# Patient Record
Sex: FEMALE | Race: WHITE | NOT HISPANIC OR LATINO | Employment: OTHER | URBAN - METROPOLITAN AREA
[De-identification: names, ages, dates, MRNs, and addresses within clinical notes are randomized per-mention and may not be internally consistent; named-entity substitution may affect disease eponyms.]

---

## 2017-01-04 ENCOUNTER — OFFICE VISIT (OUTPATIENT)
Dept: NUTRITION | Facility: HOSPITAL | Age: 42
End: 2017-01-04
Payer: MEDICARE

## 2017-01-04 DIAGNOSIS — E78.1 PURE HYPERGLYCERIDEMIA: ICD-10-CM

## 2017-01-04 DIAGNOSIS — E66.9 OBESITY, UNSPECIFIED: ICD-10-CM

## 2017-01-04 DIAGNOSIS — E11.9 DIABETES MELLITUS WITHOUT COMPLICATION (HCC): ICD-10-CM

## 2017-01-04 DIAGNOSIS — E78.00 PURE HYPERCHOLESTEROLEMIA: ICD-10-CM

## 2017-01-04 PROCEDURE — 97803 MED NUTRITION INDIV SUBSEQ: CPT

## 2017-01-17 ENCOUNTER — GENERIC CONVERSION - ENCOUNTER (OUTPATIENT)
Dept: OTHER | Facility: OTHER | Age: 42
End: 2017-01-17

## 2017-02-21 ENCOUNTER — ALLSCRIPTS OFFICE VISIT (OUTPATIENT)
Dept: OTHER | Facility: OTHER | Age: 42
End: 2017-02-21

## 2017-03-01 ENCOUNTER — OFFICE VISIT (OUTPATIENT)
Dept: NUTRITION | Facility: HOSPITAL | Age: 42
End: 2017-03-01
Payer: MEDICARE

## 2017-03-01 DIAGNOSIS — E78.00 PURE HYPERCHOLESTEROLEMIA: ICD-10-CM

## 2017-03-01 DIAGNOSIS — E78.1 PURE HYPERGLYCERIDEMIA: ICD-10-CM

## 2017-03-01 DIAGNOSIS — E66.9 OBESITY, UNSPECIFIED: ICD-10-CM

## 2017-03-01 DIAGNOSIS — E11.9 DIABETES MELLITUS WITHOUT COMPLICATION (HCC): ICD-10-CM

## 2017-03-01 PROCEDURE — 97803 MED NUTRITION INDIV SUBSEQ: CPT

## 2017-03-23 ENCOUNTER — TRANSCRIBE ORDERS (OUTPATIENT)
Dept: ADMINISTRATIVE | Facility: HOSPITAL | Age: 42
End: 2017-03-23

## 2017-03-23 ENCOUNTER — APPOINTMENT (OUTPATIENT)
Dept: LAB | Facility: HOSPITAL | Age: 42
End: 2017-03-23
Payer: MEDICARE

## 2017-03-23 DIAGNOSIS — G47.9 REPETITIVE INTRUSIONS OF SLEEP: Primary | ICD-10-CM

## 2017-03-23 DIAGNOSIS — G47.9 REPETITIVE INTRUSIONS OF SLEEP: ICD-10-CM

## 2017-03-23 LAB — VALPROATE SERPL-MCNC: 35 UG/ML (ref 50–100)

## 2017-03-23 PROCEDURE — 36415 COLL VENOUS BLD VENIPUNCTURE: CPT

## 2017-03-23 PROCEDURE — 80164 ASSAY DIPROPYLACETIC ACD TOT: CPT

## 2017-05-03 ENCOUNTER — GENERIC CONVERSION - ENCOUNTER (OUTPATIENT)
Dept: OTHER | Facility: OTHER | Age: 42
End: 2017-05-03

## 2017-05-17 ENCOUNTER — ALLSCRIPTS OFFICE VISIT (OUTPATIENT)
Dept: OTHER | Facility: OTHER | Age: 42
End: 2017-05-17

## 2017-05-24 ENCOUNTER — APPOINTMENT (EMERGENCY)
Dept: RADIOLOGY | Facility: HOSPITAL | Age: 42
End: 2017-05-24
Payer: MEDICARE

## 2017-05-24 ENCOUNTER — HOSPITAL ENCOUNTER (EMERGENCY)
Facility: HOSPITAL | Age: 42
Discharge: HOME | End: 2017-05-24
Attending: EMERGENCY MEDICINE | Admitting: EMERGENCY MEDICINE
Payer: MEDICARE

## 2017-05-24 VITALS
OXYGEN SATURATION: 92 % | HEART RATE: 64 BPM | DIASTOLIC BLOOD PRESSURE: 52 MMHG | WEIGHT: 188 LBS | RESPIRATION RATE: 18 BRPM | SYSTOLIC BLOOD PRESSURE: 91 MMHG | TEMPERATURE: 98.9 F | BODY MASS INDEX: 40.68 KG/M2

## 2017-05-24 DIAGNOSIS — M25.562 BILATERAL KNEE PAIN: Primary | ICD-10-CM

## 2017-05-24 DIAGNOSIS — M25.561 BILATERAL KNEE PAIN: Primary | ICD-10-CM

## 2017-05-24 PROCEDURE — 73562 X-RAY EXAM OF KNEE 3: CPT

## 2017-05-24 PROCEDURE — 99283 EMERGENCY DEPT VISIT LOW MDM: CPT

## 2017-05-24 RX ORDER — DIVALPROEX SODIUM 500 MG/1
1000 TABLET, DELAYED RELEASE ORAL
COMMUNITY
End: 2019-10-10

## 2017-05-24 RX ORDER — ARIPIPRAZOLE 10 MG/1
10 TABLET ORAL DAILY
COMMUNITY
End: 2018-12-03 | Stop reason: SDUPTHER

## 2017-05-24 RX ORDER — NAPROXEN 500 MG/1
500 TABLET ORAL 2 TIMES DAILY WITH MEALS
Qty: 14 TABLET | Refills: 0 | Status: SHIPPED | OUTPATIENT
Start: 2017-05-24 | End: 2017-05-31

## 2017-05-24 RX ORDER — DIVALPROEX SODIUM 250 MG/1
250 TABLET, DELAYED RELEASE ORAL
COMMUNITY
End: 2018-12-03 | Stop reason: SDUPTHER

## 2017-06-21 ENCOUNTER — APPOINTMENT (OUTPATIENT)
Dept: LAB | Facility: HOSPITAL | Age: 42
End: 2017-06-21
Payer: MEDICARE

## 2017-06-21 ENCOUNTER — TRANSCRIBE ORDERS (OUTPATIENT)
Dept: ADMINISTRATIVE | Facility: HOSPITAL | Age: 42
End: 2017-06-21

## 2017-06-21 DIAGNOSIS — G47.9 REPETITIVE INTRUSIONS OF SLEEP: ICD-10-CM

## 2017-06-21 DIAGNOSIS — F20.9 SUBCHRONIC SCHIZOPHRENIA (HCC): ICD-10-CM

## 2017-06-21 DIAGNOSIS — F70 MILD INTELLECTUAL DISABILITIES: ICD-10-CM

## 2017-06-21 DIAGNOSIS — F70 MILD INTELLECTUAL DISABILITIES: Primary | ICD-10-CM

## 2017-06-21 LAB — VALPROATE SERPL-MCNC: 46 UG/ML (ref 50–100)

## 2017-06-21 PROCEDURE — 80164 ASSAY DIPROPYLACETIC ACD TOT: CPT

## 2017-06-21 PROCEDURE — 36415 COLL VENOUS BLD VENIPUNCTURE: CPT

## 2017-07-12 ENCOUNTER — TRANSCRIBE ORDERS (OUTPATIENT)
Dept: ADMINISTRATIVE | Facility: HOSPITAL | Age: 42
End: 2017-07-12

## 2017-07-12 ENCOUNTER — APPOINTMENT (OUTPATIENT)
Dept: NUTRITION | Facility: HOSPITAL | Age: 42
End: 2017-07-12
Payer: MEDICARE

## 2017-07-12 ENCOUNTER — APPOINTMENT (OUTPATIENT)
Dept: LAB | Facility: HOSPITAL | Age: 42
End: 2017-07-12
Payer: MEDICARE

## 2017-07-12 DIAGNOSIS — E03.9 UNSPECIFIED HYPOTHYROIDISM: ICD-10-CM

## 2017-07-12 DIAGNOSIS — E78.1 PURE HYPERGLYCERIDEMIA: ICD-10-CM

## 2017-07-12 DIAGNOSIS — E78.00 PURE HYPERCHOLESTEROLEMIA: ICD-10-CM

## 2017-07-12 DIAGNOSIS — E11.9 DIABETES MELLITUS WITHOUT COMPLICATION (HCC): ICD-10-CM

## 2017-07-12 DIAGNOSIS — E87.6 HYPOPOTASSEMIA: ICD-10-CM

## 2017-07-12 DIAGNOSIS — E55.9 UNSPECIFIED VITAMIN D DEFICIENCY: ICD-10-CM

## 2017-07-12 DIAGNOSIS — E66.9 OBESITY, UNSPECIFIED: ICD-10-CM

## 2017-07-12 DIAGNOSIS — E11.9 DIABETES MELLITUS WITHOUT COMPLICATION (HCC): Primary | ICD-10-CM

## 2017-07-12 LAB
25(OH)D3 SERPL-MCNC: 98.2 NG/ML (ref 30–100)
ALBUMIN SERPL BCP-MCNC: 2.7 G/DL (ref 3.5–5)
ALP SERPL-CCNC: 70 U/L (ref 46–116)
ALT SERPL W P-5'-P-CCNC: 22 U/L (ref 12–78)
ANION GAP SERPL CALCULATED.3IONS-SCNC: 5 MMOL/L (ref 4–13)
AST SERPL W P-5'-P-CCNC: 19 U/L (ref 5–45)
BILIRUB SERPL-MCNC: 0.3 MG/DL (ref 0.2–1)
BUN SERPL-MCNC: 15 MG/DL (ref 5–25)
CALCIUM SERPL-MCNC: 8.1 MG/DL (ref 8.3–10.1)
CHLORIDE SERPL-SCNC: 103 MMOL/L (ref 100–108)
CHOLEST SERPL-MCNC: 116 MG/DL (ref 50–200)
CO2 SERPL-SCNC: 30 MMOL/L (ref 21–32)
CREAT SERPL-MCNC: 1.01 MG/DL (ref 0.6–1.3)
CREAT UR-MCNC: 59.3 MG/DL
EST. AVERAGE GLUCOSE BLD GHB EST-MCNC: 97 MG/DL
GFR SERPL CREATININE-BSD FRML MDRD: >60 ML/MIN/1.73SQ M
GLUCOSE P FAST SERPL-MCNC: 93 MG/DL (ref 65–99)
HBA1C MFR BLD: 5 % (ref 4.2–6.3)
HDLC SERPL-MCNC: 37 MG/DL (ref 40–60)
LDLC SERPL CALC-MCNC: 58 MG/DL (ref 0–100)
MICROALBUMIN UR-MCNC: <5 MG/L (ref 0–20)
MICROALBUMIN/CREAT 24H UR: <8 MG/G CREATININE (ref 0–30)
PHOSPHATE SERPL-MCNC: 4.1 MG/DL (ref 2.7–4.5)
POTASSIUM SERPL-SCNC: 3.7 MMOL/L (ref 3.5–5.3)
PROT SERPL-MCNC: 5.9 G/DL (ref 6.4–8.2)
SODIUM SERPL-SCNC: 138 MMOL/L (ref 136–145)
TRIGL SERPL-MCNC: 106 MG/DL
TSH SERPL DL<=0.05 MIU/L-ACNC: 5.25 UIU/ML (ref 0.36–3.74)

## 2017-07-12 PROCEDURE — 80061 LIPID PANEL: CPT

## 2017-07-12 PROCEDURE — 82306 VITAMIN D 25 HYDROXY: CPT

## 2017-07-12 PROCEDURE — 82570 ASSAY OF URINE CREATININE: CPT | Performed by: INTERNAL MEDICINE

## 2017-07-12 PROCEDURE — 83036 HEMOGLOBIN GLYCOSYLATED A1C: CPT

## 2017-07-12 PROCEDURE — 84100 ASSAY OF PHOSPHORUS: CPT

## 2017-07-12 PROCEDURE — 36415 COLL VENOUS BLD VENIPUNCTURE: CPT

## 2017-07-12 PROCEDURE — 84443 ASSAY THYROID STIM HORMONE: CPT

## 2017-07-12 PROCEDURE — 97803 MED NUTRITION INDIV SUBSEQ: CPT

## 2017-07-12 PROCEDURE — 82043 UR ALBUMIN QUANTITATIVE: CPT | Performed by: INTERNAL MEDICINE

## 2017-07-12 PROCEDURE — 80053 COMPREHEN METABOLIC PANEL: CPT

## 2017-08-30 ENCOUNTER — APPOINTMENT (OUTPATIENT)
Dept: LAB | Facility: HOSPITAL | Age: 42
End: 2017-08-30
Payer: MEDICARE

## 2017-08-30 ENCOUNTER — TRANSCRIBE ORDERS (OUTPATIENT)
Dept: ADMINISTRATIVE | Facility: HOSPITAL | Age: 42
End: 2017-08-30

## 2017-08-30 DIAGNOSIS — E03.9 UNSPECIFIED HYPOTHYROIDISM: Primary | ICD-10-CM

## 2017-08-30 DIAGNOSIS — E03.9 UNSPECIFIED HYPOTHYROIDISM: ICD-10-CM

## 2017-08-30 LAB
ESTRADIOL SERPL-MCNC: 47 PG/ML
FSH SERPL-ACNC: 64.8 MIU/ML
LH SERPL-ACNC: 42.2 MIU/ML
PROLACTIN SERPL-MCNC: 13.1 NG/ML
TSH SERPL DL<=0.05 MIU/L-ACNC: 1.29 UIU/ML (ref 0.36–3.74)

## 2017-08-30 PROCEDURE — 84443 ASSAY THYROID STIM HORMONE: CPT

## 2017-08-30 PROCEDURE — 83002 ASSAY OF GONADOTROPIN (LH): CPT

## 2017-08-30 PROCEDURE — 83001 ASSAY OF GONADOTROPIN (FSH): CPT

## 2017-08-30 PROCEDURE — 82670 ASSAY OF TOTAL ESTRADIOL: CPT

## 2017-08-30 PROCEDURE — 36415 COLL VENOUS BLD VENIPUNCTURE: CPT

## 2017-08-30 PROCEDURE — 84146 ASSAY OF PROLACTIN: CPT

## 2017-10-11 ENCOUNTER — APPOINTMENT (OUTPATIENT)
Dept: NUTRITION | Facility: HOSPITAL | Age: 42
End: 2017-10-11
Payer: MEDICARE

## 2017-10-11 DIAGNOSIS — E11.9 TYPE 2 DIABETES MELLITUS WITHOUT COMPLICATION, WITHOUT LONG-TERM CURRENT USE OF INSULIN (HCC): ICD-10-CM

## 2017-10-11 DIAGNOSIS — I10 ESSENTIAL HYPERTENSION, MALIGNANT: ICD-10-CM

## 2017-10-11 DIAGNOSIS — E78.00 PURE HYPERCHOLESTEROLEMIA: ICD-10-CM

## 2017-10-11 DIAGNOSIS — E66.01 MORBID OBESITY (HCC): ICD-10-CM

## 2017-10-11 PROCEDURE — 97803 MED NUTRITION INDIV SUBSEQ: CPT

## 2017-10-25 ENCOUNTER — TRANSCRIBE ORDERS (OUTPATIENT)
Dept: ADMINISTRATIVE | Facility: HOSPITAL | Age: 42
End: 2017-10-25

## 2017-10-25 ENCOUNTER — APPOINTMENT (OUTPATIENT)
Dept: LAB | Facility: HOSPITAL | Age: 42
End: 2017-10-25
Payer: MEDICARE

## 2017-10-25 DIAGNOSIS — E78.00 PURE HYPERCHOLESTEROLEMIA: Primary | ICD-10-CM

## 2017-10-25 DIAGNOSIS — I10 ESSENTIAL HYPERTENSION, MALIGNANT: ICD-10-CM

## 2017-10-25 DIAGNOSIS — E78.00 PURE HYPERCHOLESTEROLEMIA: ICD-10-CM

## 2017-10-25 LAB
ALBUMIN SERPL BCP-MCNC: 2.8 G/DL (ref 3.5–5)
ALP SERPL-CCNC: 89 U/L (ref 46–116)
ALT SERPL W P-5'-P-CCNC: 36 U/L (ref 12–78)
ANION GAP SERPL CALCULATED.3IONS-SCNC: 8 MMOL/L (ref 4–13)
AST SERPL W P-5'-P-CCNC: 34 U/L (ref 5–45)
BILIRUB DIRECT SERPL-MCNC: 0.1 MG/DL (ref 0–0.2)
BILIRUB SERPL-MCNC: 0.4 MG/DL (ref 0.2–1)
BUN SERPL-MCNC: 9 MG/DL (ref 5–25)
CALCIUM SERPL-MCNC: 8.9 MG/DL (ref 8.3–10.1)
CHLORIDE SERPL-SCNC: 105 MMOL/L (ref 100–108)
CHOLEST SERPL-MCNC: 113 MG/DL (ref 50–200)
CK SERPL-CCNC: 75 U/L (ref 26–192)
CO2 SERPL-SCNC: 30 MMOL/L (ref 21–32)
CREAT SERPL-MCNC: 1 MG/DL (ref 0.6–1.3)
GFR SERPL CREATININE-BSD FRML MDRD: 70 ML/MIN/1.73SQ M
GLUCOSE P FAST SERPL-MCNC: 95 MG/DL (ref 65–99)
HDLC SERPL-MCNC: 36 MG/DL (ref 40–60)
LDLC SERPL CALC-MCNC: 53 MG/DL (ref 0–100)
POTASSIUM SERPL-SCNC: 4.5 MMOL/L (ref 3.5–5.3)
PROT SERPL-MCNC: 6.7 G/DL (ref 6.4–8.2)
SODIUM SERPL-SCNC: 143 MMOL/L (ref 136–145)
TRIGL SERPL-MCNC: 119 MG/DL

## 2017-10-25 PROCEDURE — 82550 ASSAY OF CK (CPK): CPT

## 2017-10-25 PROCEDURE — 80048 BASIC METABOLIC PNL TOTAL CA: CPT

## 2017-10-25 PROCEDURE — 80076 HEPATIC FUNCTION PANEL: CPT

## 2017-10-25 PROCEDURE — 36415 COLL VENOUS BLD VENIPUNCTURE: CPT

## 2017-10-25 PROCEDURE — 80061 LIPID PANEL: CPT

## 2017-11-15 ENCOUNTER — GENERIC CONVERSION - ENCOUNTER (OUTPATIENT)
Dept: OTHER | Facility: OTHER | Age: 42
End: 2017-11-15
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
## 2017-11-29 ENCOUNTER — HOSPITAL ENCOUNTER (EMERGENCY)
Facility: HOSPITAL | Age: 42
Discharge: HOME | End: 2017-11-29
Admitting: EMERGENCY MEDICINE
Payer: MEDICARE

## 2017-11-29 ENCOUNTER — APPOINTMENT (EMERGENCY)
Dept: RADIOLOGY | Facility: HOSPITAL | Age: 42
End: 2017-11-29
Payer: MEDICARE

## 2017-11-29 VITALS
HEART RATE: 69 BPM | TEMPERATURE: 97.7 F | OXYGEN SATURATION: 94 % | DIASTOLIC BLOOD PRESSURE: 66 MMHG | RESPIRATION RATE: 18 BRPM | SYSTOLIC BLOOD PRESSURE: 117 MMHG

## 2017-11-29 DIAGNOSIS — S09.90XA: ICD-10-CM

## 2017-11-29 DIAGNOSIS — M54.2 NECK PAIN: ICD-10-CM
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
## 2017-11-29 PROCEDURE — 72125 CT NECK SPINE W/O DYE: CPT

## 2017-11-29 PROCEDURE — 70450 CT HEAD/BRAIN W/O DYE: CPT

## 2017-11-29 PROCEDURE — 99284 EMERGENCY DEPT VISIT MOD MDM: CPT

## 2017-11-29 RX ORDER — NAPROXEN 500 MG/1
500 TABLET ORAL ONCE
Status: DISCONTINUED | OUTPATIENT
Start: 2017-11-29 | End: 2017-11-29

## 2017-11-29 RX ORDER — TRAMADOL HYDROCHLORIDE 50 MG/1
50 TABLET ORAL ONCE
Status: COMPLETED | OUTPATIENT
Start: 2017-11-29 | End: 2017-11-29

## 2017-11-29 RX ORDER — NAPROXEN 500 MG/1
500 TABLET ORAL 2 TIMES DAILY WITH MEALS
Qty: 14 TABLET | Refills: 0 | Status: SHIPPED | OUTPATIENT
Start: 2017-11-29 | End: 2018-12-13 | Stop reason: HOSPADM

## 2017-11-29 RX ADMIN — TRAMADOL HYDROCHLORIDE 50 MG: 50 TABLET, FILM COATED ORAL at 14:35
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
## 2017-11-29 NOTE — ED NOTES
Discharge instructions gone over extensively with caregiver/ director of group home    Patient discharged home       Antony Campbell [facility]  11/29/17 7133

## 2017-11-29 NOTE — ED NOTES
Patient coughed while taking medication  Per father patient usually takes her medication in applesauce  I was not told that prior to administering medication         Loli Colon RN  11/29/17 1285
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
## 2018-01-10 ENCOUNTER — APPOINTMENT (OUTPATIENT)
Dept: NUTRITION | Facility: HOSPITAL | Age: 43
End: 2018-01-10
Payer: MEDICARE

## 2018-01-10 DIAGNOSIS — E78.00 PURE HYPERCHOLESTEROLEMIA: ICD-10-CM

## 2018-01-10 DIAGNOSIS — I10 ESSENTIAL HYPERTENSION, MALIGNANT: ICD-10-CM

## 2018-01-10 DIAGNOSIS — E66.01 MORBID OBESITY (HCC): ICD-10-CM

## 2018-01-10 DIAGNOSIS — E11.9 DIABETES MELLITUS WITHOUT COMPLICATION (HCC): ICD-10-CM

## 2018-01-10 PROCEDURE — 97803 MED NUTRITION INDIV SUBSEQ: CPT

## 2018-01-12 VITALS
RESPIRATION RATE: 16 BRPM | HEART RATE: 63 BPM | DIASTOLIC BLOOD PRESSURE: 70 MMHG | HEIGHT: 56 IN | OXYGEN SATURATION: 94 % | WEIGHT: 187.31 LBS | TEMPERATURE: 96.5 F | SYSTOLIC BLOOD PRESSURE: 96 MMHG | BODY MASS INDEX: 42.13 KG/M2

## 2018-01-12 NOTE — RESULT NOTES
Message   Called and left a voicemail that lab result are ready  Pt can call us back or can have endocrinologist f/u on results  Left a voicemail  Verified Results  () T4 and TSH [id number] 09:11AM Maryla Gottron     Test Name Result Flag Reference   TSH 2 610 uIU/mL  0 450-4 500   Thyroxine (T4) 6 7 ug/dL  4 5-12 0     () FSH and LH [id number] 09:11AM Maryla Gottron     Test Name Result Flag Reference   LH 38 2 mIU/mL     Adult Female: Follicular phase      2 4 -  12 6                                       Ovulation phase      14 0 -  95 6                                       Luteal phase          1 0 -  11 4                                       Postmenopausal        7 7 -  58 5   FSH 43 1 mIU/mL     Adult Female:                                        Follicular phase      3 5 -  12 5                                       Ovulation phase       4 7 -  21 5                                       Luteal phase          1 7 -   7 7                                       Postmenopausal       25 8 - 134 8

## 2018-01-12 NOTE — MISCELLANEOUS
Message  Message Free Text Note Form: Robles Blair is having nasal congestion and cough  She went to ER yesterday at [address] and was treated for wheezing and cough  She will follow up with Dr Vinicio Kumar next week  If she has any fever or difficulty in breathing  I am ordering Zpack for URI; she is rescheduled for January 21, 2016 at 10:30 am       Plan    1   Azithromycin 250 MG Oral Tablet; TAKE 2 TABLETS ON DAY 1 THEN TAKE 1   TABLET A DAY FOR 4 DAYS    Signatures   Electronically signed by : BISI Mariano; Jan 13 2016  9:33AM EST                       (Author)    Electronically signed by : BISI Mariano; Jan 13 2016  9:38AM EST                       (Author)

## 2018-01-13 VITALS
WEIGHT: 186 LBS | TEMPERATURE: 98.5 F | DIASTOLIC BLOOD PRESSURE: 70 MMHG | HEIGHT: 56 IN | BODY MASS INDEX: 41.84 KG/M2 | SYSTOLIC BLOOD PRESSURE: 100 MMHG | RESPIRATION RATE: 18 BRPM | OXYGEN SATURATION: 94 % | HEART RATE: 68 BPM

## 2018-01-15 NOTE — MISCELLANEOUS
-o whom it may concern,     Tao Jeong is a patient currently under the care of Dr Gerard Wyatt  In response to your question regarding a Prevnar 13 injection for Chela; As per  Dr Gerard Wyatt this injection is not indicated  until Tao Jeong is 73years of age  Should you have any questions or concerns, please feel free to contact our office at [phone]      Thank you,    Dylan Santana LPN  Practice Administrator      Electronically signed by:Haider Beckett OM  Aug  3 2016  2:33PM EST Author

## 2018-01-22 VITALS
SYSTOLIC BLOOD PRESSURE: 122 MMHG | HEIGHT: 55 IN | RESPIRATION RATE: 12 BRPM | HEART RATE: 65 BPM | WEIGHT: 180 LBS | DIASTOLIC BLOOD PRESSURE: 84 MMHG | OXYGEN SATURATION: 99 % | TEMPERATURE: 98 F | BODY MASS INDEX: 41.66 KG/M2

## 2018-02-05 ENCOUNTER — OFFICE VISIT (OUTPATIENT)
Dept: PULMONOLOGY | Facility: MEDICAL CENTER | Age: 43
End: 2018-02-05
Payer: MEDICARE

## 2018-02-05 VITALS
HEIGHT: 57 IN | DIASTOLIC BLOOD PRESSURE: 62 MMHG | BODY MASS INDEX: 38.83 KG/M2 | WEIGHT: 180 LBS | HEART RATE: 64 BPM | SYSTOLIC BLOOD PRESSURE: 112 MMHG | OXYGEN SATURATION: 95 % | RESPIRATION RATE: 18 BRPM | TEMPERATURE: 98.4 F

## 2018-02-05 DIAGNOSIS — G47.33 OBSTRUCTIVE SLEEP APNEA: ICD-10-CM

## 2018-02-05 DIAGNOSIS — J45.20 MILD INTERMITTENT ASTHMA WITHOUT COMPLICATION: Primary | ICD-10-CM

## 2018-02-05 PROCEDURE — 99214 OFFICE O/P EST MOD 30 MIN: CPT | Performed by: NURSE PRACTITIONER

## 2018-02-05 RX ORDER — ALBUTEROL SULFATE 2.5 MG/3ML
SOLUTION RESPIRATORY (INHALATION)
COMMUNITY
Start: 2016-01-21 | End: 2018-12-03 | Stop reason: ALTCHOICE

## 2018-02-05 NOTE — ASSESSMENT & PLAN NOTE
Sol Strauss is here for follow-up  She has history of obstructive sleep apnea and is using CPAP with 8 cm of water pressure with 2 L of oxygen she is now using a small ResMed med air fit for her mask with new head here  She did have nocturnal pulse oximetry on room air done December 9, 2017  Oxygen below 88% was 0 minutes this was ordered by Dr Harris     Plan includes discontinuation of supplemental oxygen  She was using 2 L of oxygen with her CPAP 8 cm    I did review with her caretaker results of her nocturnal pulse oximetry and I will discontinue her oxygen order

## 2018-02-05 NOTE — PROGRESS NOTES
Assessment/Plan:     Problem List Items Addressed This Visit        Respiratory    Obstructive sleep apnea     Nelly Broderick is here for follow-up  She has history of obstructive sleep apnea and is using CPAP with 8 cm of water pressure with 2 L of oxygen she is now using a small ResMed med air fit for her mask with new head here  She did have nocturnal pulse oximetry on room air done December 9, 2017  Oxygen below 88% was 0 minutes this was ordered by Dr Harris     Plan includes discontinuation of supplemental oxygen  She was using 2 L of oxygen with her CPAP 8 cm  I did review with her caretaker results of her nocturnal pulse oximetry and I will discontinue her oxygen order         Mild intermittent asthma without complication - Primary     Nelly Broderick does have mild intermittent asthma without complication  She is currently using nebulized treatment with albuterol 2 5 mg daily and has no wheezing now  She is not needing any maintenance inhalation  But she is using rescue albuterol as needed         Relevant Medications    albuterol (2 5 mg/3 mL) 0 083 % nebulizer solution            No Follow-up on file  All questions are answered to the patient's satisfaction and understanding  She verbalizes understanding  She is encouraged to call with any further questions or concerns  Portions of the record may have been created with voice recognition software  Occasional wrong word or "sound a like" substitutions may have occurred due to the inherent limitations of voice recognition software  Read the chart carefully and recognize, using context, where substitutions have occurred  ______________________________________________________________________    Chief Complaint:   Chief Complaint   Patient presents with    Follow-up     Sleep        Patient ID: Nelly Broderick is a 40 y o  y o  female has a past medical history of Asthma; Diabetes mellitus ([address] ); Disease of thyroid gland; Down syndrome;  Heart murmur; Hyperlipidemia; Long Q-T syndrome; Psychiatric disorder; and Sleep apnea  2/5/2018  HPI  Review of Systems   Constitutional: Negative  HENT: Negative  Eyes: Negative  Respiratory: Positive for cough  Cardiovascular: Negative  Gastrointestinal: Negative  Endocrine: Negative  Genitourinary: Negative  Musculoskeletal: Negative  Allergic/Immunologic: Negative  Neurological: Negative  Hematological: Negative  Psychiatric/Behavioral: Negative  Smoking history: She reports that she has never smoked  She has never used smokeless tobacco     The following portions of the patient's history were reviewed and updated as appropriate: problem list     Immunization History   Administered Date(s) Administered    Hep B, adult 11/06/2003, 12/04/2003, 05/06/2004    Td (adult), adsorbed 04/24/2003, 07/31/2013, 05/03/2014    Tuberculin Skin Test-PPD Intradermal 07/31/2015     Current Outpatient Prescriptions   Medication Sig Dispense Refill    albuterol (2 5 mg/3 mL) 0 083 % nebulizer solution Inhale      ARIPiprazole (ABILIFY) 10 mg tablet Take 10 mg by mouth daily      atorvastatin (LIPITOR) 10 mg tablet Take 10 mg by mouth daily   BD ULTRA-FINE LANCETS lancets TEST TWICE DAILY      divalproex sodium (DEPAKOTE) 500 mg EC tablet Take 500 mg by mouth every 12 (twelve) hours        fluticasone (FLONASE) 50 mcg/act nasal spray Fluticasone Propionate 50 MCG/ACT Nasal Suspension USE 1 SPRAY IN EACH NOSTRIL TWICE DAILY  Quantity: 1;  Refills: 5    Amna Cobble D O ; [id number] GM Bottle      glucose blood test strip TEST 2 TO 3 TIMES A DAY      levothyroxine (SYNTHROID) 50 mcg tablet Take 50 mcg by mouth daily  Synthroid 50 MCG Oral Tablet TAKE 1 TABLET DAILY  Refills: 0  Active       OXYGEN-HELIUM IN Inhale   VITAMIN D, ERGOCALCIFEROL, PO Take 1 25 mg by mouth once a week  Administer on sunday      ciclopirox (LOPROX) 0 77 % cream Apply topically 2 (two) times a day  Apply locally to groin, belly and underbreasts      divalproex sodium (DEPAKOTE) 250 mg EC tablet Take 250 mg by mouth daily at bedtime      Multiple Vitamins-Minerals (CENTRUM SILVER ULTRA WOMENS PO) Centrum Silver Ultra Womens Oral Tablet  Refills: 0  Active      naproxen (EC NAPROSYN) 500 MG EC tablet Take 1 tablet by mouth 2 (two) times a day with meals for 7 days 14 tablet 0    naproxen (NAPROSYN) 500 mg tablet Take 1 tablet by mouth 2 (two) times a day with meals for 7 days 14 tablet 0    sitaGLIPtin-metFORMIN (JANUMET)  MG per tablet Take 2 tablets by mouth daily      ziprasidone (GEODON) 60 mg capsule 1 tablet 2 (two) times a day  No current facility-administered medications for this visit  Allergies: Avandia [rosiglitazone]    Objective:  Vitals:    02/05/18 1451   BP: 112/62   BP Location: Left arm   Patient Position: Sitting   Cuff Size: Adult   Pulse: 64   Resp: 18   Temp: 98 4 °F (36 9 °C)   TempSrc: Oral   SpO2: 95%   Weight: 81 6 kg (180 lb)   Height: 4' 8 5" (1 435 m)   Oxygen Therapy  SpO2: 95 %    Wt Readings from Last 3 Encounters:   02/05/18 81 6 kg (180 lb)   11/15/17 81 6 kg (180 lb)   05/24/17 85 3 kg (188 lb)     Body mass index is 39 64 kg/m²  Physical Exam   Constitutional: She is oriented to person, place, and time  She appears well-developed and well-nourished  Down's syndrome   HENT:   Head: Normocephalic and atraumatic  Mallampati IV   Eyes: Conjunctivae and EOM are normal  Pupils are equal, round, and reactive to light  Neck: Normal range of motion  Neck supple  Cardiovascular: Normal rate and regular rhythm  Pulmonary/Chest: Effort normal and breath sounds normal    Abdominal: Soft  Bowel sounds are normal    Musculoskeletal: Normal range of motion  Neurological: She is alert and oriented to person, place, and time  Skin: Skin is warm and dry  Psychiatric: She has a normal mood and affect   Her behavior is normal        Lab Review: Appointment on 10/25/2017   Component Date Value    Total Bilirubin 10/25/2017 0 40     Bilirubin, Direct 10/25/2017 0 10     Alkaline Phosphatase 10/25/2017 89     AST 10/25/2017 34     ALT 10/25/2017 36     Total Protein 10/25/2017 6 7     Albumin 10/25/2017 2 8*    Cholesterol 10/25/2017 113     Triglycerides 10/25/2017 119     HDL, Direct 10/25/2017 36*    LDL Calculated 10/25/2017 53     Total CK 10/25/2017 75     Sodium 10/25/2017 143     Potassium 10/25/2017 4 5     Chloride 10/25/2017 105     CO2 10/25/2017 30     Anion Gap 10/25/2017 8     BUN 10/25/2017 9     Creatinine 10/25/2017 1 00     Glucose, Fasting 10/25/2017 95     Calcium 10/25/2017 8 9     eGFR 10/25/2017 70    Appointment on 08/30/2017   Component Date Value    FSH 08/30/2017 64 8     Estradiol 08/30/2017 47 0     LH 08/30/2017 42 2     TSH 3RD GENERATON 08/30/2017 1 288     Prolactin 08/30/2017 13 1        Diagnostics:  I have personally reviewed pertinent reports  Office Spirometry Results:     No results found

## 2018-02-05 NOTE — ASSESSMENT & PLAN NOTE
Cullen Cheng does have mild intermittent asthma without complication  She is currently using nebulized treatment with albuterol 2 5 mg daily and has no wheezing now  She is not needing any maintenance inhalation    But she is using rescue albuterol as needed

## 2018-02-05 NOTE — PATIENT INSTRUCTIONS
Sleep Apnea   AMBULATORY CARE:   Sleep apnea  is also called obstructive sleep apnea  It is a condition that causes you to stop breathing for 10 seconds or more while you are sleeping  During normal sleep, your throat is kept open by muscles that let air pass through easily  Sleep apnea causes the muscles and tissues around your throat to relax and block air from passing through  Sleep apnea may happen many times while you are asleep  Common symptoms include the following:   · No signs of breathing for 10 seconds or more while you sleep    · Snoring loudly, snorting, gasping or choking while you sleep, and waking up suddenly because of these    · A hard time thinking, remembering things, or focusing on your tasks the following day    · Headache or nausea    · Bedwetting or waking up often during the night to urinate    · Feeling sleepy, slow, and tired during the day  Seek care immediately if:   · You have chest pain or trouble breathing  Contact your healthcare provider if:   · You feel tired or depressed  · You have trouble staying awake during the day  · You have trouble thinking clearly  · You have questions or concerns about your condition or care  Treatment for sleep apnea  includes using a continuous positive airway pressure (CPAP) machine to keep your airway open during sleep  A mask is placed over your nose and mouth, or just your nose  The mask is hooked to the CPAP machine that blows a gentle stream of air into the mask when you breathe  This helps keep your airway open so you can breathe more regularly  Extra oxygen may be given to you through the machine  You may be given a mouth device  It looks like a mouth guard or dental retainer and stops your tongue and mouth tissues from blocking your throat while you sleep  Surgery may be needed to remove extra tissues that block your mouth, throat, or nose  Manage sleep apnea:   · Do not smoke    Nicotine and other chemicals in cigarettes and cigars can cause lung damage  Ask your healthcare provider for information if you currently smoke and need help to quit  E-cigarettes or smokeless tobacco still contain nicotine  Talk to your healthcare provider before you use these products  · Do not drink alcohol or take sedative medicine before you go to sleep  Alcohol and sedatives can relax the muscles and tissues around your throat  This can block the airflow to your lungs  · Maintain a healthy weight  Excess tissue around your throat may restrict your breathing  Ask your healthcare provider for information if you need to lose weight  · Sleep on your side or use pillows designed to prevent sleep apnea  This prevents your tongue or other tissues from blocking your throat  You can also raise the head of your bed  Follow up with your healthcare provider as directed:  Write down your questions so you remember to ask them during your visits  © 2017 [employer] Information is for End User's use only and may not be sold, redistributed or otherwise used for commercial purposes  All illustrations and images included in CareNotes® are the copyrighted property of A D [employer] M [employer]  or [employer]  The above information is an  only  It is not intended as medical advice for individual conditions or treatments  Talk to your doctor, nurse or pharmacist before following any medical regimen to see if it is safe and effective for you

## 2018-02-05 NOTE — LETTER
February 5, 2018     [id number]    Patient: Rose Petersen   YOB: 1975   Date of Visit: 2/5/2018       Dear Dr Priscilla Gonzalez Recipients: Thank you for referring Buena Leventhal to me for evaluation  Below are my notes for this consultation  If you have questions, please do not hesitate to call me  I look forward to following your patient along with you           Sincerely,        SESAR Lugo        CC: No Recipients

## 2018-02-05 NOTE — LETTER
February 5, 2018     Marcelino Womack  [address] [facility]    Patient: Sukumar Petersen   YOB: 1975   Date of Visit: 2/5/2018       Dear Dr Bateman Argue: Thank you for referring Franchesca Jessie to me for evaluation  Below are my notes for this consultation  If you have questions, please do not hesitate to call me  I look forward to following your patient along with you  David Salter has stable mild intermittent asthma  She is using nebulized albuterol as needed  She is not needing maintenance inhaler  She is compliant in the use of CPAP 8cm H2O pressure  She no longer needs supplemental oxygen with CPAP  Nocturnal pulse oximetry was done on room air CPAP  Oxygen below  88% was zero minutes and lowest oxygen was 92%  Sincerely,        SESAR Villarreal        CC: No Recipients  Tor Villarreal  2/5/2018  3:30 PM  Cosign Needed  Assessment/Plan:     Problem List Items Addressed This Visit        Respiratory    Obstructive sleep apnea     David Salter is here for follow-up  She has history of obstructive sleep apnea and is using CPAP with 8 cm of water pressure with 2 L of oxygen she is now using a small ResMed med air fit for her mask with new head here  She did have nocturnal pulse oximetry on room air done December 9, 2017  Oxygen below 88% was 0 minutes this was ordered by Dr Harris     Plan includes discontinuation of supplemental oxygen  She was using 2 L of oxygen with her CPAP 8 cm  I did review with her caretaker results of her nocturnal pulse oximetry and I will discontinue her oxygen order         Mild intermittent asthma without complication - Primary     David Salter does have mild intermittent asthma without complication  She is currently using nebulized treatment with albuterol 2 5 mg daily and has no wheezing now  She is not needing any maintenance inhalation    But she is using rescue albuterol as needed         Relevant Medications    albuterol (2 5 mg/3 mL) 0 083 % nebulizer solution            No Follow-up on file  All questions are answered to the patient's satisfaction and understanding  She verbalizes understanding  She is encouraged to call with any further questions or concerns  Portions of the record may have been created with voice recognition software  Occasional wrong word or "sound a like" substitutions may have occurred due to the inherent limitations of voice recognition software  Read the chart carefully and recognize, using context, where substitutions have occurred  ______________________________________________________________________    Chief Complaint:   Chief Complaint   Patient presents with    Follow-up     Sleep        Patient ID: Gigi Ojeda is a 40 y o  y o  female has a past medical history of Asthma; Diabetes mellitus ([address] ); Disease of thyroid gland; Down syndrome; Heart murmur; Hyperlipidemia; Long Q-T syndrome; Psychiatric disorder; and Sleep apnea  2/5/2018  HPI  Review of Systems   Constitutional: Negative  HENT: Negative  Eyes: Negative  Respiratory: Positive for cough  Cardiovascular: Negative  Gastrointestinal: Negative  Endocrine: Negative  Genitourinary: Negative  Musculoskeletal: Negative  Allergic/Immunologic: Negative  Neurological: Negative  Hematological: Negative  Psychiatric/Behavioral: Negative  Smoking history: She reports that she has never smoked   She has never used smokeless tobacco     The following portions of the patient's history were reviewed and updated as appropriate: problem list     Immunization History   Administered Date(s) Administered    Hep B, adult 11/06/2003, 12/04/2003, 05/06/2004    Td (adult), adsorbed 04/24/2003, 07/31/2013, 05/03/2014    Tuberculin Skin Test-PPD Intradermal 07/31/2015     Current Outpatient Prescriptions   Medication Sig Dispense Refill    albuterol (2 5 mg/3 mL) 0 083 % nebulizer solution Inhale      ARIPiprazole (ABILIFY) 10 mg tablet Take 10 mg by mouth daily      atorvastatin (LIPITOR) 10 mg tablet Take 10 mg by mouth daily   BD ULTRA-FINE LANCETS lancets TEST TWICE DAILY      divalproex sodium (DEPAKOTE) 500 mg EC tablet Take 500 mg by mouth every 12 (twelve) hours        fluticasone (FLONASE) 50 mcg/act nasal spray Fluticasone Propionate 50 MCG/ACT Nasal Suspension USE 1 SPRAY IN EACH NOSTRIL TWICE DAILY  Quantity: 1;  Refills: 5    Carlotta Niece D O ; [id number] GM Bottle      glucose blood test strip TEST 2 TO 3 TIMES A DAY      levothyroxine (SYNTHROID) 50 mcg tablet Take 50 mcg by mouth daily  Synthroid 50 MCG Oral Tablet TAKE 1 TABLET DAILY  Refills: 0  Active       OXYGEN-HELIUM IN Inhale   VITAMIN D, ERGOCALCIFEROL, PO Take 1 25 mg by mouth once a week  Administer on sunday      ciclopirox (LOPROX) 0 77 % cream Apply topically 2 (two) times a day  Apply locally to groin, belly and underbreasts      divalproex sodium (DEPAKOTE) 250 mg EC tablet Take 250 mg by mouth daily at bedtime      Multiple Vitamins-Minerals (CENTRUM SILVER ULTRA WOMENS PO) Centrum Silver Ultra Womens Oral Tablet  Refills: 0  Active      naproxen (EC NAPROSYN) 500 MG EC tablet Take 1 tablet by mouth 2 (two) times a day with meals for 7 days 14 tablet 0    naproxen (NAPROSYN) 500 mg tablet Take 1 tablet by mouth 2 (two) times a day with meals for 7 days 14 tablet 0    sitaGLIPtin-metFORMIN (JANUMET)  MG per tablet Take 2 tablets by mouth daily      ziprasidone (GEODON) 60 mg capsule 1 tablet 2 (two) times a day  No current facility-administered medications for this visit        Allergies: Avandia [rosiglitazone]    Objective:  Vitals:    02/05/18 1451   BP: 112/62   BP Location: Left arm   Patient Position: Sitting   Cuff Size: Adult   Pulse: 64   Resp: 18   Temp: 98 4 °F (36 9 °C)   TempSrc: Oral   SpO2: 95%   Weight: 81 6 kg (180 lb)   Height: 4' 8 5" (1 435 m)   Oxygen Therapy  SpO2: 95 %     Wt Readings from Last 3 Encounters:   02/05/18 81 6 kg (180 lb)   11/15/17 81 6 kg (180 lb)   05/24/17 85 3 kg (188 lb)     Body mass index is 39 64 kg/m²  Physical Exam   Constitutional: She is oriented to person, place, and time  She appears well-developed and well-nourished  Down's syndrome   HENT:   Head: Normocephalic and atraumatic  Mallampati IV   Eyes: Conjunctivae and EOM are normal  Pupils are equal, round, and reactive to light  Neck: Normal range of motion  Neck supple  Cardiovascular: Normal rate and regular rhythm  Pulmonary/Chest: Effort normal and breath sounds normal    Abdominal: Soft  Bowel sounds are normal    Musculoskeletal: Normal range of motion  Neurological: She is alert and oriented to person, place, and time  Skin: Skin is warm and dry  Psychiatric: She has a normal mood and affect  Her behavior is normal        Lab Review:   Appointment on 10/25/2017   Component Date Value    Total Bilirubin 10/25/2017 0 40     Bilirubin, Direct 10/25/2017 0 10     Alkaline Phosphatase 10/25/2017 89     AST 10/25/2017 34     ALT 10/25/2017 36     Total Protein 10/25/2017 6 7     Albumin 10/25/2017 2 8*    Cholesterol 10/25/2017 113     Triglycerides 10/25/2017 119     HDL, Direct 10/25/2017 36*    LDL Calculated 10/25/2017 53     Total CK 10/25/2017 75     Sodium 10/25/2017 143     Potassium 10/25/2017 4 5     Chloride 10/25/2017 105     CO2 10/25/2017 30     Anion Gap 10/25/2017 8     BUN 10/25/2017 9     Creatinine 10/25/2017 1 00     Glucose, Fasting 10/25/2017 95     Calcium 10/25/2017 8 9     eGFR 10/25/2017 70    Appointment on 08/30/2017   Component Date Value    FSH 08/30/2017 64 8     Estradiol 08/30/2017 47 0     LH 08/30/2017 42 2     TSH 3RD GENERATON 08/30/2017 1 288     Prolactin 08/30/2017 13 1        Diagnostics:  I have personally reviewed pertinent reports  Office Spirometry Results:     No results found

## 2018-04-11 ENCOUNTER — CLINICAL SUPPORT (OUTPATIENT)
Dept: NUTRITION | Facility: HOSPITAL | Age: 43
End: 2018-04-11
Payer: MEDICARE

## 2018-04-11 VITALS — BODY MASS INDEX: 39.6 KG/M2 | WEIGHT: 179.8 LBS

## 2018-04-11 DIAGNOSIS — E11.9 DIABETES MELLITUS, STABLE (HCC): ICD-10-CM

## 2018-04-11 PROCEDURE — 97803 MED NUTRITION INDIV SUBSEQ: CPT

## 2018-04-11 NOTE — PROGRESS NOTES
Follow-Up Nutrition Assessment Form    Patient Name: Kadeem Petersen    YOB: 1975    Sex: Female      Follow Up Date: 4/11/2018  Start Time: 1235 Stop Time: 1305 Total Minutes: 28     Data:  Present at session: Patient, mother and caregiver   Parent Concerns: Weight gain   Medical Dx/Reason for Referral: E11 9   Past Medical History:   Diagnosis Date    Asthma     Diabetes mellitus ([address] )     Disease of thyroid gland     Down syndrome     Heart murmur     Hyperlipidemia     Long Q-T syndrome     Psychiatric disorder     schizo    Sleep apnea        Current Outpatient Prescriptions   Medication Sig Dispense Refill    albuterol (2 5 mg/3 mL) 0 083 % nebulizer solution Inhale      ARIPiprazole (ABILIFY) 10 mg tablet Take 10 mg by mouth daily      atorvastatin (LIPITOR) 10 mg tablet Take 10 mg by mouth daily   BD ULTRA-FINE LANCETS lancets TEST TWICE DAILY      ciclopirox (LOPROX) 0 77 % cream Apply topically 2 (two) times a day  Apply locally to groin, belly and underbreasts      divalproex sodium (DEPAKOTE) 250 mg EC tablet Take 250 mg by mouth daily at bedtime      divalproex sodium (DEPAKOTE) 500 mg EC tablet Take 500 mg by mouth every 12 (twelve) hours        fluticasone (FLONASE) 50 mcg/act nasal spray Fluticasone Propionate 50 MCG/ACT Nasal Suspension USE 1 SPRAY IN EACH NOSTRIL TWICE DAILY  Quantity: 1;  Refills: 5    Anjana Marine D O ; [id number] GM Bottle      glucose blood test strip TEST 2 TO 3 TIMES A DAY      levothyroxine (SYNTHROID) 50 mcg tablet Take 50 mcg by mouth daily  Synthroid 50 MCG Oral Tablet TAKE 1 TABLET DAILY    Refills: 0  Active       Multiple Vitamins-Minerals (CENTRUM SILVER ULTRA WOMENS PO) Centrum Silver Ultra Womens Oral Tablet  Refills: 0  Active      naproxen (EC NAPROSYN) 500 MG EC tablet Take 1 tablet by mouth 2 (two) times a day with meals for 7 days 14 tablet 0    naproxen (NAPROSYN) 500 mg tablet Take 1 tablet by mouth 2 (two) times a day with meals for 7 days 14 tablet 0    OXYGEN-HELIUM IN Inhale   sitaGLIPtin-metFORMIN (JANUMET)  MG per tablet Take 2 tablets by mouth daily      VITAMIN D, ERGOCALCIFEROL, PO Take 1 25 mg by mouth once a week  Administer on sunday      ziprasidone (GEODON) 60 mg capsule 1 tablet 2 (two) times a day  No current facility-administered medications for this visit  Additional Meds/Supplements: None   Barriers to Learning: Pt has Down's Syndrome   Labs:    Height: Ht Readings from Last 3 Encounters:   02/05/18 4' 8 5" (1 435 m)   11/15/17 4' 7" (1 397 m)   05/17/17 4' 8" (1 422 m)      Weight: Wt Readings from Last 3 Encounters:   04/11/18 81 6 kg (179 lb 12 8 oz)   02/05/18 81 6 kg (180 lb)   11/15/17 81 6 kg (180 lb)     Body mass index is 39 6 kg/m²  Wt  Change Since Last Visit: [x]Yes     []No  Amount: 1 8 lb gain      Energy Needs:    Pain Screen: Are you having pain now? Goals Achieved: abiding by 1200 kcal diet, 8 pm snack and BS control off medication     New Goals:   1  Continue 1200 ADA diet daily   2  Take patient on walks, Jada or [employer] exercise programs weekly   3  Offer water or sugar free beverage or fresh cut-up vegetables if pt complains of hunger and is adamant about eating something       Initial PES:    Obesity related to h/o lack of physical activity as evidenced by elevated BMI   New PES:  None      New Problem List:  1  Weight gain   2  Lack of physical activity during winter   3         Assessment:       Medical Nutrition Therapy Intervention:  [x]Individualized Meal Plan []Understanding Lab Values   []Basic Pathophysiology of Disease []Food/Medication Interactions   [x]Food Diary [x]Exercise   []Lifestyle/Behavior Modification Techniques []Medication, Mechanism of Action   []Label Reading []Self Blood Glucose Monitoring   [x]Weight/BMI Goals []Other -    Other Notes: Pt's mother states that Xavi Bajwa has stopped being as active over the winter but she has started going to some [employer] exercise programs including Jada and yoga  They also intend to start taking her on walks again now that the weather is nicer  Steffanie Sanders has been able to stick to her 1200 ADA diet with the help of her caregivers at the group home  Pt has been pushing food away and saving food when she goes out for meals for a later time  She has been eating more vegetables and really enjoys salads  Pt has been maintaining BS below 100 without medications per BS log that group home is keeping         Comprehension: []Excellent  []Very Good  [x]Good  []Fair   []Poor    Receptivity: []Excellent  []Very Good  [x]Good  []Fair   []Poor    Expected Compliance: []Excellent  []Very Good  [x]Good  []Fair   []Poor      Labs:  CMP  Lab Results   Component Value Date     10/25/2017    K 4 5 10/25/2017     10/25/2017    CO2 30 10/25/2017    ANIONGAP 8 10/25/2017    BUN 9 10/25/2017    CREATININE 1 00 10/25/2017    GLUCOSE 115 10/10/2016    GLUF 95 10/25/2017    CALCIUM 8 9 10/25/2017    AST 34 10/25/2017    ALT 36 10/25/2017    ALKPHOS 89 10/25/2017    PROT 6 7 10/25/2017    BILITOT 0 40 10/25/2017    EGFR 70 10/25/2017       BMP  Lab Results   Component Value Date    GLUCOSE 115 10/10/2016    CALCIUM 8 9 10/25/2017     10/25/2017    K 4 5 10/25/2017    CO2 30 10/25/2017     10/25/2017    BUN 9 10/25/2017    CREATININE 1 00 10/25/2017       Lipids  Lab Results   Component Value Date    CHOL 113 10/25/2017    CHOL 116 07/12/2017    CHOL 124 02/08/2016     Lab Results   Component Value Date    HDL 36 (L) 10/25/2017    HDL 37 (L) 07/12/2017    HDL 42 02/08/2016     Lab Results   Component Value Date    LDLCALC 53 10/25/2017    LDLCALC 58 07/12/2017    LDLCALC 71 02/08/2016     Lab Results   Component Value Date    TRIG 119 10/25/2017    TRIG 106 07/12/2017    TRIG 54 02/08/2016     No components found for: CHOLHDL    Hemoglobin A1C  Lab Results   Component Value Date    HGBA1C 5 0 07/12/2017 Fasting Glucose  Lab Results   Component Value Date    GLUF 95 10/25/2017       Insulin     Thyroid  No results found for: TSH, F9FQAPT, C7GGWNW, THYROIDAB    Hepatic Function Panel  Lab Results   Component Value Date    ALT 36 10/25/2017    AST 34 10/25/2017    ALKPHOS 89 10/25/2017    BILITOT 0 40 10/25/2017       Celiac Disease Antibody Panel  No results found for: ENDOMYSIAL IGA, GLIADIN IGA, GLIADIN IGG, IGA, TISSUE TRANSGLUT AB, TTG IGA   Iron  No results found for: IRON, TIBC, FERRITIN    Vitamins  No results found for: VITAMIN B2   No results found for: NICOTINAMIDE, NICOTINIC ACID   No results found for: VITAMINB6  No results found for: [id number]  No results found for: VITB5  No results found for: Q4FLPXOP  No results found for: THYROGLB  No results found for: VITAMIN K   No results found for: 25-HYDROXY VIT D   No results found for: VITAMINE     No Follow-up on file      Kim Johnson, MS, RDN, [address] [id number]

## 2018-07-11 ENCOUNTER — CLINICAL SUPPORT (OUTPATIENT)
Dept: NUTRITION | Facility: HOSPITAL | Age: 43
End: 2018-07-11
Payer: MEDICARE

## 2018-07-11 ENCOUNTER — APPOINTMENT (OUTPATIENT)
Dept: LAB | Facility: HOSPITAL | Age: 43
End: 2018-07-11
Payer: MEDICARE

## 2018-07-11 ENCOUNTER — TRANSCRIBE ORDERS (OUTPATIENT)
Dept: ADMINISTRATIVE | Facility: HOSPITAL | Age: 43
End: 2018-07-11

## 2018-07-11 VITALS — WEIGHT: 176.6 LBS | BODY MASS INDEX: 38.9 KG/M2

## 2018-07-11 DIAGNOSIS — E03.9 MYXEDEMA HEART DISEASE: ICD-10-CM

## 2018-07-11 DIAGNOSIS — E11.9 DIABETES MELLITUS WITHOUT COMPLICATION (HCC): ICD-10-CM

## 2018-07-11 DIAGNOSIS — I51.9 MYXEDEMA HEART DISEASE: ICD-10-CM

## 2018-07-11 DIAGNOSIS — E11.9 SEVERE DIABETES MELLITUS (HCC): ICD-10-CM

## 2018-07-11 DIAGNOSIS — E11.9 SEVERE DIABETES MELLITUS (HCC): Primary | ICD-10-CM

## 2018-07-11 LAB
ALBUMIN SERPL BCP-MCNC: 3 G/DL (ref 3.5–5)
ALP SERPL-CCNC: 91 U/L (ref 46–116)
ALT SERPL W P-5'-P-CCNC: 32 U/L (ref 12–78)
ANION GAP SERPL CALCULATED.3IONS-SCNC: 4 MMOL/L (ref 4–13)
AST SERPL W P-5'-P-CCNC: 26 U/L (ref 5–45)
BILIRUB SERPL-MCNC: 0.4 MG/DL (ref 0.2–1)
BUN SERPL-MCNC: 12 MG/DL (ref 5–25)
CALCIUM SERPL-MCNC: 8.6 MG/DL (ref 8.3–10.1)
CHLORIDE SERPL-SCNC: 105 MMOL/L (ref 100–108)
CO2 SERPL-SCNC: 32 MMOL/L (ref 21–32)
CREAT SERPL-MCNC: 0.98 MG/DL (ref 0.6–1.3)
CREAT UR-MCNC: 16.1 MG/DL
EST. AVERAGE GLUCOSE BLD GHB EST-MCNC: 108 MG/DL
GFR SERPL CREATININE-BSD FRML MDRD: 72 ML/MIN/1.73SQ M
GLUCOSE SERPL-MCNC: 91 MG/DL (ref 65–140)
HBA1C MFR BLD: 5.4 % (ref 4.2–6.3)
MICROALBUMIN UR-MCNC: 6.4 MG/L (ref 0–20)
MICROALBUMIN/CREAT 24H UR: 40 MG/G CREATININE (ref 0–30)
POTASSIUM SERPL-SCNC: 4.3 MMOL/L (ref 3.5–5.3)
PROT SERPL-MCNC: 6.8 G/DL (ref 6.4–8.2)
SODIUM SERPL-SCNC: 141 MMOL/L (ref 136–145)
TSH SERPL DL<=0.05 MIU/L-ACNC: 1.89 UIU/ML (ref 0.36–3.74)

## 2018-07-11 PROCEDURE — 83036 HEMOGLOBIN GLYCOSYLATED A1C: CPT | Performed by: INTERNAL MEDICINE

## 2018-07-11 PROCEDURE — 36415 COLL VENOUS BLD VENIPUNCTURE: CPT | Performed by: INTERNAL MEDICINE

## 2018-07-11 PROCEDURE — 82570 ASSAY OF URINE CREATININE: CPT | Performed by: INTERNAL MEDICINE

## 2018-07-11 PROCEDURE — 84443 ASSAY THYROID STIM HORMONE: CPT

## 2018-07-11 PROCEDURE — 97803 MED NUTRITION INDIV SUBSEQ: CPT | Performed by: DIETITIAN, REGISTERED

## 2018-07-11 PROCEDURE — 82043 UR ALBUMIN QUANTITATIVE: CPT | Performed by: INTERNAL MEDICINE

## 2018-07-11 PROCEDURE — 80053 COMPREHEN METABOLIC PANEL: CPT | Performed by: INTERNAL MEDICINE

## 2018-07-11 NOTE — PROGRESS NOTES
Follow-Up Nutrition Assessment Form    Patient Name: China Petersen    YOB: 1975    Sex: Female      Follow Up Date: 7/11/2018  Start Time: 2:30 Stop Time: 3:30 Total Minutes: 30     Data:  Present at session: self and mother   Parent Concerns: Low BS   Medical Dx/Reason for Referral:  E11 9   Past Medical History:   Diagnosis Date    Asthma     Diabetes mellitus ([address] )     Disease of thyroid gland     Down syndrome     Heart murmur     Hyperlipidemia     Long Q-T syndrome     Psychiatric disorder     schizo    Sleep apnea        Current Outpatient Prescriptions   Medication Sig Dispense Refill    albuterol (2 5 mg/3 mL) 0 083 % nebulizer solution Inhale      ARIPiprazole (ABILIFY) 10 mg tablet Take 10 mg by mouth daily      atorvastatin (LIPITOR) 10 mg tablet Take 10 mg by mouth daily   BD ULTRA-FINE LANCETS lancets TEST TWICE DAILY      ciclopirox (LOPROX) 0 77 % cream Apply topically 2 (two) times a day  Apply locally to groin, belly and underbreasts      divalproex sodium (DEPAKOTE) 250 mg EC tablet Take 250 mg by mouth daily at bedtime      divalproex sodium (DEPAKOTE) 500 mg EC tablet Take 500 mg by mouth every 12 (twelve) hours        fluticasone (FLONASE) 50 mcg/act nasal spray Fluticasone Propionate 50 MCG/ACT Nasal Suspension USE 1 SPRAY IN EACH NOSTRIL TWICE DAILY  Quantity: 1;  Refills: 5    Earnest Shank D O ; [id number] GM Bottle      glucose blood test strip TEST 2 TO 3 TIMES A DAY      levothyroxine (SYNTHROID) 50 mcg tablet Take 50 mcg by mouth daily  Synthroid 50 MCG Oral Tablet TAKE 1 TABLET DAILY  Refills: 0  Active       Multiple Vitamins-Minerals (CENTRUM SILVER ULTRA WOMENS PO) Centrum Silver Ultra Womens Oral Tablet  Refills: 0  Active      naproxen (EC NAPROSYN) 500 MG EC tablet Take 1 tablet by mouth 2 (two) times a day with meals for 7 days 14 tablet 0    OXYGEN-HELIUM IN Inhale        sitaGLIPtin-metFORMIN (JANUMET)  MG per tablet Take 2 tablets by mouth daily      VITAMIN D, ERGOCALCIFEROL, PO Take 1 25 mg by mouth once a week  Administer on sunday      ziprasidone (GEODON) 60 mg capsule 1 tablet 2 (two) times a day  No current facility-administered medications for this visit  Additional Meds/Supplements:    Barriers to Learning: Other: Down's syndrome   Labs:    Height: Ht Readings from Last 3 Encounters:   02/05/18 4' 8 5" (1 435 m)   11/15/17 4' 7" (1 397 m)   05/17/17 4' 8" (1 422 m)      Weight: Wt Readings from Last 3 Encounters:   07/11/18 80 1 kg (176 lb 9 6 oz)   04/11/18 81 6 kg (179 lb 12 8 oz)   02/05/18 81 6 kg (180 lb)     Body mass index is 38 9 kg/m²  Wt  Change Since Last Visit: [x]Yes     []No  Amount:  - 3 3 lbs      Energy Needs: No calculation needed   Pain Screen: Are you having pain now? No      Goals Achieved: Pt has had a weight loss and BS are under control without medication  New Goals:   1    2    3        Initial PES:       New PES: No Change      New Problem List:  1  Pt has been having some low BS with agitation, headache and fuzziness   2    3        Assessment:   Overall pt is doing well     Medical Nutrition Therapy Intervention:  []Individualized Meal Plan []Understanding Lab Values   []Basic Pathophysiology of Disease []Food/Medication Interactions   []Food Diary []Exercise   []Lifestyle/Behavior Modification Techniques []Medication, Mechanism of Action   []Label Reading []Self Blood Glucose Monitoring   []Weight/BMI Goals []Other -    Other Notes: Mother is concerned that pt has been having some low BS, below 80 with agitation, headache and fuzziness  Mother than gives her a small snack which seems to help  Mother is concerned that diet is too restricted at the group home  She is also concerned about Chela choking when provided with salads without a lot of dressing as she doesn't chew her food well and can choke    Mother has come up with adding a little oil and vinegar to increase moisture on salad which discussed would not be a problem  Also suggested a snack when BS were a little low of 15 grams total carbohydrate with some protein such as cheese or peanut butter or low fat yogurt  Micah Valladares is scheduled for blood tests today including an A1c         Comprehension: []Excellent  [x]Very Good  []Good  []Fair   []Poor    Receptivity: []Excellent  [x]Very Good  []Good  []Fair   []Poor    Expected Compliance: []Excellent  [x]Very Good  []Good  []Fair   []Poor      Labs:  CMP  Lab Results   Component Value Date     10/25/2017    K 4 5 10/25/2017     10/25/2017    CO2 30 10/25/2017    ANIONGAP 8 10/25/2017    BUN 9 10/25/2017    CREATININE 1 00 10/25/2017    GLUCOSE 115 10/10/2016    GLUF 95 10/25/2017    CALCIUM 8 9 10/25/2017    AST 34 10/25/2017    ALT 36 10/25/2017    ALKPHOS 89 10/25/2017    PROT 6 7 10/25/2017    BILITOT 0 40 10/25/2017    EGFR 70 10/25/2017       BMP  Lab Results   Component Value Date    GLUCOSE 115 10/10/2016    CALCIUM 8 9 10/25/2017     10/25/2017    K 4 5 10/25/2017    CO2 30 10/25/2017     10/25/2017    BUN 9 10/25/2017    CREATININE 1 00 10/25/2017       Lipids  Lab Results   Component Value Date    CHOL 113 10/25/2017    CHOL 116 07/12/2017    CHOL 124 02/08/2016     Lab Results   Component Value Date    HDL 36 (L) 10/25/2017    HDL 37 (L) 07/12/2017    HDL 42 02/08/2016     Lab Results   Component Value Date    LDLCALC 53 10/25/2017    LDLCALC 58 07/12/2017    LDLCALC 71 02/08/2016     Lab Results   Component Value Date    TRIG 119 10/25/2017    TRIG 106 07/12/2017    TRIG 54 02/08/2016     No components found for: CHOLHDL    Hemoglobin A1C  Lab Results   Component Value Date    HGBA1C 5 0 07/12/2017       Fasting Glucose  Lab Results   Component Value Date    GLUF 95 10/25/2017       Insulin     Thyroid  No results found for: TSH, [id number], H8VBRPE, THYROIDAB    Hepatic Function Panel  Lab Results   Component Value Date    ALT 36 10/25/2017 AST 34 10/25/2017    ALKPHOS 89 10/25/2017    BILITOT 0 40 10/25/2017       Celiac Disease Antibody Panel  No results found for: ENDOMYSIAL IGA, GLIADIN IGA, GLIADIN IGG, IGA, TISSUE TRANSGLUT AB, TTG IGA   Iron  No results found for: IRON, TIBC, FERRITIN    Vitamins  No results found for: VITAMIN B2   No results found for: NICOTINAMIDE, NICOTINIC ACID   No results found for: VITAMINB6  No results found for: [id number]  No results found for: VITB5  No results found for: D4RVLXPR  No results found for: THYROGLB  No results found for: VITAMIN K   No results found for: 25-HYDROXY VIT D   No results found for: VITAMINE     No Follow-up on file      Luh Blunt, MS, RDN, [address] [phone]

## 2018-08-01 RX ORDER — BENZALKONIUM CHLORIDE 170 MG/89ML
GEL TOPICAL
COMMUNITY
End: 2019-10-10

## 2018-08-01 RX ORDER — DIVALPROEX SODIUM 500 MG/1
1000 TABLET, EXTENDED RELEASE ORAL
COMMUNITY
Start: 2018-07-10 | End: 2018-12-03 | Stop reason: SDUPTHER

## 2018-08-01 RX ORDER — LEVOCETIRIZINE DIHYDROCHLORIDE 5 MG/1
TABLET, FILM COATED ORAL
Status: ON HOLD | COMMUNITY
Start: 2018-03-22 | End: 2018-12-13

## 2018-08-01 RX ORDER — ARIPIPRAZOLE 10 MG/1
10 TABLET ORAL
COMMUNITY
Start: 2018-07-10 | End: 2019-10-10

## 2018-08-06 ENCOUNTER — OFFICE VISIT (OUTPATIENT)
Dept: PULMONOLOGY | Facility: MEDICAL CENTER | Age: 43
End: 2018-08-06
Payer: MEDICARE

## 2018-08-06 VITALS
HEART RATE: 62 BPM | WEIGHT: 175 LBS | BODY MASS INDEX: 37.76 KG/M2 | TEMPERATURE: 98.1 F | HEIGHT: 57 IN | DIASTOLIC BLOOD PRESSURE: 60 MMHG | OXYGEN SATURATION: 98 % | RESPIRATION RATE: 16 BRPM | SYSTOLIC BLOOD PRESSURE: 110 MMHG

## 2018-08-06 DIAGNOSIS — J45.20 MILD INTERMITTENT ASTHMA WITHOUT COMPLICATION: ICD-10-CM

## 2018-08-06 DIAGNOSIS — R06.02 SHORTNESS OF BREATH: Primary | ICD-10-CM

## 2018-08-06 DIAGNOSIS — G47.33 OBSTRUCTIVE SLEEP APNEA: ICD-10-CM

## 2018-08-06 DIAGNOSIS — J30.89 ALLERGIC RHINITIS DUE TO OTHER ALLERGIC TRIGGER, UNSPECIFIED SEASONALITY: ICD-10-CM

## 2018-08-06 PROCEDURE — 99214 OFFICE O/P EST MOD 30 MIN: CPT | Performed by: NURSE PRACTITIONER

## 2018-08-06 PROCEDURE — 94010 BREATHING CAPACITY TEST: CPT | Performed by: NURSE PRACTITIONER

## 2018-08-06 RX ORDER — FLUTICASONE PROPIONATE 50 MCG
1 SPRAY, SUSPENSION (ML) NASAL 2 TIMES DAILY
Qty: 16 G | Refills: 5 | Status: SHIPPED | OUTPATIENT
Start: 2018-08-06 | End: 2019-10-10

## 2018-08-06 NOTE — ASSESSMENT & PLAN NOTE
Greer Adams has mild intermittent asthma without complication  She has not been on any maintenance inhaler in some time  She does have nebulizer with albuterol that she can use as needed  Currently she is stable  PFTs were done today in the office show moderate restrictive defect  Forced vital capacity was 1 79 L or 62% of predicted FEV1 1 75 or 74% and obstruction ratio was 98% flow volume loop shows moderate restrictive impairment  She is stable at this time

## 2018-08-06 NOTE — PATIENT INSTRUCTIONS
Ryan Young has mild asthma  This is stable  She can use her rescue inhaler as needed  She does have a  Nebulizer with albuterol as needed  She has allergic rhinitis and this is stable  Continue fluticasone nasal spray 1 spray in each nostril twice daily  It is important that you use at nighttime before uses CPAP

## 2018-08-06 NOTE — PROGRESS NOTES
Assessment/Plan:     Problem List Items Addressed This Visit        Respiratory    Obstructive sleep apnea     Chela has moderate obstructive sleep apnea  He is using an benefitting from her machine  According to my most recent records she is using CPAP of 8 cm without supplemental oxygen nocturnal pulse oximetry was done in November of 2017 on room air CPAP and oxygen below 90% was 0  She is using an benefitting she was initially diagnosed in 2012  Mild intermittent asthma without complication     Chela has mild intermittent asthma without complication  She has not been on any maintenance inhaler in some time  She does have nebulizer with albuterol that she can use as needed  Currently she is stable  PFTs were done today in the office show moderate restrictive defect  Forced vital capacity was 1 79 L or 62% of predicted FEV1 1 75 or 74% and obstruction ratio was 98% flow volume loop shows moderate restrictive impairment  She is stable at this time  Other Visit Diagnoses     Shortness of breath    -  Primary    Relevant Orders    POCT spirometry (Completed)    Allergic rhinitis due to other allergic trigger, unspecified seasonality        Relevant Medications    fluticasone (FLONASE) 50 mcg/act nasal spray            Return in about 6 months (around 2/6/2019)  All questions are answered to the patient's satisfaction and understanding  She verbalizes understanding  She is encouraged to call with any further questions or concerns  Portions of the record may have been created with voice recognition software  Occasional wrong word or "sound a like" substitutions may have occurred due to the inherent limitations of voice recognition software  Read the chart carefully and recognize, using context, where substitutions have occurred      Electronically Signed by SESAR Moran    ______________________________________________________________________    Chief Complaint:   Chief Complaint   Patient presents with    Follow-up       Patient ID: Lidia Del Toro is a 40 y o  y o  female has a past medical history of Asthma; Diabetes mellitus ([address] ); Disease of thyroid gland; Down syndrome; Heart murmur; Hyperlipidemia; Long Q-T syndrome; Psychiatric disorder; and Sleep apnea  8/6/2018  Lidia Del Toro is a 27-year-old female with mild intermittent asthma without complication as well as obstructive sleep apnea  She is not needing any  any maintenance inhalation  But does have access to nebulizer with albuterol as needed  She also has history of obstructive sleep apnea and is using CPAP of 8 cm of water pressure  She had a nocturnal pulse oximetry done December of 2017  Oxalate is De Estrada below 88% was 0 minutes and this was ordered by Dr Jaleel Echavarria  According to my last notes in February 2018 she is no longer using supplemental oxygen with her CPAP  She is here today with her mother and is here for follow-up  He also has Flonase nasal spray that she uses 1 spray in each nostril both a m  and 8:00 p m  this is for allergic rhinitis  Cough   This is a chronic problem  The current episode started more than 1 year ago  The problem has been unchanged  The problem occurs every few hours  The cough is non-productive  Associated symptoms include postnasal drip  She has tried a beta-agonist inhaler for the symptoms  The treatment provided mild relief  Her past medical history is significant for asthma  Review of Systems   Constitutional: Negative  HENT: Positive for postnasal drip  Eyes: Negative  Respiratory: Positive for cough  Cardiovascular: Negative  Gastrointestinal: Negative  Endocrine: Negative  Genitourinary: Negative  Musculoskeletal: Negative  Skin: Negative  Allergic/Immunologic: Negative  Neurological: Negative  Hematological: Negative  Psychiatric/Behavioral: Negative  Smoking history: She reports that she has never smoked   She has never used smokeless tobacco     The following portions of the patient's history were reviewed and updated as appropriate: allergies, current medications, past family history, past medical history, past social history, past surgical history and problem list     Immunization History   Administered Date(s) Administered    Hep B, adult 11/06/2003, 12/04/2003, 05/06/2004    Td (adult), adsorbed 04/24/2003, 07/31/2013, 05/03/2014    Tuberculin Skin Test-PPD Intradermal 07/31/2015     Current Outpatient Prescriptions   Medication Sig Dispense Refill    albuterol (2 5 mg/3 mL) 0 083 % nebulizer solution Inhale      atorvastatin (LIPITOR) 10 mg tablet Take 10 mg by mouth daily   BD ULTRA-FINE LANCETS lancets TEST TWICE DAILY      ciclopirox (LOPROX) 0 77 % cream Apply topically 2 (two) times a day  Apply locally to groin, belly and underbreasts      divalproex sodium (DEPAKOTE) 500 mg EC tablet Take 500 mg by mouth every 12 (twelve) hours        fluticasone (FLONASE) 50 mcg/act nasal spray 1 spray into each nostril 2 (two) times a day for 30 days Fluticasone Propionate 50 MCG/ACT Nasal Suspension USE 1 SPRAY IN EACH NOSTRIL TWICE DAILY  Quantity: 1;  Refills: 5    Chelsea Harris Hamper D O ; [id number] GM Bottle  Use that a m  and 8:00 p m  16 g 5    glucose blood test strip TEST 2 TO 3 TIMES A DAY      levocetirizine (XYZAL) 5 MG tablet TAKE 1 TABLET BY MOUTH ONCE DAILY AT 8PM      levothyroxine (SYNTHROID) 50 mcg tablet Take 50 mcg by mouth daily  Synthroid 50 MCG Oral Tablet TAKE 1 TABLET DAILY  Refills: 0  Active       Multiple Vitamins-Minerals (CENTRUM SILVER ULTRA WOMENS PO) Centrum Silver Ultra Womens Oral Tablet  Refills: 0  Active      OXYGEN-HELIUM IN Inhale   VITAMIN D, ERGOCALCIFEROL, PO Take 1 25 mg by mouth once a week   Administer on sunday      Wound Dressings (REVITADERM WOUND CARE) GEL Inhale      ARIPiprazole (ABILIFY) 10 mg tablet Take 10 mg by mouth daily      ARIPiprazole (ABILIFY) 10 mg tablet Take 10 mg by mouth      divalproex sodium (DEPAKOTE ER) 500 mg 24 hr tablet Take 1,000 mg by mouth      divalproex sodium (DEPAKOTE) 250 mg EC tablet Take 250 mg by mouth daily at bedtime      naproxen (EC NAPROSYN) 500 MG EC tablet Take 1 tablet by mouth 2 (two) times a day with meals for 7 days 14 tablet 0    sitaGLIPtin-metFORMIN (JANUMET)  MG per tablet Take 2 tablets by mouth daily      ziprasidone (GEODON) 60 mg capsule 1 tablet 2 (two) times a day  No current facility-administered medications for this visit  Allergies: Avandia [rosiglitazone]    Objective:  Vitals:    08/06/18 1423   BP: 110/60   BP Location: Left arm   Patient Position: Sitting   Cuff Size: Standard   Pulse: 62   Resp: 16   Temp: 98 1 °F (36 7 °C)   TempSrc: Tympanic   SpO2: 98%   Weight: 79 4 kg (175 lb)   Height: 4' 9" (1 448 m)   Oxygen Therapy  SpO2: 98 %    Wt Readings from Last 3 Encounters:   08/06/18 79 4 kg (175 lb)   07/11/18 80 1 kg (176 lb 9 6 oz)   04/11/18 81 6 kg (179 lb 12 8 oz)     Body mass index is 37 87 kg/m²  Physical Exam   Constitutional: She is oriented to person, place, and time  She appears well-developed and well-nourished  HENT:   Head: Normocephalic  Eyes: Pupils are equal, round, and reactive to light  Neck: Normal range of motion  Cardiovascular: Normal rate and regular rhythm  Pulmonary/Chest: Effort normal and breath sounds normal    Abdominal: Soft  Musculoskeletal: Normal range of motion  Neurological: She is alert and oriented to person, place, and time  Skin: Skin is warm and dry  Psychiatric: She has a normal mood and affect   Her behavior is normal  Thought content normal        Lab Review:   Appointment on 07/11/2018   Component Date Value    TSH 3RD GENERATON 07/11/2018 1 890    Transcribe Orders on 07/11/2018   Component Date Value    Hemoglobin A1C 07/11/2018 5 4     EAG 07/11/2018 108     Sodium 07/11/2018 141     Potassium 07/11/2018 4 3  Chloride 07/11/2018 105     CO2 07/11/2018 32     Anion Gap 07/11/2018 4     BUN 07/11/2018 12     Creatinine 07/11/2018 0 98     Glucose 07/11/2018 91     Calcium 07/11/2018 8 6     AST 07/11/2018 26     ALT 07/11/2018 32     Alkaline Phosphatase 07/11/2018 91     Total Protein 07/11/2018 6 8     Albumin 07/11/2018 3 0*    Total Bilirubin 07/11/2018 0 40     eGFR 07/11/2018 72     Creatinine, Ur 07/11/2018 16 1     Microalbum  ,U,Random 07/11/2018 6 4     Microalb Creat Ratio 07/11/2018 40*       Diagnostics:  I have personally reviewed pertinent reports  Office Spirometry Results:  FEV1: 1 75 liters (74%)  FVC: 1 79 liters (62%)  FEV1/FVC: 97 8 %  ESS:    No results found

## 2018-08-06 NOTE — ASSESSMENT & PLAN NOTE
Errol Gann has moderate obstructive sleep apnea  He is using an benefitting from her machine  According to my most recent records she is using CPAP of 8 cm without supplemental oxygen nocturnal pulse oximetry was done in November of 2017 on room air CPAP and oxygen below 90% was 0  She is using an benefitting she was initially diagnosed in 2012

## 2018-08-14 ENCOUNTER — TELEPHONE (OUTPATIENT)
Dept: PULMONOLOGY | Facility: MEDICAL CENTER | Age: 43
End: 2018-08-14

## 2018-08-14 NOTE — TELEPHONE ENCOUNTER
Andrea Ortega from [facility] would like to speak to you  [phone]  They do not have it on file that [facility] uses a  Rescue inhaler and would like to speak to you in regards

## 2018-09-20 DIAGNOSIS — G47.33 OSA (OBSTRUCTIVE SLEEP APNEA): Primary | ICD-10-CM

## 2018-09-20 DIAGNOSIS — G47.33 OSA (OBSTRUCTIVE SLEEP APNEA): ICD-10-CM

## 2018-09-20 DIAGNOSIS — J40 BRONCHITIS: Primary | ICD-10-CM

## 2018-09-20 RX ORDER — ALBUTEROL SULFATE 2.5 MG/3ML
2.5 SOLUTION RESPIRATORY (INHALATION) 2 TIMES DAILY
Qty: 2160 ML | Refills: 0 | Status: SHIPPED | OUTPATIENT
Start: 2018-09-20 | End: 2018-12-03 | Stop reason: ALTCHOICE

## 2018-09-25 ENCOUNTER — TELEPHONE (OUTPATIENT)
Dept: PULMONOLOGY | Facility: MEDICAL CENTER | Age: 43
End: 2018-09-25

## 2018-10-10 ENCOUNTER — CLINICAL SUPPORT (OUTPATIENT)
Dept: NUTRITION | Facility: HOSPITAL | Age: 43
End: 2018-10-10
Payer: MEDICARE

## 2018-10-10 VITALS — BODY MASS INDEX: 38.39 KG/M2 | WEIGHT: 177.4 LBS

## 2018-10-10 DIAGNOSIS — E11.9 DIABETES MELLITUS WITHOUT COMPLICATION (HCC): ICD-10-CM

## 2018-10-10 PROCEDURE — 97803 MED NUTRITION INDIV SUBSEQ: CPT | Performed by: DIETITIAN, REGISTERED

## 2018-10-10 NOTE — PROGRESS NOTES
Follow-Up Nutrition Assessment Form    Patient Name: Zeinab Petersen    YOB: 1975    Sex: Female      Follow Up Date: 10/10/2018  Start Time: 2:30 Stop Time: 3:00 Total Minutes: 30     Data:  Present at session: self, mother and    Parent Concerns: None   Medical Dx/Reason for Referral:  E11 9   Past Medical History:   Diagnosis Date    Asthma     Diabetes mellitus ([address] )     Disease of thyroid gland     Down syndrome     Heart murmur     Hyperlipidemia     Long Q-T syndrome     Psychiatric disorder     schizo    Sleep apnea        Current Outpatient Prescriptions   Medication Sig Dispense Refill    albuterol (2 5 mg/3 mL) 0 083 % nebulizer solution Inhale      albuterol (2 5 mg/3 mL) 0 083 % nebulizer solution Take 1 vial (2 5 mg total) by nebulization 2 (two) times a day 2160 mL 0    ARIPiprazole (ABILIFY) 10 mg tablet Take 10 mg by mouth daily      ARIPiprazole (ABILIFY) 10 mg tablet Take 10 mg by mouth      atorvastatin (LIPITOR) 10 mg tablet Take 10 mg by mouth daily   BD ULTRA-FINE LANCETS lancets TEST TWICE DAILY      ciclopirox (LOPROX) 0 77 % cream Apply topically 2 (two) times a day  Apply locally to groin, belly and underbreasts      divalproex sodium (DEPAKOTE ER) 500 mg 24 hr tablet Take 1,000 mg by mouth      divalproex sodium (DEPAKOTE) 250 mg EC tablet Take 250 mg by mouth daily at bedtime      divalproex sodium (DEPAKOTE) 500 mg EC tablet Take 500 mg by mouth every 12 (twelve) hours        fluticasone (FLONASE) 50 mcg/act nasal spray 1 spray into each nostril 2 (two) times a day for 30 days Fluticasone Propionate 50 MCG/ACT Nasal Suspension USE 1 SPRAY IN EACH NOSTRIL TWICE DAILY    Quantity: 1;  Refills: 5    Chaparrita Harris O ; [id number] GM Bottle  Use that a m  and 8:00 p m  16 g 5    glucose blood test strip TEST 2 TO 3 TIMES A DAY      levocetirizine (XYZAL) 5 MG tablet TAKE 1 TABLET BY MOUTH ONCE DAILY AT 8PM      levothyroxine (SYNTHROID) 50 mcg tablet Take 50 mcg by mouth daily  Synthroid 50 MCG Oral Tablet TAKE 1 TABLET DAILY  Refills: 0  Active       Multiple Vitamins-Minerals (CENTRUM SILVER ULTRA WOMENS PO) Centrum Silver Ultra Womens Oral Tablet  Refills: 0  Active      naproxen (EC NAPROSYN) 500 MG EC tablet Take 1 tablet by mouth 2 (two) times a day with meals for 7 days 14 tablet 0    OXYGEN-HELIUM IN Inhale   sitaGLIPtin-metFORMIN (JANUMET)  MG per tablet Take 2 tablets by mouth daily      VITAMIN D, ERGOCALCIFEROL, PO Take 1 25 mg by mouth once a week  Administer on sunday      Wound Dressings (REVITADERM WOUND CARE) GEL Inhale      ziprasidone (GEODON) 60 mg capsule 1 tablet 2 (two) times a day  No current facility-administered medications for this visit  Additional Meds/Supplements:  None   Barriers to Learning: Learning Disability   Labs: None   Height: Ht Readings from Last 3 Encounters:   08/06/18 4' 9" (1 448 m)   02/05/18 4' 8 5" (1 435 m)   11/15/17 4' 7" (1 397 m)      Weight: Wt Readings from Last 3 Encounters:   10/10/18 80 5 kg (177 lb 6 4 oz)   08/06/18 79 4 kg (175 lb)   07/11/18 80 1 kg (176 lb 9 6 oz)     Body mass index is 38 39 kg/m²  Wt  Change Since Last Visit: []Yes     [x]No  Amount:       Energy Needs: No calculation needed   Pain Screen: Are you having pain now? No      Goals Achieved: BS control, Carbohydrate control     New Goals:   1  Follow an 1800 kcal/day diet   2  Increase activity   3  Initial PES:       New PES: No Change      New Problem List:  1    2    3        Assessment:  Pt has been able to maintain a consistent intake with supervision from family and  at group home    This is reflected in last A1c of 5 4     Medical Nutrition Therapy Intervention:  []Individualized Meal Plan []Understanding Lab Values   []Basic Pathophysiology of Disease []Food/Medication Interactions   []Food Diary []Exercise   []Lifestyle/Behavior Modification Techniques []Medication, Mechanism of Action   []Label Reading []Self Blood Glucose Monitoring   []Weight/BMI Goals []Other -    Other Notes: Pt is doing quite well  Her kcal intake has been increased to 1800 kcals/day  Pt has been avoiding diet soda and drinking water or tea instead, saving diet soda for special occasions  Per  and mother, pt is much improved in self-controlling intake and will now say that she is done eating instead of trying to finish everything  Mother is concerned with her lack of movement throughout the day which was due to a family event  Mother is trying to increase her physical activities  Everyone is aware of a slight weight gain but this is not of concern at this time    Follow-up in 6 months (4/10/19)       Comprehension: []Excellent  [x]Very Good  []Good  []Fair   []Poor    Receptivity: []Excellent  [x]Very Good  []Good  []Fair   []Poor    Expected Compliance: []Excellent  [x]Very Good  []Good  []Fair   []Poor      Labs:  CMP  Lab Results   Component Value Date     07/11/2018    K 4 3 07/11/2018     07/11/2018    CO2 32 07/11/2018    ANIONGAP 15 0 01/08/2016    BUN 12 07/11/2018    CREATININE 0 98 07/11/2018    GLUCOSE 116 (H) 01/08/2016    GLUF 95 10/25/2017    CALCIUM 8 6 07/11/2018    AST 26 07/11/2018    ALT 32 07/11/2018    ALKPHOS 91 07/11/2018    PROT 7 2 01/08/2016    BILITOT 0 3 01/08/2016    EGFR 72 07/11/2018       BMP  Lab Results   Component Value Date    GLUCOSE 116 (H) 01/08/2016    CALCIUM 8 6 07/11/2018     07/11/2018    K 4 3 07/11/2018    CO2 32 07/11/2018     07/11/2018    BUN 12 07/11/2018    CREATININE 0 98 07/11/2018       Lipids  No results found for: CHOL  Lab Results   Component Value Date    HDL 36 (L) 10/25/2017    HDL 37 (L) 07/12/2017    HDL 42 02/08/2016     Lab Results   Component Value Date    LDLCALC 53 10/25/2017    LDLCALC 58 07/12/2017    LDLCALC 71 02/08/2016     Lab Results   Component Value Date    TRIG 119 10/25/2017    TRIG 106 07/12/2017    TRIG 54 02/08/2016     No components found for: CHOLHDL    Hemoglobin A1C  Lab Results   Component Value Date    HGBA1C 5 4 07/11/2018       Fasting Glucose  Lab Results   Component Value Date    GLUF 95 10/25/2017       Insulin     Thyroid  No results found for: TSH, D3PPTAD, C5ASPTO, THYROIDAB    Hepatic Function Panel  Lab Results   Component Value Date    ALT 32 07/11/2018    AST 26 07/11/2018    ALKPHOS 91 07/11/2018    BILITOT 0 3 01/08/2016       Celiac Disease Antibody Panel  No results found for: ENDOMYSIAL IGA, GLIADIN IGA, GLIADIN IGG, IGA, TISSUE TRANSGLUT AB, TTG IGA   Iron  No results found for: IRON, TIBC, FERRITIN    Vitamins  No results found for: VITAMIN B2   No results found for: NICOTINAMIDE, NICOTINIC ACID   No results found for: VITAMINB6  No results found for: JERTODER51  No results found for: VITB5  No results found for: C2KTOSDS  No results found for: THYROGLB  No results found for: VITAMIN K   No results found for: 25-HYDROXY VIT D   No results found for: VITAMINE     No Follow-up on file      Jeffrey Mota, MS, RDN, [address] [address]

## 2018-10-23 ENCOUNTER — TELEPHONE (OUTPATIENT)
Dept: PULMONOLOGY | Facility: MEDICAL CENTER | Age: 43
End: 2018-10-23

## 2018-12-03 ENCOUNTER — HOSPITAL ENCOUNTER (INPATIENT)
Facility: HOSPITAL | Age: 43
LOS: 10 days | Discharge: HOME | DRG: 193 | End: 2018-12-13
Attending: EMERGENCY MEDICINE | Admitting: FAMILY MEDICINE
Payer: MEDICARE

## 2018-12-03 ENCOUNTER — APPOINTMENT (EMERGENCY)
Dept: RADIOLOGY | Facility: HOSPITAL | Age: 43
DRG: 193 | End: 2018-12-03
Payer: MEDICARE

## 2018-12-03 DIAGNOSIS — R09.02 HYPOXIA: ICD-10-CM

## 2018-12-03 DIAGNOSIS — G47.33 OBSTRUCTIVE SLEEP APNEA: ICD-10-CM

## 2018-12-03 DIAGNOSIS — J18.9 PNEUMONIA: Primary | ICD-10-CM

## 2018-12-03 DIAGNOSIS — J18.9 PNEUMONIA OF RIGHT MIDDLE LOBE DUE TO INFECTIOUS ORGANISM: ICD-10-CM

## 2018-12-03 DIAGNOSIS — J30.89 ALLERGIC RHINITIS DUE TO OTHER ALLERGIC TRIGGER, UNSPECIFIED SEASONALITY: ICD-10-CM

## 2018-12-03 DIAGNOSIS — J45.20 MILD INTERMITTENT ASTHMA WITHOUT COMPLICATION: ICD-10-CM

## 2018-12-03 PROBLEM — Z87.898 HISTORY OF PROLONGED Q-T INTERVAL ON ECG: Status: ACTIVE | Noted: 2018-12-03

## 2018-12-03 PROBLEM — Z86.59 HISTORY OF PSYCHIATRIC DISORDER: Chronic | Status: ACTIVE | Noted: 2018-12-03

## 2018-12-03 PROBLEM — B34.9 VIRAL SYNDROME: Status: ACTIVE | Noted: 2018-12-03

## 2018-12-03 PROBLEM — Q90.9 DOWN SYNDROME: Status: ACTIVE | Noted: 2018-12-03

## 2018-12-03 PROBLEM — N18.30 STAGE 3 CHRONIC KIDNEY DISEASE (HCC): Status: ACTIVE | Noted: 2018-12-03

## 2018-12-03 PROBLEM — F20.9 SCHIZOPHRENIA (HCC): Status: ACTIVE | Noted: 2018-12-03

## 2018-12-03 PROBLEM — R06.02 SOB (SHORTNESS OF BREATH): Status: ACTIVE | Noted: 2018-12-03

## 2018-12-03 LAB
ALBUMIN SERPL BCP-MCNC: 2.7 G/DL (ref 3.5–5)
ALP SERPL-CCNC: 82 U/L (ref 46–116)
ALT SERPL W P-5'-P-CCNC: 26 U/L (ref 12–78)
ANION GAP SERPL CALCULATED.3IONS-SCNC: 9 MMOL/L (ref 4–13)
APTT PPP: 30 SECONDS (ref 24–33)
AST SERPL W P-5'-P-CCNC: 24 U/L (ref 5–45)
BASOPHILS # BLD AUTO: 0.05 THOUSANDS/ΜL (ref 0–0.1)
BASOPHILS NFR BLD AUTO: 0 % (ref 0–1)
BILIRUB SERPL-MCNC: 0.4 MG/DL (ref 0.2–1)
BILIRUB UR QL STRIP: NEGATIVE
BUN SERPL-MCNC: 18 MG/DL (ref 5–25)
CALCIUM SERPL-MCNC: 8 MG/DL (ref 8.3–10.1)
CHLORIDE SERPL-SCNC: 99 MMOL/L (ref 100–108)
CLARITY UR: CLEAR
CO2 SERPL-SCNC: 27 MMOL/L (ref 21–32)
COLOR UR: YELLOW
CREAT SERPL-MCNC: 1.24 MG/DL (ref 0.6–1.3)
EOSINOPHIL # BLD AUTO: 0 THOUSAND/ΜL (ref 0–0.61)
EOSINOPHIL NFR BLD AUTO: 0 % (ref 0–6)
ERYTHROCYTE [DISTWIDTH] IN BLOOD BY AUTOMATED COUNT: 12.8 % (ref 11.6–15.1)
EXT PREG TEST URINE: NEGATIVE
GFR SERPL CREATININE-BSD FRML MDRD: 54 ML/MIN/1.73SQ M
GLUCOSE SERPL-MCNC: 157 MG/DL (ref 65–140)
GLUCOSE SERPL-MCNC: 167 MG/DL (ref 65–140)
GLUCOSE UR STRIP-MCNC: NEGATIVE MG/DL
HCT VFR BLD AUTO: 39.9 % (ref 34.8–46.1)
HGB BLD-MCNC: 13.2 G/DL (ref 11.5–15.4)
HGB UR QL STRIP.AUTO: NEGATIVE
IMM GRANULOCYTES # BLD AUTO: 0.16 THOUSAND/UL (ref 0–0.2)
IMM GRANULOCYTES NFR BLD AUTO: 1 % (ref 0–2)
INR PPP: 1.13 (ref 0.86–1.16)
KETONES UR STRIP-MCNC: ABNORMAL MG/DL
LEUKOCYTE ESTERASE UR QL STRIP: NEGATIVE
LYMPHOCYTES # BLD AUTO: 2.03 THOUSANDS/ΜL (ref 0.6–4.47)
LYMPHOCYTES NFR BLD AUTO: 9 % (ref 14–44)
MCH RBC QN AUTO: 32.4 PG (ref 26.8–34.3)
MCHC RBC AUTO-ENTMCNC: 33.1 G/DL (ref 31.4–37.4)
MCV RBC AUTO: 98 FL (ref 82–98)
MONOCYTES # BLD AUTO: 1.26 THOUSAND/ΜL (ref 0.17–1.22)
MONOCYTES NFR BLD AUTO: 6 % (ref 4–12)
NEUTROPHILS # BLD AUTO: 18.29 THOUSANDS/ΜL (ref 1.85–7.62)
NEUTS SEG NFR BLD AUTO: 84 % (ref 43–75)
NITRITE UR QL STRIP: NEGATIVE
NRBC BLD AUTO-RTO: 0 /100 WBCS
PH UR STRIP.AUTO: 6 [PH] (ref 5–9)
PLATELET # BLD AUTO: 217 THOUSANDS/UL (ref 149–390)
PMV BLD AUTO: 9.6 FL (ref 8.9–12.7)
POTASSIUM SERPL-SCNC: 4.1 MMOL/L (ref 3.5–5.3)
PROT SERPL-MCNC: 6.4 G/DL (ref 6.4–8.2)
PROT UR STRIP-MCNC: NEGATIVE MG/DL
PROTHROMBIN TIME: 11.8 SECONDS (ref 9.4–11.7)
RBC # BLD AUTO: 4.08 MILLION/UL (ref 3.81–5.12)
S PYO AG THROAT QL: NEGATIVE
SODIUM SERPL-SCNC: 135 MMOL/L (ref 136–145)
SP GR UR STRIP.AUTO: 1.02 (ref 1–1.03)
UROBILINOGEN UR QL STRIP.AUTO: 0.2 E.U./DL
WBC # BLD AUTO: 21.79 THOUSAND/UL (ref 4.31–10.16)

## 2018-12-03 PROCEDURE — 87430 STREP A AG IA: CPT | Performed by: EMERGENCY MEDICINE

## 2018-12-03 PROCEDURE — 99285 EMERGENCY DEPT VISIT HI MDM: CPT

## 2018-12-03 PROCEDURE — 99222 1ST HOSP IP/OBS MODERATE 55: CPT | Performed by: FAMILY MEDICINE

## 2018-12-03 PROCEDURE — 87449 NOS EACH ORGANISM AG IA: CPT | Performed by: FAMILY MEDICINE

## 2018-12-03 PROCEDURE — 87081 CULTURE SCREEN ONLY: CPT | Performed by: FAMILY MEDICINE

## 2018-12-03 PROCEDURE — 87040 BLOOD CULTURE FOR BACTERIA: CPT | Performed by: FAMILY MEDICINE

## 2018-12-03 PROCEDURE — 81025 URINE PREGNANCY TEST: CPT | Performed by: EMERGENCY MEDICINE

## 2018-12-03 PROCEDURE — 85610 PROTHROMBIN TIME: CPT | Performed by: EMERGENCY MEDICINE

## 2018-12-03 PROCEDURE — 96361 HYDRATE IV INFUSION ADD-ON: CPT

## 2018-12-03 PROCEDURE — 87070 CULTURE OTHR SPECIMN AEROBIC: CPT | Performed by: EMERGENCY MEDICINE

## 2018-12-03 PROCEDURE — 96375 TX/PRO/DX INJ NEW DRUG ADDON: CPT

## 2018-12-03 PROCEDURE — 96365 THER/PROPH/DIAG IV INF INIT: CPT

## 2018-12-03 PROCEDURE — 82948 REAGENT STRIP/BLOOD GLUCOSE: CPT

## 2018-12-03 PROCEDURE — 71046 X-RAY EXAM CHEST 2 VIEWS: CPT

## 2018-12-03 PROCEDURE — 36415 COLL VENOUS BLD VENIPUNCTURE: CPT | Performed by: EMERGENCY MEDICINE

## 2018-12-03 PROCEDURE — 80053 COMPREHEN METABOLIC PANEL: CPT | Performed by: EMERGENCY MEDICINE

## 2018-12-03 PROCEDURE — 85025 COMPLETE CBC W/AUTO DIFF WBC: CPT | Performed by: EMERGENCY MEDICINE

## 2018-12-03 PROCEDURE — 85730 THROMBOPLASTIN TIME PARTIAL: CPT | Performed by: EMERGENCY MEDICINE

## 2018-12-03 PROCEDURE — 93005 ELECTROCARDIOGRAM TRACING: CPT

## 2018-12-03 PROCEDURE — 87147 CULTURE TYPE IMMUNOLOGIC: CPT | Performed by: EMERGENCY MEDICINE

## 2018-12-03 PROCEDURE — 81003 URINALYSIS AUTO W/O SCOPE: CPT | Performed by: EMERGENCY MEDICINE

## 2018-12-03 RX ORDER — CLINDAMYCIN PHOSPHATE 10 MG/G
GEL TOPICAL 2 TIMES DAILY
COMMUNITY

## 2018-12-03 RX ORDER — ACETAMINOPHEN 325 MG/1
650 TABLET ORAL ONCE
Status: COMPLETED | OUTPATIENT
Start: 2018-12-03 | End: 2018-12-03

## 2018-12-03 RX ORDER — IPRATROPIUM BROMIDE AND ALBUTEROL SULFATE 2.5; .5 MG/3ML; MG/3ML
3 SOLUTION RESPIRATORY (INHALATION) ONCE
Status: COMPLETED | OUTPATIENT
Start: 2018-12-03 | End: 2018-12-03

## 2018-12-03 RX ORDER — SODIUM CHLORIDE 9 MG/ML
125 INJECTION, SOLUTION INTRAVENOUS CONTINUOUS
Status: DISCONTINUED | OUTPATIENT
Start: 2018-12-03 | End: 2018-12-04

## 2018-12-03 RX ORDER — ERYTHROMYCIN 5 MG/G
0.5 OINTMENT OPHTHALMIC
Status: DISCONTINUED | OUTPATIENT
Start: 2018-12-04 | End: 2018-12-13 | Stop reason: HOSPADM

## 2018-12-03 RX ORDER — METHYLPREDNISOLONE SODIUM SUCCINATE 125 MG/2ML
60 INJECTION, POWDER, LYOPHILIZED, FOR SOLUTION INTRAMUSCULAR; INTRAVENOUS ONCE
Status: COMPLETED | OUTPATIENT
Start: 2018-12-03 | End: 2018-12-03

## 2018-12-03 RX ORDER — ERYTHROMYCIN 5 MG/G
0.5 OINTMENT OPHTHALMIC
COMMUNITY
End: 2022-01-30 | Stop reason: ALTCHOICE

## 2018-12-03 RX ORDER — FLUTICASONE PROPIONATE 50 MCG
1 SPRAY, SUSPENSION (ML) NASAL 2 TIMES DAILY
Status: DISCONTINUED | OUTPATIENT
Start: 2018-12-03 | End: 2018-12-13 | Stop reason: HOSPADM

## 2018-12-03 RX ORDER — GUAIFENESIN/DEXTROMETHORPHAN 100-10MG/5
10 SYRUP ORAL ONCE
Status: COMPLETED | OUTPATIENT
Start: 2018-12-03 | End: 2018-12-03

## 2018-12-03 RX ORDER — ARIPIPRAZOLE 5 MG/1
10 TABLET ORAL
Status: DISCONTINUED | OUTPATIENT
Start: 2018-12-04 | End: 2018-12-13 | Stop reason: HOSPADM

## 2018-12-03 RX ORDER — LEVOTHYROXINE SODIUM 0.05 MG/1
50 TABLET ORAL
Status: DISCONTINUED | OUTPATIENT
Start: 2018-12-04 | End: 2018-12-13 | Stop reason: HOSPADM

## 2018-12-03 RX ORDER — LEVOCETIRIZINE DIHYDROCHLORIDE 5 MG/1
2.5 TABLET, FILM COATED ORAL DAILY
Status: DISCONTINUED | OUTPATIENT
Start: 2018-12-04 | End: 2018-12-13 | Stop reason: HOSPADM

## 2018-12-03 RX ORDER — CEFTRIAXONE 1 G/50ML
1000 INJECTION, SOLUTION INTRAVENOUS ONCE
Status: COMPLETED | OUTPATIENT
Start: 2018-12-03 | End: 2018-12-03

## 2018-12-03 RX ORDER — ATORVASTATIN CALCIUM 10 MG/1
10 TABLET, FILM COATED ORAL
Status: DISCONTINUED | OUTPATIENT
Start: 2018-12-03 | End: 2018-12-13 | Stop reason: HOSPADM

## 2018-12-03 RX ORDER — CLINDAMYCIN PHOSPHATE 10 MG/G
GEL TOPICAL 2 TIMES DAILY
Status: DISCONTINUED | OUTPATIENT
Start: 2018-12-04 | End: 2018-12-13 | Stop reason: HOSPADM

## 2018-12-03 RX ORDER — DIVALPROEX SODIUM 500 MG/1
1000 TABLET, DELAYED RELEASE ORAL
Status: DISCONTINUED | OUTPATIENT
Start: 2018-12-03 | End: 2018-12-13 | Stop reason: HOSPADM

## 2018-12-03 RX ADMIN — ACETAMINOPHEN 650 MG: 325 TABLET, FILM COATED ORAL at 18:05

## 2018-12-03 RX ADMIN — SODIUM CHLORIDE 125 ML/HR: 0.9 INJECTION, SOLUTION INTRAVENOUS at 22:16

## 2018-12-03 RX ADMIN — PIPERACILLIN SODIUM AND TAZOBACTAM SODIUM 3.38 G: 36; 4.5 INJECTION, POWDER, FOR SOLUTION INTRAVENOUS at 23:27

## 2018-12-03 RX ADMIN — ATORVASTATIN CALCIUM 10 MG: 10 TABLET, FILM COATED ORAL at 22:14

## 2018-12-03 RX ADMIN — FLUTICASONE PROPIONATE 1 SPRAY: 50 SPRAY, METERED NASAL at 22:13

## 2018-12-03 RX ADMIN — METRONIDAZOLE 500 MG: 500 INJECTION, SOLUTION INTRAVENOUS at 22:37

## 2018-12-03 RX ADMIN — DIVALPROEX SODIUM 1000 MG: 500 TABLET, DELAYED RELEASE ORAL at 22:14

## 2018-12-03 RX ADMIN — IPRATROPIUM BROMIDE AND ALBUTEROL SULFATE 3 ML: .5; 3 SOLUTION RESPIRATORY (INHALATION) at 18:05

## 2018-12-03 RX ADMIN — METHYLPREDNISOLONE SODIUM SUCCINATE 60 MG: 125 INJECTION, POWDER, FOR SOLUTION INTRAMUSCULAR; INTRAVENOUS at 19:29

## 2018-12-03 RX ADMIN — SODIUM CHLORIDE 1000 ML: 0.9 INJECTION, SOLUTION INTRAVENOUS at 18:05

## 2018-12-03 RX ADMIN — AZITHROMYCIN MONOHYDRATE 500 MG: 500 INJECTION, POWDER, LYOPHILIZED, FOR SOLUTION INTRAVENOUS at 20:27

## 2018-12-03 RX ADMIN — CEFTRIAXONE 1000 MG: 1 INJECTION, SOLUTION INTRAVENOUS at 19:33

## 2018-12-03 RX ADMIN — GUAIFENESIN AND DEXTROMETHORPHAN 10 ML: 100; 10 SYRUP ORAL at 18:05

## 2018-12-03 NOTE — ED PROVIDER NOTES
History  Chief Complaint   Patient presents with    Fever - 9 weeks to 74 years     fever today 101 8, body aches, sore throat, headache  2 advil given at 1:45pm       50-year-old female with past medical history of Down syndrome, ROBERTO on CPAP at night, prolonged QT syndrome, diabetes type 2, presents to the ER with complaint of generalized body aches, fever, sore throat, dry cough that started this morning  Patient lives at a group home  Patient visits her mother's house during the daytime  According to staff, patient was at her mother's house when she had a fever of 101 5 F  Advil was given about 2 hours prior to arrival   Patient brought to the ER for further evaluation  In triage patient was noted to have oxygen saturation of 88% on room air  Patient's aide states that patient did have supplemental oxygenation at night with her CPAP in the past but was discontinued last year  Patient does not have supplemental oxygenation during the day  Patient states that she is having difficulty swallowing as her throat feels very sore  History provided by:  Patient  Fever - 9 weeks to 74 years   Associated symptoms: cough and sore throat    Associated symptoms: no chest pain, no chills, no confusion, no congestion, no diarrhea, no dysuria, no ear pain, no headaches, no nausea and no rash        Prior to Admission Medications   Prescriptions Last Dose Informant Patient Reported? Taking? ARIPiprazole (ABILIFY) 10 mg tablet  Mother Yes Yes   Sig: Take 10 mg by mouth   Multiple Vitamins-Minerals (CENTRUM SILVER ULTRA WOMENS PO)  Mother Yes No   Sig: Centrum Silver Ultra Womens Oral Tablet  Refills: 0  Active   VITAMIN D, ERGOCALCIFEROL, PO  Mother Yes Yes   Sig: Take 1 25 mg by mouth once a week  Administer on sunday   Wound Dressings (REVITADERM WOUND CARE) GEL  Mother Yes Yes   Sig: Inhale   atorvastatin (LIPITOR) 10 mg tablet  Mother Yes Yes   Sig: Take 10 mg by mouth daily     divalproex sodium (DEPAKOTE) 500 mg EC tablet  Mother Yes Yes   Sig: Take 500 mg by mouth every 12 (twelve) hours     fluticasone (FLONASE) 50 mcg/act nasal spray   No Yes   Si spray into each nostril 2 (two) times a day for 30 days Fluticasone Propionate 50 MCG/ACT Nasal Suspension USE 1 SPRAY IN EACH NOSTRIL TWICE DAILY  Quantity: 1;  Refills: 5    La Harrising D O ; [id number] GM Bottle  Use that a m  and 8:00 p m    levocetirizine (XYZAL) 5 MG tablet  Mother Yes Yes   Sig: TAKE 1 TABLET BY MOUTH ONCE DAILY AT 8PM   levothyroxine (SYNTHROID) 50 mcg tablet  Mother Yes Yes   Sig: Take 50 mcg by mouth daily  Synthroid 50 MCG Oral Tablet TAKE 1 TABLET DAILY  Refills: 0  Active    naproxen (EC NAPROSYN) 500 MG EC tablet   No No   Sig: Take 1 tablet by mouth 2 (two) times a day with meals for 7 days      Facility-Administered Medications: None       Past Medical History:   Diagnosis Date    Asthma     Diabetes mellitus ([facility] [address] )     Disease of thyroid gland     Down syndrome     Heart murmur     Hyperlipidemia     Long Q-T syndrome     Psychiatric disorder     schizo    Sleep apnea        Past Surgical History:   Procedure Laterality Date    PATENT DUCTUS ARTERIOUS LIGATION         History reviewed  No pertinent family history  I have reviewed and agree with the history as documented  Social History   Substance Use Topics    Smoking status: Never Smoker    Smokeless tobacco: Never Used    Alcohol use No        Review of Systems   Constitutional: Positive for fever  Negative for activity change, appetite change and chills  HENT: Positive for sore throat  Negative for congestion and ear pain  Eyes: Negative for pain and discharge  Respiratory: Positive for cough  Negative for chest tightness, shortness of breath, wheezing and stridor  Cardiovascular: Negative for chest pain and palpitations  Gastrointestinal: Negative for abdominal distention, abdominal pain, constipation, diarrhea and nausea     Endocrine: Negative for cold intolerance  Genitourinary: Negative for dysuria, frequency and urgency  Musculoskeletal: Negative for arthralgias and back pain  Skin: Negative for color change and rash  Allergic/Immunologic: Negative for environmental allergies and food allergies  Neurological: Negative for dizziness, weakness, numbness and headaches  Hematological: Negative for adenopathy  Psychiatric/Behavioral: Negative for agitation, behavioral problems and confusion  The patient is not nervous/anxious  All other systems reviewed and are negative  Physical Exam  Physical Exam   Constitutional: She is oriented to person, place, and time  She appears well-developed and well-nourished  HENT:   Head: Normocephalic and atraumatic  Mouth/Throat: Oropharynx is clear and moist    Eyes: Conjunctivae and EOM are normal    Neck: Normal range of motion  Neck supple  Cardiovascular: Normal rate, regular rhythm, normal heart sounds and intact distal pulses  Pulmonary/Chest: Effort normal and breath sounds normal    Lungs are clear to auscultation bilateral    Abdominal: Soft  Bowel sounds are normal  She exhibits no distension  There is no tenderness  Musculoskeletal: Normal range of motion  Neurological: She is alert and oriented to person, place, and time  Skin: Skin is warm and dry  Psychiatric: She has a normal mood and affect  Her behavior is normal  Judgment and thought content normal    Nursing note and vitals reviewed        Vital Signs  ED Triage Vitals [12/03/18 1723]   Temperature Pulse Respirations Blood Pressure SpO2   99 °F (37 2 °C) 84 20 96/59 90 %      Temp Source Heart Rate Source Patient Position - Orthostatic VS BP Location FiO2 (%)   Tympanic Monitor Sitting Right arm --      Pain Score       8           Vitals:    12/03/18 1723 12/03/18 1730 12/03/18 1918 12/03/18 2030   BP: 96/59 96/59 97/54 (!) 86/50   Pulse: 84 85 76 68   Patient Position - Orthostatic VS: Sitting  Sitting Sitting Visual Acuity      ED Medications  Medications   azithromycin (ZITHROMAX) 500 mg in sodium chloride 0 9% 250mL IVPB 500 mg (500 mg Intravenous New Bag 12/3/18 2027)   sodium chloride 0 9 % bolus 1,000 mL (1,000 mL Intravenous New Bag 12/3/18 1805)   acetaminophen (TYLENOL) tablet 650 mg (650 mg Oral Given 12/3/18 1805)   ipratropium-albuterol (DUO-NEB) 0 5-2 5 mg/3 mL inhalation solution 3 mL (3 mL Nebulization Given 12/3/18 1805)   dextromethorphan-guaiFENesin (ROBITUSSIN DM)  mg/5 mL oral syrup 10 mL (10 mL Oral Given 12/3/18 1805)   cefTRIAXone (ROCEPHIN) IVPB (premix) 1,000 mg (0 mg Intravenous Stopped 12/3/18 2015)   methylPREDNISolone sodium succinate (Solu-MEDROL) injection 60 mg (60 mg Intravenous Given 12/3/18 1929)       Diagnostic Studies  Results Reviewed     Procedure Component Value Units Date/Time    Rapid Strep A Screen Throat with Reflex to Culture, Pediatrics and Compromised Adults [[id number]]  (Normal) Collected:  12/03/18 1826    Lab Status:  Final result Specimen:  Throat from Throat Updated:  12/03/18 1846     Rapid Strep A Screen Negative    Throat culture [[id number]] Collected:  12/03/18 1826    Lab Status:   In process Specimen:  Throat from Throat Updated:  12/03/18 1846    Comprehensive metabolic panel [[id number]]  (Abnormal) Collected:  12/03/18 1804    Lab Status:  Final result Specimen:  Blood from Arm, Right Updated:  12/03/18 1832     Sodium 135 (L) mmol/L      Potassium 4 1 mmol/L      Chloride 99 (L) mmol/L      CO2 27 mmol/L      ANION GAP 9 mmol/L      BUN 18 mg/dL      Creatinine 1 24 mg/dL      Glucose 167 (H) mg/dL      Calcium 8 0 (L) mg/dL      AST 24 U/L      ALT 26 U/L      Alkaline Phosphatase 82 U/L      Total Protein 6 4 g/dL      Albumin 2 7 (L) g/dL      Total Bilirubin 0 40 mg/dL      eGFR 54 ml/min/1 73sq m     Narrative:         National Kidney Disease Education Program recommendations are as follows:  GFR calculation is accurate only with a steady state creatinine  Chronic Kidney disease less than 60 ml/min/1 73 sq  meters  Kidney failure less than 15 ml/min/1 73 sq  meters      Protime-INR [[id number]]  (Abnormal) Collected:  12/03/18 1804    Lab Status:  Final result Specimen:  Blood from Arm, Right Updated:  12/03/18 1832     Protime 11 8 (H) seconds      INR 1 13    APTT [[id number]]  (Normal) Collected:  12/03/18 1804    Lab Status:  Final result Specimen:  Blood from Arm, Right Updated:  12/03/18 1832     PTT 30 seconds     UA w Reflex to Microscopic w Reflex to Culture [[id number]]  (Abnormal) Collected:  12/03/18 1810    Lab Status:  Final result Specimen:  Urine from Urine, Clean Catch Updated:  12/03/18 1820     Color, UA Yellow     Clarity, UA Clear     Specific [place], UA 1 020     pH, UA 6 0     Leukocytes, UA Negative     Nitrite, UA Negative     Protein, UA Negative mg/dl      Glucose, UA Negative mg/dl      Ketones, UA Trace (A) mg/dl      Urobilinogen, UA 0 2 E U /dl      Bilirubin, UA Negative     Blood, UA Negative    POCT pregnancy, urine [[id number]]  (Normal) Resulted:  12/03/18 1819    Lab Status:  Final result Updated:  12/03/18 1819     EXT PREG TEST UR (Ref: Negative) negative    CBC and differential [[id number]]  (Abnormal) Collected:  12/03/18 1804    Lab Status:  Final result Specimen:  Blood from Arm, Right Updated:  12/03/18 1817     WBC 21 79 (H) Thousand/uL      RBC 4 08 Million/uL      Hemoglobin 13 2 g/dL      Hematocrit 39 9 %      MCV 98 fL      MCH 32 4 pg      MCHC 33 1 g/dL      RDW 12 8 %      MPV 9 6 fL      Platelets 726 Thousands/uL      nRBC 0 /100 WBCs      Neutrophils Relative 84 (H) %      Immat GRANS % 1 %      Lymphocytes Relative 9 (L) %      Monocytes Relative 6 %      Eosinophils Relative 0 %      Basophils Relative 0 %      Neutrophils Absolute 18 29 (H) Thousands/µL      Immature Grans Absolute 0 16 Thousand/uL      Lymphocytes Absolute 2 03 Thousands/µL      Monocytes Absolute 1 26 (H) Thousand/µL      Eosinophils Absolute 0 00 Thousand/µL      Basophils Absolute 0 05 Thousands/µL                  XR chest 2 views    (Results Pending)              Procedures  Procedures       Phone Contacts  ED Phone Contact    ED Course                               MDM  Number of Diagnoses or Management Options  Hypoxia: new and requires workup  Pneumonia: new and requires workup  Diagnosis management comments: Obtain blood work, EKG, chest x-ray to rule out infection  Give IV fluids, Tylenol, neb treatment  Check rapid strep       Amount and/or Complexity of Data Reviewed  Clinical lab tests: ordered and reviewed  Tests in the radiology section of CPT®: ordered and reviewed  Tests in the medicine section of CPT®: ordered and reviewed  Review and summarize past medical records: yes  Independent visualization of images, tracings, or specimens: yes    Risk of Complications, Morbidity, and/or Mortality  General comments: Patient presented with acute onset fever, cough, sore throat  Patient's blood work shows elevated white count  Patient has been hypoxic requiring supplemental oxygen in the ER  Chest x-ray shows concern for right lower lobe pneumonia  At this point empiric coverage started for community-acquired pneumonia and patient is admitted for further management  Patient and family agrees with admission plans      Patient Progress  Patient progress: stable    CritCare Time    Disposition  Final diagnoses:   Pneumonia   Hypoxia     Time reflects when diagnosis was documented in both MDM as applicable and the Disposition within this note     Time User Action Codes Description Comment    12/3/2018  8:01 PM Nathalia Knight Add [J18 9] Pneumonia     12/3/2018  8:01 PM Shama [address] [place] [address] [R09 02] Hypoxia       ED Disposition     ED Disposition Condition Comment    Admit  Case was discussed with Dr Yissel Gaona and the patient's admission status was agreed to be Admission Status: inpatient status to the service of Dr Yissel Man Information    None         Patient's Medications   Discharge Prescriptions    No medications on file     No discharge procedures on file      ED Provider  Electronically Signed by           Christoph Braun DO  12/03/18 8417

## 2018-12-04 ENCOUNTER — HOSPITAL ENCOUNTER (INPATIENT)
Dept: RADIOLOGY | Facility: HOSPITAL | Age: 43
DRG: 193 | End: 2018-12-04
Payer: MEDICARE

## 2018-12-04 PROBLEM — J96.00 ACUTE RESPIRATORY FAILURE (HCC): Status: ACTIVE | Noted: 2018-12-04

## 2018-12-04 PROBLEM — E11.9 TYPE 2 DIABETES MELLITUS, WITHOUT LONG-TERM CURRENT USE OF INSULIN (HCC): Status: ACTIVE | Noted: 2018-12-04

## 2018-12-04 LAB
ANION GAP SERPL CALCULATED.3IONS-SCNC: 9 MMOL/L (ref 4–13)
ATRIAL RATE: 75 BPM
BASOPHILS # BLD MANUAL: 0 THOUSAND/UL (ref 0–0.1)
BASOPHILS NFR MAR MANUAL: 0 % (ref 0–1)
BUN SERPL-MCNC: 11 MG/DL (ref 5–25)
CALCIUM SERPL-MCNC: 8.5 MG/DL (ref 8.3–10.1)
CHLORIDE SERPL-SCNC: 109 MMOL/L (ref 100–108)
CO2 SERPL-SCNC: 25 MMOL/L (ref 21–32)
CREAT SERPL-MCNC: 1.03 MG/DL (ref 0.6–1.3)
EOSINOPHIL # BLD MANUAL: 0 THOUSAND/UL (ref 0–0.4)
EOSINOPHIL NFR BLD MANUAL: 0 % (ref 0–6)
ERYTHROCYTE [DISTWIDTH] IN BLOOD BY AUTOMATED COUNT: 12.9 % (ref 11.6–15.1)
EST. AVERAGE GLUCOSE BLD GHB EST-MCNC: 120 MG/DL
GFR SERPL CREATININE-BSD FRML MDRD: 67 ML/MIN/1.73SQ M
GLUCOSE SERPL-MCNC: 107 MG/DL (ref 65–140)
GLUCOSE SERPL-MCNC: 172 MG/DL (ref 65–140)
GLUCOSE SERPL-MCNC: 189 MG/DL (ref 65–140)
GLUCOSE SERPL-MCNC: 198 MG/DL (ref 65–140)
GLUCOSE SERPL-MCNC: 256 MG/DL (ref 65–140)
HBA1C MFR BLD: 5.8 % (ref 4.2–6.3)
HCT VFR BLD AUTO: 39.7 % (ref 34.8–46.1)
HGB BLD-MCNC: 12.8 G/DL (ref 11.5–15.4)
L PNEUMO1 AG UR QL IA.RAPID: NEGATIVE
LYMPHOCYTES # BLD AUTO: 0.41 THOUSAND/UL (ref 0.6–4.47)
LYMPHOCYTES # BLD AUTO: 2 % (ref 14–44)
MAGNESIUM SERPL-MCNC: 2.2 MG/DL (ref 1.6–2.6)
MCH RBC QN AUTO: 31.8 PG (ref 26.8–34.3)
MCHC RBC AUTO-ENTMCNC: 32.2 G/DL (ref 31.4–37.4)
MCV RBC AUTO: 99 FL (ref 82–98)
MONOCYTES # BLD AUTO: 0.41 THOUSAND/UL (ref 0–1.22)
MONOCYTES NFR BLD: 2 % (ref 4–12)
NEUTROPHILS # BLD MANUAL: 19.78 THOUSAND/UL (ref 1.85–7.62)
NEUTS BAND NFR BLD MANUAL: 37 % (ref 0–8)
NEUTS SEG NFR BLD AUTO: 59 % (ref 43–75)
NRBC BLD AUTO-RTO: 0 /100 WBCS
P AXIS: 45 DEGREES
PLATELET # BLD AUTO: 187 THOUSANDS/UL (ref 149–390)
PLATELET BLD QL SMEAR: ADEQUATE
PMV BLD AUTO: 9.8 FL (ref 8.9–12.7)
POTASSIUM SERPL-SCNC: 3.9 MMOL/L (ref 3.5–5.3)
PR INTERVAL: 150 MS
QRS AXIS: 43 DEGREES
QRSD INTERVAL: 86 MS
QT INTERVAL: 408 MS
QTC INTERVAL: 455 MS
RBC # BLD AUTO: 4.03 MILLION/UL (ref 3.81–5.12)
RBC MORPH BLD: NORMAL
S PNEUM AG UR QL: NEGATIVE
SODIUM SERPL-SCNC: 143 MMOL/L (ref 136–145)
T WAVE AXIS: 24 DEGREES
TOTAL CELLS COUNTED SPEC: 100
TOXIC GRANULES BLD QL SMEAR: PRESENT
VENTRICULAR RATE: 75 BPM
WBC # BLD AUTO: 20.6 THOUSAND/UL (ref 4.31–10.16)

## 2018-12-04 PROCEDURE — 85027 COMPLETE CBC AUTOMATED: CPT | Performed by: FAMILY MEDICINE

## 2018-12-04 PROCEDURE — 94760 N-INVAS EAR/PLS OXIMETRY 1: CPT

## 2018-12-04 PROCEDURE — 83036 HEMOGLOBIN GLYCOSYLATED A1C: CPT | Performed by: FAMILY MEDICINE

## 2018-12-04 PROCEDURE — 82948 REAGENT STRIP/BLOOD GLUCOSE: CPT

## 2018-12-04 PROCEDURE — 93010 ELECTROCARDIOGRAM REPORT: CPT | Performed by: INTERNAL MEDICINE

## 2018-12-04 PROCEDURE — 83735 ASSAY OF MAGNESIUM: CPT | Performed by: FAMILY MEDICINE

## 2018-12-04 PROCEDURE — 85007 BL SMEAR W/DIFF WBC COUNT: CPT | Performed by: FAMILY MEDICINE

## 2018-12-04 PROCEDURE — 80048 BASIC METABOLIC PNL TOTAL CA: CPT | Performed by: FAMILY MEDICINE

## 2018-12-04 RX ADMIN — PIPERACILLIN SODIUM AND TAZOBACTAM SODIUM 3.38 G: 36; 4.5 INJECTION, POWDER, FOR SOLUTION INTRAVENOUS at 10:33

## 2018-12-04 RX ADMIN — LEVOTHYROXINE SODIUM 50 MCG: 50 TABLET ORAL at 05:27

## 2018-12-04 RX ADMIN — FLUTICASONE PROPIONATE 1 SPRAY: 50 SPRAY, METERED NASAL at 10:34

## 2018-12-04 RX ADMIN — SODIUM CHLORIDE 125 ML/HR: 0.9 INJECTION, SOLUTION INTRAVENOUS at 09:16

## 2018-12-04 RX ADMIN — ATORVASTATIN CALCIUM 10 MG: 10 TABLET, FILM COATED ORAL at 17:09

## 2018-12-04 RX ADMIN — METRONIDAZOLE 500 MG: 500 INJECTION, SOLUTION INTRAVENOUS at 06:01

## 2018-12-04 RX ADMIN — METRONIDAZOLE 500 MG: 500 INJECTION, SOLUTION INTRAVENOUS at 14:58

## 2018-12-04 RX ADMIN — ENOXAPARIN SODIUM 40 MG: 40 INJECTION SUBCUTANEOUS at 10:33

## 2018-12-04 RX ADMIN — AMPICILLIN SODIUM AND SULBACTAM SODIUM 3 G: 2; 1 INJECTION, POWDER, FOR SOLUTION INTRAMUSCULAR; INTRAVENOUS at 22:13

## 2018-12-04 RX ADMIN — ARIPIPRAZOLE 10 MG: 5 TABLET ORAL at 05:27

## 2018-12-04 RX ADMIN — FLUTICASONE PROPIONATE 1 SPRAY: 50 SPRAY, METERED NASAL at 17:47

## 2018-12-04 RX ADMIN — ERYTHROMYCIN 0.5 INCH: 5 OINTMENT OPHTHALMIC at 21:07

## 2018-12-04 RX ADMIN — AMPICILLIN SODIUM AND SULBACTAM SODIUM 3 G: 2; 1 INJECTION, POWDER, FOR SOLUTION INTRAMUSCULAR; INTRAVENOUS at 17:47

## 2018-12-04 RX ADMIN — INSULIN LISPRO 1 UNITS: 100 INJECTION, SOLUTION INTRAVENOUS; SUBCUTANEOUS at 08:28

## 2018-12-04 RX ADMIN — PIPERACILLIN SODIUM AND TAZOBACTAM SODIUM 3.38 G: 36; 4.5 INJECTION, POWDER, FOR SOLUTION INTRAVENOUS at 05:17

## 2018-12-04 RX ADMIN — DIVALPROEX SODIUM 1000 MG: 500 TABLET, DELAYED RELEASE ORAL at 21:01

## 2018-12-04 NOTE — UTILIZATION REVIEW
Initial Clinical Review    Admission: Date/Time/Statement: 12/3/18 @ 2002     Orders Placed This Encounter   Procedures    Inpatient Admission (expected length of stay for this patient is greater than two midnights)     Standing Status:   Standing     Number of Occurrences:   1     Order Specific Question:   Admitting Physician     Answer:   Carlyle Giang [[id number]]     Order Specific Question:   Level of Care     Answer:   Med Surg [[id number]]     Order Specific Question:   Estimated length of stay     Answer:   More than 2 Midnights     Order Specific Question:   Certification     Answer:   I certify that inpatient services are medically necessary for this patient for a duration of greater than two midnights  See H&P and MD Progress Notes for additional information about the patient's course of treatment  ED: Date/Time/Mode of Arrival:   ED Arrival Information     Expected Arrival Acuity Means of Arrival Escorted By Service Admission Type    - 12/3/2018 17:03 Urgent Walk-In Other General Medicine Urgent    Arrival Complaint    flu like symptoms          Chief Complaint:   Chief Complaint   Patient presents with    Fever - 9 weeks to 74 years     fever today 101 8, body aches, sore throat, headache  2 advil given at 1:45pm         History of Illness: This is a 56-year-old female with a past medical history of Down syndrome, psychiatric disorders on Abilify and Depakote, sleep apnea on CPAP at night, diet-controlled diabetes, who presents to the ER this morning for generalized body aches, fever, sore throat, and dry cough  History obtained mainly from patient's family, her mother is her POA, she is also accompanied by a staff member from the group home that she currently resides in  Per patient's mother, patient has been having symptoms of the sore throat and congestion since about 2-3 days now, else well as malaise, intermittent lightheadedness    However earlier this evening, patient noted to have an episode of fever, she was also not her usual active self, little bit malaise  She also has been having intermittent headaches, generalized  She also has been having a cough, mainly nonproductive  No significant shortness of breath that she has been experiencing, no wheezing, no chest pain, no chest tightness  She does have a history of asthma, however that is very well controlled per her family, she has not had any recent flares  ED Vital Signs:   ED Triage Vitals [12/03/18 1723]   Temperature Pulse Respirations Blood Pressure SpO2   99 °F (37 2 °C) 84 20 96/59 90 %      Temp Source Heart Rate Source Patient Position - Orthostatic VS BP Location FiO2 (%)   Tympanic Monitor Sitting Right arm --      Pain Score       8        Wt Readings from Last 1 Encounters:   12/03/18 82 3 kg (181 lb 7 oz)       Vital Signs (abnormal): bp 86/50  Sat 89%    Abnormal Labs/Diagnostic Test Results: na 135 cl 99 glucose 167 alb 2 7 wbc 21 79 ,37 bands %  cxr  Right middle lobe patchy infiltrate consistent with pneumonia  Follow-up radiographs recommended in one month to ensure complete resolution    ED Treatment:   Medication Administration from 12/03/2018 1703 to 12/03/2018 2118       Date/Time Order Dose Route Action Action by Comments     12/03/2018 1805 sodium chloride 0 9 % bolus 1,000 mL 1,000 mL Intravenous New Bag Geoffrey Dudley [facility]      12/03/2018 1805 acetaminophen (TYLENOL) tablet 650 mg 650 mg Oral Given Geoffrey Dudley RN      12/03/2018 1805 ipratropium-albuterol (DUO-NEB) 0 5-2 5 mg/3 mL inhalation solution 3 mL 3 mL Nebulization Given Geoffrey Dudley RN      12/03/2018 1805 dextromethorphan-guaiFENesin (ROBITUSSIN DM)  mg/5 mL oral syrup 10 mL 10 mL Oral Given Geoffrey Dudley RN      12/03/2018 2015 cefTRIAXone (ROCEPHIN) IVPB (premix) 1,000 mg 0 mg Intravenous Stopped Hannah Hall RN      12/03/2018 1933 cefTRIAXone (ROCEPHIN) IVPB (premix) 1,000 mg 1,000 mg Intravenous New Bag Hannah Hall RN 12/03/2018 2027 azithromycin (ZITHROMAX) 500 mg in sodium chloride 0 9% 250mL IVPB 500 mg 500 mg Intravenous New [address] Fang Pearl RN      12/03/2018 1929 methylPREDNISolone sodium succinate (Solu-MEDROL) injection 60 mg 60 mg Intravenous Given Fang Pearl RN           Past Medical/Surgical History: Active Ambulatory Problems     Diagnosis Date Noted    Obstructive sleep apnea 02/05/2018    Mild intermittent asthma without complication 89/97/0399     Resolved Ambulatory Problems     Diagnosis Date Noted    No Resolved Ambulatory Problems     Past Medical History:   Diagnosis Date    Asthma     Diabetes mellitus ([facility] [address] )     Disease of thyroid gland     Down syndrome     Heart murmur     Hyperlipidemia     Long Q-T syndrome     Psychiatric disorder     Sleep apnea        Admitting Diagnosis: Pneumonia [J18 9]  Hypoxia [R09 02]  Fever [R50 9]    Age/Sex: 44 y o  female    Assessment/Plan: This is a 44 y o  F with a PMHx of Down syndrome, psychiatric disorders, diet controlled DM who presents for pneumonia with hypoxia  Patient is expected to stay for atleast 2 midnights, with an expected length of stay of 3 or more days  Patient will be placed under the service of Dr Fabienne Rogers  The following assessment and plan was discussed with attending          * Pneumonia with hypoxia   Assessment & Plan     - in setting of leukocytosis, probable PNA vs URI/bronchitis  - CXR shows right middle lobe patchy infiltrate consistent with pneumonia --?   Aspiration pneumonia in setting of difficulty chewing/swallowing per family in setting of her Down syndrome  - for concern for aspiration pneumonia, will start patient on a broad-spectrum Zosyn and Flagyl while awaiting cultures at which time antibiotics can be narrowed down; patient to be NPO at this time, modified barium study in the morning to further assess for dysphagia/aspiration  - s/p 1 dose of Rocephin in ER  - order sputum Cx, urine strep and legionella   - f/u throat Cx, rapid strep was negative in the ER  - supplemental O2 PRN for SpO2 >88%, wean as tolerated, hypoxia possibly 2/2 respiratory ongoing infection, monitor  - transient mild hypotension POA, s/p 1L IV NS bolus in ER, home BP low 100s per staff and family, now SBP in low 100s, will hydrate with IV NS 125cchr  - placed on aspiration precautions      Acute respiratory failure (HCC)   Assessment & Plan     - POA, patient requiring nasal cannula oxygen to maintain oxygen saturation, wean as tolerated, likely secondary/in setting of pneumonia      Viral syndrome   Assessment & Plan     - IV hydration with IV NS  - tylenol prn for headaches  - soft diet for sore throat  - treat superimposed PNA      Type 2 diabetes mellitus, without long-term current use of insulin ([facility])   Assessment & Plan             Lab Results   Component Value Date     HGBA1C 5 4 07/11/2018      - patient has diet controlled diabetes with last A1c controlled at 5 4, will obtain repeat A1c at this time, placed on insulin sliding scale in case she has hyperglycemia in setting of infection      History of psychiatric disorder   Assessment & Plan     - c/w abilify and depakote      History of prolonged Q-T interval on ECG   Assessment & Plan     - obtain an EKG in the a m   To monitor this         Stage 3 chronic kidney disease (HCC)   Assessment & Plan     Mild stable CKD III, Avoid nephrotoxic agents, creatinine baseline at this point      Down syndrome   Assessment & Plan     Concern for chronic dysphagia due to difficulty chewing food per patient's mother, although patient is on regular diet at home, will place patient NPO and modified barium study in the morning, see above mention      Mild intermittent asthma without complication   Assessment & Plan     - no meds at home, prn albuterol       Obstructive sleep apnea   Assessment & Plan     - sc/w home CPAP with supplemental O2 for the hypoxia              Admission Orders:  gmf  Aspiration precautions  Seizure precautions  Nursing dysphagia assessment  Sputum gscs  Oxygen 5l nc  Scheduled Meds:   Current Facility-Administered Medications:  ARIPiprazole 10 mg Oral Early Morning [facility] Farnaz, DO    atorvastatin 10 mg Oral Daily With Dinner [facility] Farnaz, DO    clindamycin  Topical BID [facility] Farnaz, DO    divalproex sodium 1,000 mg Oral HS [facility] Farnaz, DO    enoxaparin 40 mg Subcutaneous Daily [facility] Farnaz, DO    erythromycin 0 5 inch Both Eyes HS [facility] Farnaz, DO    fluticasone 1 spray Nasal BID [facility] Farnaz, DO    insulin lispro 1-5 Units Subcutaneous 4x Daily (AC & HS) [facility] Farnaz, DO    levocetirizine 2 5 mg Oral Daily [facility], DO    levothyroxine 50 mcg Oral Early Morning [facility] Farnaz, DO    metroNIDAZOLE 500 mg Intravenous Q8H [facility] Farnaz, DO Last Rate: 500 mg (12/04/18 0601)   piperacillin-tazobactam 3 375 g Intravenous Q6H [facility] Farnaz, DO Last Rate: 3 375 g (12/04/18 1033)   sodium chloride 125 mL/hr Intravenous Continuous [facility] Farnaz, DO Last Rate: 125 mL/hr (12/04/18 0916)     Continuous Infusions:   sodium chloride 125 mL/hr Last Rate: 125 mL/hr (12/04/18 0916)     PRN Meds:

## 2018-12-04 NOTE — PROGRESS NOTES
[address] [facility] [address] [facility] Practice Progress Note - Riccardo Petersen 44 y o  female MRN: [id number]    Unit/Bed#: 2 [place] [address] Encounter: [id number]      Assessment/Plan:    * Pneumonia with hypoxia   Assessment & Plan    - in setting of leukocytosis, probable PNA vs URI/bronchitis  - CXR shows right middle lobe patchy infiltrate consistent with pneumonia --? Aspiration pneumonia in setting of difficulty chewing/swallowing per family in setting of her Down syndrome  - for concern for aspiration pneumonia,   -continue with Unasyn 3 g IV q 6h for aspiration pneumonia   Patient passed bedside swallow eval  - order sputum Cx, urine strep and legionella   - f/u throat Cx, rapid strep was negative in the ER  - supplemental O2 PRN for SpO2 >88%, wean as tolerated, hypoxia possibly 2/2 respiratory ongoing infection, monitor  - transient mild hypotension POA, s/p 1L IV NS bolus in ER, home BP low 100s per staff and family, now SBP in low 100s, will hydrate with IV NS 125cchr  - placed on aspiration precautions     Type 2 diabetes mellitus, without long-term current use of insulin (HCC)   Assessment & Plan    Repeat A1c 5 8 today, diabetes controlled with lifestyle modification  Will continue to check Accu-Cheks on patient, no sliding scale at this time  Goal glucose less than 180       Acute respiratory failure (HCC)   Assessment & Plan    - POA, patient requiring nasal cannula oxygen to maintain oxygen saturation, wean as tolerated, likely secondary/in setting of pneumonia     History of psychiatric disorder   Assessment & Plan    - c/w abilify and depakote     History of prolonged Q-T interval on ECG   Assessment & Plan    - continue to monitor       Stage 3 chronic kidney disease (HCC)   Assessment & Plan    Mild stable CKD III, Avoid nephrotoxic agents, creatinine baseline at this point     Down syndrome   Assessment & Plan    Patient lives in a group home, mother's POA     Viral syndrome   Assessment & Plan    - IV hydration with IV NS  - tylenol prn for headaches  - soft diet for sore throat  - treat superimposed PNA     Mild intermittent asthma without complication   Assessment & Plan    - no meds at home, prn albuterol      Obstructive sleep apnea   Assessment & Plan    - sc/w home CPAP with supplemental O2 for the hypoxia           Subjective:   Patient seen and examined at bedside  No acute events noted overnight  She notes that she is very hungry this morning  She states she had lightheadedness dizziness with cough and nausea prior to coming to arrival states she is feeling slightly better but not completely  Objective:     Vitals: Blood pressure 95/51, pulse 72, temperature 98 °F (36 7 °C), temperature source Oral, resp  rate 18, height 4' 9" (1 448 m), weight 82 3 kg (181 lb 7 oz), SpO2 90 %, not currently breastfeeding  ,Body mass index is 39 26 kg/m²  Wt Readings from Last 3 Encounters:   12/03/18 82 3 kg (181 lb 7 oz)   10/10/18 80 5 kg (177 lb 6 4 oz)   08/06/18 79 4 kg (175 lb)       Intake/Output Summary (Last 24 hours) at 12/04/18 1656  Last data filed at 12/03/18 2327   Gross per 24 hour   Intake                0 ml   Output              750 ml   Net             -750 ml       Physical Exam: BP 95/51 (BP Location: Left arm)   Pulse 72   Temp 98 °F (36 7 °C) (Oral)   Resp 18   Ht 4' 9" (1 448 m)   Wt 82 3 kg (181 lb 7 oz)   LMP  (LMP Unknown)   SpO2 90%   Breastfeeding?  No   BMI 39 26 kg/m²   General appearance: alert and oriented, in no acute distress  Lungs: clear to auscultation bilaterally  Heart: regular rate and rhythm, +murmur  Abdomen: soft, non-tender; bowel sounds normal; no masses,  no organomegaly  Extremities: extremities normal, warm and well-perfused; no cyanosis, clubbing, or edema     Recent Results (from the past 24 hour(s))   ECG 12 lead    Collection Time: 12/03/18  5:47 PM   Result Value Ref Range    Ventricular Rate 75 BPM    Atrial Rate 75 BPM    NY Interval 150 ms    QRSD Interval 86 ms QT Interval 408 ms    QTC Interval 455 ms    P Axis 45 degrees    QRS Axis 43 degrees    T Wave Axis 24 degrees   CBC and differential    Collection Time: 12/03/18  6:04 PM   Result Value Ref Range    WBC 21 79 (H) 4 31 - 10 16 Thousand/uL    RBC 4 08 3 81 - 5 12 Million/uL    Hemoglobin 13 2 11 5 - 15 4 g/dL    Hematocrit 39 9 34 8 - 46 1 %    MCV 98 82 - 98 fL    MCH 32 4 26 8 - 34 3 pg    MCHC 33 1 31 4 - 37 4 g/dL    RDW 12 8 11 6 - 15 1 %    MPV 9 6 8 9 - 12 7 fL    Platelets 665 476 - 275 Thousands/uL    nRBC 0 /100 WBCs    Neutrophils Relative 84 (H) 43 - 75 %    Immat GRANS % 1 0 - 2 %    Lymphocytes Relative 9 (L) 14 - 44 %    Monocytes Relative 6 4 - 12 %    Eosinophils Relative 0 0 - 6 %    Basophils Relative 0 0 - 1 %    Neutrophils Absolute 18 29 (H) 1 85 - 7 62 Thousands/µL    Immature Grans Absolute 0 16 0 00 - 0 20 Thousand/uL    Lymphocytes Absolute 2 03 0 60 - 4 47 Thousands/µL    Monocytes Absolute 1 26 (H) 0 17 - 1 22 Thousand/µL    Eosinophils Absolute 0 00 0 00 - 0 61 Thousand/µL    Basophils Absolute 0 05 0 00 - 0 10 Thousands/µL   Protime-INR    Collection Time: 12/03/18  6:04 PM   Result Value Ref Range    Protime 11 8 (H) 9 4 - 11 7 seconds    INR 1 13 0 86 - 1 16   APTT    Collection Time: 12/03/18  6:04 PM   Result Value Ref Range    PTT 30 24 - 33 seconds   Comprehensive metabolic panel    Collection Time: 12/03/18  6:04 PM   Result Value Ref Range    Sodium 135 (L) 136 - 145 mmol/L    Potassium 4 1 3 5 - 5 3 mmol/L    Chloride 99 (L) 100 - 108 mmol/L    CO2 27 21 - 32 mmol/L    ANION GAP 9 4 - 13 mmol/L    BUN 18 5 - 25 mg/dL    Creatinine 1 24 0 60 - 1 30 mg/dL    Glucose 167 (H) 65 - 140 mg/dL    Calcium 8 0 (L) 8 3 - 10 1 mg/dL    AST 24 5 - 45 U/L    ALT 26 12 - 78 U/L    Alkaline Phosphatase 82 46 - 116 U/L    Total Protein 6 4 6 4 - 8 2 g/dL    Albumin 2 7 (L) 3 5 - 5 0 g/dL    Total Bilirubin 0 40 0 20 - 1 00 mg/dL    eGFR 54 ml/min/1 73sq m   UA w Reflex to Microscopic w Reflex to Culture    Collection Time: 12/03/18  6:10 PM   Result Value Ref Range    Color, UA Yellow     Clarity, UA Clear     Specific Gravity, UA 1 020 1 000 - 1 030    pH, UA 6 0 5 0 - 9 0    Leukocytes, UA Negative Negative    Nitrite, UA Negative Negative    Protein, UA Negative Negative mg/dl    Glucose, UA Negative Negative mg/dl    Ketones, UA Trace (A) Negative mg/dl    Urobilinogen, UA 0 2 0 2, 1 0 E U /dl E U /dl    Bilirubin, UA Negative Negative    Blood, UA Negative Negative   Strep Pneumoniae, Urine    Collection Time: 12/03/18  6:10 PM   Result Value Ref Range    Strep pneumoniae antigen, urine Negative Negative   Legionella antigen, urine    Collection Time: 12/03/18  6:10 PM   Result Value Ref Range    Legionella Urinary Antigen Negative Negative   POCT pregnancy, urine    Collection Time: 12/03/18  6:19 PM   Result Value Ref Range    EXT PREG TEST UR (Ref: Negative) negative    Rapid Strep A Screen Throat with Reflex to Culture, Pediatrics and Compromised Adults    Collection Time: 12/03/18  6:26 PM   Result Value Ref Range    Rapid Strep A Screen Negative Negative   Throat culture    Collection Time: 12/03/18  6:26 PM   Result Value Ref Range    Throat Culture Negative for beta-hemolytic Streptococcus    Fingerstick Glucose (POCT)    Collection Time: 12/03/18 10:05 PM   Result Value Ref Range    POC Glucose 157 (H) 65 - 140 mg/dl   Magnesium    Collection Time: 12/04/18  6:20 AM   Result Value Ref Range    Magnesium 2 2 1 6 - 2 6 mg/dL   CBC and differential    Collection Time: 12/04/18  6:20 AM   Result Value Ref Range    WBC 20 60 (H) 4 31 - 10 16 Thousand/uL    RBC 4 03 3 81 - 5 12 Million/uL    Hemoglobin 12 8 11 5 - 15 4 g/dL    Hematocrit 39 7 34 8 - 46 1 %    MCV 99 (H) 82 - 98 fL    MCH 31 8 26 8 - 34 3 pg    MCHC 32 2 31 4 - 37 4 g/dL    RDW 12 9 11 6 - 15 1 %    MPV 9 8 8 9 - 12 7 fL    Platelets 008 880 - 583 Thousands/uL    nRBC 0 /100 WBCs   Basic metabolic panel    Collection Time: 12/04/18  6:20 AM   Result Value Ref Range    Sodium 143 136 - 145 mmol/L    Potassium 3 9 3 5 - 5 3 mmol/L    Chloride 109 (H) 100 - 108 mmol/L    CO2 25 21 - 32 mmol/L    ANION GAP 9 4 - 13 mmol/L    BUN 11 5 - 25 mg/dL    Creatinine 1 03 0 60 - 1 30 mg/dL    Glucose 189 (H) 65 - 140 mg/dL    Calcium 8 5 8 3 - 10 1 mg/dL    eGFR 67 ml/min/1 73sq m   Hemoglobin A1C    Collection Time: 12/04/18  6:20 AM   Result Value Ref Range    Hemoglobin A1C 5 8 4 2 - 6 3 %     mg/dl   Manual Differential(PHLEBS Do Not Order)    Collection Time: 12/04/18  6:20 AM   Result Value Ref Range    Segmented % 59 43 - 75 %    Bands % 37 (H) 0 - 8 %    Lymphocytes % 2 (L) 14 - 44 %    Monocytes % 2 (L) 4 - 12 %    Eosinophils, % 0 0 - 6 %    Basophils % 0 0 - 1 %    Absolute Neutrophils 19 78 (H) 1 85 - 7 62 Thousand/uL    Lymphocytes Absolute 0 41 (L) 0 60 - 4 47 Thousand/uL    Monocytes Absolute 0 41 0 00 - 1 22 Thousand/uL    Eosinophils Absolute 0 00 0 00 - 0 40 Thousand/uL    Basophils Absolute 0 00 0 00 - 0 10 Thousand/uL    Total Counted 100     Toxic Granulation Present     RBC Morphology Normal     Platelet Estimate Adequate Adequate   Fingerstick Glucose (POCT)    Collection Time: 12/04/18  7:14 AM   Result Value Ref Range    POC Glucose 172 (H) 65 - 140 mg/dl   Fingerstick Glucose (POCT)    Collection Time: 12/04/18 11:39 AM   Result Value Ref Range    POC Glucose 107 65 - 140 mg/dl       Current Facility-Administered Medications   Medication Dose Route Frequency Provider Last Rate Last Dose    ampicillin-sulbactam (UNASYN) 3 g in sodium chloride 0 9 % 100 mL IVPB  3 g Intravenous Q6H Indra Miranda MD        ARIPiprazole (ABILIFY) tablet 10 mg  10 mg Oral Early Morning Yissel Farnaz, DO   10 mg at 12/04/18 0527    atorvastatin (LIPITOR) tablet 10 mg  10 mg Oral Daily With [employer], DO   10 mg at 12/03/18 2214    clindamycin (CLINDAGEL) 1 % gel   Topical BID Yissel Farnaz, DO        divalproex sodium (DEPAKOTE) EC tablet 1,000 mg  1,000 mg Oral HS Yissel Farnaz, DO   1,000 mg at 12/03/18 2214    enoxaparin (LOVENOX) subcutaneous injection 40 mg  40 mg Subcutaneous Daily Sidra Sindhu, DO   40 mg at 12/04/18 1033    erythromycin (ILOTYCIN) 0 5 % ophthalmic ointment 0 5 inch  0 5 inch Both Eyes HS Yissel Farnaz, DO        fluticasone (FLONASE) 50 mcg/act nasal spray 1 spray  1 spray Nasal BID Sidra Farnaz, DO   1 spray at 12/04/18 1034    levocetirizine (XYZAL) tablet 2 5 mg  2 5 mg Oral Daily Sidra Sindhu, DO        levothyroxine tablet 50 mcg  50 mcg Oral Early Morning Sidra Sindhu, DO   50 mcg at 12/04/18 0527    sodium chloride 0 9 % infusion  125 mL/hr Intravenous Continuous Yissel Farnaz,  mL/hr at 12/04/18 0916 125 mL/hr at 12/04/18 0916       Invasive Devices     Peripheral Intravenous Line            Peripheral IV 12/03/18 Right Antecubital less than 1 day                Lab, Imaging and other studies: I have personally reviewed pertinent reports      VTE Pharmacologic Prophylaxis: Enoxaparin (Lovenox)  VTE Mechanical Prophylaxis: sequential compression device    Micha Brown MD

## 2018-12-04 NOTE — PLAN OF CARE
Problem: DISCHARGE PLANNING - CARE MANAGEMENT  Goal: Discharge to post-acute care or home with appropriate resources  INTERVENTIONS:  - Conduct assessment to determine patient/family and health care team treatment goals, and need for post-acute services based on payer coverage, community resources, and patient preferences, and barriers to discharge  - Address psychosocial, clinical, and financial barriers to discharge as identified in assessment in conjunction with the patient/family and health care team  - Arrange appropriate level of post-acute services according to patient's   needs and preference and payer coverage in collaboration with the physician and health care team  - Communicate with and update the patient/family, physician, and health care team regarding progress on the discharge plan  - Arrange appropriate transportation to post-acute venues  DCP IS TO [address]  Outcome: Progressing

## 2018-12-04 NOTE — ASSESSMENT & PLAN NOTE
- in setting of pneumonia  - continue with supplemental oxygen, plan to wean to maintain a saturation greater than 92%  - At ED, patient was saturating on 89%

## 2018-12-04 NOTE — PLAN OF CARE
DISCHARGE PLANNING     Discharge to home or other facility with appropriate resources Progressing        INFECTION - ADULT     Absence or prevention of progression during hospitalization Progressing        Knowledge Deficit     Patient/family/caregiver demonstrates understanding of disease process, treatment plan, medications, and discharge instructions Progressing        PAIN - ADULT     Verbalizes/displays adequate comfort level or baseline comfort level Progressing        Potential for Falls     Patient will remain free of falls Progressing

## 2018-12-04 NOTE — ASSESSMENT & PLAN NOTE
- Initial CXR Right middle lobe patchy infiltrate consistent with pneumonia  - completed Unasyn 3 g IV q 6h x4 days, and Augmentin PO 12/7 for 10 days  - urine strep and Legionella negative, rapid strep negative, 12/6/18: throat culture pos for GBS  - placed on aspiratin precautions  - Home O2 eval on 12/8/18, 93% on room air  - Patient is benefitting from use of CPAP with setting of 8 cm  - patient was d/c on 12/7, medically cleared by primary care team and pulmonology, parents appealed medicare rights  Has remained medically stable

## 2018-12-04 NOTE — SOCIAL WORK
PT LIVES AT [address] [facility] [phone]  CURRENTLY PT IS HOME WITH HER PARENTS DURING THE DAY AND RETURNS TO GROUP HOME IN THE EVENING  CONTACTED [address], PT IS INDEPENDENT WITH ADL'S WITH VERBAL DIRECTION  PT HAS CPAP, SHOWER CHAIR AND STEP STOOL  DCP IS FOR PT TO RETURN TO GROUP HOME WHEN STABLE  IT IS PREFERRED TO COORDINATE D/C BEFORE 3:00PM ON DAY OF D/C  Felipe Kidd [phone] AND WITH GLENN LANZA ([facility]) [phone]  CM WILL FOLLOW FOR ADDITIONAL DCP NEEDS

## 2018-12-04 NOTE — H&P
H&P Exam - Chantelle Petersen 44 y o  female MRN: [id number]    Unit/Bed#: ED 15 Encounter: [id number]    This is a 44 y o  F with a PMHx of Down syndrome, psychiatric disorders, diet controlled DM who presents for pneumonia with hypoxia  Patient is expected to stay for atleast 2 midnights, with an expected length of stay of 3 or more days  Patient will be placed under the service of Dr Cailin Sood  The following assessment and plan was discussed with attending  * Pneumonia with hypoxia   Assessment & Plan    - in setting of leukocytosis, probable PNA vs URI/bronchitis  - CXR shows right middle lobe patchy infiltrate consistent with pneumonia --?   Aspiration pneumonia in setting of difficulty chewing/swallowing per family in setting of her Down syndrome  - for concern for aspiration pneumonia, will start patient on a broad-spectrum Zosyn and Flagyl while awaiting cultures at which time antibiotics can be narrowed down; patient to be NPO at this time, modified barium study in the morning to further assess for dysphagia/aspiration  - s/p 1 dose of Rocephin in ER  - order sputum Cx, urine strep and legionella   - f/u throat Cx, rapid strep was negative in the ER  - supplemental O2 PRN for SpO2 >88%, wean as tolerated, hypoxia possibly 2/2 respiratory ongoing infection, monitor  - transient mild hypotension POA, s/p 1L IV NS bolus in ER, home BP low 100s per staff and family, now SBP in low 100s, will hydrate with IV NS 125cchr  - placed on aspiration precautions     Acute respiratory failure ([facility] [address] )   Assessment & Plan    - POA, patient requiring nasal cannula oxygen to maintain oxygen saturation, wean as tolerated, likely secondary/in setting of pneumonia     Viral syndrome   Assessment & Plan    - IV hydration with IV NS  - tylenol prn for headaches  - soft diet for sore throat  - treat superimposed PNA     Type 2 diabetes mellitus, without long-term current use of insulin ([address] )   Assessment & Plan    Lab Results   Component Value Date    HGBA1C 5 4 07/11/2018     - patient has diet controlled diabetes with last A1c controlled at 5 4, will obtain repeat A1c at this time, placed on insulin sliding scale in case she has hyperglycemia in setting of infection     History of psychiatric disorder   Assessment & Plan    - c/w abilify and depakote     History of prolonged Q-T interval on ECG   Assessment & Plan    - obtain an EKG in the a m  To monitor this       Stage 3 chronic kidney disease (HCC)   Assessment & Plan    Mild stable CKD III, Avoid nephrotoxic agents, creatinine baseline at this point     Down syndrome   Assessment & Plan    Concern for chronic dysphagia due to difficulty chewing food per patient's mother, although patient is on regular diet at home, will place patient NPO and modified barium study in the morning, see above mention     Mild intermittent asthma without complication   Assessment & Plan    - no meds at home, prn albuterol      Obstructive sleep apnea   Assessment & Plan    - sc/w home CPAP with supplemental O2 for the hypoxia             History of Present Illness     This is a 43-year-old female with a past medical history of Down syndrome, psychiatric disorders on Abilify and Depakote, sleep apnea on CPAP at night, diet-controlled diabetes, who presents to the ER this morning for generalized body aches, fever, sore throat, and dry cough  History obtained mainly from patient's family, her mother is her POA, she is also accompanied by a staff member from the group home that she currently resides in  Per patient's mother, patient has been having symptoms of the sore throat and congestion since about 2-3 days now, else well as malaise, intermittent lightheadedness  However earlier this evening, patient noted to have an episode of fever, she was also not her usual active self, little bit malaise  She also has been having intermittent headaches, generalized  She also has been having a cough, mainly nonproductive  No significant shortness of breath that she has been experiencing, no wheezing, no chest pain, no chest tightness  She does have a history of asthma, however that is very well controlled per her family, she has not had any recent flares  In ER, patient got 1 dose of Zithromax and Rocephin  Review of Systems   Constitutional: Negative for chills and fever  HENT: Positive for congestion and sore throat  Respiratory: Positive for cough  Negative for shortness of breath and wheezing  Cardiovascular: Negative for chest pain and leg swelling  Gastrointestinal: Negative for abdominal pain, diarrhea, nausea and vomiting  Genitourinary: Negative for dysuria  Neurological: Positive for light-headedness  Historical Information   Past Medical History:   Diagnosis Date    Asthma     Diabetes mellitus ([address] )     Disease of thyroid gland     Down syndrome     Heart murmur     Hyperlipidemia     Long Q-T syndrome     Psychiatric disorder     schizo    Sleep apnea      Past Surgical History:   Procedure Laterality Date    PATENT DUCTUS ARTERIOUS LIGATION       Social History   History   Alcohol Use No     History   Drug Use No     History   Smoking Status    Never Smoker   Smokeless Tobacco    Never Used     Family History: History reviewed  No pertinent family history  Meds/Allergies   PTA meds:   Prior to Admission Medications   Prescriptions Last Dose Informant Patient Reported? Taking? ARIPiprazole (ABILIFY) 10 mg tablet 12/3/2018 at 0800 Mother Yes Yes   Sig: Take 10 mg by mouth   Multiple Vitamins-Minerals (CENTRUM SILVER ULTRA WOMENS PO)  Mother Yes No   Sig: Centrum Silver Ultra Womens Oral Tablet  Refills: 0  Active   VITAMIN D, ERGOCALCIFEROL, PO 12/2/2018 at 0800 Mother Yes Yes   Sig: Take 1 25 mg by mouth once a week   Administer on sunday   Wound Dressings (REVITADERM WOUND CARE) GEL 12/2/2018 at 0800 Mother Yes Yes   Sig: Inhale Apply to feet daily in AM    atorvastatin (LIPITOR) 10 mg tablet 2018 at 2000 Mother Yes Yes   Sig: Take 10 mg by mouth daily  clindamycin (CLINDAGEL) 1 % gel 12/3/2018 at 0800  Yes Yes   Sig: Apply topically 2 (two) times a day Apply to L axilla   divalproex sodium (DEPAKOTE) 500 mg EC tablet 2018 at 2000 Mother Yes Yes   Sig: Take 1,000 mg by mouth daily at bedtime     erythromycin (ILOTYCIN) ophthalmic ointment 2018 at 2200  Yes Yes   Sig: Administer 0 5 inches to both eyes daily at bedtime   fluticasone (FLONASE) 50 mcg/act nasal spray 12/3/2018 at 0800  No Yes   Si spray into each nostril 2 (two) times a day for 30 days Fluticasone Propionate 50 MCG/ACT Nasal Suspension USE 1 SPRAY IN EACH NOSTRIL TWICE DAILY  Quantity: 1;  Refills: 5    Karen Harris D O ; [id number] GM Bottle  Use that a m  and 8:00 p m    levocetirizine (XYZAL) 5 MG tablet 2018 at 2000 Mother Yes Yes   Sig: TAKE 1 TABLET BY MOUTH ONCE DAILY AT 8PM   levothyroxine (SYNTHROID) 50 mcg tablet 12/3/2018 at 0630 Mother Yes Yes   Sig: Take 50 mcg by mouth daily  Synthroid 50 MCG Oral Tablet TAKE 1 TABLET DAILY    Refills: 0  Active    naproxen (EC NAPROSYN) 500 MG EC tablet   No No   Sig: Take 1 tablet by mouth 2 (two) times a day with meals for 7 days      Facility-Administered Medications: None     Allergies   Allergen Reactions    Avandia [Rosiglitazone]      CHF       Objective   First Vitals:   Blood Pressure: 96/59 (18)  Pulse: 84 (18)  Temperature: 99 °F (37 2 °C) (18)  Temp Source: Tympanic (18)  Respirations: 20 (18)  Height: 4' 9" (144 8 cm) (18)  Weight - Scale: 83 8 kg (184 lb 12 8 oz) (18)  SpO2: 90 % (18)    Current Vitals:   Blood Pressure: 106/57 (18)  Pulse: 69 (18)  Temperature: 98 4 °F (36 9 °C) (18)  Temp Source: Tympanic (18)  Respirations: 18 (18)  Height: 4' 9" (144 8 cm) (18 1723)  Weight - Scale: 83 8 kg (184 lb 12 8 oz) (12/03/18 1723)  SpO2: 94 % (12/03/18 2104)    No intake or output data in the 24 hours ending 12/03/18 2111    Invasive Devices     Peripheral Intravenous Line            Peripheral IV 12/03/18 Right Antecubital less than 1 day                Physical Exam   Constitutional: No distress  HENT:   Head: Normocephalic and atraumatic  Right Ear: External ear normal    Left Ear: External ear normal    Mouth/Throat: Oropharynx is clear and moist  No oropharyngeal exudate  Eyes: Pupils are equal, round, and reactive to light  Conjunctivae and EOM are normal    Neck: Neck supple  Cardiovascular: Normal rate, regular rhythm, normal heart sounds and intact distal pulses  No murmur heard  Pulmonary/Chest: Effort normal and breath sounds normal  No respiratory distress  She has no wheezes  She has no rales  Abdominal: Soft  Bowel sounds are normal  She exhibits no distension  There is no tenderness  There is no rebound  Musculoskeletal: She exhibits no edema, tenderness or deformity  Neurological: She is alert  No cranial nerve deficit  She exhibits normal muscle tone  Coordination normal    Skin: Skin is warm and dry  She is not diaphoretic  Psychiatric: She has a normal mood and affect         Lab Results:   Results for orders placed or performed during the hospital encounter of 12/03/18   Rapid Strep A Screen Throat with Reflex to Culture, Pediatrics and Compromised Adults   Result Value Ref Range    Rapid Strep A Screen Negative Negative   CBC and differential   Result Value Ref Range    WBC 21 79 (H) 4 31 - 10 16 Thousand/uL    RBC 4 08 3 81 - 5 12 Million/uL    Hemoglobin 13 2 11 5 - 15 4 g/dL    Hematocrit 39 9 34 8 - 46 1 %    MCV 98 82 - 98 fL    MCH 32 4 26 8 - 34 3 pg    MCHC 33 1 31 4 - 37 4 g/dL    RDW 12 8 11 6 - 15 1 %    MPV 9 6 8 9 - 12 7 fL    Platelets 267 977 - 697 Thousands/uL    nRBC 0 /100 WBCs    Neutrophils Relative 84 (H) 43 - 75 %    Immat GRANS % 1 0 - 2 %    Lymphocytes Relative 9 (L) 14 - 44 %    Monocytes Relative 6 4 - 12 %    Eosinophils Relative 0 0 - 6 %    Basophils Relative 0 0 - 1 %    Neutrophils Absolute 18 29 (H) 1 85 - 7 62 Thousands/µL    Immature Grans Absolute 0 16 0 00 - 0 20 Thousand/uL    Lymphocytes Absolute 2 03 0 60 - 4 47 Thousands/µL    Monocytes Absolute 1 26 (H) 0 17 - 1 22 Thousand/µL    Eosinophils Absolute 0 00 0 00 - 0 61 Thousand/µL    Basophils Absolute 0 05 0 00 - 0 10 Thousands/µL   Protime-INR   Result Value Ref Range    Protime 11 8 (H) 9 4 - 11 7 seconds    INR 1 13 0 86 - 1 16   APTT   Result Value Ref Range    PTT 30 24 - 33 seconds   Comprehensive metabolic panel   Result Value Ref Range    Sodium 135 (L) 136 - 145 mmol/L    Potassium 4 1 3 5 - 5 3 mmol/L    Chloride 99 (L) 100 - 108 mmol/L    CO2 27 21 - 32 mmol/L    ANION GAP 9 4 - 13 mmol/L    BUN 18 5 - 25 mg/dL    Creatinine 1 24 0 60 - 1 30 mg/dL    Glucose 167 (H) 65 - 140 mg/dL    Calcium 8 0 (L) 8 3 - 10 1 mg/dL    AST 24 5 - 45 U/L    ALT 26 12 - 78 U/L    Alkaline Phosphatase 82 46 - 116 U/L    Total Protein 6 4 6 4 - 8 2 g/dL    Albumin 2 7 (L) 3 5 - 5 0 g/dL    Total Bilirubin 0 40 0 20 - 1 00 mg/dL    eGFR 54 ml/min/1 73sq m   UA w Reflex to Microscopic w Reflex to Culture   Result Value Ref Range    Color, UA Yellow     Clarity, UA Clear     Specific Gravity, UA 1 020 1 000 - 1 030    pH, UA 6 0 5 0 - 9 0    Leukocytes, UA Negative Negative    Nitrite, UA Negative Negative    Protein, UA Negative Negative mg/dl    Glucose, UA Negative Negative mg/dl    Ketones, UA Trace (A) Negative mg/dl    Urobilinogen, UA 0 2 0 2, 1 0 E U /dl E U /dl    Bilirubin, UA Negative Negative    Blood, UA Negative Negative   Magnesium   Result Value Ref Range    Magnesium 2 2 1 6 - 2 6 mg/dL   CBC and differential   Result Value Ref Range    WBC 20 60 (H) 4 31 - 10 16 Thousand/uL    RBC 4 03 3 81 - 5 12 Million/uL    Hemoglobin 12 8 11 5 - 15 4 g/dL    Hematocrit 39 7 34 8 - 46 1 %    MCV 99 (H) 82 - 98 fL    MCH 31 8 26 8 - 34 3 pg    MCHC 32 2 31 4 - 37 4 g/dL    RDW 12 9 11 6 - 15 1 %    MPV 9 8 8 9 - 12 7 fL    Platelets 075 823 - 471 Thousands/uL    nRBC 0 /100 WBCs   Basic metabolic panel   Result Value Ref Range    Sodium 143 136 - 145 mmol/L    Potassium 3 9 3 5 - 5 3 mmol/L    Chloride 109 (H) 100 - 108 mmol/L    CO2 25 21 - 32 mmol/L    ANION GAP 9 4 - 13 mmol/L    BUN 11 5 - 25 mg/dL    Creatinine 1 03 0 60 - 1 30 mg/dL    Glucose 189 (H) 65 - 140 mg/dL    Calcium 8 5 8 3 - 10 1 mg/dL    eGFR 67 ml/min/1 73sq m   POCT pregnancy, urine   Result Value Ref Range    EXT PREG TEST UR (Ref: Negative) negative    Fingerstick Glucose (POCT)   Result Value Ref Range    POC Glucose 157 (H) 65 - 140 mg/dl   Fingerstick Glucose (POCT)   Result Value Ref Range    POC Glucose 172 (H) 65 - 140 mg/dl   Manual Differential(PHLEBS Do Not Order)   Result Value Ref Range    Segmented % 59 43 - 75 %    Bands % 37 (H) 0 - 8 %    Lymphocytes % 2 (L) 14 - 44 %    Monocytes % 2 (L) 4 - 12 %    Eosinophils, % 0 0 - 6 %    Basophils % 0 0 - 1 %    Absolute Neutrophils 19 78 (H) 1 85 - 7 62 Thousand/uL    Lymphocytes Absolute 0 41 (L) 0 60 - 4 47 Thousand/uL    Monocytes Absolute 0 41 0 00 - 1 22 Thousand/uL    Eosinophils Absolute 0 00 0 00 - 0 40 Thousand/uL    Basophils Absolute 0 00 0 00 - 0 10 Thousand/uL    Total Counted 100     Toxic Granulation Present     RBC Morphology Normal     Platelet Estimate Adequate Adequate       Imaging: Xr Chest 2 Views    Result Date: 12/3/2018  Impression: Right middle lobe patchy infiltrate consistent with pneumonia  Follow-up radiographs recommended in one month to ensure complete resolution  The study was marked in [facility] for immediate notification  Workstation performed: [id number]         Code Status: Full    Counseling / Coordination of Care: Total floor / unit time spent today 30 minutes

## 2018-12-04 NOTE — SPEECH THERAPY NOTE
Speech Language/Pathology      Videofluoroscopy swallowing evaluation order discontinued and canceled by ALEXIS Frankel S , [address] [facility] Pathologist  [id number]

## 2018-12-04 NOTE — ASSESSMENT & PLAN NOTE
- c/w home CPAP with supplemental O2 for the hypoxia  - provided new script for cpap in setting of cpap machine being damaged   - setting of 8 cm  - Patient is benefitting from CPAP with setting of 8 cm  - patient's family wants to switch medical equipment company after process was completed with current company

## 2018-12-04 NOTE — PROGRESS NOTES
Bedside nursing swallowing done  Tolerated Jello, apple sauce and liquid water  Dr Simón Gabriel was updated

## 2018-12-05 ENCOUNTER — HOSPITAL ENCOUNTER (INPATIENT)
Dept: RADIOLOGY | Facility: HOSPITAL | Age: 43
Discharge: HOME | DRG: 193 | End: 2018-12-05
Payer: MEDICARE

## 2018-12-05 LAB
ANION GAP SERPL CALCULATED.3IONS-SCNC: 7 MMOL/L (ref 4–13)
BASOPHILS # BLD AUTO: 0.09 THOUSANDS/ΜL (ref 0–0.1)
BASOPHILS NFR BLD AUTO: 1 % (ref 0–1)
BUN SERPL-MCNC: 11 MG/DL (ref 5–25)
CALCIUM SERPL-MCNC: 8.4 MG/DL (ref 8.3–10.1)
CHLORIDE SERPL-SCNC: 105 MMOL/L (ref 100–108)
CO2 SERPL-SCNC: 30 MMOL/L (ref 21–32)
CREAT SERPL-MCNC: 0.9 MG/DL (ref 0.6–1.3)
EOSINOPHIL # BLD AUTO: 0.08 THOUSAND/ΜL (ref 0–0.61)
EOSINOPHIL NFR BLD AUTO: 0 % (ref 0–6)
ERYTHROCYTE [DISTWIDTH] IN BLOOD BY AUTOMATED COUNT: 13 % (ref 11.6–15.1)
GFR SERPL CREATININE-BSD FRML MDRD: 79 ML/MIN/1.73SQ M
GLUCOSE SERPL-MCNC: 176 MG/DL (ref 65–140)
GLUCOSE SERPL-MCNC: 181 MG/DL (ref 65–140)
GLUCOSE SERPL-MCNC: 195 MG/DL (ref 65–140)
GLUCOSE SERPL-MCNC: 199 MG/DL (ref 65–140)
GLUCOSE SERPL-MCNC: 203 MG/DL (ref 65–140)
HCT VFR BLD AUTO: 41.3 % (ref 34.8–46.1)
HGB BLD-MCNC: 13.3 G/DL (ref 11.5–15.4)
IMM GRANULOCYTES # BLD AUTO: 0.26 THOUSAND/UL (ref 0–0.2)
IMM GRANULOCYTES NFR BLD AUTO: 1 % (ref 0–2)
LYMPHOCYTES # BLD AUTO: 1.59 THOUSANDS/ΜL (ref 0.6–4.47)
LYMPHOCYTES NFR BLD AUTO: 9 % (ref 14–44)
MCH RBC QN AUTO: 32.2 PG (ref 26.8–34.3)
MCHC RBC AUTO-ENTMCNC: 32.2 G/DL (ref 31.4–37.4)
MCV RBC AUTO: 100 FL (ref 82–98)
MONOCYTES # BLD AUTO: 1.26 THOUSAND/ΜL (ref 0.17–1.22)
MONOCYTES NFR BLD AUTO: 7 % (ref 4–12)
MRSA NOSE QL CULT: NORMAL
NEUTROPHILS # BLD AUTO: 14.9 THOUSANDS/ΜL (ref 1.85–7.62)
NEUTS SEG NFR BLD AUTO: 82 % (ref 43–75)
NRBC BLD AUTO-RTO: 0 /100 WBCS
PLATELET # BLD AUTO: 219 THOUSANDS/UL (ref 149–390)
PMV BLD AUTO: 10.1 FL (ref 8.9–12.7)
POTASSIUM SERPL-SCNC: 4.1 MMOL/L (ref 3.5–5.3)
RBC # BLD AUTO: 4.13 MILLION/UL (ref 3.81–5.12)
SODIUM SERPL-SCNC: 142 MMOL/L (ref 136–145)
WBC # BLD AUTO: 18.18 THOUSAND/UL (ref 4.31–10.16)

## 2018-12-05 PROCEDURE — 80048 BASIC METABOLIC PNL TOTAL CA: CPT | Performed by: FAMILY MEDICINE

## 2018-12-05 PROCEDURE — G8997 SWALLOW GOAL STATUS: HCPCS

## 2018-12-05 PROCEDURE — 82948 REAGENT STRIP/BLOOD GLUCOSE: CPT

## 2018-12-05 PROCEDURE — 87633 RESP VIRUS 12-25 TARGETS: CPT | Performed by: INTERNAL MEDICINE

## 2018-12-05 PROCEDURE — 99221 1ST HOSP IP/OBS SF/LOW 40: CPT | Performed by: INTERNAL MEDICINE

## 2018-12-05 PROCEDURE — 85025 COMPLETE CBC W/AUTO DIFF WBC: CPT | Performed by: FAMILY MEDICINE

## 2018-12-05 PROCEDURE — G8996 SWALLOW CURRENT STATUS: HCPCS

## 2018-12-05 PROCEDURE — 74230 X-RAY XM SWLNG FUNCJ C+: CPT

## 2018-12-05 PROCEDURE — 92611 MOTION FLUOROSCOPY/SWALLOW: CPT

## 2018-12-05 RX ORDER — ALBUTEROL SULFATE 2.5 MG/3ML
2.5 SOLUTION RESPIRATORY (INHALATION) EVERY 4 HOURS PRN
Status: DISCONTINUED | OUTPATIENT
Start: 2018-12-05 | End: 2018-12-13 | Stop reason: HOSPADM

## 2018-12-05 RX ADMIN — INSULIN LISPRO 1 UNITS: 100 INJECTION, SOLUTION INTRAVENOUS; SUBCUTANEOUS at 11:50

## 2018-12-05 RX ADMIN — AMPICILLIN SODIUM AND SULBACTAM SODIUM 3 G: 2; 1 INJECTION, POWDER, FOR SOLUTION INTRAMUSCULAR; INTRAVENOUS at 11:48

## 2018-12-05 RX ADMIN — INSULIN LISPRO 1 UNITS: 100 INJECTION, SOLUTION INTRAVENOUS; SUBCUTANEOUS at 22:55

## 2018-12-05 RX ADMIN — AMPICILLIN SODIUM AND SULBACTAM SODIUM 3 G: 2; 1 INJECTION, POWDER, FOR SOLUTION INTRAMUSCULAR; INTRAVENOUS at 04:41

## 2018-12-05 RX ADMIN — ENOXAPARIN SODIUM 40 MG: 40 INJECTION SUBCUTANEOUS at 11:49

## 2018-12-05 RX ADMIN — DIVALPROEX SODIUM 1000 MG: 500 TABLET, DELAYED RELEASE ORAL at 22:55

## 2018-12-05 RX ADMIN — INSULIN LISPRO 1 UNITS: 100 INJECTION, SOLUTION INTRAVENOUS; SUBCUTANEOUS at 08:50

## 2018-12-05 RX ADMIN — INSULIN LISPRO 1 UNITS: 100 INJECTION, SOLUTION INTRAVENOUS; SUBCUTANEOUS at 17:48

## 2018-12-05 RX ADMIN — ATORVASTATIN CALCIUM 10 MG: 10 TABLET, FILM COATED ORAL at 16:19

## 2018-12-05 RX ADMIN — FLUTICASONE PROPIONATE 1 SPRAY: 50 SPRAY, METERED NASAL at 17:13

## 2018-12-05 RX ADMIN — LEVOTHYROXINE SODIUM 50 MCG: 50 TABLET ORAL at 05:28

## 2018-12-05 RX ADMIN — AMPICILLIN SODIUM AND SULBACTAM SODIUM 3 G: 2; 1 INJECTION, POWDER, FOR SOLUTION INTRAMUSCULAR; INTRAVENOUS at 16:19

## 2018-12-05 RX ADMIN — ERYTHROMYCIN 0.5 INCH: 5 OINTMENT OPHTHALMIC at 22:55

## 2018-12-05 RX ADMIN — AMPICILLIN SODIUM AND SULBACTAM SODIUM 3 G: 2; 1 INJECTION, POWDER, FOR SOLUTION INTRAMUSCULAR; INTRAVENOUS at 22:55

## 2018-12-05 RX ADMIN — ARIPIPRAZOLE 10 MG: 5 TABLET ORAL at 05:28

## 2018-12-05 RX ADMIN — FLUTICASONE PROPIONATE 1 SPRAY: 50 SPRAY, METERED NASAL at 11:50

## 2018-12-05 NOTE — SOCIAL WORK
BERONICA following to monitor needs and assist with planning  Pt is a resident of [address] [place] [address]  SW spoke with Heladio Lane, assistant , about pt's level of function prior to admission and eventual return home  Per Ms Carlos Enrique Melgar pt spends part of her day with her parents and part at group home during week  Weekends pt is at group home except when parents pick her up to go to [employer]  During week pt goes with parents around 9:00 am   Pt returns to group home any time between 2:30 pm-8:30 pm depending on plans  Per BERONICA understanding this arrangement is because parents are not fond of the Day Program that is currently in place for pt and the other clients of group home  The agency that manages the services for the clients, [facility] [employer], is currently in the process of trying to find staff to work with pt during the day as well as find a different Day Program that is more acceptable with parents  Ms Carlos Enrique Melgar expressed no concerns about pt's eventual return to group home once pt's condition is stable  Offered assistance in future if needed and gave SW phone number for reference  SW will continue to follow to monitor progress and assist as needed

## 2018-12-05 NOTE — PROGRESS NOTES
[address] [address] [facility] Practice Progress Note - Rose Petersen 44 y o  female MRN: [id number]    Unit/Bed#: 2 [place] [address] Encounter: [id number]      Assessment/Plan:  * Pneumonia with hypoxia   Assessment & Plan    - CXR Right middle lobe patchy infiltrate consistent with pneumonia  ? Aspiration pneumonia in setting of difficulty chewing/swallowing per family in setting of her Down syndrome  - for concern for aspiration pneumonia, will obtain formal speech/swallow eval  -continue with Unasyn 3 g IV q 6h for aspiration pneumonia   Patient passed bedside swallow eval  - sputum culture pending   -urine strep and Legionella negative, rapid strep negative, throat culture negative  - supplemental O2 PRN for SpO2 >88%, wean as tolerated, hypoxia possibly 2/2 pneumonia  - placed on aspiration precautions     Acute respiratory failure ([facility] [address] )   Assessment & Plan    - in setting of aspiration pneumonia  - continue with supplemental oxygen, plan to wean to maintain a saturation greater than 92%  Type 2 diabetes mellitus, without long-term current use of insulin ([facility])   Assessment & Plan    Repeat A1c 5 8 today, diabetes controlled with lifestyle modification  Glucose yesterday ranged between 107-260  Will start on insulin sliding scale during hospitalization  Goal glucose less than 180       History of psychiatric disorder   Assessment & Plan    - c/w abilify and depakote     History of prolonged Q-T interval on ECG   Assessment & Plan    - continue to monitor       Stage 3 chronic kidney disease (HCC)   Assessment & Plan    Mild stable CKD III, Avoid nephrotoxic agents, creatinine baseline at this point     Down syndrome   Assessment & Plan    Patient lives in a group home, mother's POA     Viral syndrome   Assessment & Plan    - tylenol prn for headaches  - soft diet for sore throat  - treat superimposed PNA     Mild intermittent asthma without complication   Assessment & Plan    - no meds at home, prn albuterol Obstructive sleep apnea   Assessment & Plan    - sc/w home CPAP with supplemental O2 for the hypoxia             Subjective:   Patient seen and examined at bedside, no acute events noted overnight  States shortness of breath has slightly improved, feels a little bit better today  Objective:     Vitals: Blood pressure 127/68, pulse 78, temperature 98 1 °F (36 7 °C), temperature source Oral, resp  rate 18, height 4' 9" (1 448 m), weight 82 3 kg (181 lb 7 oz), SpO2 95 %, not currently breastfeeding  ,Body mass index is 39 26 kg/m²  Wt Readings from Last 3 Encounters:   12/03/18 82 3 kg (181 lb 7 oz)   10/10/18 80 5 kg (177 lb 6 4 oz)   08/06/18 79 4 kg (175 lb)       Intake/Output Summary (Last 24 hours) at 12/05/18 0737  Last data filed at 12/04/18 2301   Gross per 24 hour   Intake              850 ml   Output              550 ml   Net              300 ml       Physical Exam: /68 (BP Location: Left arm)   Pulse 78   Temp 98 1 °F (36 7 °C) (Oral)   Resp 18   Ht 4' 9" (1 448 m)   Wt 82 3 kg (181 lb 7 oz)   LMP  (LMP Unknown)   SpO2 95%   Breastfeeding?  No   BMI 39 26 kg/m²   General appearance: alert and oriented, in no acute distress  Lungs: dimnished breath sounds on right side otherwise clear  Heart: regular rate and rhythm and positive murmur  Abdomen: soft, non-tender; bowel sounds normal; no masses,  no organomegaly  Extremities: extremities normal, warm and well-perfused; no cyanosis, clubbing, or edema     Recent Results (from the past 24 hour(s))   Fingerstick Glucose (POCT)    Collection Time: 12/04/18 11:39 AM   Result Value Ref Range    POC Glucose 107 65 - 140 mg/dl   Fingerstick Glucose (POCT)    Collection Time: 12/04/18  4:35 PM   Result Value Ref Range    POC Glucose 198 (H) 65 - 140 mg/dl   Fingerstick Glucose (POCT)    Collection Time: 12/04/18  9:15 PM   Result Value Ref Range    POC Glucose 256 (H) 65 - 140 mg/dl   Fingerstick Glucose (POCT)    Collection Time: 12/05/18  7:20 AM Result Value Ref Range    POC Glucose 176 (H) 65 - 140 mg/dl       Current Facility-Administered Medications   Medication Dose Route Frequency Provider Last Rate Last Dose    ampicillin-sulbactam (UNASYN) 3 g in sodium chloride 0 9 % 100 mL IVPB  3 g Intravenous Q6H Indra Miranda  mL/hr at 12/05/18 0441 3 g at 12/05/18 0441    ARIPiprazole (ABILIFY) tablet 10 mg  10 mg Oral Early Morning Yissel Farnaz, DO   10 mg at 12/05/18 0528    atorvastatin (LIPITOR) tablet 10 mg  10 mg Oral Daily With [employer], DO   10 mg at 12/04/18 1709    clindamycin (CLINDAGEL) 1 % gel   Topical BID Yissel Farnaz, DO        divalproex sodium (DEPAKOTE) EC tablet 1,000 mg  1,000 mg Oral HS Yissel Farnaz, DO   1,000 mg at 12/04/18 2101    enoxaparin (LOVENOX) subcutaneous injection 40 mg  40 mg Subcutaneous Daily Yissel Farnaz, DO   40 mg at 12/04/18 1033    erythromycin (ILOTYCIN) 0 5 % ophthalmic ointment 0 5 inch  0 5 inch Both Eyes HS Yissel Farnaz, DO   0 5 inch at 12/04/18 2107    fluticasone (FLONASE) 50 mcg/act nasal spray 1 spray  1 spray Nasal BID Yissel Farnaz, DO   1 spray at 12/04/18 1747    levocetirizine (XYZAL) tablet 2 5 mg  2 5 mg Oral Daily Yissel Farnaz, DO        levothyroxine tablet 50 mcg  50 mcg Oral Early Morning Yissel Farnaz, DO   50 mcg at 12/05/18 2488       Invasive Devices     Peripheral Intravenous Line            Peripheral IV 12/03/18 Right Antecubital 1 day                Lab, Imaging and other studies: I have personally reviewed pertinent reports      VTE Pharmacologic Prophylaxis: Enoxaparin (Lovenox)  VTE Mechanical Prophylaxis: sequential compression device    Indra Miranda MD

## 2018-12-05 NOTE — PLAN OF CARE
DISCHARGE PLANNING     Discharge to home or other facility with appropriate resources Progressing        DISCHARGE PLANNING - CARE MANAGEMENT     Discharge to post-acute care or home with appropriate resources Progressing        INFECTION - ADULT     Absence or prevention of progression during hospitalization Progressing        Knowledge Deficit     Patient/family/caregiver demonstrates understanding of disease process, treatment plan, medications, and discharge instructions Progressing        PAIN - ADULT     Verbalizes/displays adequate comfort level or baseline comfort level Progressing        Potential for Falls     Patient will remain free of falls Progressing        Prexisting or High Potential for Compromised Skin Integrity     Skin integrity is maintained or improved Progressing

## 2018-12-05 NOTE — PROCEDURES
Speech-Language Pathology Video Barium Swallow Study        Patient Name: Julita Matthews    Today's Date: 12/5/2018     Problem List  Patient Active Problem List   Diagnosis    Obstructive sleep apnea    Mild intermittent asthma without complication    Pneumonia with hypoxia    Viral syndrome    Down syndrome    Stage 3 chronic kidney disease ([facility] [address] )    History of prolonged Q-T interval on ECG    History of psychiatric disorder    Acute respiratory failure (HCC)    Type 2 diabetes mellitus, without long-term current use of insulin (HCC)       Past Medical History  Past Medical History:   Diagnosis Date    Asthma     Diabetes mellitus ([facility] [address] )     Disease of thyroid gland     Down syndrome     Heart murmur     Hyperlipidemia     Long Q-T syndrome     Psychiatric disorder     schizo    Sleep apnea        Past Surgical History  Past Surgical History:   Procedure Laterality Date    PATENT DUCTUS ARTERIOUS LIGATION           General Information;  Pt is a 44 y o  female who presented to [address] [place] [address] with pneumonia with hypoxia  Pt passed a bedside RN dysphagia screen and modified barium swallow study was ordered to r/o aspiration  Per chart family has reported some dysphagia  Per RD she is known to be an impulsive eater which was confirmed by the patients father  He also expressed coughing and gagging noted with rapid rate/vol of intake       Swallow Information   Current Risks for Dysphagia & Aspiration: change in respiratory status and reported new dysphagia     Current Symptoms/Concerns: coughing with po, choking incident and change in respiratory status    Current Diet: regular diet and thin liquids      Baseline Diet: soft/level 3 diet and thin liquids      Baseline Assessment   Behavior/Cognition: alert    Speech/Language Status: able to participate in conversation and able to follow commands    Patient Positioning: Laterally at 90 degrees      Speech/Swallow Mech:   Facial: symmetrical  Labial: WFL  Lingual: macroglossia noted consistent with Downs Syndrome otherwise adequate strength and ROm  Velum: symmetrical  Mandible: adequate ROM  Dentition: some missing dentition  Vocal quality:clear/adequate   Volitional Cough: strong/productive   Respiratory: RA    Previous VBS: none noted in EPIC    Consistencies Assessed and Performance   Pt was seen in radiology for a Video Barium Swallow Study, seated in the upright position and viewed laterally with the following consistencies:      Administered: thin liquids, nectar thick, puree, mechanical soft solids, hard solids and mixed consistency  Specific materials administered: Barium laden applesauce, nutrigrain bar, peaches in juice, hard cookie, nectar thick, and thin liquids  Liquids were administered by cup and straw      Results are as follows:     **Images are available for review on PACS    Oral stage:  Lip closure: adequate  Mastication: incomplete with regular solids- reduced with mixed consistencies 2' impulsivity- mildly prolonged with soft solids  Bolus formation: mildly reduced across consistencies  Bolus control: mildly reduced- see below  Transfer: lingual pumping is noted as primary means of transfer- generally WFL  Residue: noted across solids min-mod with piecemeal deglutition noted- at times requiring cues for additional swallow to clear; liquid wash also noted to be beneficial    Pharyngeal stage:  Swallow promptness: adequate  Spill to valleculae: mild with puree, soft and regular solids; mild-mod with mixed consistencies  Spill to pyriforms: mild-mod spill with mixed consistencies  Epiglottic inversion: *small epiglottis noted with mildly delayed/reduced inversion  Laryngeal rise: adequate  Pharyngeal constriction: adequate  Vallecular retention: min with mixed; mod with puree   Pyriform retention: min with thin liquids  PPW coating: none  Osteophytes: none present  CP prominence: not observed  Epiglottic undercoat: not observed  Penetration: deep transient penetration noted on initial sip of thin liquids via straw  Aspiration: none observed    Screening of Esophageal stage:  WFL at the cervical level, no backflow or stasis evident  Assessment Summary; Pt presents with mild-moderate oral and mild pharyngeal dysphagia as described above  Primary concerns include noted small epiglottis with delayed/reduced inversion as well as decreased oral processing and reported/noted impulsivity negatively impacting safety of intake  Patient with mild risk for aspiration with rapid rate/vol of intake  Patient reportedly self- feds and likely unable to carryover recommendations therefore it is recommended that supervision is provided at all times with intake with cueing for adequate rate/vol to improve safety and reduce risk  Diagnosis/Prognosis;  mild-moderate oral and mild pharyngeal dysphagia    good prognosis for continued safe po intake given supervision and cueing    Recommendations; Recommend soft/level 3 diet and thin liquids, with upright posture, slow rate of feeding, small bites/sips and alternating bites and sips and 1:1 assisstance for verbal reminders for reduced rate/vol of intake  Recommended Form of Meds: crushed with puree   Full Supervision  Aspiration precautions   Reflux Precautions  Aspiration precautions posted  Results reviewed with patient, RN, MD, CRNP and family  Goals:  Pt will tolerate least restrictive diet w/out s/s aspiration or oral/pharyngeal difficulties  Dysphagia Goals: pt will tolerate dysph 3 textures with thin liquids without s/s of aspiration x3 and pt will demonstrate accurate use of controlled raet/vol of intake strategy with 80% accuracy given min-mod verbal cues      Treatment Recommendations: ST will continue to follow    Discharge recommendation: SNF/homecare    ALEXIS Nieves , [address] [facility]  Speech Language Pathologist   [id number]

## 2018-12-05 NOTE — PLAN OF CARE
Problem: SLP ADULT - SWALLOWING, IMPAIRED  Goal: Initial SLP swallow eval performed  Outcome: Completed Date Met: 12/05/18

## 2018-12-06 ENCOUNTER — APPOINTMENT (INPATIENT)
Dept: RADIOLOGY | Facility: HOSPITAL | Age: 43
DRG: 193 | End: 2018-12-06
Payer: MEDICARE

## 2018-12-06 LAB
ANION GAP SERPL CALCULATED.3IONS-SCNC: 5 MMOL/L (ref 4–13)
BACTERIA THROAT CULT: ABNORMAL
BASOPHILS # BLD AUTO: 0.09 THOUSANDS/ΜL (ref 0–0.1)
BASOPHILS NFR BLD AUTO: 1 % (ref 0–1)
BUN SERPL-MCNC: 8 MG/DL (ref 5–25)
CALCIUM SERPL-MCNC: 8.3 MG/DL (ref 8.3–10.1)
CHLORIDE SERPL-SCNC: 104 MMOL/L (ref 100–108)
CO2 SERPL-SCNC: 32 MMOL/L (ref 21–32)
CREAT SERPL-MCNC: 0.86 MG/DL (ref 0.6–1.3)
EOSINOPHIL # BLD AUTO: 0.13 THOUSAND/ΜL (ref 0–0.61)
EOSINOPHIL NFR BLD AUTO: 1 % (ref 0–6)
ERYTHROCYTE [DISTWIDTH] IN BLOOD BY AUTOMATED COUNT: 13.2 % (ref 11.6–15.1)
GFR SERPL CREATININE-BSD FRML MDRD: 84 ML/MIN/1.73SQ M
GLUCOSE SERPL-MCNC: 130 MG/DL (ref 65–140)
GLUCOSE SERPL-MCNC: 141 MG/DL (ref 65–140)
GLUCOSE SERPL-MCNC: 161 MG/DL (ref 65–140)
GLUCOSE SERPL-MCNC: 222 MG/DL (ref 65–140)
GLUCOSE SERPL-MCNC: 222 MG/DL (ref 65–140)
HCT VFR BLD AUTO: 40.1 % (ref 34.8–46.1)
HGB BLD-MCNC: 12.3 G/DL (ref 11.5–15.4)
IMM GRANULOCYTES # BLD AUTO: 0.17 THOUSAND/UL (ref 0–0.2)
IMM GRANULOCYTES NFR BLD AUTO: 2 % (ref 0–2)
LYMPHOCYTES # BLD AUTO: 2.39 THOUSANDS/ΜL (ref 0.6–4.47)
LYMPHOCYTES NFR BLD AUTO: 22 % (ref 14–44)
MCH RBC QN AUTO: 32 PG (ref 26.8–34.3)
MCHC RBC AUTO-ENTMCNC: 30.7 G/DL (ref 31.4–37.4)
MCV RBC AUTO: 104 FL (ref 82–98)
MONOCYTES # BLD AUTO: 0.89 THOUSAND/ΜL (ref 0.17–1.22)
MONOCYTES NFR BLD AUTO: 8 % (ref 4–12)
NEUTROPHILS # BLD AUTO: 7.37 THOUSANDS/ΜL (ref 1.85–7.62)
NEUTS SEG NFR BLD AUTO: 66 % (ref 43–75)
NRBC BLD AUTO-RTO: 0 /100 WBCS
PLATELET # BLD AUTO: 193 THOUSANDS/UL (ref 149–390)
PMV BLD AUTO: 10.2 FL (ref 8.9–12.7)
POTASSIUM SERPL-SCNC: 3.9 MMOL/L (ref 3.5–5.3)
PROCALCITONIN SERPL-MCNC: 0.07 NG/ML
RBC # BLD AUTO: 3.84 MILLION/UL (ref 3.81–5.12)
SODIUM SERPL-SCNC: 141 MMOL/L (ref 136–145)
WBC # BLD AUTO: 11.04 THOUSAND/UL (ref 4.31–10.16)

## 2018-12-06 PROCEDURE — 71046 X-RAY EXAM CHEST 2 VIEWS: CPT

## 2018-12-06 PROCEDURE — 80048 BASIC METABOLIC PNL TOTAL CA: CPT | Performed by: FAMILY MEDICINE

## 2018-12-06 PROCEDURE — 84145 PROCALCITONIN (PCT): CPT | Performed by: FAMILY MEDICINE

## 2018-12-06 PROCEDURE — 94760 N-INVAS EAR/PLS OXIMETRY 1: CPT

## 2018-12-06 PROCEDURE — 99232 SBSQ HOSP IP/OBS MODERATE 35: CPT | Performed by: INTERNAL MEDICINE

## 2018-12-06 PROCEDURE — 82948 REAGENT STRIP/BLOOD GLUCOSE: CPT

## 2018-12-06 PROCEDURE — 85025 COMPLETE CBC W/AUTO DIFF WBC: CPT | Performed by: FAMILY MEDICINE

## 2018-12-06 PROCEDURE — 92526 ORAL FUNCTION THERAPY: CPT

## 2018-12-06 RX ORDER — ACETAMINOPHEN 325 MG/1
650 TABLET ORAL EVERY 6 HOURS PRN
Status: DISCONTINUED | OUTPATIENT
Start: 2018-12-06 | End: 2018-12-13 | Stop reason: HOSPADM

## 2018-12-06 RX ADMIN — INSULIN LISPRO 1 UNITS: 100 INJECTION, SOLUTION INTRAVENOUS; SUBCUTANEOUS at 09:02

## 2018-12-06 RX ADMIN — AMPICILLIN SODIUM AND SULBACTAM SODIUM 3 G: 2; 1 INJECTION, POWDER, FOR SOLUTION INTRAMUSCULAR; INTRAVENOUS at 06:13

## 2018-12-06 RX ADMIN — AMPICILLIN SODIUM AND SULBACTAM SODIUM 3 G: 2; 1 INJECTION, POWDER, FOR SOLUTION INTRAMUSCULAR; INTRAVENOUS at 12:25

## 2018-12-06 RX ADMIN — FLUTICASONE PROPIONATE 1 SPRAY: 50 SPRAY, METERED NASAL at 17:42

## 2018-12-06 RX ADMIN — ARIPIPRAZOLE 10 MG: 5 TABLET ORAL at 06:13

## 2018-12-06 RX ADMIN — AMPICILLIN SODIUM AND SULBACTAM SODIUM 3 G: 2; 1 INJECTION, POWDER, FOR SOLUTION INTRAMUSCULAR; INTRAVENOUS at 16:31

## 2018-12-06 RX ADMIN — DIVALPROEX SODIUM 1000 MG: 500 TABLET, DELAYED RELEASE ORAL at 21:32

## 2018-12-06 RX ADMIN — ACETAMINOPHEN 650 MG: 325 TABLET, FILM COATED ORAL at 09:45

## 2018-12-06 RX ADMIN — ACETAMINOPHEN 650 MG: 325 TABLET, FILM COATED ORAL at 20:14

## 2018-12-06 RX ADMIN — FLUTICASONE PROPIONATE 1 SPRAY: 50 SPRAY, METERED NASAL at 09:03

## 2018-12-06 RX ADMIN — ENOXAPARIN SODIUM 40 MG: 40 INJECTION SUBCUTANEOUS at 09:02

## 2018-12-06 RX ADMIN — ERYTHROMYCIN 0.5 INCH: 5 OINTMENT OPHTHALMIC at 21:35

## 2018-12-06 RX ADMIN — LEVOTHYROXINE SODIUM 50 MCG: 50 TABLET ORAL at 06:13

## 2018-12-06 RX ADMIN — ATORVASTATIN CALCIUM 10 MG: 10 TABLET, FILM COATED ORAL at 16:31

## 2018-12-06 RX ADMIN — INSULIN LISPRO 1 UNITS: 100 INJECTION, SOLUTION INTRAVENOUS; SUBCUTANEOUS at 16:31

## 2018-12-06 RX ADMIN — INSULIN LISPRO 1 UNITS: 100 INJECTION, SOLUTION INTRAVENOUS; SUBCUTANEOUS at 21:36

## 2018-12-06 RX ADMIN — AMPICILLIN SODIUM AND SULBACTAM SODIUM 3 G: 2; 1 INJECTION, POWDER, FOR SOLUTION INTRAMUSCULAR; INTRAVENOUS at 22:59

## 2018-12-06 NOTE — CONSULTS
Consultation - Pulmonary Medicine   Christine Petersen 44 y o  female MRN: [id number]  Unit/Bed#: [address] [place] [address] Encounter: [id number]      Assessment:  Right middle lobe pneumonia more likely community-acquired pneumonia rather than aspiration pneumonia  Moderate obstructive sleep apnea with good compliance to CPAP at 8 cm water at home  Recent upper respiratory tract infection which may have been viral  Down syndrome  Mild intermittent asthma without acute exacerbation    Plan:   Concur with IV Unasyn as ordered  Can check nasal swab for respiratory pathogen panel  Patient has frequent cough can just continue with 3 L of oxygen for now in place of her CPAP which is set at 8 cm water at home  Check procalcitonin level in a m  Neb treatments with albuterol 2 5 mg q 4 hours p r n  Wheezing shortness of breath  I spoke with patient's father and I spoke with patient's medical team    History of Present Illness   Physician Requesting Consult: Nevaeh Bhandari MD  Reason for Consult / Principal Problem:  Pneumonia, ROBERTO  Hx and PE limited by:  Patient has Down syndrome  Father was at bedside and provided history    HPI: Savannah Burton is a 44y o  year old female with Down syndrome who presented from her group home for evaluation of general malaise, body aches fever sore throat and cough  Patient's father states patient has had signs of a upper respiratory tract infection with nasal discharge and cough for the past several days  She seemed to be getting somewhat lethargic and also had fever  She was not having any apparent shortness of breath  She reported fever of 101 5°  On arrival to the ER her temperature was 99°, blood pressure was 96/59 and room air saturation 90%  Initial chest x-ray shows a patchy infiltrate in the right middle lobe  Her initial white blood cell count was 21 8 and is gradually decreasing  Today white blood cell count is 18 2  Hemoglobin is 13 3 blood cultures just are show no growth  Urine for strep pneumonia and Legionella antigen are negative  Throat culture was negative as well  Nasal MRSA cultures negative  A modified barium video fluoroscopic swallow study was done today  This did reveal some mild to moderate oral and mild pharyngeal dysphagia but no distinct aspiration    She is presently receiving IV ampicillin-sulbactam   She still has some cough and lethargy  Gage Alas does have a history of mild intermittent asthma for which he uses nebulized with albuterol periodically  She does have a history of moderate obstructive sleep apnea  In October 2012 when she weighed 217 lb her overall AHI was 22 and this increased to 42 and REM sleep  She does use CPAP at 8 cm of water  Review of Systems   Constitutional: Positive for activity change and fever  Patient has been having cough, fever, fatigue and general malaise  HENT: Positive for congestion and sore throat  Eyes: Negative for redness  Respiratory: Positive for cough  Negative for shortness of breath  Cardiovascular: Negative for leg swelling  Neurological: Negative for syncope  Historical Information   Past Medical History:   Diagnosis Date    Asthma     Diabetes mellitus ([address] )     Disease of thyroid gland     Down syndrome     Heart murmur     Hyperlipidemia     Long Q-T syndrome     Psychiatric disorder     schizo    Sleep apnea      Past Surgical History:   Procedure Laterality Date    PATENT DUCTUS ARTERIOUS LIGATION       Social History   History   Alcohol Use No     History   Drug Use No     History   Smoking Status    Never Smoker   Smokeless Tobacco    Never Used         Family History: History reviewed  No pertinent family history      Meds/Allergies     Current Facility-Administered Medications:     albuterol inhalation solution 2 5 mg, 2 5 mg, Nebulization, Q4H PRN, Dayron Oswald MD    ampicillin-sulbactam (UNASYN) 3 g in sodium chloride 0 9 % 100 mL IVPB, 3 g, Intravenous, Jass Escobar MD, Last Rate: 200 mL/hr at 12/05/18 1619, 3 g at 12/05/18 1619    ARIPiprazole (ABILIFY) tablet 10 mg, 10 mg, Oral, Early Morning, Yissel Fanraz, DO, 10 mg at 12/05/18 0528    atorvastatin (LIPITOR) tablet 10 mg, 10 mg, Oral, Daily With Yaz Gil Farnaz, DO, 10 mg at 12/05/18 1619    clindamycin (CLINDAGEL) 1 % gel, , Topical, BID, Yissel Farnaz, DO    divalproex sodium (DEPAKOTE) EC tablet 1,000 mg, 1,000 mg, Oral, HS, Yissel Farnaz, DO, 1,000 mg at 12/04/18 2101    enoxaparin (LOVENOX) subcutaneous injection 40 mg, 40 mg, Subcutaneous, Daily, Yissel Farnaz, DO, 40 mg at 12/05/18 1149    erythromycin (ILOTYCIN) 0 5 % ophthalmic ointment 0 5 inch, 0 5 inch, Both Eyes, HS, Yissel Farnaz, DO, 0 5 inch at 12/04/18 2107    fluticasone (FLONASE) 50 mcg/act nasal spray 1 spray, 1 spray, Nasal, BID, Yissel Farnaz, DO, 1 spray at 12/05/18 1713    insulin lispro (HumaLOG) 100 units/mL subcutaneous injection 1-5 Units, 1-5 Units, Subcutaneous, 4x Daily (AC & HS), 1 Units at 12/05/18 1748 **AND** Fingerstick Glucose (POCT), , , 4x Daily AC and at bedtime, Micha Brown MD    levocetirizine (XYZAL) tablet 2 5 mg, 2 5 mg, Oral, Daily, Yissel Farnaz, DO    levothyroxine tablet 50 mcg, 50 mcg, Oral, Early Morning, Yissel Farnaz, DO, 50 mcg at 12/05/18 7620    Prior to Admission medications    Medication Sig Start Date End Date Taking? Authorizing Provider   ARIPiprazole (ABILIFY) 10 mg tablet Take 10 mg by mouth 7/10/18  Yes Historical Provider, MD   atorvastatin (LIPITOR) 10 mg tablet Take 10 mg by mouth daily     Yes Historical Provider, MD   clindamycin (CLINDAGEL) 1 % gel Apply topically 2 (two) times a day Apply to L axilla   Yes Historical Provider, MD   divalproex sodium (DEPAKOTE) 500 mg EC tablet Take 1,000 mg by mouth daily at bedtime     Yes Historical Provider, MD   erythromycin (ILOTYCIN) ophthalmic ointment Administer 0 5 inches to both eyes daily at bedtime   Yes Historical Provider, MD fluticasone (FLONASE) 50 mcg/act nasal spray 1 spray into each nostril 2 (two) times a day for 30 days Fluticasone Propionate 50 MCG/ACT Nasal Suspension USE 1 SPRAY IN EACH NOSTRIL TWICE DAILY  Quantity: 1;  Refills: 5    BrindaCammie owensMichaelmiya WIGGINS O ; [id number] GM Bottle  Use that a m  and 8:00 p m  8/6/18 12/3/18 Yes Patricia Pollen, CRNP   levocetirizine (XYZAL) 5 MG tablet TAKE 1 TABLET BY MOUTH ONCE DAILY AT 8PM 3/22/18  Yes Historical Provider, MD   levothyroxine (SYNTHROID) 50 mcg tablet Take 50 mcg by mouth daily  Synthroid 50 MCG Oral Tablet TAKE 1 TABLET DAILY  Refills: 0  Active    Yes Historical Provider, MD   VITAMIN D, ERGOCALCIFEROL, PO Take 1 25 mg by mouth once a week  Administer on sunday   Yes Historical Provider, MD   Wound Dressings (REVITADERM WOUND CARE) GEL Inhale Apply to feet daily in AM    Yes Historical Provider, MD   Multiple Vitamins-Minerals (CENTRUM SILVER ULTRA WOMENS PO) Centrum Silver Ultra Womens Oral Tablet  Refills: 0  Active    Historical Provider, MD   naproxen (EC NAPROSYN) 500 MG EC tablet Take 1 tablet by mouth 2 (two) times a day with meals for 7 days 11/29/17 12/6/17  Denisa Schwartz, DO       Allergies   Allergen Reactions    Avandia [Rosiglitazone]      CHF       Objective   Vitals: Blood pressure 126/57, pulse 90, temperature 98 1 °F (36 7 °C), temperature source Oral, resp  rate 18, height 4' 9" (1 448 m), weight 82 3 kg (181 lb 7 oz), SpO2 95 %, not currently breastfeeding  ,Body mass index is 39 26 kg/m²  Intake/Output Summary (Last 24 hours) at 12/05/18 2027  Last data filed at 12/05/18 1202   Gross per 24 hour   Intake             1090 ml   Output              650 ml   Net              440 ml     Invasive Devices     Peripheral Intravenous Line            Peripheral IV 12/03/18 Right Antecubital 2 days                Physical Exam   Constitutional:   Overweight female who is slightly lethargic but arousable  Not having any distress    On 3 L of oxygen O2 saturation is 95%   HENT:   Head: Normocephalic  Mouth/Throat: Oropharynx is clear and moist  No oropharyngeal exudate  Eyes: Conjunctivae are normal    Neck: Neck supple  No JVD present  Cardiovascular: Normal rate, regular rhythm and normal heart sounds  Pulmonary/Chest:   Lung sounds reveal bilateral rhonchi  No wheezing   Abdominal: Soft  She exhibits no distension  There is no tenderness  Musculoskeletal: She exhibits no edema  Neurological:   Slightly lethargic and will speak   Skin: Skin is warm and dry  Lab Results: ABG: No results found for: PHART, QXF9ISM, PO2ART, XZH2KEQ, C2JYTNSN, BEART, SOURCE, BNP: No results found for: BNP, CBC: Lab Results   Component Value Date    WBC 18 18 (H) 12/05/2018    HGB 13 3 12/05/2018    HCT 41 3 12/05/2018     (H) 12/05/2018     12/05/2018    ADJUSTEDWBC 9 00 02/08/2016    MCH 32 2 12/05/2018    MCHC 32 2 12/05/2018    RDW 13 0 12/05/2018    MPV 10 1 12/05/2018    NRBC 0 12/05/2018   , CMP: Lab Results   Component Value Date     01/08/2016    K 4 1 12/05/2018    K 3 4 (L) 01/08/2016     12/05/2018     01/08/2016    CO2 30 12/05/2018    CO2 26 01/08/2016    ANIONGAP 15 0 01/08/2016    BUN 11 12/05/2018    BUN 20 01/08/2016    CREATININE 0 90 12/05/2018    CREATININE 1 1 01/08/2016    GLUCOSE 116 (H) 01/08/2016    CALCIUM 8 4 12/05/2018    CALCIUM 8 5 01/08/2016    AST 24 12/03/2018    AST 21 01/08/2016    ALT 26 12/03/2018    ALT 28 01/08/2016    ALKPHOS 82 12/03/2018    ALKPHOS 106 01/08/2016    PROT 7 2 01/08/2016    BILITOT 0 3 01/08/2016    EGFR 79 12/05/2018   , PT/INR:   Lab Results   Component Value Date    INR 1 13 12/03/2018    INR 1 00 01/08/2016   , Troponin: No results found for: TROPONIN    Imaging Studies: I have personally reviewed pertinent reports  and I have personally reviewed pertinent films in PACS    EKG, Pathology, and Other Studies: I have personally reviewed pertinent reports      VTE Prophylaxis: Enoxaparin (Lovenox)    Code Status: Level 1 - Full Code    Counseling/Coordination of Care: Total floor / unit time spent today 30 minutes  Greater than 50% of total time was spent with the patient and / or family counseling and / or coordination of care   A description of the counseling / coordination of care: I reviewed chest x ray, spoke to patient's father, and discussed pneumonia treatmment with her medical team

## 2018-12-06 NOTE — PROGRESS NOTES
Patient has 2 medications on MAR that the hospital does not have  The family of the patient instructed me to call the caregiver, Suellen Michael  I was not able to get in contact will her or leave a message because her voice mail is full

## 2018-12-06 NOTE — SPEECH THERAPY NOTE
Speech Language/Pathology    Speech/Language Pathology Progress Note    Patient Name: Christine Petersen  Today's Date: 12/6/2018     Problem List  Patient Active Problem List   Diagnosis    Obstructive sleep apnea    Mild intermittent asthma without complication    Pneumonia with hypoxia    Viral syndrome    Down syndrome    Stage 3 chronic kidney disease ([address] )    History of prolonged Q-T interval on ECG    History of psychiatric disorder    Acute respiratory failure (HCC)    Type 2 diabetes mellitus, without long-term current use of insulin (HCC)        Past Medical History  Past Medical History:   Diagnosis Date    Asthma     Diabetes mellitus ([address]     Disease of thyroid gland     Down syndrome     Heart murmur     Hyperlipidemia     Long Q-T syndrome     Psychiatric disorder     schizo    Sleep apnea         Past Surgical History  Past Surgical History:   Procedure Laterality Date    PATENT DUCTUS ARTERIOUS LIGATION       Subjective:  Patient asleep in bed, 02 in place, woke in response to verbal input  Objective:  Patient is on a Dysphagia 3 diet with thin liquids per results of Modified Barium Swallowing Study 12/5/18  Diet as well as compensatory strategies for safe swallow are posted above patient's bed  These include slow rate of intake, smaller bites of solids, single sips of liquid, alternate solids and liquids  Patient takes successive sips of liquid from a straw and able to modify to single sips with verbal cues  Patient continues to take larger bites of solids and modified with cues  Continue to verbally reinforce the use of compensatory strategies  No delay in the initiation of the swallow for liquids and solids  Intermittent coughing during intake may not be related to dysphagia but to pneumonia  Assessment:  Swallow skills are adequate to continue a Dysphagia 3 diet with thin liquids  Patient requires supervision to follow strategies    Plan/Recommendations:  1)  Dysphagia 3 diet with thin liquids  2)  Medications with thin liquids or applesauce  3)  Supervision during meals  4)  Use of compensatory strategies to facilitate safe swallow skills  Will follow      ALEXIS Salamanca , [address] Pathologist  [id number]

## 2018-12-06 NOTE — PROGRESS NOTES
[address] [facility] [address] [facility] Practice Progress Note - Marlene Petersen 44 y o  female MRN: [id number]    Unit/Bed#: 2 [place] [address] Encounter: [id number]      Assessment/Plan:    * Pneumonia with hypoxia   Assessment & Plan    - CXR Right middle lobe patchy infiltrate consistent with pneumonia  Community vs?Aspiration pneumonia in setting of difficulty chewing/swallowing per family in setting of her Down syndrome  -continue with Unasyn 3 g IV q 6h  Day #3 abx   Patient passed bedside swallow eval  WBC improving today to 11 04, afebrile overnight  - procalcitonin pending  - sputum culture pending, resp pathogen panel pending   -urine strep and Legionella negative, rapid strep negative, throat culture negative  - supplemental O2 PRN for SpO2 >88%, wean as tolerated, hypoxia possibly 2/2 pneumonia  - placed on aspiration precautions     Acute respiratory failure (HCC)   Assessment & Plan    - in setting of pneumonia  - continue with supplemental oxygen, plan to wean to maintain a saturation greater than 92%  Type 2 diabetes mellitus, without long-term current use of insulin (HCC)   Assessment & Plan    Repeat A1c 5 8 today, diabetes controlled with lifestyle modification  Glucose yesterday ranged between 107-260  Will start on insulin sliding scale during hospitalization  Goal glucose less than 180       History of psychiatric disorder   Assessment & Plan    - c/w abilify and depakote     History of prolonged Q-T interval on ECG   Assessment & Plan    - continue to monitor       Stage 3 chronic kidney disease (HCC)   Assessment & Plan    Mild stable CKD III, Avoid nephrotoxic agents, creatinine baseline at this point     Down syndrome   Assessment & Plan    Patient lives in a group home, mother's POA     Viral syndrome   Assessment & Plan    - tylenol prn for headaches  - soft diet for sore throat  - treat superimposed PNA     Mild intermittent asthma without complication   Assessment & Plan    - no meds at home, prn albuterol Obstructive sleep apnea   Assessment & Plan    - sc/w home CPAP with supplemental O2 for the hypoxia             Subjective:   Patient seen and examined at bedside  No acute events noted overnight  She says she feels better this morning, as she also does endorse a headache  Objective:     Vitals: Blood pressure 92/53, pulse 71, temperature 98 3 °F (36 8 °C), temperature source Oral, resp  rate 18, height 4' 9" (1 448 m), weight 82 3 kg (181 lb 7 oz), SpO2 90 %, not currently breastfeeding  ,Body mass index is 39 26 kg/m²  Wt Readings from Last 3 Encounters:   12/03/18 82 3 kg (181 lb 7 oz)   10/10/18 80 5 kg (177 lb 6 4 oz)   08/06/18 79 4 kg (175 lb)       Intake/Output Summary (Last 24 hours) at 12/06/18 0930  Last data filed at 12/05/18 1202   Gross per 24 hour   Intake              240 ml   Output              300 ml   Net              -60 ml       Physical Exam: BP 92/53 (BP Location: Left arm)   Pulse 71   Temp 98 3 °F (36 8 °C) (Oral)   Resp 18   Ht 4' 9" (1 448 m)   Wt 82 3 kg (181 lb 7 oz)   LMP  (LMP Unknown)   SpO2 90%   Breastfeeding?  No   BMI 39 26 kg/m²   General appearance: alert and oriented, in no acute distress  Lungs: diffuse rhonchi, no wheezing, diminshed at right base  Heart: regular rate and rhythm  Abdomen: soft, non-tender; bowel sounds normal; no masses,  no organomegaly  Extremities: extremities normal, warm and well-perfused; no cyanosis, clubbing, or edema     Recent Results (from the past 24 hour(s))   CBC and differential    Collection Time: 12/05/18 10:45 AM   Result Value Ref Range    WBC 18 18 (H) 4 31 - 10 16 Thousand/uL    RBC 4 13 3 81 - 5 12 Million/uL    Hemoglobin 13 3 11 5 - 15 4 g/dL    Hematocrit 41 3 34 8 - 46 1 %     (H) 82 - 98 fL    MCH 32 2 26 8 - 34 3 pg    MCHC 32 2 31 4 - 37 4 g/dL    RDW 13 0 11 6 - 15 1 %    MPV 10 1 8 9 - 12 7 fL    Platelets 285 450 - 320 Thousands/uL    nRBC 0 /100 WBCs    Neutrophils Relative 82 (H) 43 - 75 %    Immat GRANS % 1 0 - 2 %    Lymphocytes Relative 9 (L) 14 - 44 %    Monocytes Relative 7 4 - 12 %    Eosinophils Relative 0 0 - 6 %    Basophils Relative 1 0 - 1 %    Neutrophils Absolute 14 90 (H) 1 85 - 7 62 Thousands/µL    Immature Grans Absolute 0 26 (H) 0 00 - 0 20 Thousand/uL    Lymphocytes Absolute 1 59 0 60 - 4 47 Thousands/µL    Monocytes Absolute 1 26 (H) 0 17 - 1 22 Thousand/µL    Eosinophils Absolute 0 08 0 00 - 0 61 Thousand/µL    Basophils Absolute 0 09 0 00 - 0 10 Thousands/µL   Fingerstick Glucose (POCT)    Collection Time: 12/05/18 11:43 AM   Result Value Ref Range    POC Glucose 195 (H) 65 - 140 mg/dl   Fingerstick Glucose (POCT)    Collection Time: 12/05/18  4:26 PM   Result Value Ref Range    POC Glucose 199 (H) 65 - 140 mg/dl   Fingerstick Glucose (POCT)    Collection Time: 12/05/18 10:18 PM   Result Value Ref Range    POC Glucose 203 (H) 65 - 140 mg/dl   Fingerstick Glucose (POCT)    Collection Time: 12/06/18  7:58 AM   Result Value Ref Range    POC Glucose 222 (H) 65 - 140 mg/dl   CBC and differential    Collection Time: 12/06/18  8:19 AM   Result Value Ref Range    WBC 11 04 (H) 4 31 - 10 16 Thousand/uL    RBC 3 84 3 81 - 5 12 Million/uL    Hemoglobin 12 3 11 5 - 15 4 g/dL    Hematocrit 40 1 34 8 - 46 1 %     (H) 82 - 98 fL    MCH 32 0 26 8 - 34 3 pg    MCHC 30 7 (L) 31 4 - 37 4 g/dL    RDW 13 2 11 6 - 15 1 %    MPV 10 2 8 9 - 12 7 fL    Platelets 312 734 - 212 Thousands/uL    nRBC 0 /100 WBCs    Neutrophils Relative 66 43 - 75 %    Immat GRANS % 2 0 - 2 %    Lymphocytes Relative 22 14 - 44 %    Monocytes Relative 8 4 - 12 %    Eosinophils Relative 1 0 - 6 %    Basophils Relative 1 0 - 1 %    Neutrophils Absolute 7 37 1 85 - 7 62 Thousands/µL    Immature Grans Absolute 0 17 0 00 - 0 20 Thousand/uL    Lymphocytes Absolute 2 39 0 60 - 4 47 Thousands/µL    Monocytes Absolute 0 89 0 17 - 1 22 Thousand/µL    Eosinophils Absolute 0 13 0 00 - 0 61 Thousand/µL    Basophils Absolute 0 09 0 00 - 0 10 Thousands/µL   Basic metabolic panel    Collection Time: 12/06/18  8:19 AM   Result Value Ref Range    Sodium 141 136 - 145 mmol/L    Potassium 3 9 3 5 - 5 3 mmol/L    Chloride 104 100 - 108 mmol/L    CO2 32 21 - 32 mmol/L    ANION GAP 5 4 - 13 mmol/L    BUN 8 5 - 25 mg/dL    Creatinine 0 86 0 60 - 1 30 mg/dL    Glucose 130 65 - 140 mg/dL    Calcium 8 3 8 3 - 10 1 mg/dL    eGFR 84 ml/min/1 73sq m       Current Facility-Administered Medications   Medication Dose Route Frequency Provider Last Rate Last Dose    acetaminophen (TYLENOL) tablet 650 mg  650 mg Oral Q6H PRN Karin Haywood MD        albuterol inhalation solution 2 5 mg  2 5 mg Nebulization Q4H PRN Leland Weber MD        ampicillin-sulbactam (UNASYN) 3 g in sodium chloride 0 9 % 100 mL IVPB  3 g Intravenous Q6H Leland Weber  mL/hr at 12/06/18 0613 3 g at 12/06/18 0844    ARIPiprazole (ABILIFY) tablet 10 mg  10 mg Oral Early Morning Yissel Farnaz, DO   10 mg at 12/06/18 8788    atorvastatin (LIPITOR) tablet 10 mg  10 mg Oral Daily With [employer], DO   10 mg at 12/05/18 1619    clindamycin (CLINDAGEL) 1 % gel   Topical BID Yissel Farnaz, DO        divalproex sodium (DEPAKOTE) EC tablet 1,000 mg  1,000 mg Oral HS Yissel Farnaz, DO   1,000 mg at 12/05/18 2255    enoxaparin (LOVENOX) subcutaneous injection 40 mg  40 mg Subcutaneous Daily Yissel Farnaz, DO   40 mg at 12/06/18 0902    erythromycin (ILOTYCIN) 0 5 % ophthalmic ointment 0 5 inch  0 5 inch Both Eyes HS Yissel Farnaz, DO   0 5 inch at 12/05/18 2255    fluticasone (FLONASE) 50 mcg/act nasal spray 1 spray  1 spray Nasal BID Yissel Farnaz, DO   1 spray at 12/06/18 0903    insulin lispro (HumaLOG) 100 units/mL subcutaneous injection 1-5 Units  1-5 Units Subcutaneous 4x Daily (AC & HS) Leland Weber MD   1 Units at 12/06/18 0902    levocetirizine (XYZAL) tablet 2 5 mg  2 5 mg Oral Daily Yissel Farnaz,         levothyroxine tablet 50 mcg  50 mcg Oral Early Morning [facility]   50 mcg at 12/06/18 4346       Invasive Devices     Peripheral Intravenous Line            Peripheral IV 12/03/18 Right Antecubital 2 days                Lab, Imaging and other studies: I have personally reviewed pertinent reports      VTE Pharmacologic Prophylaxis: Enoxaparin (Lovenox)  VTE Mechanical Prophylaxis: sequential compression device    Atiya León MD

## 2018-12-06 NOTE — PROGRESS NOTES
Progress Note - Pulmonary   Chela Petersen 44 y o  female MRN: [id number]  Unit/Bed#: 2 [place] [address] Encounter: [id number]      Assessment/Plan:   Right middle lobe community-acquired pneumonia:  -continue total 5 day course of antibiotics, currently on Unasyn  -acute hypoxemia with prior history of oxygen dependence:  -secondary to above  -prior history of oxygen dependence resolved as of February 2018 per office visit notes  -mild intermittent asthma without acute exacerbation:  -currently no role for steroids at this time  -p r n  Nebulization  -moderate obstructive sleep apnea:  -compliant with CPAP on 8 cm of water home  -obesity with BMI 39 2  -weight loss would benefit respiratory status  -down syndrome:  -lives at group home    -1 more day of IV antibiotics  -can be discharged home tomorrow from a pulmonary perspective  -should have follow-up in the outpatient setting within 7-10 days          ______________________________________________________________________    Subjective: Pt seen and examined at bedside  No complaints  Patient wants to go home  Tele Events:     Vitals:   Temp:  [98 1 °F (36 7 °C)-98 9 °F (37 2 °C)] 98 9 °F (37 2 °C)  HR:  [71-90] 74  Resp:  [16-18] 16  BP: ()/(52-66) 117/58  Weight (last 2 days)     None        Oxygen Therapy  SpO2: 93 %  O2 Flow Rate (L/min): 3 L/min    IV Infusions:       Nutrition:        Diet Orders            Start     Ordered    12/05/18 1632  Diet David/CHO Controlled; Consistent Carbohydrate Diet Level 3 (6 carb servings/90 grams CHO/meal); Dysphagia 3-Dental Soft; Thin Liquid  Diet effective now     Comments:  Assist while eating   Question Answer Comment   Diet Type David/CHO Controlled    David/CHO Controlled Consistent Carbohydrate Diet Level 3 (6 carb servings/90 grams CHO/meal)    Other Restriction(s): Dysphagia 3-Dental Soft    Liquid Modifier Thin Liquid    RD to adjust diet per protocol?  Yes        12/05/18 1632    12/04/18 0853  Room Service Once     Question:  Type of Service  Answer:  [facility]    12/04/18 0852          Ins/Outs:   I/O       12/04 0701 - 12/05 0700 12/05 0701 - 12/06 0700 12/06 0701 - 12/07 0700    P  O   240     I V  (mL/kg) 850 (10 3)      Total Intake(mL/kg) 850 (10 3) 240 (2 9)     Urine (mL/kg/hr) 550 (0 3) 300 (0 2)     Total Output 550 300      Net +300 -60             Unmeasured Stool Occurrence 2 x            Lines/Drains:  Invasive Devices     Peripheral Intravenous Line            Peripheral IV 12/03/18 Right Antecubital 2 days                 Active medications:  Scheduled Meds:  Current Facility-Administered Medications:  acetaminophen 650 mg Oral Q6H PRN Catrina Conroy MD    albuterol 2 5 mg Nebulization Q4H PRN Indra Miranda MD    ampicillin-sulbactam 3 g Intravenous Q6H Indra Miranda MD Last Rate: 3 g (12/06/18 1225)   ARIPiprazole 10 mg Oral Early Morning Yissel Farnaz, DO    atorvastatin 10 mg Oral Daily With Dinner Yissel Farnaz, DO    clindamycin  Topical BID Yissel Farnaz, DO    divalproex sodium 1,000 mg Oral HS Yissel Farnaz, DO    enoxaparin 40 mg Subcutaneous Daily Yissel Farnaz, DO    erythromycin 0 5 inch Both Eyes HS Yisesl Farnaz, DO    fluticasone 1 spray Nasal BID Yissel Farnaz, DO    insulin lispro 1-5 Units Subcutaneous 4x Daily (AC & HS) Indra Miranda MD    levocetirizine 2 5 mg Oral Daily Yissel Farnaz, DO    levothyroxine 50 mcg Oral Early Morning Yissel Farnaz, DO      PRN Meds:  acetaminophen 650 mg Q6H PRN   albuterol 2 5 mg Q4H PRN     ____________________________________________________________________      Physical Exam   Constitutional: She is oriented to person, place, and time  Obese body habitus    She has classic clinical facial apperance of downs   HENT:   Head: Normocephalic and atraumatic  Eyes: Pupils are equal, round, and reactive to light  Conjunctivae are normal    Neck: Normal range of motion  Neck supple     Cardiovascular: Normal rate, regular rhythm and normal heart sounds  Pulmonary/Chest: Effort normal  No accessory muscle usage  No tachypnea  No respiratory distress  She has decreased breath sounds in the right lower field, the left middle field and the left lower field  She has no wheezes  She has rhonchi in the right lower field  She has no rales  She exhibits no tenderness  Abdominal: Soft  Bowel sounds are normal    Musculoskeletal: Normal range of motion  Trace lower extremity edema   Neurological: She is alert and oriented to person, place, and time  Skin: Skin is warm and dry     Psychiatric: She has a normal mood and affect            ____________________________________________________________________    Labs:   CBC:   Results from last 7 days  Lab Units 12/06/18  0819 12/05/18  1045 12/04/18  0620   WBC Thousand/uL 11 04* 18 18* 20 60*   HEMOGLOBIN g/dL 12 3 13 3 12 8   HEMATOCRIT % 40 1 41 3 39 7   MCV fL 104* 100* 99*   PLATELETS Thousands/uL 193 219 187     CMP:   Results from last 7 days  Lab Units 12/06/18  0819 12/05/18  0926 12/04/18  0620 12/03/18  1804   POTASSIUM mmol/L 3 9 4 1 3 9 4 1   CHLORIDE mmol/L 104 105 109* 99*   CO2 mmol/L 32 30 25 27   BUN mg/dL 8 11 11 18   CREATININE mg/dL 0 86 0 90 1 03 1 24   CALCIUM mg/dL 8 3 8 4 8 5 8 0*   AST U/L  --   --   --  24   ALT U/L  --   --   --  26   ALK PHOS U/L  --   --   --  82   EGFR ml/min/1 73sq m 84 79 67 54     Magnesium:   Results from last 7 days  Lab Units 12/04/18  0620   MAGNESIUM mg/dL 2 2     Phosphorous:     Troponin:     PT/INR:   Results from last 7 days  Lab Units 12/03/18  1804   PTT seconds 30   INR  1 13     Lactic Acid:     BNP:     TSH:     Procalcitonin: Invalid input(s): PROCALCITONIN      Imaging:     Improved right lower lobe density on PA and lateral images today compared to 12/03  FL barium swallow video w speech   Final Result by Javier Ordaz CCC-SLP (12/05 0824)      FL barium swallow video w speech   Final Result by Og Velazquez MD (12/05 1064) FINDINGS/IMPRESSION:      Video barium swallow was performed by the department of speech pathology utilizing food substances of various thickness  Intermittent ventricular penetration with thin liquids and nectar thick liquids noted  No brice aspiration with any swallow  Please refer to the speech pathology report for further details  Workstation performed: [id number]         XR chest 2 views   Final Result by Edward Alejo MD (12/03 2102)      Right middle lobe patchy infiltrate consistent with pneumonia  Follow-up radiographs recommended in one month to ensure complete resolution  The study was marked in [facility] for immediate notification              Workstation performed: [id number]         XR chest pa & lateral    (Results Pending)           Micro: Lab Results   Component Value Date    BLOODCX No Growth at 48 hrs  12/03/2018    BLOODCX No Growth at 48 hrs  12/03/2018    MRSACULTURE  12/03/2018     No Methicillin Resistant Staphlyococcus aureus (MRSA) isolated            Invalid input(s): LEGIONELLAURINARYANTIGEN        Code Status: Level 1 - Full Code

## 2018-12-07 PROBLEM — J96.00 ACUTE RESPIRATORY FAILURE (HCC): Status: RESOLVED | Noted: 2018-12-04 | Resolved: 2018-12-07

## 2018-12-07 LAB
ANION GAP SERPL CALCULATED.3IONS-SCNC: 7 MMOL/L (ref 4–13)
BASOPHILS # BLD MANUAL: 0 THOUSAND/UL (ref 0–0.1)
BASOPHILS NFR MAR MANUAL: 0 % (ref 0–1)
BUN SERPL-MCNC: 12 MG/DL (ref 5–25)
CALCIUM SERPL-MCNC: 8.6 MG/DL (ref 8.3–10.1)
CHLORIDE SERPL-SCNC: 105 MMOL/L (ref 100–108)
CO2 SERPL-SCNC: 31 MMOL/L (ref 21–32)
CREAT SERPL-MCNC: 0.91 MG/DL (ref 0.6–1.3)
EOSINOPHIL # BLD MANUAL: 0.17 THOUSAND/UL (ref 0–0.4)
EOSINOPHIL NFR BLD MANUAL: 2 % (ref 0–6)
ERYTHROCYTE [DISTWIDTH] IN BLOOD BY AUTOMATED COUNT: 12.7 % (ref 11.6–15.1)
GFR SERPL CREATININE-BSD FRML MDRD: 78 ML/MIN/1.73SQ M
GLUCOSE SERPL-MCNC: 130 MG/DL (ref 65–140)
GLUCOSE SERPL-MCNC: 131 MG/DL (ref 65–140)
GLUCOSE SERPL-MCNC: 151 MG/DL (ref 65–140)
GLUCOSE SERPL-MCNC: 155 MG/DL (ref 65–140)
GLUCOSE SERPL-MCNC: 169 MG/DL (ref 65–140)
GLUCOSE SERPL-MCNC: 208 MG/DL (ref 65–140)
HCT VFR BLD AUTO: 40.2 % (ref 34.8–46.1)
HGB BLD-MCNC: 12.8 G/DL (ref 11.5–15.4)
LYMPHOCYTES # BLD AUTO: 2.6 THOUSAND/UL (ref 0.6–4.47)
LYMPHOCYTES # BLD AUTO: 31 % (ref 14–44)
MAGNESIUM SERPL-MCNC: 2.2 MG/DL (ref 1.6–2.6)
MCH RBC QN AUTO: 31.4 PG (ref 26.8–34.3)
MCHC RBC AUTO-ENTMCNC: 31.8 G/DL (ref 31.4–37.4)
MCV RBC AUTO: 99 FL (ref 82–98)
MONOCYTES # BLD AUTO: 0.25 THOUSAND/UL (ref 0–1.22)
MONOCYTES NFR BLD: 3 % (ref 4–12)
NEUTROPHILS # BLD MANUAL: 5.38 THOUSAND/UL (ref 1.85–7.62)
NEUTS BAND NFR BLD MANUAL: 10 % (ref 0–8)
NEUTS SEG NFR BLD AUTO: 54 % (ref 43–75)
NRBC BLD AUTO-RTO: 0 /100 WBCS
PLATELET # BLD AUTO: 239 THOUSANDS/UL (ref 149–390)
PLATELET BLD QL SMEAR: ADEQUATE
PMV BLD AUTO: 10.2 FL (ref 8.9–12.7)
POTASSIUM SERPL-SCNC: 4.2 MMOL/L (ref 3.5–5.3)
RBC # BLD AUTO: 4.07 MILLION/UL (ref 3.81–5.12)
RBC MORPH BLD: NORMAL
SODIUM SERPL-SCNC: 143 MMOL/L (ref 136–145)
TOTAL CELLS COUNTED SPEC: 100
WBC # BLD AUTO: 8.4 THOUSAND/UL (ref 4.31–10.16)

## 2018-12-07 PROCEDURE — G8979 MOBILITY GOAL STATUS: HCPCS

## 2018-12-07 PROCEDURE — 97162 PT EVAL MOD COMPLEX 30 MIN: CPT

## 2018-12-07 PROCEDURE — 97530 THERAPEUTIC ACTIVITIES: CPT

## 2018-12-07 PROCEDURE — 99232 SBSQ HOSP IP/OBS MODERATE 35: CPT | Performed by: INTERNAL MEDICINE

## 2018-12-07 PROCEDURE — G8978 MOBILITY CURRENT STATUS: HCPCS

## 2018-12-07 PROCEDURE — 85007 BL SMEAR W/DIFF WBC COUNT: CPT | Performed by: FAMILY MEDICINE

## 2018-12-07 PROCEDURE — 94618 PULMONARY STRESS TESTING: CPT

## 2018-12-07 PROCEDURE — 85027 COMPLETE CBC AUTOMATED: CPT | Performed by: FAMILY MEDICINE

## 2018-12-07 PROCEDURE — 80048 BASIC METABOLIC PNL TOTAL CA: CPT | Performed by: FAMILY MEDICINE

## 2018-12-07 PROCEDURE — 94760 N-INVAS EAR/PLS OXIMETRY 1: CPT

## 2018-12-07 PROCEDURE — 82948 REAGENT STRIP/BLOOD GLUCOSE: CPT

## 2018-12-07 PROCEDURE — 83735 ASSAY OF MAGNESIUM: CPT | Performed by: FAMILY MEDICINE

## 2018-12-07 PROCEDURE — 92526 ORAL FUNCTION THERAPY: CPT

## 2018-12-07 RX ORDER — LEVOCETIRIZINE DIHYDROCHLORIDE 5 MG/1
2.5 TABLET, FILM COATED ORAL DAILY
Qty: 30 TABLET | Refills: 2 | Status: SHIPPED | OUTPATIENT
Start: 2018-12-08 | End: 2018-12-13 | Stop reason: HOSPADM

## 2018-12-07 RX ORDER — AMOXICILLIN AND CLAVULANATE POTASSIUM 875; 125 MG/1; MG/1
1 TABLET, FILM COATED ORAL EVERY 12 HOURS SCHEDULED
Status: DISCONTINUED | OUTPATIENT
Start: 2018-12-07 | End: 2018-12-09

## 2018-12-07 RX ORDER — AMOXICILLIN AND CLAVULANATE POTASSIUM 875; 125 MG/1; MG/1
1 TABLET, FILM COATED ORAL EVERY 12 HOURS SCHEDULED
Qty: 8 TABLET | Refills: 0 | Status: SHIPPED | OUTPATIENT
Start: 2018-12-08 | End: 2018-12-13 | Stop reason: HOSPADM

## 2018-12-07 RX ADMIN — INSULIN LISPRO 1 UNITS: 100 INJECTION, SOLUTION INTRAVENOUS; SUBCUTANEOUS at 12:01

## 2018-12-07 RX ADMIN — FLUTICASONE PROPIONATE 1 SPRAY: 50 SPRAY, METERED NASAL at 08:48

## 2018-12-07 RX ADMIN — AMOXICILLIN AND CLAVULANATE POTASSIUM 1 TABLET: 875; 125 TABLET, FILM COATED ORAL at 21:52

## 2018-12-07 RX ADMIN — ARIPIPRAZOLE 10 MG: 5 TABLET ORAL at 05:21

## 2018-12-07 RX ADMIN — INSULIN LISPRO 1 UNITS: 100 INJECTION, SOLUTION INTRAVENOUS; SUBCUTANEOUS at 15:54

## 2018-12-07 RX ADMIN — FLUTICASONE PROPIONATE 1 SPRAY: 50 SPRAY, METERED NASAL at 16:03

## 2018-12-07 RX ADMIN — LEVOTHYROXINE SODIUM 50 MCG: 50 TABLET ORAL at 05:21

## 2018-12-07 RX ADMIN — ERYTHROMYCIN 0.5 INCH: 5 OINTMENT OPHTHALMIC at 21:52

## 2018-12-07 RX ADMIN — AMPICILLIN SODIUM AND SULBACTAM SODIUM 3 G: 2; 1 INJECTION, POWDER, FOR SOLUTION INTRAMUSCULAR; INTRAVENOUS at 05:23

## 2018-12-07 RX ADMIN — ACETAMINOPHEN 650 MG: 325 TABLET, FILM COATED ORAL at 06:24

## 2018-12-07 RX ADMIN — AMPICILLIN SODIUM AND SULBACTAM SODIUM 3 G: 2; 1 INJECTION, POWDER, FOR SOLUTION INTRAMUSCULAR; INTRAVENOUS at 12:00

## 2018-12-07 RX ADMIN — AMPICILLIN SODIUM AND SULBACTAM SODIUM 3 G: 2; 1 INJECTION, POWDER, FOR SOLUTION INTRAMUSCULAR; INTRAVENOUS at 16:00

## 2018-12-07 RX ADMIN — DIVALPROEX SODIUM 1000 MG: 500 TABLET, DELAYED RELEASE ORAL at 21:52

## 2018-12-07 RX ADMIN — ACETAMINOPHEN 650 MG: 325 TABLET, FILM COATED ORAL at 16:01

## 2018-12-07 RX ADMIN — ATORVASTATIN CALCIUM 10 MG: 10 TABLET, FILM COATED ORAL at 15:54

## 2018-12-07 RX ADMIN — INSULIN LISPRO 1 UNITS: 100 INJECTION, SOLUTION INTRAVENOUS; SUBCUTANEOUS at 08:48

## 2018-12-07 RX ADMIN — INSULIN LISPRO 1 UNITS: 100 INJECTION, SOLUTION INTRAVENOUS; SUBCUTANEOUS at 21:52

## 2018-12-07 RX ADMIN — ENOXAPARIN SODIUM 40 MG: 40 INJECTION SUBCUTANEOUS at 08:48

## 2018-12-07 NOTE — PROGRESS NOTES
[address] [facility] [address] [facility] Practice Progress Note - Zeinab Petersen 44 y o  female MRN: [id number]    Unit/Bed#: 2 [place] [address] Encounter: [id number]      Assessment/Plan:  * Pneumonia with hypoxia   Assessment & Plan    - CXR Right middle lobe patchy infiltrate consistent with pneumonia  Community vs?Aspiration pneumonia in setting of difficulty chewing/swallowing per family in setting of her Down syndrome  -continue with Unasyn 3 g IV q 6h  Day #3 abx   Patient passed bedside swallow eval  WBC improving today to 11 04, afebrile overnight  - procalcitonin pending  - sputum culture pending, resp pathogen panel pending   -urine strep and Legionella negative, rapid strep negative, throat culture negative   -12/6/18: throat culture pos for GBS  - supplemental O2 PRN for SpO2 >88%, wean as tolerated, hypoxia possibly 2/2 pneumonia  - placed on aspiratin precautions  - Home O2 eval on 12/8/18     Type 2 diabetes mellitus, without long-term current use of insulin ([facility])   Assessment & Plan    Repeat A1c 5 8 today, diabetes controlled with lifestyle modification  Glucose yesterday ranged between 107-260  Will start on insulin sliding scale during hospitalization  Goal glucose less than 180       History of psychiatric disorder   Assessment & Plan    - c/w abilify and depakote     History of prolonged Q-T interval on ECG   Assessment & Plan    - continue to monitor       Stage 3 chronic kidney disease (HCC)   Assessment & Plan    Mild stable CKD III, Avoid nephrotoxic agents, creatinine baseline at this point     Down syndrome   Assessment & Plan    Patient lives in a group home, mother's POA     Viral syndrome   Assessment & Plan    - tylenol prn for headaches  - soft diet for sore throat  - treat superimposed PNA     Mild intermittent asthma without complication   Assessment & Plan    - no meds at home, prn albuterol      Obstructive sleep apnea   Assessment & Plan    - sc/w home CPAP with supplemental O2 for the hypoxia       Acute respiratory failure (HCC)resolved as of 12/7/2018   Assessment & Plan    - in setting of pneumonia  - continue with supplemental oxygen, plan to wean to maintain a saturation greater than 92%  - At ED, patient was saturating on 89%  Subjective:   Patient seen and examined at bedside  Patient alert and awake  Patient complaining of HA and nausea  Otherwise no complaints  Objective:     Vitals: Blood pressure 98/55, pulse 69, temperature 97 8 °F (36 6 °C), temperature source Oral, resp  rate 20, height 4' 9" (1 448 m), weight 82 3 kg (181 lb 7 oz), SpO2 (!) 87 %, not currently breastfeeding  ,Body mass index is 39 26 kg/m²  Wt Readings from Last 3 Encounters:   12/03/18 82 3 kg (181 lb 7 oz)   10/10/18 80 5 kg (177 lb 6 4 oz)   08/06/18 79 4 kg (175 lb)       Intake/Output Summary (Last 24 hours) at 12/07/18 1207  Last data filed at 12/06/18 2300   Gross per 24 hour   Intake                0 ml   Output              200 ml   Net             -200 ml       Physical Exam: BP 98/55 (BP Location: Left arm)   Pulse 69   Temp 97 8 °F (36 6 °C) (Oral)   Resp 20   Ht 4' 9" (1 448 m)   Wt 82 3 kg (181 lb 7 oz)   LMP  (LMP Unknown)   SpO2 (!) 87%   Breastfeeding?  No   BMI 39 26 kg/m²   General appearance: alert and oriented, in no acute distress  Throat: lips, mucosa, and tongue normal; teeth and gums normal  Lungs: diminished breath sounds  Heart: regular rate and rhythm, S1, S2 normal, no murmur, click, rub or gallop  Abdomen: soft, non-tender; bowel sounds normal; no masses,  no organomegaly  Extremities: extremities normal, warm and well-perfused; no cyanosis, clubbing, or edema  Neurologic: Grossly normal     Recent Results (from the past 24 hour(s))   Fingerstick Glucose (POCT)    Collection Time: 12/06/18  4:05 PM   Result Value Ref Range    POC Glucose 161 (H) 65 - 140 mg/dl   Fingerstick Glucose (POCT)    Collection Time: 12/06/18  8:53 PM   Result Value Ref Range    POC Glucose 222 (H) 65 - 140 mg/dl   Basic metabolic panel    Collection Time: 12/07/18  5:20 AM   Result Value Ref Range    Sodium 143 136 - 145 mmol/L    Potassium 4 2 3 5 - 5 3 mmol/L    Chloride 105 100 - 108 mmol/L    CO2 31 21 - 32 mmol/L    ANION GAP 7 4 - 13 mmol/L    BUN 12 5 - 25 mg/dL    Creatinine 0 91 0 60 - 1 30 mg/dL    Glucose 131 65 - 140 mg/dL    Calcium 8 6 8 3 - 10 1 mg/dL    eGFR 78 ml/min/1 73sq m   CBC and differential    Collection Time: 12/07/18  5:20 AM   Result Value Ref Range    WBC 8 40 4 31 - 10 16 Thousand/uL    RBC 4 07 3 81 - 5 12 Million/uL    Hemoglobin 12 8 11 5 - 15 4 g/dL    Hematocrit 40 2 34 8 - 46 1 %    MCV 99 (H) 82 - 98 fL    MCH 31 4 26 8 - 34 3 pg    MCHC 31 8 31 4 - 37 4 g/dL    RDW 12 7 11 6 - 15 1 %    MPV 10 2 8 9 - 12 7 fL    Platelets 894 116 - 347 Thousands/uL    nRBC 0 /100 WBCs   Magnesium    Collection Time: 12/07/18  5:20 AM   Result Value Ref Range    Magnesium 2 2 1 6 - 2 6 mg/dL   Manual Differential(PHLEBS Do Not Order)    Collection Time: 12/07/18  5:20 AM   Result Value Ref Range    Segmented % 54 43 - 75 %    Bands % 10 (H) 0 - 8 %    Lymphocytes % 31 14 - 44 %    Monocytes % 3 (L) 4 - 12 %    Eosinophils, % 2 0 - 6 %    Basophils % 0 0 - 1 %    Absolute Neutrophils 5 38 1 85 - 7 62 Thousand/uL    Lymphocytes Absolute 2 60 0 60 - 4 47 Thousand/uL    Monocytes Absolute 0 25 0 00 - 1 22 Thousand/uL    Eosinophils Absolute 0 17 0 00 - 0 40 Thousand/uL    Basophils Absolute 0 00 0 00 - 0 10 Thousand/uL    Total Counted 100     RBC Morphology Normal     Platelet Estimate Adequate Adequate   Fingerstick Glucose (POCT)    Collection Time: 12/07/18  8:04 AM   Result Value Ref Range    POC Glucose 169 (H) 65 - 140 mg/dl   Fingerstick Glucose (POCT)    Collection Time: 12/07/18  8:39 AM   Result Value Ref Range    POC Glucose 130 65 - 140 mg/dl   Fingerstick Glucose (POCT)    Collection Time: 12/07/18 11:22 AM   Result Value Ref Range    POC Glucose 155 (H) 65 - 140 mg/dl       Current Facility-Administered Medications   Medication Dose Route Frequency Provider Last Rate Last Dose    acetaminophen (TYLENOL) tablet 650 mg  650 mg Oral Q6H PRN Barb Birch MD   650 mg at 12/07/18 0624    albuterol inhalation solution 2 5 mg  2 5 mg Nebulization Q4H PRN Toño Yadav MD        ampicillin-sulbactam (UNASYN) 3 g in sodium chloride 0 9 % 100 mL IVPB  3 g Intravenous Q6H Toño Yadav  mL/hr at 12/07/18 1200 3 g at 12/07/18 1200    ARIPiprazole (ABILIFY) tablet 10 mg  10 mg Oral Early Morning Yissel Farnaz, DO   10 mg at 12/07/18 0521    atorvastatin (LIPITOR) tablet 10 mg  10 mg Oral Daily With [employer], DO   10 mg at 12/06/18 1631    clindamycin (CLINDAGEL) 1 % gel   Topical BID Sidra Sindhu, DO        divalproex sodium (DEPAKOTE) EC tablet 1,000 mg  1,000 mg Oral HS Sidra Sindhu, DO   1,000 mg at 12/06/18 2132    enoxaparin (LOVENOX) subcutaneous injection 40 mg  40 mg Subcutaneous Daily Sidra Sindhu, DO   40 mg at 12/07/18 0848    erythromycin (ILOTYCIN) 0 5 % ophthalmic ointment 0 5 inch  0 5 inch Both Eyes HS Sidra Sindhu, DO   0 5 inch at 12/06/18 2135    fluticasone (FLONASE) 50 mcg/act nasal spray 1 spray  1 spray Nasal BID Yissel Farnaz, DO   1 spray at 12/07/18 0848    insulin lispro (HumaLOG) 100 units/mL subcutaneous injection 1-5 Units  1-5 Units Subcutaneous 4x Daily (AC & HS) Toño Yadav MD   1 Units at 12/07/18 1201    levocetirizine (XYZAL) tablet 2 5 mg  2 5 mg Oral Daily Sidra Sindhu, DO        levothyroxine tablet 50 mcg  50 mcg Oral Early Morning Sidra Farnaz, DO   50 mcg at 12/07/18 7001       Invasive Devices     Peripheral Intravenous Line            Peripheral IV 12/03/18 Right Antecubital 3 days                Lab, Imaging and other studies: I have personally reviewed pertinent reports      VTE Pharmacologic Prophylaxis: Enoxaparin (Lovenox)  VTE Mechanical Prophylaxis: sequential compression device    [facility] Or, MD

## 2018-12-07 NOTE — PHYSICAL THERAPY NOTE
PT EVALUATION  OT evaluation deferred as pt is near baseline function and able to perform ADL with S      12/07/18 1235   Note Type   Note type Eval/Treat   Pain Assessment   Pain Assessment Watts-Baker FACES   Watts-Baker FACES Pain Rating 4   Pain Location Head   Home Living   Type of Home Group Home   Prior Function   Level of [place] Independent with ADLs and functional mobility  (needs verbal cues for ADLs)   Lives With Facility staff   Comments Pt lives at a group home but spends most of the daytime with her parents   Restrictions/Precautions   Other Precautions O2;Fall Risk;Droplet precautions   General   Additional Pertinent History Pt admitted wtih PNA with hypoxia   Cognition   Arousal/Participation Cooperative   Following Commands Follows one step commands without difficulty   RUE Assessment   RUE Assessment WFL   LUE Assessment   LUE Assessment WFL   RLE Assessment   RLE Assessment WFL   LLE Assessment   LLE Assessment WFL   Bed Mobility   Supine to Sit 5  Supervision   Sit to Supine 5  Supervision   Transfers   Sit to Stand 5  Supervision   Stand to Sit 5  Supervision   Stand pivot 5  Supervision   Ambulation/Elevation   Gait pattern Wide TY   Gait Assistance 4  Minimal assist   Assistive Device (6L02, Sp02 96% with activity)   Distance 200 feet   Activity Tolerance   Activity Tolerance Patient tolerated treatment well   Assessment   Problem List Decreased endurance;Decreased mobility; Impaired balance   Assessment Patient seen for Physical Therapy evaluation  Patient admitted with Pneumonia  Comorbidities affecting patient's physical performance include: down syndrome, ckd, dm  Personal factors affecting patient at time of initial evaluation include: inability to navigate level surfaces without external assistance and inability to live alone  Prior to admission, patient was independent with functional mobility without assistive device    Please find objective findings from Physical Therapy assessment regarding body systems outlined above with impairments and limitations including weakness, impaired balance, decreased activity tolerance, decreased functional mobility tolerance and SOB upon exertion  The Barthel Index was used as a functional outcome tool presenting with a score of 60 today indicating moderate limitations of functional mobility and ADLS  Patient's clinical presentation is currently evolving as seen in patient's presentation of increased fall risk, new onset of impairment of functional mobility, decreased endurance and new onset of weakness  Pt would benefit from continued Physical Therapy treatment to address deficits as defined above and maximize level of functional mobility  As demonstrated by objective findings, the assigned level of complexity for this evaluation is moderate  Goals   Patient Goals "watch hallmark movies, eat lunch, see my mom and dad"   STG Expiration Date 12/14/18   Short Term Goal #1 independent ambulation 300 feet level surfaces so pt can negotiate her group home, improve standing tolerance to 10 minutes so pt can complete ADL   LTG Expiration Date 12/21/18   Long Term Goal #1 independent ambulation outdoor surfaces 500 feet so pt can get out to [place] with her parents  Long Term Goal #2 improve standing balance to at least good to decrease fall risk     Treatment Day 1   Plan   Treatment/Interventions Therapeutic exercise;Gait training   PT Frequency 5x/wk   Recommendation   Recommendation (home with group home support)   Barthel Index   Feeding 5   Bathing 0   Grooming Score 0   Dressing Score 5   Bladder Score 10   Bowels Score 10   Toilet Use Score 5   Transfers (Bed/Chair) Score 10   Mobility (Level Surface) Score 10   Stairs Score 5   Barthel Index Score 61   Licensure   [place] License Number  An Garcia PT [id number]     Time In:1220  Time AFC:2407  Total Time: 15      S:  "I have to go to the bathroom"  O:  Supervision transfer to commode and to wipe   Supervision ambulation on RA x 400 feet  Sp02>92% with walking  A:  Gait is steady  P:  Continue PT, anticipate pt will be able to go back to group home when medically appropriate      Evangelina Chavira, PT [id number]

## 2018-12-07 NOTE — SOCIAL WORK
PT TO D/C TO GROUP HOME TODAY, IMM DISCUSSED  PT PT PARENTS EXPRESSED CONCERN THEY FEEL SHE IS NOT MEDICALLY STABLE FOR D/C TO HOME  PT PARENTS WILL CALL [facility]UC[facility] AND [facility] D/C  CM WILL FOLLOW

## 2018-12-07 NOTE — RESPIRATORY THERAPY NOTE
Home Oxygen Qualifying Test       Patient name: Belkys Thomson        : 1975   Date of Test:  2018  Diagnosis:      Home Oxygen Test:    **Medicare Guidelines require item(s) 1-5 on all ambulatory patients or 1 and 2 on non-ambulatory patients  1   Baseline SPO2 on Room Air at rest 93 %  2   SPO2 during exercise on Room Air  93%  During exercise monitor SpO2  If SPO2 increases >=89% with ambulation do not add supplemental             oxygen  If <= 88% on room air add O2 via NC and titrate patient  Patient must be ambulated with O2 and titrated to > 88% with exertion  3   Exercise performed:          walking          []  Supplemental Home Oxygen is indicated  [x]  Client does not qualify for home oxygen  Respiratory Additional Notes pt ambulated in hallway 6 minutes on room air  No desaturation noted      Syl Reveles RT

## 2018-12-07 NOTE — SPEECH THERAPY NOTE
Speech Language/Pathology    Speech/Language Pathology Progress Note    Patient Name: Jim Petersen  Today's Date: 12/7/2018     Problem List  Patient Active Problem List   Diagnosis    Obstructive sleep apnea    Mild intermittent asthma without complication    Pneumonia with hypoxia    Viral syndrome    Down syndrome    Stage 3 chronic kidney disease ([facility] [facility] )    History of prolonged Q-T interval on ECG    History of psychiatric disorder    Type 2 diabetes mellitus, without long-term current use of insulin (HCC)        Past Medical History  Past Medical History:   Diagnosis Date    Asthma     Diabetes mellitus ([facility] [address] )     Disease of thyroid gland     Down syndrome     Heart murmur     Hyperlipidemia     Long Q-T syndrome     Psychiatric disorder     schizo    Sleep apnea         Past Surgical History  Past Surgical History:   Procedure Laterality Date    PATENT DUCTUS ARTERIOUS LIGATION       Subjective:  Patient awake and alert seated upright in bed with family present  Objective:  Patient continues on a Dysphagia 3 diet with thin liquids  Patient independently swallowed single sips from a cup following verbal reminder  Patient swallowed Dysphagia 3 consistency and thin liquid with no delay in initiation of the swallow, good laryngeal elevation  Delayed cough following session  Reinforce to patient to alternate solids and liquids to clear any oral/pharyngeal residual   Discussed strategies with family  Assessment:  Swallow skills are safe and WNL to continue Dysphagia 3 diet with thin liquids with the use of compensatory strategies  Family has been trained in the use of these strategies  Plan/Recommendations:  1)  Continue Dysphagia 3 diet with thin liquids  2)  Medications administered as desired  3)  Aspiration precautions  Group home may contact this department with any questions or concerns regarding dysphagia and strategies      ALEXIS Wheat , CCC-SLP  Speech-Language Pathologist  [id number]

## 2018-12-08 LAB
ADENOVIRUS: NOT DETECTED
ANION GAP SERPL CALCULATED.3IONS-SCNC: 5 MMOL/L (ref 4–13)
BUN SERPL-MCNC: 14 MG/DL (ref 5–25)
C PNEUM DNA SPEC QL NAA+PROBE: NOT DETECTED
CALCIUM SERPL-MCNC: 8.8 MG/DL (ref 8.3–10.1)
CHLORIDE SERPL-SCNC: 102 MMOL/L (ref 100–108)
CO2 SERPL-SCNC: 32 MMOL/L (ref 21–32)
CREAT SERPL-MCNC: 1 MG/DL (ref 0.6–1.3)
ERYTHROCYTE [DISTWIDTH] IN BLOOD BY AUTOMATED COUNT: 12.7 % (ref 11.6–15.1)
FLUAV H1 RNA SPEC QL NAA+PROBE: NOT DETECTED
FLUAV H3 RNA SPEC QL NAA+PROBE: NOT DETECTED
FLUAV RNA SPEC QL NAA+PROBE: NOT DETECTED
FLUBV RNA SPEC QL NAA+PROBE: NOT DETECTED
GFR SERPL CREATININE-BSD FRML MDRD: 70 ML/MIN/1.73SQ M
GLUCOSE SERPL-MCNC: 123 MG/DL (ref 65–140)
GLUCOSE SERPL-MCNC: 134 MG/DL (ref 65–140)
GLUCOSE SERPL-MCNC: 136 MG/DL (ref 65–140)
GLUCOSE SERPL-MCNC: 139 MG/DL (ref 65–140)
GLUCOSE SERPL-MCNC: 187 MG/DL (ref 65–140)
HBOV DNA SPEC QL NAA+PROBE: NOT DETECTED
HCOV 229E RNA SPEC QL NAA+PROBE: NOT DETECTED
HCOV HKU1 RNA SPEC QL NAA+PROBE: NOT DETECTED
HCOV NL63 RNA SPEC QL NAA+PROBE: NOT DETECTED
HCOV OC43 RNA SPEC QL NAA+PROBE: NOT DETECTED
HCT VFR BLD AUTO: 42.4 % (ref 34.8–46.1)
HGB BLD-MCNC: 13.4 G/DL (ref 11.5–15.4)
HPIV1 RNA SPEC QL NAA+PROBE: NOT DETECTED
HPIV2 RNA SPEC QL NAA+PROBE: NOT DETECTED
HPIV3 RNA SPEC QL NAA+PROBE: NOT DETECTED
HPIV4 RNA SPEC QL NAA+PROBE: NOT DETECTED
M PNEUMO DNA SPEC QL NAA+PROBE: NOT DETECTED
MAGNESIUM SERPL-MCNC: 2.2 MG/DL (ref 1.6–2.6)
MCH RBC QN AUTO: 31.5 PG (ref 26.8–34.3)
MCHC RBC AUTO-ENTMCNC: 31.6 G/DL (ref 31.4–37.4)
MCV RBC AUTO: 100 FL (ref 82–98)
METAPNEUMOVIRUS: NOT DETECTED
PHOSPHATE SERPL-MCNC: 4.1 MG/DL (ref 2.7–4.5)
PLATELET # BLD AUTO: 271 THOUSANDS/UL (ref 149–390)
PMV BLD AUTO: 10.3 FL (ref 8.9–12.7)
POTASSIUM SERPL-SCNC: 4.6 MMOL/L (ref 3.5–5.3)
RBC # BLD AUTO: 4.26 MILLION/UL (ref 3.81–5.12)
RHINOVIRUS RNA SPEC QL NAA+PROBE: NOT DETECTED
RSV A RNA SPEC QL NAA+PROBE: NOT DETECTED
RSV B RNA SPEC QL NAA+PROBE: NOT DETECTED
SODIUM SERPL-SCNC: 139 MMOL/L (ref 136–145)
WBC # BLD AUTO: 9.68 THOUSAND/UL (ref 4.31–10.16)

## 2018-12-08 PROCEDURE — 94762 N-INVAS EAR/PLS OXIMTRY CONT: CPT

## 2018-12-08 PROCEDURE — 84100 ASSAY OF PHOSPHORUS: CPT | Performed by: STUDENT IN AN ORGANIZED HEALTH CARE EDUCATION/TRAINING PROGRAM

## 2018-12-08 PROCEDURE — 83735 ASSAY OF MAGNESIUM: CPT | Performed by: STUDENT IN AN ORGANIZED HEALTH CARE EDUCATION/TRAINING PROGRAM

## 2018-12-08 PROCEDURE — 85027 COMPLETE CBC AUTOMATED: CPT | Performed by: STUDENT IN AN ORGANIZED HEALTH CARE EDUCATION/TRAINING PROGRAM

## 2018-12-08 PROCEDURE — 80048 BASIC METABOLIC PNL TOTAL CA: CPT | Performed by: STUDENT IN AN ORGANIZED HEALTH CARE EDUCATION/TRAINING PROGRAM

## 2018-12-08 PROCEDURE — 82948 REAGENT STRIP/BLOOD GLUCOSE: CPT

## 2018-12-08 RX ADMIN — LEVOTHYROXINE SODIUM 50 MCG: 50 TABLET ORAL at 06:06

## 2018-12-08 RX ADMIN — ACETAMINOPHEN 650 MG: 325 TABLET, FILM COATED ORAL at 14:57

## 2018-12-08 RX ADMIN — ACETAMINOPHEN 650 MG: 325 TABLET, FILM COATED ORAL at 23:20

## 2018-12-08 RX ADMIN — AMOXICILLIN AND CLAVULANATE POTASSIUM 1 TABLET: 875; 125 TABLET, FILM COATED ORAL at 20:55

## 2018-12-08 RX ADMIN — ENOXAPARIN SODIUM 40 MG: 40 INJECTION SUBCUTANEOUS at 08:48

## 2018-12-08 RX ADMIN — INSULIN LISPRO 1 UNITS: 100 INJECTION, SOLUTION INTRAVENOUS; SUBCUTANEOUS at 17:15

## 2018-12-08 RX ADMIN — DIVALPROEX SODIUM 1000 MG: 500 TABLET, DELAYED RELEASE ORAL at 21:00

## 2018-12-08 RX ADMIN — ARIPIPRAZOLE 10 MG: 5 TABLET ORAL at 06:06

## 2018-12-08 RX ADMIN — AMOXICILLIN AND CLAVULANATE POTASSIUM 1 TABLET: 875; 125 TABLET, FILM COATED ORAL at 08:48

## 2018-12-08 RX ADMIN — ATORVASTATIN CALCIUM 10 MG: 10 TABLET, FILM COATED ORAL at 17:16

## 2018-12-08 RX ADMIN — FLUTICASONE PROPIONATE 1 SPRAY: 50 SPRAY, METERED NASAL at 08:48

## 2018-12-08 RX ADMIN — ERYTHROMYCIN 0.5 INCH: 5 OINTMENT OPHTHALMIC at 21:00

## 2018-12-08 RX ADMIN — FLUTICASONE PROPIONATE 1 SPRAY: 50 SPRAY, METERED NASAL at 17:15

## 2018-12-08 NOTE — PROGRESS NOTES
Progress Note - Pulmonary   Jimerika Petersen 44 y o  female MRN: [id number]  Unit/Bed#: 2 [place] [address] Encounter: [id number]    Assessment:  Right middle lobe pneumonia likely community-acquired pneumonia  Clinically improving although chest x-ray still shows some right middle lobe infiltrate  Patient has been alert and overall seems to feel better  Leukocytosis has resolved in white blood cell count 8 4 today  Bandemia decreased at 10%  Serum procalcitonin level decreased  To 0 7  Moderate ROBERTO  Mild intermittent asthma without exacerbation    Plan:  Check room air O2 saturation with overnight oximetry recording  IV has infiltrate so agree with change to p o  Augmentin 875 mg twice a day  Father was concerned that patient CPAP machine at the group home may not be functioning well  He says it has a burn small that emanates from the CPAP machine and not tubing  Medical supply company is [facility] and reportedly this CPAP machine is only 3year-old  She likely would be okay without the CPAP for few days until [facility] can be contacted on Monday and her CPAP machine checked out by them  I did discuss her this with her father the bedside and discussed patient's medical condition with him as well  Subjective:   Patient does have some nonproductive cough  Objective:     Vitals: Blood pressure 129/57, pulse 74, temperature 98 7 °F (37 1 °C), temperature source Oral, resp  rate 20, height 4' 9" (1 448 m), weight 82 3 kg (181 lb 7 oz), SpO2 94 %, not currently breastfeeding  ,Body mass index is 39 26 kg/m²  Intake/Output Summary (Last 24 hours) at 12/07/18 2121  Last data filed at 12/06/18 2300   Gross per 24 hour   Intake                0 ml   Output              200 ml   Net             -200 ml       Physical Exam: Physical Exam   Constitutional: She is oriented to person, place, and time  She appears well-developed and well-nourished  No distress  Room air O2 saturation is 92%  Patient is alert and  in no distress   HENT:   Head: Normocephalic  Nose: Nose normal    Mouth/Throat: Oropharynx is clear and moist  No oropharyngeal exudate  Eyes: Pupils are equal, round, and reactive to light  Conjunctivae are normal    Neck: Neck supple  No JVD present  No tracheal deviation present  Cardiovascular: Normal rate, regular rhythm and normal heart sounds  Pulmonary/Chest: Effort normal    Lung sounds are clear   Abdominal: Soft  She exhibits no distension  There is no tenderness  There is no guarding  Musculoskeletal: She exhibits no edema  Lymphadenopathy:     She has no cervical adenopathy  Neurological: She is alert and oriented to person, place, and time  Skin: Skin is warm and dry  No rash noted  Psychiatric: She has a normal mood and affect  Her behavior is normal  Thought content normal         Labs: I have personally reviewed pertinent lab results  , ABG: No results found for: PHART, HJR2LEB, PO2ART, WCC8KSW, T4WTXECY, BEART, SOURCE, BNP: No results found for: BNP, CBC: Lab Results   Component Value Date    WBC 8 40 12/07/2018    HGB 12 8 12/07/2018    HCT 40 2 12/07/2018    MCV 99 (H) 12/07/2018     12/07/2018    ADJUSTEDWBC 9 00 02/08/2016    MCH 31 4 12/07/2018    MCHC 31 8 12/07/2018    RDW 12 7 12/07/2018    MPV 10 2 12/07/2018    NRBC 0 12/07/2018   , CMP: Lab Results   Component Value Date     01/08/2016    K 4 2 12/07/2018    K 3 4 (L) 01/08/2016     12/07/2018     01/08/2016    CO2 31 12/07/2018    CO2 26 01/08/2016    ANIONGAP 15 0 01/08/2016    BUN 12 12/07/2018    BUN 20 01/08/2016    CREATININE 0 91 12/07/2018    CREATININE 1 1 01/08/2016    GLUCOSE 116 (H) 01/08/2016    CALCIUM 8 6 12/07/2018    CALCIUM 8 5 01/08/2016    AST 24 12/03/2018    AST 21 01/08/2016    ALT 26 12/03/2018    ALT 28 01/08/2016    ALKPHOS 82 12/03/2018    ALKPHOS 106 01/08/2016    PROT 7 2 01/08/2016    BILITOT 0 3 01/08/2016    EGFR 78 12/07/2018   , PT/INR: Lab Results   Component Value Date    INR 1 13 12/03/2018    INR 1 00 01/08/2016   , Troponin: No results found for: TROPONIN    Imaging and other studies: I have personally reviewed pertinent reports     and I have personally reviewed pertinent films in PACS

## 2018-12-08 NOTE — PROGRESS NOTES
[address] [facility] [address] [facility] Practice Progress Note - Neo Petersen 44 y o  female MRN: [id number]    Unit/Bed#: 2 [place] [address] Encounter: [id number]      Assessment/Plan:  * Pneumonia with hypoxia   Assessment & Plan    -Initial CXR Right middle lobe patchy infiltrate consistent with pneumonia  Community vs?Aspiration pneumonia in setting of difficulty chewing/swallowing per family in setting of her Down syndrome  Passed bedside swallow   -completed Unasyn 3 g IV q 6h x4 days, started on Augmentin PO 12/7, to complete 7 day course   - procalcitonin 0 07  - sputum culture pending, resp pathogen panel negative  -urine strep and Legionella negative, rapid strep negative, 12/6/18: throat culture pos for GBS  - supplemental O2 PRN for SpO2 >88%, wean as tolerated, hypoxia possibly 2/2 pneumonia  - placed on aspiratin precautions  - Home O2 eval on 12/8/18, 93% on room air     Type 2 diabetes mellitus, without long-term current use of insulin (HCC)   Assessment & Plan    Repeat A1c 5 8 this admission, diabetes controlled with lifestyle modification      ISS during admission     History of psychiatric disorder   Assessment & Plan    - c/w abilify and depakote     History of prolonged Q-T interval on ECG   Assessment & Plan    - continue to monitor       Stage 3 chronic kidney disease (HCC)   Assessment & Plan    Mild stable CKD III, Avoid nephrotoxic agents, creatinine baseline at this point     Down syndrome   Assessment & Plan    Patient lives in a group home, mother's POA     Viral syndrome   Assessment & Plan    - tylenol prn for headaches  - soft diet for sore throat  - treat superimposed PNA     Mild intermittent asthma without complication   Assessment & Plan    - no meds at home, prn albuterol      Obstructive sleep apnea   Assessment & Plan    - c/w home CPAP with supplemental O2 for the hypoxia  - home setting 10cm  - reportedly patient's mother was concerned about group home CPAP machine having issues, will provide script for new machine on discharge  Acute respiratory failure (HCC)resolved as of 12/7/2018   Assessment & Plan    - in setting of pneumonia  - continue with supplemental oxygen, plan to wean to maintain a saturation greater than 92%  - At ED, patient was saturating on 89%  Patient was discharged 12/7 back to group home, but per social work note patient's parents expressed concern they felt she was not medically stable, parents to call [facility] and appeal discharge, CM following, appreciate support  She remains cleared for discharge with new script provided for replacement CPAP machine as concern was expressed about machine being broken  Subjective:   Patient seen and examined at bedside this morning  No acute overnight events reported  Complains of some sore throat but overall feeling well and eager to go home  Objective:     Vitals: Blood pressure 117/56, pulse 72, temperature 97 9 °F (36 6 °C), temperature source Oral, resp  rate 18, height 4' 9" (1 448 m), weight 82 3 kg (181 lb 7 oz), SpO2 94 %, not currently breastfeeding  ,Body mass index is 39 26 kg/m²    Wt Readings from Last 3 Encounters:   12/03/18 82 3 kg (181 lb 7 oz)   10/10/18 80 5 kg (177 lb 6 4 oz)   08/06/18 79 4 kg (175 lb)       Intake/Output Summary (Last 24 hours) at 12/08/18 1212  Last data filed at 12/08/18 0900   Gross per 24 hour   Intake              720 ml   Output              200 ml   Net              520 ml       Physical Exam: General appearance: alert and oriented, in no acute distress  Head: Normocephalic, without obvious abnormality, atraumatic  Lungs: clear to auscultation bilaterally  Heart: regular rate and rhythm, S1, S2 normal, no murmur, click, rub or gallop  Abdomen: soft, non-tender; bowel sounds normal; no masses,  no organomegaly  Extremities: extremities normal, warm and well-perfused; no cyanosis, clubbing, or edema     Recent Results (from the past 24 hour(s))   Fingerstick Glucose (POCT)    Collection Time: 12/07/18  3:45 PM   Result Value Ref Range    POC Glucose 151 (H) 65 - 140 mg/dl   Fingerstick Glucose (POCT)    Collection Time: 12/07/18  9:22 PM   Result Value Ref Range    POC Glucose 208 (H) 65 - 140 mg/dl   CBC    Collection Time: 12/08/18  6:22 AM   Result Value Ref Range    WBC 9 68 4 31 - 10 16 Thousand/uL    RBC 4 26 3 81 - 5 12 Million/uL    Hemoglobin 13 4 11 5 - 15 4 g/dL    Hematocrit 42 4 34 8 - 46 1 %     (H) 82 - 98 fL    MCH 31 5 26 8 - 34 3 pg    MCHC 31 6 31 4 - 37 4 g/dL    RDW 12 7 11 6 - 15 1 %    Platelets 274 547 - 067 Thousands/uL    MPV 10 3 8 9 - 12 7 fL   Basic metabolic panel    Collection Time: 12/08/18  6:22 AM   Result Value Ref Range    Sodium 139 136 - 145 mmol/L    Potassium 4 6 3 5 - 5 3 mmol/L    Chloride 102 100 - 108 mmol/L    CO2 32 21 - 32 mmol/L    ANION GAP 5 4 - 13 mmol/L    BUN 14 5 - 25 mg/dL    Creatinine 1 00 0 60 - 1 30 mg/dL    Glucose 139 65 - 140 mg/dL    Calcium 8 8 8 3 - 10 1 mg/dL    eGFR 70 ml/min/1 73sq m   Magnesium    Collection Time: 12/08/18  6:22 AM   Result Value Ref Range    Magnesium 2 2 1 6 - 2 6 mg/dL   Phosphorus    Collection Time: 12/08/18  6:22 AM   Result Value Ref Range    Phosphorus 4 1 2 7 - 4 5 mg/dL   Fingerstick Glucose (POCT)    Collection Time: 12/08/18  7:23 AM   Result Value Ref Range    POC Glucose 134 65 - 140 mg/dl   Fingerstick Glucose (POCT)    Collection Time: 12/08/18 11:18 AM   Result Value Ref Range    POC Glucose 136 65 - 140 mg/dl       Current Facility-Administered Medications   Medication Dose Route Frequency Provider Last Rate Last Dose    acetaminophen (TYLENOL) tablet 650 mg  650 mg Oral Q6H PRN Jim Conti MD   650 mg at 12/07/18 1601    albuterol inhalation solution 2 5 mg  2 5 mg Nebulization Q4H PRN Christella Skiff, MD        amoxicillin-clavulanate (AUGMENTIN) 875-125 mg per tablet 1 tablet  1 tablet Oral Q12H 1395 Ish Ordonez MD   1 tablet at 12/08/18 0848    ARIPiprazole (ABILIFY) tablet 10 mg  10 mg Oral Early Morning Yissel Farnaz, DO   10 mg at 12/08/18 0606    atorvastatin (LIPITOR) tablet 10 mg  10 mg Oral Daily With [employer], DO   10 mg at 12/07/18 1554    clindamycin (CLINDAGEL) 1 % gel   Topical BID Sidra Sindhu, DO        divalproex sodium (DEPAKOTE) EC tablet 1,000 mg  1,000 mg Oral HS Sidra Sindhu, DO   1,000 mg at 12/07/18 2152    enoxaparin (LOVENOX) subcutaneous injection 40 mg  40 mg Subcutaneous Daily Sidra Sindhu, DO   40 mg at 12/08/18 0848    erythromycin (ILOTYCIN) 0 5 % ophthalmic ointment 0 5 inch  0 5 inch Both Eyes HS Sidra Sindhu, DO   0 5 inch at 12/07/18 2152    fluticasone (FLONASE) 50 mcg/act nasal spray 1 spray  1 spray Nasal BID Sidra Sindhu, DO   1 spray at 12/08/18 0848    insulin lispro (HumaLOG) 100 units/mL subcutaneous injection 1-5 Units  1-5 Units Subcutaneous 4x Daily (AC & HS) Yaritza Good MD   1 Units at 12/07/18 2152    levocetirizine (XYZAL) tablet 2 5 mg  2 5 mg Oral Daily Sidra Sindhu, DO        levothyroxine tablet 50 mcg  50 mcg Oral Early Morning Sidra Sindhu, DO   50 mcg at 12/08/18 0606       Invasive Devices          No matching active lines, drains, or airways        Lab, Imaging and other studies: I have personally reviewed pertinent reports      VTE Pharmacologic Prophylaxis: Enoxaparin (Lovenox)  VTE Mechanical Prophylaxis: sequential compression device    Milton Bernstein DO

## 2018-12-09 LAB
BACTERIA BLD CULT: NORMAL
BACTERIA BLD CULT: NORMAL
GLUCOSE SERPL-MCNC: 113 MG/DL (ref 65–140)
GLUCOSE SERPL-MCNC: 130 MG/DL (ref 65–140)
GLUCOSE SERPL-MCNC: 156 MG/DL (ref 65–140)
GLUCOSE SERPL-MCNC: 170 MG/DL (ref 65–140)

## 2018-12-09 PROCEDURE — 82948 REAGENT STRIP/BLOOD GLUCOSE: CPT

## 2018-12-09 PROCEDURE — 94760 N-INVAS EAR/PLS OXIMETRY 1: CPT

## 2018-12-09 RX ORDER — AMOXICILLIN AND CLAVULANATE POTASSIUM 875; 125 MG/1; MG/1
1 TABLET, FILM COATED ORAL EVERY 12 HOURS SCHEDULED
Status: COMPLETED | OUTPATIENT
Start: 2018-12-09 | End: 2018-12-09

## 2018-12-09 RX ADMIN — FLUTICASONE PROPIONATE 1 SPRAY: 50 SPRAY, METERED NASAL at 08:54

## 2018-12-09 RX ADMIN — ACETAMINOPHEN 650 MG: 325 TABLET, FILM COATED ORAL at 23:19

## 2018-12-09 RX ADMIN — DIVALPROEX SODIUM 1000 MG: 500 TABLET, DELAYED RELEASE ORAL at 21:00

## 2018-12-09 RX ADMIN — ATORVASTATIN CALCIUM 10 MG: 10 TABLET, FILM COATED ORAL at 16:53

## 2018-12-09 RX ADMIN — ARIPIPRAZOLE 10 MG: 5 TABLET ORAL at 05:00

## 2018-12-09 RX ADMIN — INSULIN LISPRO 1 UNITS: 100 INJECTION, SOLUTION INTRAVENOUS; SUBCUTANEOUS at 16:53

## 2018-12-09 RX ADMIN — ERYTHROMYCIN 0.5 INCH: 5 OINTMENT OPHTHALMIC at 21:00

## 2018-12-09 RX ADMIN — AMOXICILLIN AND CLAVULANATE POTASSIUM 1 TABLET: 875; 125 TABLET, FILM COATED ORAL at 20:56

## 2018-12-09 RX ADMIN — ACETAMINOPHEN 650 MG: 325 TABLET, FILM COATED ORAL at 08:54

## 2018-12-09 RX ADMIN — AMOXICILLIN AND CLAVULANATE POTASSIUM 1 TABLET: 875; 125 TABLET, FILM COATED ORAL at 08:54

## 2018-12-09 RX ADMIN — LEVOTHYROXINE SODIUM 50 MCG: 50 TABLET ORAL at 05:00

## 2018-12-09 RX ADMIN — ENOXAPARIN SODIUM 40 MG: 40 INJECTION SUBCUTANEOUS at 08:54

## 2018-12-09 NOTE — PROGRESS NOTES
[address] [facility] [address] [facility] Practice Progress Note - Eleno Petersen 44 y o  female MRN: [id number]    Unit/Bed#: 2 [place] [address] Encounter: [id number]      Assessment/Plan:  * Pneumonia with hypoxia   Assessment & Plan    -Initial CXR Right middle lobe patchy infiltrate consistent with pneumonia  Community vs?Aspiration pneumonia in setting of difficulty chewing/swallowing per family in setting of her Down syndrome  Passed bedside swallow   -completed Unasyn 3 g IV q 6h x4 days, started on Augmentin PO 12/7, to complete 7 day course on 12/10  - procalcitonin 0 07  - sputum culture pending, resp pathogen panel negative  -urine strep and Legionella negative, rapid strep negative, 12/6/18: throat culture pos for GBS  - supplemental O2 PRN for SpO2 >88%, wean as tolerated, hypoxia possibly 2/2 pneumonia  - placed on aspiratin precautions  - Home O2 eval on 12/8/18, 93% on room air  - patient was d/c on 12/7, medically cleared by primary care team and pulmonology, parents appealed medicare rights  Type 2 diabetes mellitus, without long-term current use of insulin (HCC)   Assessment & Plan    Repeat A1c 5 8 this admission, diabetes controlled with lifestyle modification      ISS during admission     History of psychiatric disorder   Assessment & Plan    - c/w abilify and depakote     History of prolonged Q-T interval on ECG   Assessment & Plan    - continue to monitor       Stage 3 chronic kidney disease (HCC)   Assessment & Plan    Mild stable CKD III, Avoid nephrotoxic agents, creatinine baseline at this point     Down syndrome   Assessment & Plan    Patient lives in a group home, mother's POA     Viral syndrome   Assessment & Plan    - tylenol prn for headaches  - soft diet for sore throat  - treat superimposed PNA     Mild intermittent asthma without complication   Assessment & Plan    - no meds at home, prn albuterol      Obstructive sleep apnea   Assessment & Plan    - c/w home CPAP with supplemental O2 for the hypoxia  - home setting 10cm  - reportedly patient's mother was concerned about group home CPAP machine having issues, will provide script for new machine on discharge  Acute respiratory failure (HCC)resolved as of 12/7/2018   Assessment & Plan    - in setting of pneumonia  - continue with supplemental oxygen, plan to wean to maintain a saturation greater than 92%  - At ED, patient was saturating on 89%  Subjective:   Patient seen and examined at bedside today  Patient complaining of headache, per nurse she had recently received tylenol  Denied fever, chills, chest pain, SOB, abdominal pain, nausea, change in bowel and urinary function  Objective:     Vitals: Blood pressure 96/54, pulse 69, temperature 98 °F (36 7 °C), temperature source Oral, resp  rate 18, height 4' 9" (1 448 m), weight 82 3 kg (181 lb 7 oz), SpO2 95 %, not currently breastfeeding  ,Body mass index is 39 26 kg/m²  Wt Readings from Last 3 Encounters:   12/03/18 82 3 kg (181 lb 7 oz)   10/10/18 80 5 kg (177 lb 6 4 oz)   08/06/18 79 4 kg (175 lb)       Intake/Output Summary (Last 24 hours) at 12/09/18 1759  Last data filed at 12/09/18 0924   Gross per 24 hour   Intake              450 ml   Output             1150 ml   Net             -700 ml       Physical Exam: BP 96/54 (BP Location: Left arm)   Pulse 69   Temp 98 °F (36 7 °C) (Oral)   Resp 18   Ht 4' 9" (1 448 m)   Wt 82 3 kg (181 lb 7 oz)   LMP  (LMP Unknown)   SpO2 95%   Breastfeeding?  No   BMI 39 26 kg/m²   General appearance: alert and oriented, in no acute distress  Head: Normocephalic, without obvious abnormality, atraumatic  Lungs: clear to auscultation bilaterally  Heart: regular rate and rhythm, S1, S2 normal, no murmur, click, rub or gallop  Abdomen: soft, non-tender; bowel sounds normal; no masses,  no organomegaly  Extremities: extremities normal, warm and well-perfused; no cyanosis, clubbing, or edema  Skin: Skin color, texture, turgor normal  No rashes or lesions Recent Results (from the past 24 hour(s))   Fingerstick Glucose (POCT)    Collection Time: 12/08/18  9:06 PM   Result Value Ref Range    POC Glucose 123 65 - 140 mg/dl   Fingerstick Glucose (POCT)    Collection Time: 12/09/18  7:29 AM   Result Value Ref Range    POC Glucose 130 65 - 140 mg/dl   Fingerstick Glucose (POCT)    Collection Time: 12/09/18 11:42 AM   Result Value Ref Range    POC Glucose 113 65 - 140 mg/dl   Fingerstick Glucose (POCT)    Collection Time: 12/09/18  4:26 PM   Result Value Ref Range    POC Glucose 170 (H) 65 - 140 mg/dl       Current Facility-Administered Medications   Medication Dose Route Frequency Provider Last Rate Last Dose    acetaminophen (TYLENOL) tablet 650 mg  650 mg Oral Q6H PRN Jim Conti MD   650 mg at 12/09/18 0854    albuterol inhalation solution 2 5 mg  2 5 mg Nebulization Q4H PRN Christella Skiff, MD        amoxicillin-clavulanate (AUGMENTIN) 875-125 mg per tablet 1 tablet  1 tablet Oral Q12H 1395 Ish Ordonez MD        ARIPiprazole (ABILIFY) tablet 10 mg  10 mg Oral Early Morning Yissel Farnaz, DO   10 mg at 12/09/18 0500    atorvastatin (LIPITOR) tablet 10 mg  10 mg Oral Daily With [employer], DO   10 mg at 12/09/18 1653    clindamycin (CLINDAGEL) 1 % gel   Topical BID Yissel Farnaz, DO        divalproex sodium (DEPAKOTE) EC tablet 1,000 mg  1,000 mg Oral HS Yissel Farnaz, DO   1,000 mg at 12/08/18 2100    enoxaparin (LOVENOX) subcutaneous injection 40 mg  40 mg Subcutaneous Daily Yissel Farnaz, DO   40 mg at 12/09/18 0854    erythromycin (ILOTYCIN) 0 5 % ophthalmic ointment 0 5 inch  0 5 inch Both Eyes HS Yissel Farnaz, DO   0 5 inch at 12/08/18 2100    fluticasone (FLONASE) 50 mcg/act nasal spray 1 spray  1 spray Nasal BID Yissel Farnaz, DO   1 spray at 12/09/18 0854    insulin lispro (HumaLOG) 100 units/mL subcutaneous injection 1-5 Units  1-5 Units Subcutaneous 4x Daily (AC & HS) Christella Skiff, MD   1 Units at 12/09/18 9593    levocetirizine (XYZAL) tablet 2 5 mg  2 5 mg Oral Daily Yissel Farnaz, DO        levothyroxine tablet 50 mcg  50 mcg Oral Early Morning Yissel Farnaz, DO   50 mcg at 12/09/18 0500       Invasive Devices          No matching active lines, drains, or airways          Lab, Imaging and other studies: I have personally reviewed pertinent reports      VTE Pharmacologic Prophylaxis: Enoxaparin (Lovenox)  VTE Mechanical Prophylaxis: sequential compression device    Nabil Oneil MD

## 2018-12-09 NOTE — SOCIAL WORK
CM checked appeal status, medical records have been received and are going in for medical review  CM to continue to follow up on status

## 2018-12-09 NOTE — PLAN OF CARE
DISCHARGE PLANNING     Discharge to home or other facility with appropriate resources Progressing        DISCHARGE PLANNING - CARE MANAGEMENT     Discharge to post-acute care or home with appropriate resources Progressing        INFECTION - ADULT     Absence or prevention of progression during hospitalization Progressing        Knowledge Deficit     Patient/family/caregiver demonstrates understanding of disease process, treatment plan, medications, and discharge instructions Progressing        PAIN - ADULT     Verbalizes/displays adequate comfort level or baseline comfort level Progressing        Potential for Falls     Patient will remain free of falls Progressing        Prexisting or High Potential for Compromised Skin Integrity     Skin integrity is maintained or improved Progressing        RESPIRATORY - ADULT     Achieves optimal ventilation and oxygenation Progressing

## 2018-12-09 NOTE — PROGRESS NOTES
Progress Note - Pulmonary   Chela Petersen 44 y o  female MRN: [id number]  Unit/Bed#: 2 [place] [address] Encounter: [id number]      Assessment/Plan:   -right middle lobe community-acquired pneumonia:  Acute hypoxemia with prior history of oxygen dependence:  -current oxygen saturation is 97% on room air  -on Augmentin  -patient to complete 7 day course today  -clinically resolving with improving chest x-ray  -mild intermittent asthma without acute exacerbation:  -stable  -can sent home with albuterol inhaler  -moderate obstructive sleep apnea:  -continue CPAP 10 cm of water  -patient needs script for new CPAP machine  -obesity with BMI 39 2:  -weight loss will benefit sleep apnea  -down syndrome      -pulmonology signing off at this point  -patient to follow up in office within 1-2 weeks of discharge  -continue CPAP at home 10 cm of water  -finish total course of antibiotics for 7 days  -repeat x-rays as an outpatient        ______________________________________________________________________    Subjective: Pt seen and examined at bedside  No complaints  Tele Events:     Vitals:   Temp:  [97 9 °F (36 6 °C)-98 °F (36 7 °C)] 97 9 °F (36 6 °C)  HR:  [60-65] 60  Resp:  [18] 18  BP: (105-110)/(58-62) 105/58  Weight (last 2 days)     None        Oxygen Therapy  SpO2: 90 %  O2 Flow Rate (L/min): 2 L/min    IV Infusions:       Nutrition:        Diet Orders            Start     Ordered    12/05/18 1632  Diet David/CHO Controlled; Consistent Carbohydrate Diet Level 3 (6 carb servings/90 grams CHO/meal); Dysphagia 3-Dental Soft; Thin Liquid  Diet effective now     Comments:  Assist while eating   Question Answer Comment   Diet Type David/CHO Controlled    David/CHO Controlled Consistent Carbohydrate Diet Level 3 (6 carb servings/90 grams CHO/meal)    Other Restriction(s): Dysphagia 3-Dental Soft    Liquid Modifier Thin Liquid    RD to adjust diet per protocol?  Yes        12/05/18 1632    12/04/18 0853  Room Service  Once Question:  Type of Service  Answer:  [facility]    12/04/18 0852          Ins/Outs:   I/O       12/07 0701 - 12/08 0700 12/08 0701 - 12/09 0700 12/09 0701 - 12/10 0700    P  O   2730     Total Intake(mL/kg)  2730 (33 2)     Urine (mL/kg/hr)  1150 (0 6) 500 (1 3)    Total Output   1150 500    Net   +1580 -500           Unmeasured Stool Occurrence  1 x           Lines/Drains:  Invasive Devices          No matching active lines, drains, or airways           Active medications:  Scheduled Meds:  Current Facility-Administered Medications:  acetaminophen 650 mg Oral Q6H PRN Rachell Somers MD   albuterol 2 5 mg Nebulization Q4H PRN Erica Anand MD   amoxicillin-clavulanate 1 tablet Oral Q12H Ovidio Ahumada, MD   ARIPiprazole 10 mg Oral Early Morning Yissel Farnaz, DO   atorvastatin 10 mg Oral Daily With Dinner Yissel Farnaz, DO   clindamycin  Topical BID Yissel Farnaz, DO   divalproex sodium 1,000 mg Oral HS Yissel Farnaz, DO   enoxaparin 40 mg Subcutaneous Daily Yissel Farnaz, DO   erythromycin 0 5 inch Both Eyes HS Yissel Farnaz, DO   fluticasone 1 spray Nasal BID Yissel Farnaz, DO   insulin lispro 1-5 Units Subcutaneous 4x Daily (AC & HS) Erica Anand MD   levocetirizine 2 5 mg Oral Daily Yissel Farnaz, DO   levothyroxine 50 mcg Oral Early Morning Yissel Farnaz, DO     PRN Meds:  acetaminophen 650 mg Q6H PRN   albuterol 2 5 mg Q4H PRN     ____________________________________________________________________      Physical Exam   Constitutional: She is oriented to person, place, and time  She appears well-developed  Obese body habitus   HENT:   Head: Normocephalic and atraumatic  Classic down syndrome facies   Eyes: Pupils are equal, round, and reactive to light  Conjunctivae are normal    Neck: Normal range of motion  Neck supple  Cardiovascular: Normal rate, regular rhythm and normal heart sounds  Pulmonary/Chest: Effort normal and breath sounds normal  No respiratory distress  She has no wheezes  She has no rales  She exhibits no tenderness  Abdominal: Soft  Bowel sounds are normal    Musculoskeletal: Normal range of motion  She exhibits no edema  Neurological: She is alert and oriented to person, place, and time  Skin: Skin is warm and dry  Psychiatric: She has a normal mood and affect            ____________________________________________________________________    Labs:   CBC:   Results from last 7 days  Lab Units 12/08/18 0622 12/07/18 0520 12/06/18 0819   WBC Thousand/uL 9 68 8 40 11 04*   HEMOGLOBIN g/dL 13 4 12 8 12 3   HEMATOCRIT % 42 4 40 2 40 1   MCV fL 100* 99* 104*   PLATELETS Thousands/uL 271 239 193     CMP:   Results from last 7 days  Lab Units 12/08/18 0622 12/07/18 0520 12/06/18 0819 12/03/18  1804   POTASSIUM mmol/L 4 6 4 2 3 9  < > 4 1   CHLORIDE mmol/L 102 105 104  < > 99*   CO2 mmol/L 32 31 32  < > 27   BUN mg/dL 14 12 8  < > 18   CREATININE mg/dL 1 00 0 91 0 86  < > 1 24   CALCIUM mg/dL 8 8 8 6 8 3  < > 8 0*   AST U/L  --   --   --   --  24   ALT U/L  --   --   --   --  26   ALK PHOS U/L  --   --   --   --  82   EGFR ml/min/1 73sq m 70 78 84  < > 54   < > = values in this interval not displayed  Magnesium:   Results from last 7 days  Lab Units 12/08/18  0622   MAGNESIUM mg/dL 2 2     Phosphorous:   Results from last 7 days  Lab Units 12/08/18  0622   PHOSPHORUS mg/dL 4 1     Troponin:     PT/INR:   Results from last 7 days  Lab Units 12/03/18  1804   PTT seconds 30   INR  1 13     Lactic Acid:     BNP:     TSH:     Procalcitonin: Invalid input(s): PROCALCITONIN      Imaging:   XR chest pa & lateral   Final Result by Abelardo Tinajero MD (12/06 2131)      Slight progression of pneumonia on the right involving more of the lung              Workstation performed: [id number]         FL barium swallow video w speech   Final Result by Josh Atkinson CCC-SLP (12/05 1633)      FL barium swallow video w speech   Final Result by Daniel Rivera MD (12/05 1659) FINDINGS/IMPRESSION:      Video barium swallow was performed by the department of speech pathology utilizing food substances of various thickness  Intermittent ventricular penetration with thin liquids and nectar thick liquids noted  No brice aspiration with any swallow  Please refer to the speech pathology report for further details  Workstation performed: [id number]         XR chest 2 views   Final Result by Jovanni Nicolas MD (12/03 2102)      Right middle lobe patchy infiltrate consistent with pneumonia  Follow-up radiographs recommended in one month to ensure complete resolution  The study was marked in [facility] for immediate notification  Workstation performed: [id number]                 Micro: Lab Results   Component Value Date    BLOODCX No Growth After 5 Days  12/03/2018    BLOODCX No Growth After 5 Days   12/03/2018    MRSACULTURE  12/03/2018     No Methicillin Resistant Staphlyococcus aureus (MRSA) isolated            Invalid input(s): LEGIONELLAURINARYANTIGEN        Code Status: Level 1 - Full Code

## 2018-12-10 LAB
GLUCOSE SERPL-MCNC: 109 MG/DL (ref 65–140)
GLUCOSE SERPL-MCNC: 152 MG/DL (ref 65–140)
GLUCOSE SERPL-MCNC: 175 MG/DL (ref 65–140)
GLUCOSE SERPL-MCNC: 85 MG/DL (ref 65–140)

## 2018-12-10 PROCEDURE — 99232 SBSQ HOSP IP/OBS MODERATE 35: CPT | Performed by: STUDENT IN AN ORGANIZED HEALTH CARE EDUCATION/TRAINING PROGRAM

## 2018-12-10 PROCEDURE — 82948 REAGENT STRIP/BLOOD GLUCOSE: CPT

## 2018-12-10 PROCEDURE — 92526 ORAL FUNCTION THERAPY: CPT

## 2018-12-10 PROCEDURE — 94760 N-INVAS EAR/PLS OXIMETRY 1: CPT

## 2018-12-10 RX ADMIN — ATORVASTATIN CALCIUM 10 MG: 10 TABLET, FILM COATED ORAL at 17:35

## 2018-12-10 RX ADMIN — FLUTICASONE PROPIONATE 1 SPRAY: 50 SPRAY, METERED NASAL at 09:40

## 2018-12-10 RX ADMIN — FLUTICASONE PROPIONATE 1 SPRAY: 50 SPRAY, METERED NASAL at 17:35

## 2018-12-10 RX ADMIN — ERYTHROMYCIN 0.5 INCH: 5 OINTMENT OPHTHALMIC at 23:10

## 2018-12-10 RX ADMIN — ARIPIPRAZOLE 10 MG: 5 TABLET ORAL at 05:35

## 2018-12-10 RX ADMIN — LEVOTHYROXINE SODIUM 50 MCG: 50 TABLET ORAL at 05:35

## 2018-12-10 RX ADMIN — INSULIN LISPRO 1 UNITS: 100 INJECTION, SOLUTION INTRAVENOUS; SUBCUTANEOUS at 09:40

## 2018-12-10 RX ADMIN — ENOXAPARIN SODIUM 40 MG: 40 INJECTION SUBCUTANEOUS at 09:40

## 2018-12-10 RX ADMIN — ACETAMINOPHEN 650 MG: 325 TABLET, FILM COATED ORAL at 23:13

## 2018-12-10 RX ADMIN — DIVALPROEX SODIUM 1000 MG: 500 TABLET, DELAYED RELEASE ORAL at 23:10

## 2018-12-10 NOTE — PROGRESS NOTES
27[address] [facility] [address] [facility] Practice Progress Note - Rony Petersen 44 y o  female MRN: [id number]    Unit/Bed#: 2 [place] [address] Encounter: [id number]      Assessment/Plan:  * Pneumonia with hypoxia   Assessment & Plan    -Initial CXR Right middle lobe patchy infiltrate consistent with pneumonia  Community vs?Aspiration pneumonia in setting of difficulty chewing/swallowing per family in setting of her Down syndrome  Passed bedside swallow   -completed Unasyn 3 g IV q 6h x4 days, started on Augmentin PO 12/7, to complete 7 day course on 12/10  - procalcitonin 0 07  - sputum culture pending, resp pathogen panel negative  -urine strep and Legionella negative, rapid strep negative, 12/6/18: throat culture pos for GBS  - supplemental O2 PRN for SpO2 >88%, wean as tolerated, hypoxia possibly 2/2 pneumonia  - placed on aspiratin precautions  - Home O2 eval on 12/8/18, 93% on room air  - patient was d/c on 12/7, medically cleared by primary care team and pulmonology, parents appealed medicare rights  - patient has been stable medically since then  Type 2 diabetes mellitus, without long-term current use of insulin (HCC)   Assessment & Plan    Repeat A1c 5 8 this admission, diabetes controlled with lifestyle modification      ISS during admission     History of psychiatric disorder   Assessment & Plan    - c/w abilify and depakote     History of prolonged Q-T interval on ECG   Assessment & Plan    - continue to monitor       Stage 3 chronic kidney disease (HCC)   Assessment & Plan    Mild stable CKD III, Avoid nephrotoxic agents, creatinine baseline at this point     Down syndrome   Assessment & Plan    Patient lives in a group home, mother's POA     Viral syndrome   Assessment & Plan    - tylenol prn for headaches  - soft diet for sore throat  - treat superimposed PNA     Mild intermittent asthma without complication   Assessment & Plan    - no meds at home, prn albuterol      Obstructive sleep apnea   Assessment & Plan    - c/w home CPAP with supplemental O2 for the hypoxia  - home setting 10cm  - reportedly patient's mother was concerned about group home CPAP machine having issues, will provide script for new machine on discharge  Acute respiratory failure (HCC)resolved as of 12/7/2018   Assessment & Plan    - in setting of pneumonia  - continue with supplemental oxygen, plan to wean to maintain a saturation greater than 92%  - At ED, patient was saturating on 89%  Subjective:   Patient seen and examined at bedside  Patient today w/o complaint  Denied HA, CP, SOB, abdominal pain, nausea  Bowel movements normal and no problem with urinating  Tolerating PO diet  Objective:     Vitals: Blood pressure 118/59, pulse 63, temperature 97 7 °F (36 5 °C), temperature source Axillary, resp  rate 16, height 4' 9" (1 448 m), weight 82 3 kg (181 lb 7 oz), SpO2 95 %, not currently breastfeeding  ,Body mass index is 39 26 kg/m²  Wt Readings from Last 3 Encounters:   12/03/18 82 3 kg (181 lb 7 oz)   10/10/18 80 5 kg (177 lb 6 4 oz)   08/06/18 79 4 kg (175 lb)       Intake/Output Summary (Last 24 hours) at 12/10/18 1811  Last data filed at 12/10/18 1730   Gross per 24 hour   Intake              960 ml   Output              550 ml   Net              410 ml       Physical Exam: /59 (BP Location: Right arm)   Pulse 63   Temp 97 7 °F (36 5 °C) (Axillary)   Resp 16   Ht 4' 9" (1 448 m)   Wt 82 3 kg (181 lb 7 oz)   LMP  (LMP Unknown)   SpO2 95%   Breastfeeding?  No   BMI 39 26 kg/m²    General appearance: alert and oriented, in no acute distress  Head: Normocephalic, without obvious abnormality, atraumatic  Lungs: clear to auscultation bilaterally  Heart: regular rate and rhythm, S1, S2 normal, no murmur, click, rub or gallop  Abdomen: soft, non-tender; bowel sounds normal; no masses,  no organomegaly  Extremities: extremities normal, warm and well-perfused; no cyanosis, clubbing, or edema  Skin: Skin color, texture, turgor normal  No rashes or lesions     Recent Results (from the past 24 hour(s))   Fingerstick Glucose (POCT)    Collection Time: 12/09/18  9:14 PM   Result Value Ref Range    POC Glucose 156 (H) 65 - 140 mg/dl   Fingerstick Glucose (POCT)    Collection Time: 12/10/18  7:33 AM   Result Value Ref Range    POC Glucose 152 (H) 65 - 140 mg/dl   Fingerstick Glucose (POCT)    Collection Time: 12/10/18 11:30 AM   Result Value Ref Range    POC Glucose 85 65 - 140 mg/dl   Fingerstick Glucose (POCT)    Collection Time: 12/10/18  4:56 PM   Result Value Ref Range    POC Glucose 109 65 - 140 mg/dl       Current Facility-Administered Medications   Medication Dose Route Frequency Provider Last Rate Last Dose    acetaminophen (TYLENOL) tablet 650 mg  650 mg Oral Q6H PRN Oneyda Baker MD   650 mg at 12/09/18 2319    albuterol inhalation solution 2 5 mg  2 5 mg Nebulization Q4H PRN Jayla Yanez MD        ARIPiprazole (ABILIFY) tablet 10 mg  10 mg Oral Early Morning Yissel Farnaz, DO   10 mg at 12/10/18 0535    atorvastatin (LIPITOR) tablet 10 mg  10 mg Oral Daily With [employer], DO   10 mg at 12/10/18 1735    clindamycin (CLINDAGEL) 1 % gel   Topical BID Yissel Farnaz, DO        divalproex sodium (DEPAKOTE) EC tablet 1,000 mg  1,000 mg Oral HS Yissel Farnaz, DO   1,000 mg at 12/09/18 2100    enoxaparin (LOVENOX) subcutaneous injection 40 mg  40 mg Subcutaneous Daily Yissel Farnaz, DO   40 mg at 12/10/18 0940    erythromycin (ILOTYCIN) 0 5 % ophthalmic ointment 0 5 inch  0 5 inch Both Eyes HS Yissel Farnaz, DO   0 5 inch at 12/09/18 2100    fluticasone (FLONASE) 50 mcg/act nasal spray 1 spray  1 spray Nasal BID Yissel Farnaz, DO   1 spray at 12/10/18 1735    insulin lispro (HumaLOG) 100 units/mL subcutaneous injection 1-5 Units  1-5 Units Subcutaneous 4x Daily (AC & HS) Jayla Yanez MD   1 Units at 12/10/18 0940    levocetirizine (XYZAL) tablet 2 5 mg  2 5 mg Oral Daily Yissel Osei DO        levothyroxine tablet 50 mcg  50 mcg Oral Early Morning [facility], DO   50 mcg at 12/10/18 0535       Invasive Devices          No matching active lines, drains, or airways          Lab, Imaging and other studies: I have personally reviewed pertinent reports      VTE Pharmacologic Prophylaxis: Enoxaparin (Lovenox)  VTE Mechanical Prophylaxis: sequential compression device    Navdeep Napoles MD

## 2018-12-10 NOTE — UTILIZATION REVIEW
Continued Stay Review  Date: 12/7/18  Vital Signs: Vitals: Blood pressure 98/55, pulse 69, temperature 97 8 °F (36 6 °C), temperature source Oral, resp  rate 20, height 4' 9" (1 448 m), weight 82 3 kg (181 lb 7 oz), SpO2 (!) 87 %, not currently breastfeeding  ,Body mass index is 39 26 kg/m²    Medications:   Scheduled Meds:   Current Facility-Administered Medications:  acetaminophen 650 mg Oral Q6H PRN Riley Soulier, MD   albuterol 2 5 mg Nebulization Q4H PRN Nga Espinosa MD   ARIPiprazole 10 mg Oral Early Morning Yissel Farnaz, DO   atorvastatin 10 mg Oral Daily With Dinner Yissel Farnaz, DO   clindamycin  Topical BID Yissel Farnaz, DO   divalproex sodium 1,000 mg Oral HS Yissel Farnaz, DO   enoxaparin 40 mg Subcutaneous Daily Yissel Farnaz, DO   erythromycin 0 5 inch Both Eyes HS Yissel Farnaz, DO   fluticasone 1 spray Nasal BID Yissel Farnaz, DO   insulin lispro 1-5 Units Subcutaneous 4x Daily (AC & HS) Nga Espinosa MD   levocetirizine 2 5 mg Oral Daily Yissel Farnaz, DO   levothyroxine 50 mcg Oral Early Morning Yissel Farnaz, DO     Continuous Infusions:    PRN Meds:   acetaminophen    albuterol    Abnormal Labs/Diagnostic Results:   BANDS 10     Age/Sex: 44 y o  female     Assessment/Plan:   Patient complaining of HA and nausea  Lungs: diminished breath sounds  Pneumonia with hypoxia   Assessment & Plan     -continue with Unasyn 3 g IV q 6h  Day #3 abx              Patient passed bedside swallow eval  WBC improving today to 11 04, afebrile overnight  - procalcitonin pending  - sputum culture pending, resp pathogen panel pending              -urine strep and Legionella negative, rapid strep negative, throat culture negative              -12/6/18: throat culture pos for GBS  - supplemental O2 PRN for SpO2 >88%, wean as tolerated, hypoxia possibly 2/2 pneumonia  - placed on aspiratin precautions  - Home O2 eval on 12/8/18     Discharge Plan: TBD

## 2018-12-10 NOTE — PLAN OF CARE
Problem: RESPIRATORY - ADULT  Goal: Achieves optimal ventilation and oxygenation  INTERVENTIONS:  - Assess for changes in respiratory status  - Assess for changes in mentation and behavior  - Position to facilitate oxygenation and minimize respiratory effort  - Oxygen administration by appropriate delivery method based on oxygen saturation (per order) or ABGs  - Encourage broncho-pulmonary hygiene including cough, deep breathe, Incentive Spirometry  - Assess the need for suctioning and aspirate as needed  - Assess and instruct to report SOB or any respiratory difficulty  - Respiratory Therapy support as indicated   INTERVENTIONS:  - Assess for changes in respiratory status  - Assess for changes in mentation and behavior  - Position to facilitate oxygenation and minimize respiratory effort  - Oxygen administration by appropriate delivery method based on oxygen saturation (per order) or ABGs  - Initiate smoking cessation education as indicated  - Encourage broncho-pulmonary hygiene including cough, deep breathe, Incentive Spirometry  - Assess the need for suctioning and aspirate as needed  - Assess and instruct to report SOB or any respiratory difficulty  - Respiratory Therapy support as indicated  Outcome: Progressing

## 2018-12-10 NOTE — SOCIAL WORK
Call received from [facility] regarding Medicare Discharge Appeal determination  [facility] physician disagrees with termination of servies, services will continue and no liability exists  Family and physician aware

## 2018-12-10 NOTE — SPEECH THERAPY NOTE
Speech Language/Pathology    Speech/Language Pathology Progress Note    Patient Name: Nubia Petersen  Today's Date: 12/10/2018     Subjective:  Patient seen upright in bed- requesting ice water  Patient is being discharged today  Objective:  Patient was seen with thin water via cup sip  She continues to present with lingual pumping/thrust as primary means of bolus tan formation and transfer  No overt s/s reduced oral control  Swallowing initiation appeared prompt  Laryngeal rise was palpated and judged to be within functional limits  Cough noted x1 on last sip of liquid given hyperextension of the neck and slightly larger sip  No additional coughing, throat clearing, change in vocal quality or respiratory status noted today  Patient noted to initially attempt large cup sips despite verbal reminder of strategy however after initial sip given mod verbal cues patient with good carryover of controlled rate/vol of intake  Assessment:  Patient continues to present with mild-moderate oral and mild pharyngeal dysphagia with increased risk for aspiration with rapid rate/vol of intake  She continues to require 1:1 assistance/supervision during intake for cueing to carryover strategies  Plan/Recommendations:  Continue dysph 3 textures with thin liquids  Continue meds crushed in puree  Aspiration/reflux precautions and the following safe swallowing strategies: upright posture, slow rate of feeding, small bites/sips and alternating bites and sips and 1:1 assisstance for verbal reminders for reduced rate/vol of intake  Patient will be discharged from [address] [place] [address] services at this time as she is at baseline diet and recommendations/strategies in place  Follow up ST is not necessary upon d/c from hospital  Please re- consult if patients status changes      ALEXIS Lopez S , [id number] [facility]  Speech Language Pathologist   [phone]

## 2018-12-11 LAB
GLUCOSE SERPL-MCNC: 123 MG/DL (ref 65–140)
GLUCOSE SERPL-MCNC: 141 MG/DL (ref 65–140)
GLUCOSE SERPL-MCNC: 146 MG/DL (ref 65–140)
GLUCOSE SERPL-MCNC: 149 MG/DL (ref 65–140)

## 2018-12-11 PROCEDURE — 94760 N-INVAS EAR/PLS OXIMETRY 1: CPT

## 2018-12-11 PROCEDURE — 82948 REAGENT STRIP/BLOOD GLUCOSE: CPT

## 2018-12-11 RX ADMIN — ENOXAPARIN SODIUM 40 MG: 40 INJECTION SUBCUTANEOUS at 08:01

## 2018-12-11 RX ADMIN — ERYTHROMYCIN 0.5 INCH: 5 OINTMENT OPHTHALMIC at 22:32

## 2018-12-11 RX ADMIN — ATORVASTATIN CALCIUM 10 MG: 10 TABLET, FILM COATED ORAL at 17:29

## 2018-12-11 RX ADMIN — LEVOTHYROXINE SODIUM 50 MCG: 50 TABLET ORAL at 06:18

## 2018-12-11 RX ADMIN — DIVALPROEX SODIUM 1000 MG: 500 TABLET, DELAYED RELEASE ORAL at 22:31

## 2018-12-11 RX ADMIN — ACETAMINOPHEN 650 MG: 325 TABLET, FILM COATED ORAL at 23:56

## 2018-12-11 RX ADMIN — FLUTICASONE PROPIONATE 1 SPRAY: 50 SPRAY, METERED NASAL at 08:01

## 2018-12-11 RX ADMIN — ARIPIPRAZOLE 10 MG: 5 TABLET ORAL at 06:17

## 2018-12-11 RX ADMIN — FLUTICASONE PROPIONATE 1 SPRAY: 50 SPRAY, METERED NASAL at 17:29

## 2018-12-11 NOTE — SOCIAL WORK
SW following to assist with DCP  Call received from [facility] regarding pt's CPAP and oxygen  They are in need of additional supporting documentation and testing before DME can be delivered  Fax with requesting information received from [facility] and will be given to pulmonologist for review    [facility] - [phone] [facility]

## 2018-12-11 NOTE — SOCIAL WORK
Discharge ordered for today  Plan remains for pt to return to group home  BERONICA spoke with , Meryle Grace, to discuss discharge for today  Inquired about resolution of CPAP issue from last week  Per Ms Vega pt's mother has DME provider information  BERONICA placed call to Mrs Petersen to discuss resolution  Per pt's mother the provider is [facility]   Mother said she is working on CPAP issue with Dr Amairani Whyte office  She is confident that office will be able to assist with all needs related to pulmonary issues  At this time they are continuing CPAP at night and per mother considering night time oxygen  Mother is agreeable with pt's transfer back to group home as long as pt's pulmonary needs are met so she doesn't have to return to the hospital   Offered assistance if needed  Will follow until discharge

## 2018-12-11 NOTE — PROGRESS NOTES
[facility] Practice Progress Note - Katie Petersen 44 y o  female MRN: [id number]    Unit/Bed#: [address] [place] [address] Encounter: [id number]      Assessment/Plan:  * Pneumonia with hypoxiaresolved as of 12/12/2018   Assessment & Plan    - Initial CXR Right middle lobe patchy infiltrate consistent with pneumonia  - completed Unasyn 3 g IV q 6h x4 days, and Augmentin PO 12/7 for 10 days  - urine strep and Legionella negative, rapid strep negative, 12/6/18: throat culture pos for GBS  - placed on aspiratin precautions  - Home O2 eval on 12/8/18, 93% on room air  - Patient will benefit from use of CPAP with setting of 8 cm  - patient was d/c on 12/7, medically cleared by primary care team and pulmonology, parents appealed medicare rights  Has remained medically stable  Obstructive sleep apnea   Assessment & Plan    - c/w home CPAP with supplemental O2 for the hypoxia  - home setting 8cm  - provided new script for cpap in setting of cpap machine being damaged  - Patient will benefit from CPAP with setting of 8 cm     Type 2 diabetes mellitus, without long-term current use of insulin (HCC)   Assessment & Plan    Repeat A1c 5 8 this admission, diabetes controlled with lifestyle modification      ISS during admission     History of prolonged Q-T interval on ECG   Assessment & Plan    - continue to monitor       Stage 3 chronic kidney disease (HCC)   Assessment & Plan    Mild stable CKD III, Avoid nephrotoxic agents, creatinine baseline at this point     Down syndrome   Assessment & Plan    Patient lives in a group home, mother's POA     Viral syndrome   Assessment & Plan    - tylenol prn for headaches  - soft diet for sore throat  - treat superimposed PNA     Mild intermittent asthma without complication   Assessment & Plan    - no meds at home, prn albuterol      Acute respiratory failure (HCC)resolved as of 12/7/2018   Assessment & Plan    - in setting of pneumonia  - continue with supplemental oxygen, plan to wean to maintain a saturation greater than 92%  - At ED, patient was saturating on 89%  History of psychiatric disorderresolved as of 12/12/2018   Assessment & Plan    - c/w abilify and depakote             Subjective:   Patient seen and examined at bedside today  Patient stable, afebrile, no complaints  Oxygenation at 95% on room air  Anticipated discharge today  Objective:     Vitals: Blood pressure 105/52, pulse 56, temperature 98 1 °F (36 7 °C), temperature source Oral, resp  rate 18, height 4' 9" (1 448 m), weight 82 3 kg (181 lb 7 oz), SpO2 93 %, not currently breastfeeding  ,Body mass index is 39 26 kg/m²  Wt Readings from Last 3 Encounters:   12/03/18 82 3 kg (181 lb 7 oz)   10/10/18 80 5 kg (177 lb 6 4 oz)   08/06/18 79 4 kg (175 lb)       Intake/Output Summary (Last 24 hours) at 12/12/18 1553  Last data filed at 12/12/18 0900   Gross per 24 hour   Intake              240 ml   Output              680 ml   Net             -440 ml       Physical Exam: /52 (BP Location: Right leg)   Pulse 56   Temp 98 1 °F (36 7 °C) (Oral)   Resp 18   Ht 4' 9" (1 448 m)   Wt 82 3 kg (181 lb 7 oz)   LMP  (LMP Unknown)   SpO2 93%   Breastfeeding?  No   BMI 39 26 kg/m²   General appearance: alert and oriented, in no acute distress  Head: Normocephalic, without obvious abnormality, atraumatic  Throat: moist mucous membranes  Lungs: clear to auscultation bilaterally  Heart: regular rate and rhythm, S1, S2 normal, no murmur, click, rub or gallop  Abdomen: soft, non-tender; bowel sounds normal; no masses,  no organomegaly  Extremities: extremities normal, warm and well-perfused; no cyanosis, clubbing, or edema  Skin: Skin color, texture, turgor normal  No rashes or lesions     Recent Results (from the past 24 hour(s))   Fingerstick Glucose (POCT)    Collection Time: 12/11/18  4:23 PM   Result Value Ref Range    POC Glucose 141 (H) 65 - 140 mg/dl   Fingerstick Glucose (POCT)    Collection Time: 12/11/18  8:55 PM Result Value Ref Range    POC Glucose 149 (H) 65 - 140 mg/dl   Fingerstick Glucose (POCT)    Collection Time: 12/12/18  7:12 AM   Result Value Ref Range    POC Glucose 112 65 - 140 mg/dl   Fingerstick Glucose (POCT)    Collection Time: 12/12/18 12:22 PM   Result Value Ref Range    POC Glucose 172 (H) 65 - 140 mg/dl       Current Facility-Administered Medications   Medication Dose Route Frequency Provider Last Rate Last Dose    acetaminophen (TYLENOL) tablet 650 mg  650 mg Oral Q6H PRN Trish Marrufo MD   650 mg at 12/11/18 2356    albuterol inhalation solution 2 5 mg  2 5 mg Nebulization Q4H PRN Grant Campbell MD        ARIPiprazole (ABILIFY) tablet 10 mg  10 mg Oral Early Morning Yissel Farnaz, DO   10 mg at 12/12/18 0616    atorvastatin (LIPITOR) tablet 10 mg  10 mg Oral Daily With [employer], DO   10 mg at 12/11/18 1729    clindamycin (CLINDAGEL) 1 % gel   Topical BID Sidra Sindhu, DO        divalproex sodium (DEPAKOTE) EC tablet 1,000 mg  1,000 mg Oral HS Yissel Farnaz, DO   1,000 mg at 12/11/18 2231    enoxaparin (LOVENOX) subcutaneous injection 40 mg  40 mg Subcutaneous Daily Sidra Sindhu, DO   40 mg at 12/12/18 0820    erythromycin (ILOTYCIN) 0 5 % ophthalmic ointment 0 5 inch  0 5 inch Both Eyes HS Yissel Farnaz, DO   0 5 inch at 12/11/18 2232    fluticasone (FLONASE) 50 mcg/act nasal spray 1 spray  1 spray Nasal BID Yissel Farnaz, DO   1 spray at 12/12/18 0820    levocetirizine (XYZAL) tablet 2 5 mg  2 5 mg Oral Daily Yissel Farnaz, DO        levothyroxine tablet 50 mcg  50 mcg Oral Early Morning Yissel Farnaz, DO   50 mcg at 12/12/18 0616       Invasive Devices          No matching active lines, drains, or airways          Lab, Imaging and other studies: I have personally reviewed pertinent reports      VTE Pharmacologic Prophylaxis: Enoxaparin (Lovenox)  VTE Mechanical Prophylaxis: sequential compression device    Trish Marrufo MD

## 2018-12-11 NOTE — SOCIAL WORK
SW met with pt's parents to discuss transfer back to group home  Mother was on phone with [employer] supervisor, Norma Cameron  Ms Mauro Has stated that they will need to review discharge instructions to confirm that they meet Division of Developmental Disabilities criteria before pt is discharge  They also need CPAP and oxygen equipment delivered to group home and group home staff trained on equipment before pt can return  SW will follow up with CHARI Still at Dr Roldan Garcia office to determine status of DME orders  Then call will be placed to [facility] to determine delivery schedule  Per Ms Mauro Has staff can be trained tomorrow if DME arrives at group home  Until all above are completed group home will not be able to accept pt back  Discharge instructions faxed to Norma Cameron  Call placed to [facility] - message left requesting call back  Call placed to [facility] - they have not received any information that they need in order to fill DME request   Deidra Alvarez will continue to follow to assist as needed

## 2018-12-11 NOTE — DISCHARGE INSTRUCTIONS
Bacterial Pneumonia   WHAT YOU NEED TO KNOW:   Bacterial pneumonia is a lung infection caused by bacteria  It makes your lungs inflamed, which means they cannot work well  Bacterial pneumonia germs are easily spread when an infected person coughs, sneezes, or has close contact with others  DISCHARGE INSTRUCTIONS:   Return to the emergency department if:   · You are confused and cannot think clearly  · You are urinating less or not at all  · You cough up blood  · You have more trouble breathing, or your breathing seems faster than normal     · Your heart or pulse beats more than 100 times in 1 minute  · Your lips or fingernails turn blue  Contact your healthcare provider if:   · Your symptoms are the same or get worse 48 hours after you start antibiotics  · You cannot eat or have loss of appetite, nausea, or are vomiting  · You have questions or concerns about your condition or care  Medicines:   · Antibiotics  treat pneumonia caused by bacteria  · Acetaminophen  decreases pain and fever  It is available without a doctor's order  Ask how much to take and how often to take it  Follow directions  Read the labels of all other medicines you are using to see if they also contain acetaminophen, or ask your doctor or pharmacist  Acetaminophen can cause liver damage if not taken correctly  Do not use more than 4 grams (4,000 milligrams) total of acetaminophen in one day  · NSAIDs , such as ibuprofen, help decrease swelling, pain, and fever  This medicine is available with or without a doctor's order  NSAIDs can cause stomach bleeding or kidney problems in certain people  If you take blood thinner medicine, always ask your healthcare provider if NSAIDs are safe for you  Always read the medicine label and follow directions  · Take your medicine as directed  Contact your healthcare provider if you think your medicine is not helping or if you have side effects   Tell him or her if you are allergic to any medicine  Keep a list of the medicines, vitamins, and herbs you take  Include the amounts, and when and why you take them  Bring the list or the pill bottles to follow-up visits  Carry your medicine list with you in case of an emergency  Follow up with your healthcare provider as directed:  Write down your questions so you remember to ask them during your visits  Manage your symptoms:   · Rest as needed  Rest often while you recover  Slowly start to do more each day  · Drink liquids as directed  Ask how much liquid to drink each day and which liquids are best for you  Liquids help thin your mucus, which may make it easier for you to cough it up  · Do not smoke  Avoid secondhand smoke  Smoking increases your risk for pneumonia  Smoking also makes it harder for you to get better after you have had pneumonia  Ask your healthcare provider for information if you currently smoke and need help to quit  E-cigarettes or smokeless tobacco still contain nicotine  Talk to your healthcare provider before you use these products  · Use a cool mist humidifier  to increase air moisture in your home  This may make it easier for you to breathe and help decrease your cough  · Keep your head elevated  You may be able to breathe better if you lie down with the head of your bed up  Prevent bacterial pneumonia:   · Prevent the spread of germs  Wash your hands often with soap and water  Use gel hand cleanser when there is no soap and water available  Do not touch your eyes, nose, or mouth unless you have washed your hands first  Cover your mouth when you cough  Cough into a tissue or your shirtsleeve so you do not spread germs from your hands  If you are sick, stay away from others as much as possible  · Limit alcohol  Women should limit alcohol to 1 drink a day  Men should limit alcohol to 2 drinks a day  A drink of alcohol is 12 ounces of beer, 5 ounces of wine, or 1½ ounces of liquor  · Ask about vaccines  You may need a vaccine to help prevent pneumonia  Get an influenza (flu) vaccine every year as soon as it becomes available  © 2017 [employer] Information is for End User's use only and may not be sold, redistributed or otherwise used for commercial purposes  All illustrations and images included in CareNotes® are the copyrighted property of A D A M , Inc  or [employer]  The above information is an  only  It is not intended as medical advice for individual conditions or treatments  Talk to your doctor, nurse or pharmacist before following any medical regimen to see if it is safe and effective for you

## 2018-12-12 PROBLEM — J18.9 PNEUMONIA: Status: RESOLVED | Noted: 2018-12-03 | Resolved: 2018-12-12

## 2018-12-12 PROBLEM — B34.9 VIRAL SYNDROME: Status: RESOLVED | Noted: 2018-12-03 | Resolved: 2018-12-12

## 2018-12-12 PROBLEM — Z86.59 HISTORY OF PSYCHIATRIC DISORDER: Chronic | Status: RESOLVED | Noted: 2018-12-03 | Resolved: 2018-12-12

## 2018-12-12 LAB
GLUCOSE SERPL-MCNC: 112 MG/DL (ref 65–140)
GLUCOSE SERPL-MCNC: 146 MG/DL (ref 65–140)
GLUCOSE SERPL-MCNC: 172 MG/DL (ref 65–140)

## 2018-12-12 PROCEDURE — 97110 THERAPEUTIC EXERCISES: CPT

## 2018-12-12 PROCEDURE — 82948 REAGENT STRIP/BLOOD GLUCOSE: CPT

## 2018-12-12 RX ADMIN — FLUTICASONE PROPIONATE 1 SPRAY: 50 SPRAY, METERED NASAL at 17:41

## 2018-12-12 RX ADMIN — FLUTICASONE PROPIONATE 1 SPRAY: 50 SPRAY, METERED NASAL at 08:20

## 2018-12-12 RX ADMIN — ERYTHROMYCIN 0.5 INCH: 5 OINTMENT OPHTHALMIC at 22:22

## 2018-12-12 RX ADMIN — DIVALPROEX SODIUM 1000 MG: 500 TABLET, DELAYED RELEASE ORAL at 22:22

## 2018-12-12 RX ADMIN — LEVOTHYROXINE SODIUM 50 MCG: 50 TABLET ORAL at 06:16

## 2018-12-12 RX ADMIN — ENOXAPARIN SODIUM 40 MG: 40 INJECTION SUBCUTANEOUS at 08:20

## 2018-12-12 RX ADMIN — ATORVASTATIN CALCIUM 10 MG: 10 TABLET, FILM COATED ORAL at 17:40

## 2018-12-12 RX ADMIN — ARIPIPRAZOLE 10 MG: 5 TABLET ORAL at 06:16

## 2018-12-12 NOTE — SOCIAL WORK
CM SPOKE WITH CHANTELLE AT [facility] @ 11:00, CANNOT APPROVE CPAP, NEED DOCUMENTATION THAT PT IS BENEFITING FROM CPAP IN A PROGRESS NOTE, ALSO NEED TO KNOW IF O2 IS CONTINUOUS OR NOCTURNAL, IF NOCTURNAL WILL NEED CPAP TITRATION STUDY  [facility]  CM MET WITH [facility] TEAM AND PULMONOLOGY @ 3:30, PULMONOLOGY CALLED [facility] DIRECTLY, NEW DOCUMENTATION FAXED AND SENT VIA [employer]  CM WILL F/U FOR APPROVAL

## 2018-12-12 NOTE — PROGRESS NOTES
[facility] [address] [facility] Practice Progress Note - Soren Petersen 44 y o  female MRN: [id number]    Unit/Bed#: [address] [place] [address] Encounter: [id number]      Assessment/Plan:  * Pneumonia with hypoxiaresolved as of 12/12/2018   Assessment & Plan    - Initial CXR Right middle lobe patchy infiltrate consistent with pneumonia  - completed Unasyn 3 g IV q 6h x4 days, and Augmentin PO 12/7 for 10 days  - urine strep and Legionella negative, rapid strep negative, 12/6/18: throat culture pos for GBS  - placed on aspiratin precautions  - Home O2 eval on 12/8/18, 93% on room air  - Patient is benefitting from use of CPAP with setting of 8 cm  - patient was d/c on 12/7, medically cleared by primary care team and pulmonology, parents appealed medicare rights  Has remained medically stable  Obstructive sleep apnea   Assessment & Plan    - c/w home CPAP with supplemental O2 for the hypoxia  - provided new script for cpap in setting of cpap machine being damaged   - setting of 8 cm  - Patient is benefitting from CPAP with setting of 8 cm     Type 2 diabetes mellitus, without long-term current use of insulin (HCC)   Assessment & Plan    Repeat A1c 5 8 this admission, diabetes controlled with lifestyle modification  ISS during admission     History of prolonged Q-T interval on ECG   Assessment & Plan    - continue to monitor       Stage 3 chronic kidney disease (HCC)   Assessment & Plan    Mild stable CKD III, Avoid nephrotoxic agents, creatinine baseline at this point     Down syndrome   Assessment & Plan    Patient lives in a group home, mother's POA     Mild intermittent asthma without complication   Assessment & Plan    - no meds at home, prn albuterol      Acute respiratory failure (HCC)resolved as of 12/7/2018   Assessment & Plan    - in setting of pneumonia  - continue with supplemental oxygen, plan to wean to maintain a saturation greater than 92%  - At ED, patient was saturating on 89%        History of psychiatric disorderresolved as of 12/12/2018   Assessment & Plan    - c/w abilify and depakote     Viral syndromeresolved as of 12/12/2018   Assessment & Plan    - tylenol prn for headaches  - soft diet for sore throat  - treat superimposed PNA             Subjective:   Patient seen and examined at bedside  Patient currently stable, no complaints  Patient was asleep  Objective:     Vitals: Blood pressure 105/52, pulse 56, temperature 98 1 °F (36 7 °C), temperature source Oral, resp  rate 18, height 4' 9" (1 448 m), weight 82 3 kg (181 lb 7 oz), SpO2 93 %, not currently breastfeeding  ,Body mass index is 39 26 kg/m²  Wt Readings from Last 3 Encounters:   12/03/18 82 3 kg (181 lb 7 oz)   10/10/18 80 5 kg (177 lb 6 4 oz)   08/06/18 79 4 kg (175 lb)       Intake/Output Summary (Last 24 hours) at 12/12/18 1739  Last data filed at 12/12/18 0900   Gross per 24 hour   Intake              240 ml   Output              680 ml   Net             -440 ml       Physical Exam: /52 (BP Location: Right leg)   Pulse 56   Temp 98 1 °F (36 7 °C) (Oral)   Resp 18   Ht 4' 9" (1 448 m)   Wt 82 3 kg (181 lb 7 oz)   LMP  (LMP Unknown)   SpO2 93%   Breastfeeding?  No   BMI 39 26 kg/m²   General appearance: alert and oriented, in no acute distress  Head: Normocephalic, without obvious abnormality, atraumatic  Throat: moist mucous membranes  Lungs: clear to auscultation bilaterally  Heart: regular rate and rhythm, S1, S2 normal, no murmur, click, rub or gallop  Abdomen: soft, non-tender; bowel sounds normal; no masses,  no organomegaly  Extremities: extremities normal, warm and well-perfused; no cyanosis, clubbing, or edema  Skin: Skin color, texture, turgor normal  No rashes or lesions  Neurologic: Grossly normal     Recent Results (from the past 24 hour(s))   Fingerstick Glucose (POCT)    Collection Time: 12/11/18  8:55 PM   Result Value Ref Range    POC Glucose 149 (H) 65 - 140 mg/dl   Fingerstick Glucose (POCT)    Collection Time: 12/12/18  7:12 AM   Result Value Ref Range    POC Glucose 112 65 - 140 mg/dl   Fingerstick Glucose (POCT)    Collection Time: 12/12/18 12:22 PM   Result Value Ref Range    POC Glucose 172 (H) 65 - 140 mg/dl   Fingerstick Glucose (POCT)    Collection Time: 12/12/18  4:41 PM   Result Value Ref Range    POC Glucose 146 (H) 65 - 140 mg/dl       Current Facility-Administered Medications   Medication Dose Route Frequency Provider Last Rate Last Dose    acetaminophen (TYLENOL) tablet 650 mg  650 mg Oral Q6H PRN Swathi Draper MD   650 mg at 12/11/18 2356    albuterol inhalation solution 2 5 mg  2 5 mg Nebulization Q4H PRN Atiya León MD        ARIPiprazole (ABILIFY) tablet 10 mg  10 mg Oral Early Morning Yissel Farnaz, DO   10 mg at 12/12/18 0616    atorvastatin (LIPITOR) tablet 10 mg  10 mg Oral Daily With [employer], DO   10 mg at 12/11/18 1729    clindamycin (CLINDAGEL) 1 % gel   Topical BID Yissel Sindhu, DO        divalproex sodium (DEPAKOTE) EC tablet 1,000 mg  1,000 mg Oral HS Yissel Farnaz, DO   1,000 mg at 12/11/18 2231    enoxaparin (LOVENOX) subcutaneous injection 40 mg  40 mg Subcutaneous Daily Sidra Sindhu, DO   40 mg at 12/12/18 0820    erythromycin (ILOTYCIN) 0 5 % ophthalmic ointment 0 5 inch  0 5 inch Both Eyes HS Yissel Farnaz, DO   0 5 inch at 12/11/18 2232    fluticasone (FLONASE) 50 mcg/act nasal spray 1 spray  1 spray Nasal BID Yissel Farnaz, DO   1 spray at 12/12/18 0820    levocetirizine (XYZAL) tablet 2 5 mg  2 5 mg Oral Daily Yissel Farnaz, DO        levothyroxine tablet 50 mcg  50 mcg Oral Early Morning Yissel Farnaz, DO   50 mcg at 12/12/18 0616       Invasive Devices          No matching active lines, drains, or airways          Lab, Imaging and other studies: I have personally reviewed pertinent reports      VTE Pharmacologic Prophylaxis: Enoxaparin (Lovenox)  VTE Mechanical Prophylaxis: sequential compression device    Swathi Draper MD

## 2018-12-12 NOTE — PHYSICAL THERAPY NOTE
PT TREATMENT     12/12/18 1440   Pain Assessment   Pain Assessment No/denies pain   Restrictions/Precautions   Other Precautions Fall Risk   General   Chart Reviewed Yes   Family/Caregiver Present No   Cognition   Arousal/Participation Cooperative   Subjective   Subjective patient cooperative and without pain complaints   Bed Mobility   Supine to Sit 7  Independent   Sit to Supine 7  Independent   Transfers   Sit to Stand 5  Supervision   Stand to Sit 5  Supervision   Ambulation/Elevation   Gait Assistance 5  Supervision   Additional items Verbal cues   Assistive Device (none)   Distance 150 feet with change in direction and no balance loss noted at this time   Exercises   Balance training  stepping and backward walking completed for balance training   Assessment   Assessment patient cooperative and motivated and demonstrating improving gait balance and endurance and will benefit from continued PT with progression as tolerated      Recommendation   Recommendation (group home with support)   Licensure   [place] License Number  [facility] PT [id number]

## 2018-12-13 VITALS
OXYGEN SATURATION: 95 % | TEMPERATURE: 97.9 F | BODY MASS INDEX: 39.14 KG/M2 | DIASTOLIC BLOOD PRESSURE: 58 MMHG | HEART RATE: 62 BPM | RESPIRATION RATE: 18 BRPM | HEIGHT: 57 IN | WEIGHT: 181.44 LBS | SYSTOLIC BLOOD PRESSURE: 108 MMHG

## 2018-12-13 LAB
GLUCOSE SERPL-MCNC: 107 MG/DL (ref 65–140)
GLUCOSE SERPL-MCNC: 116 MG/DL (ref 65–140)
GLUCOSE SERPL-MCNC: 118 MG/DL (ref 65–140)

## 2018-12-13 PROCEDURE — 94760 N-INVAS EAR/PLS OXIMETRY 1: CPT

## 2018-12-13 PROCEDURE — 97530 THERAPEUTIC ACTIVITIES: CPT

## 2018-12-13 PROCEDURE — 82948 REAGENT STRIP/BLOOD GLUCOSE: CPT

## 2018-12-13 RX ORDER — LEVOCETIRIZINE DIHYDROCHLORIDE 5 MG/1
5 TABLET, FILM COATED ORAL DAILY
Qty: 30 TABLET | Refills: 1 | Status: SHIPPED | OUTPATIENT
Start: 2018-12-13 | End: 2020-03-31 | Stop reason: SDUPTHER

## 2018-12-13 RX ADMIN — FLUTICASONE PROPIONATE 1 SPRAY: 50 SPRAY, METERED NASAL at 09:57

## 2018-12-13 RX ADMIN — ACETAMINOPHEN 650 MG: 325 TABLET, FILM COATED ORAL at 00:21

## 2018-12-13 RX ADMIN — LEVOTHYROXINE SODIUM 50 MCG: 50 TABLET ORAL at 05:28

## 2018-12-13 RX ADMIN — ARIPIPRAZOLE 10 MG: 5 TABLET ORAL at 05:28

## 2018-12-13 RX ADMIN — ENOXAPARIN SODIUM 40 MG: 40 INJECTION SUBCUTANEOUS at 09:52

## 2018-12-13 NOTE — DISCHARGE SUMMARY
[facility] Discharge Summary - Medical Lisa Box Nicola 44 y o  female MRN: [id number]    [place] [address] [place] / Bed: [address] [place] [address] Encounter: [id number]    BRIEF OVERVIEW  Admitting Provider: Rajan Middleton MD  Discharge Provider: Radha Obrien DO    Discharge To: group home/parents home      Outpatient Follow-Up:   Patient with follow-up with PCP at [facility] in 1-2 weeks of hospital discharge  Patient has a follow-up appointment with pulmonology on December 20th  Things to address at first follow up visit:   Ensure compliance with CPAP q h s  Follow-up status post bacterial pneumonia  Labs and results pending at discharge:  None    Admission Date: 12/3/2018     Discharge Date: 12/13/18    Primary Discharge Diagnosis  Principal Problem (Resolved):    Pneumonia with hypoxia  Active Problems:    Obstructive sleep apnea    Mild intermittent asthma without complication    Down syndrome    Stage 3 chronic kidney disease (HCC)    History of prolonged Q-T interval on ECG    Type 2 diabetes mellitus, without long-term current use of insulin ([facility] [address] )  Resolved Problems:    Acute respiratory failure ([facility] [address] )    Viral syndrome    History of psychiatric disorder      Consulting Providers   Pulmonology        [address] ST[address]    Procedures Performed/Pertinent Test results  Xr Chest Pa & Lateral   Result Date: 12/6/2018  Impression: Slight progression of pneumonia on the right involving more of the lung  Workstation performed: [id number]     Fl Barium Swallow Video W Speech    Result Date: 12/5/2018  Impression: FINDINGS/IMPRESSION: Video barium swallow was performed by the department of speech pathology utilizing food substances of various thickness  Intermittent ventricular penetration with thin liquids and nectar thick liquids noted  No brice aspiration with any swallow  Please refer to the speech pathology report for further details  Workstation performed: [id number]     Respiratory pathogen panel negative  Blood cultures no growth x5 days  Throat culture:  1+ growth of beta-hemolytic Streptococcus group G  Rapid strep screen negative  Urine strep and Legionella negative       12/3/2018 18:04 12/4/2018 06:20 12/5/2018 10:45 12/6/2018 08:19 12/7/2018 05:20 12/8/2018 06:22   WBC 21 79 (H) 20 60 (H) 18 18 (H) 11 04 (H) 8 40 9 68       HPI  Per H&P:  This is a 50-year-old female with a past medical history of Down syndrome, psychiatric disorders on Abilify and Depakote, sleep apnea on CPAP at night, diet-controlled diabetes, who presents to the ER this morning for generalized body aches, fever, sore throat, and dry cough  History obtained mainly from patient's family, her mother is her POA, she is also accompanied by a staff member from the group home that she currently resides in  Per patient's mother, patient has been having symptoms of the sore throat and congestion since about 2-3 days now, else well as malaise, intermittent lightheadedness  However earlier this evening, patient noted to have an episode of fever, she was also not her usual active self, little bit malaise  She also has been having intermittent headaches, generalized  She also has been having a cough, mainly nonproductive  No significant shortness of breath that she has been experiencing, no wheezing, no chest pain, no chest tightness  She does have a history of asthma, however that is very well controlled per her family, she has not had any recent flares  Hospital Course  Patient was initially admitted with pneumonia in the setting of aspiration versus a community-acquired pneumonia  Patient was started on IV antibiotics, started on nasal cannula did maintain O2 saturation above 90%  On admission there was concern of aspiration pneumonia given that patient has a right middle lobe had a suspected infiltrate    A barium swallow had been done, and patient was cleared to have a dysphagia dental soft diet with thin liquids and needs assistance with eating  Patient continued to improve daily, white count on admission was 21 79 and down trended to 9 68 at time of discharge  Patient was continued on IV antibiotics, patient's IV had infiltrated, and patient was continued on Augmentin p  O , to complete a total 7 day course of antibiotics  Patient's oxygen requirements improved daily, there was a home O2 eval done which showed patient did not require additional oxygen supplementation  Patient remained afebrile during hospital course  Patient's chronic conditions were monitored during hospitalization, and remained stable  Patient was resumed on all of her home medications at time of discharge  Patient is stable for discharge at this time with all questions answered  Patient's hospital course and discharge planning was discussed in detail with attending who is in agreement with plan  Of note during hospitalization, patient was discharged on 12/07/2018, patient's parents did file a Medicare appeal which was approved  Apparently patient did not have appropriate CPAP at home prior to discharge which patient would benefit from using  Arrangements were made with the assistance of case management, and supplies were at patient's group home prior to patient's discharge to home  Medications   Your Medications     STOP taking these medications     STOP taking these medications    naproxen 500 MG EC tablet   Commonly known as: EC NAPROSYN    TAKE these medications     TAKE these medications     Morning Afternoon Evening Bedtime As Needed    ARIPiprazole 10 mg tablet   Commonly known as: ABILIFY   Take 10 mg by mouth   Refills: 0           atorvastatin 10 mg tablet   Commonly known as: LIPITOR   Take 10 mg by mouth daily     Refills: 0           CENTRUM SILVER ULTRA WOMENS PO   Centrum Silver Ultra Womens Oral Tablet Refills: 0 Active   Refills: 0          clindamycin 1 % gel Commonly known as: CLINDAGEL   Apply topically 2 (two) times a day Apply to L axilla   Refills: 0            divalproex sodium 500 mg EC tablet   Commonly known as: DEPAKOTE   Take 1,000 mg by mouth daily at bedtime   Refills: 0           erythromycin ophthalmic ointment   Commonly known as: ILOTYCIN   Administer 0 5 inches to both eyes daily at bedtime   Refills: 0           fluticasone 50 mcg/act nasal spray   Commonly known as: FLONASE   1 spray into each nostril 2 (two) times a day for 30 days Fluticasone Propionate 50 MCG/ACT Nasal Suspension USE 1 SPRAY IN EACH NOSTRIL TWICE DAILY  Quantity: 1; Refills: 5 Kimberly Hank D O ; [id number] GM Bottle Use that a m  and 8:00 p m  Refills: 5            levocetirizine 5 MG tablet   Commonly known as: XYZAL   TAKE 1 TABLET BY MOUTH ONCE DAILY AT 8PM   Refills: 0           REVITADERM WOUND CARE Gel   Inhale Apply to feet daily in AM   Refills: 0           SYNTHROID 50 mcg tablet   Generic drug: levothyroxine   Take 50 mcg by mouth daily  Synthroid 50 MCG Oral Tablet TAKE 1 TABLET DAILY  Refills: 0 Active   Refills: 0           VITAMIN D (ERGOCALCIFEROL) PO   Take 1 25 mg by mouth once a week  Administer on sunday   Refills: 0               Allergies  Allergies   Allergen Reactions    Avandia [Rosiglitazone]      CHF       Diet restrictions: Dysphagia 3-dental soft, thin liquids, assistance with feeding  Activity restrictions: none  Code Status: Level 1 - Full Code  Advance Directive and Living Will: Received  Power of : Yes  POLST:      Discharge Condition: good      Discharge  Statement   I spent 30 minutes discharging the patient  This time was spent on the day of discharge  I had direct contact with the patient on the day of discharge  Additional documentation is required if more than 30 minutes were spent on discharge

## 2018-12-13 NOTE — PHYSICAL THERAPY NOTE
PT TREATMENT     12/13/18 1500   Pain Assessment   Pain Assessment No/denies pain   General   Chart Reviewed Yes   Cognition   Overall Cognitive Status WFL   Subjective   Subjective agreeable to activity   Bed Mobility   Supine to Sit 7  Independent   Sit to Supine 7  Independent   Transfers   Sit to Stand 7  Independent   Stand to Sit 7  Independent   Stand pivot 7  Independent   Ambulation/Elevation   Gait Assistance 5  Supervision   Distance 500 feet   Assessment   Prognosis Good   Problem List Decreased endurance;Decreased mobility   Assessment Pt is progressing toward all goals  No SOB with activity, gait is steady  Plan   Treatment/Interventions Gait training; Endurance training; Therapeutic exercise;Elevations;LE strengthening/ROM   Progress Progressing toward goals   PT Frequency 5x/wk   Recommendation   Recommendation (return to group home)   Licensure   [place] License Number  David MCINTYRE [id number]

## 2018-12-13 NOTE — UTILIZATION REVIEW
Continued Stay Review    Date: 12/12/18    Vital Signs: /58 (BP Location: Left arm)   Pulse 62   Temp 97 9 °F (36 6 °C) (Oral)   Resp 18   Ht 4' 9" (1 448 m)   Wt 82 3 kg (181 lb 7 oz)   LMP  (LMP Unknown)   SpO2 95%   Breastfeeding? No   BMI 39 26 kg/m²     Medications:   Scheduled Meds:   Current Facility-Administered Medications:  acetaminophen 650 mg Oral Q6H PRN Otto Cantu MD   albuterol 2 5 mg Nebulization Q4H PRN Lucinda Tatum MD   ARIPiprazole 10 mg Oral Early Morning Yissel Farnaz, DO   atorvastatin 10 mg Oral Daily With Dinner Yissel Farnaz, DO   clindamycin  Topical BID Yissel Afrnaz, DO   divalproex sodium 1,000 mg Oral HS Yissel Farnaz, DO   enoxaparin 40 mg Subcutaneous Daily Yissel Farnaz, DO   erythromycin 0 5 inch Both Eyes HS Yissel Farnaz, DO   fluticasone 1 spray Nasal BID Yissel Farnaz, DO   levocetirizine 2 5 mg Oral Daily Yissel Farnaz, DO   levothyroxine 50 mcg Oral Early Morning Yissel Farnaz, DO     Continuous Infusions:    PRN Meds:   acetaminophen    albuterol    Abnormal Labs/Diagnostic Results:     Age/Sex: 44 y o  female     Assessment/Plan:   Assessment/Plan:      * Pneumonia with hypoxiaresolved as of 12/12/2018   Assessment & Plan     - Initial CXR Right middle lobe patchy infiltrate consistent with pneumonia  - completed Unasyn 3 g IV q 6h x4 days, and Augmentin PO 12/7 for 10 days  - urine strep and Legionella negative, rapid strep negative, 12/6/18: throat culture pos for GBS  - placed on aspiratin precautions  - Home O2 eval on 12/8/18, 93% on room air  - Patient is benefitting from use of CPAP with setting of 8 cm  - patient was d/c on 12/7, medically cleared by primary care team and pulmonology, parents appealed medicare rights  Has remained medically stable       Obstructive sleep apnea   Assessment & Plan     - c/w home CPAP with supplemental O2 for the hypoxia  - provided new script for cpap in setting of cpap machine being damaged              - setting of 8 cm  - Patient is benefitting from CPAP with setting of 8 cm   Type 2 diabetes mellitus, without long-term current use of insulin ([facility])   Assessment & Plan     Repeat A1c 5 8 this admission, diabetes controlled with lifestyle modification      ISS during admission   History of prolonged Q-T interval on ECG   Assessment & Plan     - continue to monitor   Stage 3 chronic kidney disease (HCC)   Assessment & Plan     Mild stable CKD III, Avoid nephrotoxic agents, creatinine baseline at this point   Down syndrome   Assessment & Plan     Patient lives in a group home, mother's POA   Mild intermittent asthma without complication   Assessment & Plan     - no meds at home, prn albuterol        Discharge Plan:

## 2018-12-13 NOTE — SOCIAL WORK
CM NOTIFIED CPAP IS APPROVED VIA [facility]  REPRESENTATIVE FROM [employer] WILL DELIVER AND TEACH GROUP HOME [facility]AFF THIS EVENING  CM AND PT MOTHER SPOKE WITH GROUP HOME STAFF, THEY ARE AGREEABLE TO TAKE HER BACK THIS EVENING  GROUP HOME STAFF WILL TRANSPORT PT TO GROUP HOME

## 2018-12-13 NOTE — SOCIAL WORK
CM SPOKE WITH [facility] AT 9:30AM, CPAP IS APPROVED, HOWEVER PT MOM HAS DECIDED TO CHANGE DME COMPANY TO [facility]  CM MADE REFERRAL TO [facility] WITH ALL NECESSARY DOCUMENTATION  CM AWAITING APPROVAL

## 2018-12-13 NOTE — PROGRESS NOTES
[address] [facility] [address] [facility] Practice Progress Note - Rose Petersen 44 y o  female MRN: [id number]    Unit/Bed#: [address] [place] [address] Encounter: [id number]      Assessment/Plan:  * Pneumonia with hypoxiaresolved as of 12/12/2018   Assessment & Plan    - Initial CXR Right middle lobe patchy infiltrate consistent with pneumonia  - completed Unasyn 3 g IV q 6h x4 days, and Augmentin PO 12/7 for 10 days  - urine strep and Legionella negative, rapid strep negative, 12/6/18: throat culture pos for GBS  - placed on aspiratin precautions  - Home O2 eval on 12/8/18, 93% on room air  - Patient is benefitting from use of CPAP with setting of 8 cm  - patient was d/c on 12/7, medically cleared by primary care team and pulmonology, parents appealed medicare rights  Has remained medically stable  Obstructive sleep apnea   Assessment & Plan    - c/w home CPAP with supplemental O2 for the hypoxia  - provided new script for cpap in setting of cpap machine being damaged   - setting of 8 cm  - Patient is benefitting from CPAP with setting of 8 cm  - patient's family wants to switch medical equipment company after process was completed with current company  Type 2 diabetes mellitus, without long-term current use of insulin (HCC)   Assessment & Plan    Repeat A1c 5 8 this admission, diabetes controlled with lifestyle modification      ISS during admission     History of prolonged Q-T interval on ECG   Assessment & Plan    - continue to monitor       Stage 3 chronic kidney disease (HCC)   Assessment & Plan    Mild stable CKD III, Avoid nephrotoxic agents, creatinine baseline at this point     Down syndrome   Assessment & Plan    Patient lives in a group home, mother's POA     Mild intermittent asthma without complication   Assessment & Plan    - no meds at home, prn albuterol      Acute respiratory failure (HCC)resolved as of 12/7/2018   Assessment & Plan    - in setting of pneumonia  - continue with supplemental oxygen, plan to wean to maintain a saturation greater than 92%  - At ED, patient was saturating on 89%  History of psychiatric disorderresolved as of 12/12/2018   Assessment & Plan    - c/w abilify and depakote     Viral syndromeresolved as of 12/12/2018   Assessment & Plan    - tylenol prn for headaches  - soft diet for sore throat  - treat superimposed PNA             Subjective:   Patient seen and examined at bedside  Patient has been stable, afebrile overnight  Objective:     Vitals: Blood pressure 108/58, pulse 62, temperature 97 9 °F (36 6 °C), temperature source Oral, resp  rate 18, height 4' 9" (1 448 m), weight 82 3 kg (181 lb 7 oz), SpO2 95 %, not currently breastfeeding  ,Body mass index is 39 26 kg/m²  Wt Readings from Last 3 Encounters:   12/03/18 82 3 kg (181 lb 7 oz)   10/10/18 80 5 kg (177 lb 6 4 oz)   08/06/18 79 4 kg (175 lb)       Intake/Output Summary (Last 24 hours) at 12/13/18 1506  Last data filed at 12/13/18 0531   Gross per 24 hour   Intake              800 ml   Output                0 ml   Net              800 ml       Physical Exam: /58 (BP Location: Left arm)   Pulse 62   Temp 97 9 °F (36 6 °C) (Oral)   Resp 18   Ht 4' 9" (1 448 m)   Wt 82 3 kg (181 lb 7 oz)   LMP  (LMP Unknown)   SpO2 95%   Breastfeeding?  No   BMI 39 26 kg/m²   General appearance: alert and oriented, in no acute distress  Head: Normocephalic, without obvious abnormality, atraumatic  Throat: moist mucous membranes  Lungs: clear to auscultation bilaterally  Heart: regular rate and rhythm, S1, S2 normal, no murmur, click, rub or gallop  Abdomen: soft, non-tender; bowel sounds normal; no masses,  no organomegaly  Extremities: extremities normal, warm and well-perfused; no cyanosis, clubbing, or edema  Skin: Skin color, texture, turgor normal  No rashes or lesions  Neurologic: Grossly normal     Recent Results (from the past 24 hour(s))   Fingerstick Glucose (POCT)    Collection Time: 12/12/18  4:41 PM   Result Value Ref Range    POC Glucose 146 (H) 65 - 140 mg/dl   Fingerstick Glucose (POCT)    Collection Time: 12/13/18  7:06 AM   Result Value Ref Range    POC Glucose 107 65 - 140 mg/dl   Fingerstick Glucose (POCT)    Collection Time: 12/13/18 11:05 AM   Result Value Ref Range    POC Glucose 118 65 - 140 mg/dl       Current Facility-Administered Medications   Medication Dose Route Frequency Provider Last Rate Last Dose    acetaminophen (TYLENOL) tablet 650 mg  650 mg Oral Q6H PRN Jane Martinez MD   650 mg at 12/13/18 0021    albuterol inhalation solution 2 5 mg  2 5 mg Nebulization Q4H PRN Terral Essex, MD        ARIPiprazole (ABILIFY) tablet 10 mg  10 mg Oral Early Morning Yissel Farnaz, DO   10 mg at 12/13/18 0528    atorvastatin (LIPITOR) tablet 10 mg  10 mg Oral Daily With [employer], DO   10 mg at 12/12/18 1740    clindamycin (CLINDAGEL) 1 % gel   Topical BID Sidra Sindhu, DO        divalproex sodium (DEPAKOTE) EC tablet 1,000 mg  1,000 mg Oral HS Yissel Farnaz, DO   1,000 mg at 12/12/18 2222    enoxaparin (LOVENOX) subcutaneous injection 40 mg  40 mg Subcutaneous Daily Sidra Sindhu, DO   40 mg at 12/13/18 0952    erythromycin (ILOTYCIN) 0 5 % ophthalmic ointment 0 5 inch  0 5 inch Both Eyes HS Yissel Farnaz, DO   0 5 inch at 12/12/18 2222    fluticasone (FLONASE) 50 mcg/act nasal spray 1 spray  1 spray Nasal BID Yissel Farnaz, DO   1 spray at 12/13/18 0957    levocetirizine (XYZAL) tablet 2 5 mg  2 5 mg Oral Daily Yissel Farnaz, DO        levothyroxine tablet 50 mcg  50 mcg Oral Early Morning Yissel Farnaz, DO   50 mcg at 12/13/18 0528       Invasive Devices          No matching active lines, drains, or airways          Lab, Imaging and other studies: I have personally reviewed pertinent reports      VTE Pharmacologic Prophylaxis: Enoxaparin (Lovenox)  VTE Mechanical Prophylaxis: sequential compression device    Jane Martinez MD

## 2018-12-19 PROBLEM — R13.12 OROPHARYNGEAL DYSPHAGIA: Status: ACTIVE | Noted: 2018-12-19

## 2018-12-19 PROBLEM — R91.8 MULTILOBAR LUNG INFILTRATE: Status: ACTIVE | Noted: 2018-12-19

## 2018-12-19 RX ORDER — GEL DRESSING
GEL (GRAM) TOPICAL
COMMUNITY
End: 2019-10-10

## 2018-12-20 ENCOUNTER — OFFICE VISIT (OUTPATIENT)
Dept: PULMONOLOGY | Facility: MEDICAL CENTER | Age: 43
End: 2018-12-20
Payer: MEDICARE

## 2018-12-20 VITALS
RESPIRATION RATE: 16 BRPM | TEMPERATURE: 96.9 F | SYSTOLIC BLOOD PRESSURE: 110 MMHG | HEART RATE: 61 BPM | OXYGEN SATURATION: 100 % | DIASTOLIC BLOOD PRESSURE: 70 MMHG | HEIGHT: 56 IN | WEIGHT: 181 LBS | BODY MASS INDEX: 40.72 KG/M2

## 2018-12-20 DIAGNOSIS — R91.8 MULTILOBAR LUNG INFILTRATE: ICD-10-CM

## 2018-12-20 DIAGNOSIS — G47.33 OBSTRUCTIVE SLEEP APNEA: Primary | ICD-10-CM

## 2018-12-20 DIAGNOSIS — J45.20 MILD INTERMITTENT ASTHMA WITHOUT COMPLICATION: ICD-10-CM

## 2018-12-20 DIAGNOSIS — R13.12 OROPHARYNGEAL DYSPHAGIA: ICD-10-CM

## 2018-12-20 PROCEDURE — 99214 OFFICE O/P EST MOD 30 MIN: CPT | Performed by: PHYSICIAN ASSISTANT

## 2018-12-20 RX ORDER — ALBUTEROL SULFATE 90 UG/1
2 AEROSOL, METERED RESPIRATORY (INHALATION) EVERY 6 HOURS PRN
Qty: 8.5 G | Refills: 0 | Status: SHIPPED | OUTPATIENT
Start: 2018-12-20 | End: 2020-02-24 | Stop reason: SDUPTHER

## 2018-12-20 NOTE — PROGRESS NOTES
Assessment/Plan:    Problem List Items Addressed This Visit     Obstructive sleep apnea - Primary     -pt utilizes CPAP at group home facility  -continue 8 centimeters of water of CPAP as prescribed  -DME company is now [address]  Recently switched from [facility]         Mild intermittent asthma without complication     -utilizes albuterol inhaler         Relevant Medications    albuterol (PROAIR HFA) 90 mcg/act inhaler    Spacer/Aero Chamber Mouthpiece (SPACER DEVICE) for metered dose inhaler    Multilobar lung infiltrate     -clinically resolved  -will follow up for repeat imaging in approx 4 weeks         Relevant Medications    albuterol (PROAIR HFA) 90 mcg/act inhaler    Other Relevant Orders    XR chest pa & lateral    Oropharyngeal dysphagia     -aspiration risk  -encouraged adherence to recommended dysphagia diet                 Return if symptoms worsen or fail to improve, for Next scheduled follow up  All questions are answered to the patient's satisfaction and understanding  She verbalizes understanding  She is encouraged to call with any further questions or concerns  Portions of the record may have been created with voice recognition software  Occasional wrong word or "sound a like" substitutions may have occurred due to the inherent limitations of voice recognition software  Read the chart carefully and recognize, using context, where substitutions have occurred  Electronically Signed by Ketan Tinajero PA-C    ______________________________________________________________________    Chief Complaint:   Chief Complaint   Patient presents with   [facility] F/U 12/3    Pneumonia       Patient ID: Catalina Kirk is a 44 y o  y o  female has a past medical history of Asthma; Complex partial epilepsy ([address] ); Diabetes mellitus ([facility] [address] ); Disease of thyroid gland; Down syndrome; Heart murmur; Hyperlipidemia;  Long Q-T syndrome; Pneumonia; Psychiatric disorder; and Sleep apnea     12/20/2018  Patient presents today for follow-up visit  She was recently hospitalized from 12/3 to 12/13 for multi lobar right-sided pneumonia  She was treated with a full 7 day course of antibiotics and did very well  She remained in the hospital for some additional days due to her CPAP machine being faulty at her group home  She remained in the hospital until she was able to get her CPAP machine  She was fully recovered and not requiring oxygen at the time of discharge  She is accompanied here today with her mother  The patient is reported to be doing well  She has a new CPAP machine and tolerating well at 8 centimeters of water pressure  Her new DME company is [email or web address]    She has been doing well without any symptoms of fever, cough, chills etc     Mother requests albuterol inhaler with spacer for the patient's group home  HPI    Review of Systems   Constitutional: Negative for activity change, appetite change, chills, fatigue and fever  HENT: Negative for postnasal drip, rhinorrhea, sinus pain and sinus pressure  Eyes: Negative for visual disturbance  Respiratory: Negative for apnea, cough, chest tightness, shortness of breath, wheezing and stridor  Cardiovascular: Negative for chest pain and leg swelling  Gastrointestinal: Negative for diarrhea, nausea and vomiting  Endocrine: Negative for cold intolerance and heat intolerance  Musculoskeletal: Negative for arthralgias, back pain, joint swelling and myalgias  Skin: Negative for color change  Neurological: Negative for syncope and light-headedness  Hematological: Negative for adenopathy  Psychiatric/Behavioral: Negative for confusion and sleep disturbance         The following portions of the patient's history were reviewed and updated as appropriate: allergies, current medications, past family history, past medical history, past social history, past surgical history and problem list     Smoking history: She reports that she has never smoked  She has never used smokeless tobacco   Social history: She reports that she has never smoked  She has never used smokeless tobacco  She reports that she does not drink alcohol or use drugs  Past Medical History:   Diagnosis Date    Asthma     Complex partial [facility] [address] [facility]     Diabetes mellitus ([facility] [address] )     Disease of thyroid gland     Down syndrome     Heart murmur     Hyperlipidemia     Long Q-T syndrome     Pneumonia     Last assessed 5/28/2014     Psychiatric disorder     schizo    Sleep apnea      Past Surgical History:   Procedure Laterality Date    PATENT DUCTUS ARTERIOUS LIGATION       Family History   Problem Relation Age of Onset    Adopted: Yes    No Known Problems Mother      Immunization History   Administered Date(s) Administered    Hep B, adult 11/06/2003, 12/04/2003, 05/06/2004    Td (adult), adsorbed 04/24/2003, 07/31/2013, 05/03/2014    Tuberculin Skin Test-PPD Intradermal 07/31/2015     Current Outpatient Prescriptions   Medication Sig Dispense Refill    ARIPiprazole (ABILIFY) 10 mg tablet Take 10 mg by mouth      atorvastatin (LIPITOR) 10 mg tablet Take 10 mg by mouth daily   clindamycin (CLINDAGEL) 1 % gel Apply topically 2 (two) times a day Apply to L axilla      divalproex sodium (DEPAKOTE) 500 mg EC tablet Take 1,000 mg by mouth daily at bedtime        erythromycin (ILOTYCIN) ophthalmic ointment Administer 0 5 inches to both eyes daily at bedtime      Eyelid Cleansers (OCUSOFT BABY EYELID & EYELASH EX) Inhale      levocetirizine (XYZAL) 5 MG tablet Take 1 tablet (5 mg total) by mouth daily At 8 PM 30 tablet 1    levothyroxine (SYNTHROID) 50 mcg tablet Take 50 mcg by mouth daily  Synthroid 50 MCG Oral Tablet TAKE 1 TABLET DAILY    Refills: 0  Active       metFORMIN (GLUCOPHAGE) 500 mg tablet Take 500 mg by mouth 2 (two) times a day      Mouthwashes (LISTERINE ANTISEPTIC ADVANCED MT) by Per J Tube route      Multiple Vitamins-Minerals (CENTRUM SILVER ULTRA WOMENS PO) Centrum Silver Ultra Womens Oral Tablet  Refills: 0  Active      VITAMIN D, ERGOCALCIFEROL, PO Take 1 25 mg by mouth once a week  Administer on sunday      Wound Dressings (KERAGELT) GEL Inhale      Wound Dressings (REVITADERM WOUND CARE) GEL Inhale Apply to feet daily in AM       albuterol (PROAIR HFA) 90 mcg/act inhaler Inhale 2 puffs every 6 (six) hours as needed for wheezing 8 5 g 0    fluticasone (FLONASE) 50 mcg/act nasal spray 1 spray into each nostril 2 (two) times a day for 30 days Fluticasone Propionate 50 MCG/ACT Nasal Suspension USE 1 SPRAY IN EACH NOSTRIL TWICE DAILY  Quantity: 1;  Refills: 5    Luz Harris D O ; [id number] GM Bottle  Use that a m  and 8:00 p m  16 g 5    Spacer/Aero Chamber Mouthpiece (SPACER DEVICE) for metered dose inhaler For use with metered dose inhaler 1 Device 0     No current facility-administered medications for this visit  Allergies: Avandia [rosiglitazone]    Objective:  Vitals:    12/20/18 1412   BP: 110/70   BP Location: Left arm   Patient Position: Sitting   Cuff Size: Adult   Pulse: 61   Resp: 16   Temp: (!) 96 9 °F (36 1 °C)   TempSrc: Tympanic   SpO2: 100%   Weight: 82 1 kg (181 lb)   Height: 4' 8" (1 422 m)   Oxygen Therapy  SpO2: 100 %    Wt Readings from Last 3 Encounters:   12/20/18 82 1 kg (181 lb)   12/03/18 82 3 kg (181 lb 7 oz)   10/10/18 80 5 kg (177 lb 6 4 oz)     Body mass index is 40 58 kg/m²  Physical Exam   Constitutional: She is oriented to person, place, and time  She appears well-developed and well-nourished  HENT:   Head: Normocephalic and atraumatic  Eyes: Pupils are equal, round, and reactive to light  Conjunctivae are normal    Neck: Normal range of motion  Neck supple  Cardiovascular: Normal rate, regular rhythm and normal heart sounds  Pulmonary/Chest: Effort normal and breath sounds normal  No respiratory distress  She has no wheezes  She has no rales   She exhibits no tenderness  Abdominal: Soft  Bowel sounds are normal    Musculoskeletal: Normal range of motion  She exhibits no edema  Neurological: She is alert and oriented to person, place, and time  Skin: Skin is warm and dry  Psychiatric: She has a normal mood and affect  Lab Review:   Admission on 12/03/2018, Discharged on 12/13/2018   No results displayed because visit has over 200 results  Diagnostics:  XR chest pa & lateral    (Results Pending)     I have personally reviewed pertinent reports  and I have personally reviewed pertinent films in PACS    Office Spirometry Results:     ESS:    Xr Chest Pa & Lateral    Result Date: 12/6/2018  Narrative: CHEST INDICATION:   PNA  COMPARISON:  12/3/2018 EXAM PERFORMED/VIEWS:  XR CHEST PA & LATERAL FINDINGS: Cardiomediastinal silhouette appears unremarkable  The patient's pneumonia seems to occupy a slightly greater area of the right lung with a few new patchy and nodular densities in the mid to upper lung field which were not visible on the prior study  Left lung stable  No pleural fluid or pneumothorax  Bony density projecting near the left shoulder might represent intra-articular loose body  No fractures are seen  Impression: Slight progression of pneumonia on the right involving more of the lung  Workstation performed: [id number]     Xr Chest 2 Views    Result Date: 12/3/2018  Narrative: CHEST INDICATION:   fever, cough, sorethroat  COMPARISON:  1/8/2016 EXAM PERFORMED/VIEWS:  XR CHEST PA & LATERAL FINDINGS: Cardiomediastinal silhouette appears unremarkable  Right middle lobe patchy infiltrate  No pneumothorax or pleural effusion  Osseous structures appear within normal limits for patient age  Impression: Right middle lobe patchy infiltrate consistent with pneumonia  Follow-up radiographs recommended in one month to ensure complete resolution  The study was marked in [facility] for immediate notification   Workstation performed: [id number] Fl Barium Swallow Video W Speech    Result Date: 12/5/2018  Narrative: VIDEO BARIUM SWALLOW INDICATION:  Suspected aspiration pneumonia  COMPARISON: None FLUOROSCOPY TIME:   2 4 MIN  IMAGES:  1     Impression: FINDINGS/IMPRESSION: Video barium swallow was performed by the department of speech pathology utilizing food substances of various thickness  Intermittent ventricular penetration with thin liquids and nectar thick liquids noted  No brice aspiration with any swallow  Please refer to the speech pathology report for further details  Workstation performed: [id number]     Fl Barium Swallow Video W Speech    Result Date: 12/5/2018  Narrative: Micheal Robledo CCC-SLP     12/5/2018  4:33 PM Speech-Language Pathology Video Barium Swallow Study Patient Name: Jessica Petersen Today's Date: 12/5/2018  Problem List Patient Active Problem List Diagnosis  Obstructive sleep apnea  Mild intermittent asthma without complication  Pneumonia with hypoxia  Viral syndrome  Down syndrome  Stage 3 chronic kidney disease ([address] )  History of prolonged Q-T interval on ECG  History of psychiatric disorder  Acute respiratory failure (HCC)  Type 2 diabetes mellitus, without long-term current use of insulin (HCC) Past Medical History Past Medical History: Diagnosis Date  Asthma   Diabetes mellitus ([address] )   Disease of thyroid gland   Down syndrome   Heart murmur   Hyperlipidemia   Long Q-T syndrome   Psychiatric disorder   schizo  Sleep apnea  Past Surgical History Past Surgical History: Procedure Laterality Date  PATENT DUCTUS ARTERIOUS LIGATION   General Information; Pt is a 44 y o  female who presented to [address] [place] [address] with pneumonia with hypoxia  Pt passed a bedside RN dysphagia screen and modified barium swallow study was ordered to r/o aspiration  Per chart family has reported some dysphagia  Per RD she is known to be an impulsive eater which was confirmed by the patients father   He also expressed coughing and gagging noted with rapid rate/vol of intake  Swallow Information Current Risks for Dysphagia & Aspiration: change in respiratory status and reported new dysphagia  Current Symptoms/Concerns: coughing with po, choking incident and change in respiratory status Current Diet: regular diet and thin liquids  Baseline Diet: soft/level 3 diet and thin liquids Baseline Assessment Behavior/Cognition: alert Speech/Language Status: able to participate in conversation and able to follow commands Patient Positioning: Laterally at 90 degrees Speech/Swallow Mech: Facial: symmetrical Labial: WFL Lingual: macroglossia noted consistent with Downs Syndrome otherwise adequate strength and ROm Velum: symmetrical Mandible: adequate ROM Dentition: some missing dentition Vocal quality:clear/adequate Volitional Cough: strong/productive Respiratory: RA Previous VBS: none noted in EPIC Consistencies Assessed and Performance Pt was seen in radiology for a Video Barium Swallow Study, seated in the upright position and viewed laterally with the following consistencies:  Administered: thin liquids, nectar thick, puree, mechanical soft solids, hard solids and mixed consistency Specific materials administered: Barium laden applesauce, nutrigrain bar, peaches in juice, hard cookie, nectar thick, and thin liquids  Liquids were administered by cup and straw   Results are as follows: **Images are available for review on PACS Oral stage: Lip closure: adequate Mastication: incomplete with regular solids- reduced with mixed consistencies 2' impulsivity- mildly prolonged with soft solids Bolus formation: mildly reduced across consistencies Bolus control: mildly reduced- see below Transfer: lingual pumping is noted as primary means of transfer- generally WFL Residue: noted across solids min-mod with piecemeal deglutition noted- at times requiring cues for additional swallow to clear; liquid wash also noted to be beneficial Pharyngeal stage: Swallow promptness: adequate Spill to valleculae: mild with puree, soft and regular solids; mild-mod with mixed consistencies Spill to pyriforms: mild-mod spill with mixed consistencies Epiglottic inversion: *small epiglottis noted with mildly delayed/reduced inversion Laryngeal rise: adequate Pharyngeal constriction: adequate Vallecular retention: min with mixed; mod with puree Pyriform retention: min with thin liquids PPW coating: none Osteophytes: none present CP prominence: not observed Epiglottic undercoat: not observed Penetration: deep transient penetration noted on initial sip of thin liquids via straw Aspiration: none observed Screening of Esophageal stage: WFL at the cervical level, no backflow or stasis evident  Assessment Summary; Pt presents with mild-moderate oral and mild pharyngeal dysphagia as described above  Primary concerns include noted small epiglottis with delayed/reduced inversion as well as decreased oral processing and reported/noted impulsivity negatively impacting safety of intake  Patient with mild risk for aspiration with rapid rate/vol of intake  Patient reportedly self- feds and likely unable to carryover recommendations therefore it is recommended that supervision is provided at all times with intake with cueing for adequate rate/vol to improve safety and reduce risk  Diagnosis/Prognosis; mild-moderate oral and mild pharyngeal dysphagia  good prognosis for continued safe po intake given supervision and cueing Recommendations; Recommend soft/level 3 diet and thin liquids, with upright posture, slow rate of feeding, small bites/sips and alternating bites and sips and 1:1 assisstance for verbal reminders for reduced rate/vol of intake  Recommended Form of Meds: crushed with puree Full Supervision Aspiration precautions Reflux Precautions Aspiration precautions posted  Results reviewed with patient, RN, MD, CRNP and family   Goals: Pt will tolerate least restrictive diet w/out s/s aspiration or oral/pharyngeal difficulties  Dysphagia Goals: pt will tolerate dysph 3 textures with thin liquids without s/s of aspiration x3 and pt will demonstrate accurate use of controlled raet/vol of intake strategy with 80% accuracy given min-mod verbal cues   Treatment Recommendations: ST will continue to follow Discharge recommendation: SNF/homecare [place] , [id number] [facility] Speech Language Pathologist [id number]

## 2018-12-20 NOTE — ASSESSMENT & PLAN NOTE
-pt utilizes CPAP at group home facility  -continue 8 centimeters of water of CPAP as prescribed  -DME company is now [address]    Recently switched from [facility]

## 2018-12-20 NOTE — PATIENT INSTRUCTIONS
Pneumonia:    Resolved    -follow up chest xray in 2 weeks    ROBERTO:  -continue CPAP 8 cm Water    Asthma:  Rx for MDI Spacer to be used with Albuterol Inhaler    Follow up As scheduled on 2/5

## 2019-02-05 ENCOUNTER — OFFICE VISIT (OUTPATIENT)
Dept: PULMONOLOGY | Facility: MEDICAL CENTER | Age: 44
End: 2019-02-05
Payer: MEDICARE

## 2019-02-05 VITALS
WEIGHT: 176 LBS | HEIGHT: 56 IN | HEART RATE: 64 BPM | OXYGEN SATURATION: 96 % | BODY MASS INDEX: 39.59 KG/M2 | SYSTOLIC BLOOD PRESSURE: 100 MMHG | TEMPERATURE: 98.4 F | DIASTOLIC BLOOD PRESSURE: 60 MMHG | RESPIRATION RATE: 12 BRPM

## 2019-02-05 DIAGNOSIS — G47.33 OBSTRUCTIVE SLEEP APNEA: ICD-10-CM

## 2019-02-05 DIAGNOSIS — G47.33 OSA (OBSTRUCTIVE SLEEP APNEA): Primary | ICD-10-CM

## 2019-02-05 DIAGNOSIS — R91.8 MULTILOBAR LUNG INFILTRATE: ICD-10-CM

## 2019-02-05 DIAGNOSIS — J45.20 MILD INTERMITTENT ASTHMA WITHOUT COMPLICATION: ICD-10-CM

## 2019-02-05 PROCEDURE — 99214 OFFICE O/P EST MOD 30 MIN: CPT | Performed by: NURSE PRACTITIONER

## 2019-02-05 RX ORDER — ARIPIPRAZOLE 10 MG/1
10 TABLET ORAL DAILY
COMMUNITY
Start: 2019-01-15 | End: 2019-11-30 | Stop reason: SDUPTHER

## 2019-02-05 RX ORDER — DIVALPROEX SODIUM 500 MG/1
1000 TABLET, EXTENDED RELEASE ORAL DAILY
COMMUNITY
Start: 2019-01-15 | End: 2019-11-30 | Stop reason: SDUPTHER

## 2019-02-05 NOTE — ASSESSMENT & PLAN NOTE
Patient currently has CPAP 8 cm of water pressure  I am reviewing compliance data from January 5, 2019 to February 3, 2019  She is using this machine 97% of the time and average usage is 9 hours  It appears that this is a set pressure to 8 cm  According to her caretaker who accompanies her today this is a relatively new machine  Her apneic hypopnea index is elevated 11 3 events per hour  Plan includes changing to auto CPAP 8-12 cm of water pressure  This should treat her diagnosis of ROBERTO

## 2019-02-05 NOTE — PATIENT INSTRUCTIONS
I am ordering a pressure change on the CPAP  It is currently set at 8 cm of water  Pressure  Unchanged will happen via electronically  She will be on auto CPAP 8-14 cm of water pressure  Her oxygen saturation only needs to be checked twice daily at 9:00 a m   And 4:00 p m   If she does use her rescue inhaler called albuterol she can uses spacer which I have ordered for you

## 2019-02-05 NOTE — ASSESSMENT & PLAN NOTE
Patient has no evidence of pneumonia on clinical basis today  She has intermittent cough that is nonproductive  She will follow up with chest x-ray within the next 2 weeks

## 2019-02-05 NOTE — ASSESSMENT & PLAN NOTE
Patient has mild intermittent asthma that is currently stable  According to her caretaker she is no longer using nebulizer with albuterol or rescue inhalation  She has been stable with her oxygenation  I am requesting that her O2 sats be monitored twice daily at 9:00 a m  And 4:00 p m  Daily  This is a change from previous order that was 3 times a day

## 2019-02-05 NOTE — PROGRESS NOTES
Assessment/Plan:     Problem List Items Addressed This Visit        Respiratory    Obstructive sleep apnea     Patient currently has CPAP 8 cm of water pressure  I am reviewing compliance data from January 5, 2019 to February 3, 2019  She is using this machine 97% of the time and average usage is 9 hours  It appears that this is a set pressure to 8 cm  According to her caretaker who accompanies her today this is a relatively new machine  Her apneic hypopnea index is elevated 11 3 events per hour  Plan includes changing to auto CPAP 8-12 cm of water pressure  This should treat her diagnosis of ROBERTO  Mild intermittent asthma without complication     Patient has mild intermittent asthma that is currently stable  According to her caretaker she is no longer using nebulizer with albuterol or rescue inhalation  She has been stable with her oxygenation  I am requesting that her O2 sats be monitored twice daily at 9:00 a m  And 4:00 p m  Daily  This is a change from previous order that was 3 times a day  Relevant Medications    Spacer/Aero Chamber Mouthpiece (SPACER DEVICE) for metered dose inhaler    Multilobar lung infiltrate       Patient has no evidence of pneumonia on clinical basis today  She has intermittent cough that is nonproductive  She will follow up with chest x-ray within the next 2 weeks  Other Visit Diagnoses     ROBERTO (obstructive sleep apnea)    -  Primary    Relevant Orders    PAP DME Pressure Change          HPI:  Juventino Cadet is here today for follow-up visit  She was last seen in the office December 20, 2018  She has a history of obstructive sleep apnea and also mild intermittent asthma without complication  She did have hospitalization for multilobar lung infiltrate which clinically resolved  Patient was recently hospitalized in December 2018 for right middle lobe pneumonia that was likely community acquired    It was felt that she had a moderate upper respiratory tract infection that had been viral and then developed pneumonia  Was on IV Unasyn  At the hospital she was on 3 L of supplemental oxygen and is now back on her CPAP at 8 cm  Return in about 6 months (around 8/5/2019)  All questions are answered to the patient's satisfaction and understanding  She verbalizes understanding  She is encouraged to call with any further questions or concerns  Portions of the record may have been created with voice recognition software  Occasional wrong word or "sound a like" substitutions may have occurred due to the inherent limitations of voice recognition software  Read the chart carefully and recognize, using context, where substitutions have occurred  Electronically Signed by SESAR Soriano    ______________________________________________________________________    Chief Complaint:   Chief Complaint   Patient presents with    Follow-up     6M CPAP       Patient ID: [id number] Afshin Banegas is a 44 y o  y o  female has a past medical history of Asthma; Complex partial epilepsy ([facility] [address] ); Diabetes mellitus ([facility] [address] ); Disease of thyroid gland; Down syndrome; Heart murmur; Hyperlipidemia; Long Q-T syndrome; Pneumonia; Psychiatric disorder; and Sleep apnea  2/5/2019  Patient presents today for follow-up visit  Cough   This is a chronic problem  The current episode started more than 1 year ago  The problem has been unchanged  The cough is non-productive  The symptoms are aggravated by lying down  She has tried a beta-agonist inhaler for the symptoms  The treatment provided mild relief  Her past medical history is significant for asthma  Review of Systems   Constitutional: Negative  HENT: Negative  Eyes: Negative  Respiratory: Positive for cough  Cardiovascular: Negative  Gastrointestinal: Negative  Endocrine: Negative  Genitourinary: Negative  Musculoskeletal: Negative  Skin: Negative  Allergic/Immunologic: Negative  Neurological: Negative  Hematological: Negative  Psychiatric/Behavioral: Negative  Smoking history: She reports that she has never smoked  She has never used smokeless tobacco     The following portions of the patient's history were reviewed and updated as appropriate: allergies, current medications, past family history, past medical history, past social history, past surgical history and problem list     Immunization History   Administered Date(s) Administered    Hep B, adult 11/06/2003, 12/04/2003, 05/06/2004    Td (adult), adsorbed 04/24/2003, 07/31/2013, 05/03/2014    Tuberculin Skin Test-PPD Intradermal 07/31/2015     Current Outpatient Prescriptions   Medication Sig Dispense Refill    albuterol (PROAIR HFA) 90 mcg/act inhaler Inhale 2 puffs every 6 (six) hours as needed for wheezing 8 5 g 0    ARIPiprazole (ABILIFY) 10 mg tablet Take 10 mg by mouth      atorvastatin (LIPITOR) 10 mg tablet Take 10 mg by mouth daily   clindamycin (CLINDAGEL) 1 % gel Apply topically 2 (two) times a day Apply to L axilla      divalproex sodium (DEPAKOTE ER) 500 mg 24 hr tablet Take 1,000 mg by mouth daily      erythromycin (ILOTYCIN) ophthalmic ointment Administer 0 5 inches to both eyes daily at bedtime      Eyelid Cleansers (OCUSOFT BABY EYELID & EYELASH EX) Inhale      levocetirizine (XYZAL) 5 MG tablet Take 1 tablet (5 mg total) by mouth daily At 8 PM 30 tablet 1    levothyroxine (SYNTHROID) 50 mcg tablet Take 50 mcg by mouth daily  Synthroid 50 MCG Oral Tablet TAKE 1 TABLET DAILY  Refills: 0  Active       Mouthwashes (LISTERINE ANTISEPTIC ADVANCED MT) by Per J Tube route      Multiple Vitamins-Minerals (CENTRUM SILVER ULTRA WOMENS PO) Centrum Silver Ultra Womens Oral Tablet  Refills: 0  Active      Spacer/Aero Chamber Mouthpiece (SPACER DEVICE) for metered dose inhaler For use with metered dose inhaler 1 Device 0    VITAMIN D, ERGOCALCIFEROL, PO Take 1 25 mg by mouth once a week   Administer on sunday      Wound Dressings (KERAGELT) GEL Inhale      Wound Dressings (REVITADERM WOUND CARE) GEL Inhale Apply to feet daily in AM       ARIPiprazole (ABILIFY) 10 mg tablet Take 10 mg by mouth daily      divalproex sodium (DEPAKOTE) 500 mg EC tablet Take 1,000 mg by mouth daily at bedtime        fluticasone (FLONASE) 50 mcg/act nasal spray 1 spray into each nostril 2 (two) times a day for 30 days Fluticasone Propionate 50 MCG/ACT Nasal Suspension USE 1 SPRAY IN EACH NOSTRIL TWICE DAILY  Quantity: 1;  Refills: 5    Leena Harris O ; [id number] GM Bottle  Use that a m  and 8:00 p m  16 g 5    metFORMIN (GLUCOPHAGE) 500 mg tablet Take 500 mg by mouth 2 (two) times a day       No current facility-administered medications for this visit  Allergies: Avandia [rosiglitazone]    Objective:  Vitals:    02/05/19 1637   BP: 100/60   BP Location: Left arm   Patient Position: Sitting   Cuff Size: Standard   Pulse: 64   Resp: 12   Temp: 98 4 °F (36 9 °C)   TempSrc: Tympanic   SpO2: 96%   Weight: 79 8 kg (176 lb)   Height: 4' 8" (1 422 m)   Oxygen Therapy  SpO2: 96 %    Wt Readings from Last 3 Encounters:   02/05/19 79 8 kg (176 lb)   12/20/18 82 1 kg (181 lb)   12/03/18 82 3 kg (181 lb 7 oz)     Body mass index is 39 46 kg/m²  Physical Exam   Constitutional: She appears well-developed and well-nourished  HENT:   Head: Normocephalic and atraumatic  Mallampati 4   Eyes: Pupils are equal, round, and reactive to light  Neck: Normal range of motion  Neck supple  Cardiovascular: Normal rate and regular rhythm  Murmur heard  Pulmonary/Chest: Effort normal and breath sounds normal    Abdominal: Soft  Musculoskeletal: Normal range of motion  Neurological: A cranial nerve deficit is present  Skin: Skin is warm and dry  Psychiatric: She has a normal mood and affect   Her behavior is normal  Thought content normal        Lab Review:   Admission on 12/03/2018, Discharged on 12/13/2018   No results displayed because visit has over 200 results  Diagnostics:  I have personally reviewed pertinent reports  Office Spirometry Results:     ESS:    No results found

## 2019-02-11 ENCOUNTER — HOSPITAL ENCOUNTER (OUTPATIENT)
Dept: RADIOLOGY | Facility: HOSPITAL | Age: 44
Discharge: HOME | End: 2019-02-11
Payer: MEDICARE

## 2019-02-11 ENCOUNTER — TRANSCRIBE ORDERS (OUTPATIENT)
Dept: ADMINISTRATIVE | Facility: HOSPITAL | Age: 44
End: 2019-02-11

## 2019-02-11 DIAGNOSIS — Q90.9 DOWN'S SYNDROME: Primary | ICD-10-CM

## 2019-02-11 DIAGNOSIS — R91.8 MULTILOBAR LUNG INFILTRATE: ICD-10-CM

## 2019-02-11 PROCEDURE — 72040 X-RAY EXAM NECK SPINE 2-3 VW: CPT

## 2019-02-11 PROCEDURE — 71046 X-RAY EXAM CHEST 2 VIEWS: CPT

## 2019-04-10 ENCOUNTER — CLINICAL SUPPORT (OUTPATIENT)
Dept: NUTRITION | Facility: HOSPITAL | Age: 44
End: 2019-04-10
Payer: MEDICARE

## 2019-04-10 ENCOUNTER — TRANSCRIBE ORDERS (OUTPATIENT)
Dept: ADMINISTRATIVE | Facility: HOSPITAL | Age: 44
End: 2019-04-10

## 2019-04-10 VITALS — WEIGHT: 175.6 LBS | BODY MASS INDEX: 39.37 KG/M2

## 2019-04-10 DIAGNOSIS — E11.9 TYPE 2 DIABETES MELLITUS WITHOUT COMPLICATION, WITHOUT LONG-TERM CURRENT USE OF INSULIN (HCC): ICD-10-CM

## 2019-04-10 PROCEDURE — 97803 MED NUTRITION INDIV SUBSEQ: CPT | Performed by: DIETITIAN, REGISTERED

## 2019-07-03 ENCOUNTER — CLINICAL SUPPORT (OUTPATIENT)
Dept: NUTRITION | Facility: HOSPITAL | Age: 44
End: 2019-07-03
Payer: MEDICARE

## 2019-07-03 VITALS — BODY MASS INDEX: 42.91 KG/M2 | WEIGHT: 191.4 LBS

## 2019-07-03 DIAGNOSIS — E11.9 TYPE 2 DIABETES MELLITUS WITHOUT COMPLICATION, WITHOUT LONG-TERM CURRENT USE OF INSULIN (HCC): ICD-10-CM

## 2019-07-03 PROCEDURE — 97803 MED NUTRITION INDIV SUBSEQ: CPT | Performed by: DIETITIAN, REGISTERED

## 2019-07-03 NOTE — PROGRESS NOTES
Follow-Up Nutrition Assessment Form    Patient Name: Enrrique Petersen    YOB: 1975    Sex: Female      Follow Up Date: 7/3/2019  Start Time: 11:26 Stop Time: 11:42 Total Minutes: 15     Data:  Present at session: self and    Parent Concerns:    Medical Dx/Reason for Referral: E11 9   Past Medical History:   Diagnosis Date    Asthma     Complex partial epilepsy ([facility] [address]     Diabetes mellitus ([facility] [address] [facility]     Disease of thyroid gland     Down syndrome     Heart murmur     Hyperlipidemia     Long Q-T syndrome     Pneumonia     Last assessed 5/28/2014     Psychiatric disorder     schizo    Sleep apnea        Current Outpatient Medications   Medication Sig Dispense Refill    albuterol (PROAIR HFA) 90 mcg/act inhaler Inhale 2 puffs every 6 (six) hours as needed for wheezing 8 5 g 0    ARIPiprazole (ABILIFY) 10 mg tablet Take 10 mg by mouth      ARIPiprazole (ABILIFY) 10 mg tablet Take 10 mg by mouth daily      atorvastatin (LIPITOR) 10 mg tablet Take 10 mg by mouth daily   clindamycin (CLINDAGEL) 1 % gel Apply topically 2 (two) times a day Apply to L axilla      divalproex sodium (DEPAKOTE ER) 500 mg 24 hr tablet Take 1,000 mg by mouth daily      divalproex sodium (DEPAKOTE) 500 mg EC tablet Take 1,000 mg by mouth daily at bedtime        erythromycin (ILOTYCIN) ophthalmic ointment Administer 0 5 inches to both eyes daily at bedtime      Eyelid Cleansers (OCUSOFT BABY EYELID & EYELASH EX) Inhale      fluticasone (FLONASE) 50 mcg/act nasal spray 1 spray into each nostril 2 (two) times a day for 30 days Fluticasone Propionate 50 MCG/ACT Nasal Suspension USE 1 SPRAY IN EACH NOSTRIL TWICE DAILY  Quantity: 1;  Refills: 5    Radha Harris Miss D O ; [id number] GM Bottle  Use that a m  and 8:00 p m  16 g 5    levocetirizine (XYZAL) 5 MG tablet Take 1 tablet (5 mg total) by mouth daily At 8 PM 30 tablet 1    levothyroxine (SYNTHROID) 50 mcg tablet Take 50 mcg by mouth daily   Synthroid 50 MCG Oral Tablet TAKE 1 TABLET DAILY  Refills: 0  Active       metFORMIN (GLUCOPHAGE) 500 mg tablet Take 500 mg by mouth 2 (two) times a day      Mouthwashes (LISTERINE ANTISEPTIC ADVANCED MT) by Per J Tube route      Multiple Vitamins-Minerals (CENTRUM SILVER ULTRA WOMENS PO) Centrum Silver Ultra Womens Oral Tablet  Refills: 0  Active      Spacer/Aero Chamber Mouthpiece (SPACER DEVICE) for metered dose inhaler For use with metered dose inhaler 1 Device 0    VITAMIN D, ERGOCALCIFEROL, PO Take 1 25 mg by mouth once a week  Administer on sunday      Wound Dressings (KERAGELT) GEL Inhale      Wound Dressings (REVITADERM WOUND CARE) GEL Inhale Apply to feet daily in AM        No current facility-administered medications for this visit  Additional Meds/Supplements:    Barriers to Learning: Learning Disability   Labs:    Height: Ht Readings from Last 3 Encounters:   02/05/19 4' 8" (1 422 m)   12/20/18 4' 8" (1 422 m)   12/03/18 4' 9" (1 448 m)      Weight: Wt Readings from Last 3 Encounters:   07/03/19 86 8 kg (191 lb 6 4 oz)   04/10/19 79 7 kg (175 lb 9 6 oz)   02/05/19 79 8 kg (176 lb)     Body mass index is 42 91 kg/m²  Wt  Change Since Last Visit: [x]Yes     []No  Amount: Gained 16 4 lbs since last visit      Energy Needs: No calculation needed   Pain Screen: Are you having pain now? No      Goals Achieved: Blood sugars are under control     New Goals:   1  Limit sweets to one serving per week   2  Increase intake of vegetables per day to at least one serving per lunch and dinner meal   Use vegetables if pt remains hungry after meal   3  Walking at least 3 times per week for 30 minutes  Initial PES:       New PES: No Change      New Problem List:  1    2    3        Assessment:  Pt has gained significant weight since last visit       Medical Nutrition Therapy Intervention:  []Individualized Meal Plan []Understanding Lab Values   []Basic Pathophysiology of Disease []Food/Medication Interactions []Food Diary []Exercise   []Lifestyle/Behavior Modification Techniques []Medication, Mechanism of Action   []Label Reading []Self Blood Glucose Monitoring   []Weight/BMI Goals []Other -    Other Notes: Pt has gained significant weight since last visit  Acknowledges that she has not been following prior program as closely since she has found that her BS are under control  May need to go back to prior program of watching her intake    Also recommend that pt increase exercise through walking or other program that can get her moving       Comprehension: []Excellent  []Very Good  [x]Good  []Fair   []Poor    Receptivity: []Excellent  []Very Good  [x]Good  []Fair   []Poor    Expected Compliance: []Excellent  []Very Good  [x]Good  []Fair   []Poor      Labs:  CMP  Lab Results   Component Value Date     01/08/2016    K 4 6 12/08/2018     12/08/2018    CO2 32 12/08/2018    ANIONGAP 15 0 01/08/2016    BUN 14 12/08/2018    CREATININE 1 00 12/08/2018    GLUCOSE 116 (H) 01/08/2016    GLUF 95 10/25/2017    CALCIUM 8 8 12/08/2018    AST 24 12/03/2018    ALT 26 12/03/2018    ALKPHOS 82 12/03/2018    PROT 7 2 01/08/2016    BILITOT 0 3 01/08/2016    EGFR 70 12/08/2018       BMP  Lab Results   Component Value Date    GLUCOSE 116 (H) 01/08/2016    CALCIUM 8 8 12/08/2018     01/08/2016    K 4 6 12/08/2018    CO2 32 12/08/2018     12/08/2018    BUN 14 12/08/2018    CREATININE 1 00 12/08/2018       Lipids  No results found for: CHOL  Lab Results   Component Value Date    HDL 36 (L) 10/25/2017    HDL 37 (L) 07/12/2017    HDL 42 02/08/2016     Lab Results   Component Value Date    LDLCALC 53 10/25/2017    LDLCALC 58 07/12/2017    LDLCALC 71 02/08/2016     Lab Results   Component Value Date    TRIG 119 10/25/2017    TRIG 106 07/12/2017    TRIG 54 02/08/2016     No results found for: CHOLHDL    Hemoglobin A1C  Lab Results   Component Value Date    HGBA1C 5 8 12/04/2018       Fasting Glucose  Lab Results   Component Value Date    GLUF 95 10/25/2017       Insulin     Thyroid  No results found for: TSH, Q0JPKEI, J3ZWNUS, THYROIDAB    Hepatic Function Panel  Lab Results   Component Value Date    ALT 26 12/03/2018    AST 24 12/03/2018    ALKPHOS 82 12/03/2018    BILITOT 0 3 01/08/2016       Celiac Disease Antibody Panel  No results found for: ENDOMYSIAL IGA, GLIADIN IGA, GLIADIN IGG, IGA, TISSUE TRANSGLUT AB, TTG IGA   Iron  No results found for: IRON, TIBC, FERRITIN    Vitamins  No results found for: VITAMIN B2   No results found for: NICOTINAMIDE, NICOTINIC ACID   No results found for: VITAMINB6  No results found for: [id number]  No results found for: VITB5  No results found for: [id number]  No results found for: THYROGLB  No results found for: VITAMIN K   No results found for: 25-HYDROXY VIT D   No components found for: VITAMINE     No follow-ups on file      Lianet Tucker, MS, RDN, [address] [id number]

## 2019-07-03 NOTE — PATIENT INSTRUCTIONS
1  Limit sweets to one serving per week   2  Increase intake of vegetables per day to at least one serving per lunch and dinner meal   Use vegetables if pt remains hungry after meal   3  Walking at least 3 times per week for 30 minutes

## 2019-07-17 ENCOUNTER — TELEPHONE (OUTPATIENT)
Dept: PULMONOLOGY | Facility: MEDICAL CENTER | Age: 44
End: 2019-07-17

## 2019-07-17 DIAGNOSIS — J30.89 ALLERGIC RHINITIS DUE TO OTHER ALLERGIC TRIGGER, UNSPECIFIED SEASONALITY: Primary | ICD-10-CM

## 2019-07-17 RX ORDER — FLUTICASONE PROPIONATE 50 MCG
1 SPRAY, SUSPENSION (ML) NASAL DAILY
Qty: 1 BOTTLE | Refills: 3 | Status: SHIPPED | OUTPATIENT
Start: 2019-07-17 | End: 2020-02-17 | Stop reason: SDUPTHER

## 2019-07-17 NOTE — TELEPHONE ENCOUNTER
[facility] called for a refill on flonaze  Can you please put order in and I will fax to them at [phone]

## 2019-08-05 ENCOUNTER — OFFICE VISIT (OUTPATIENT)
Dept: PULMONOLOGY | Facility: MEDICAL CENTER | Age: 44
End: 2019-08-05
Payer: MEDICARE

## 2019-08-05 VITALS
RESPIRATION RATE: 12 BRPM | OXYGEN SATURATION: 95 % | WEIGHT: 196 LBS | DIASTOLIC BLOOD PRESSURE: 62 MMHG | HEIGHT: 56 IN | TEMPERATURE: 96.4 F | HEART RATE: 56 BPM | SYSTOLIC BLOOD PRESSURE: 112 MMHG | BODY MASS INDEX: 44.09 KG/M2

## 2019-08-05 DIAGNOSIS — R91.8 MULTILOBAR LUNG INFILTRATE: ICD-10-CM

## 2019-08-05 DIAGNOSIS — J45.20 MILD INTERMITTENT ASTHMA WITHOUT COMPLICATION: ICD-10-CM

## 2019-08-05 DIAGNOSIS — G47.33 OBSTRUCTIVE SLEEP APNEA: Primary | ICD-10-CM

## 2019-08-05 PROCEDURE — 99213 OFFICE O/P EST LOW 20 MIN: CPT | Performed by: NURSE PRACTITIONER

## 2019-08-05 NOTE — ASSESSMENT & PLAN NOTE
Tao Jeong was initially diagnosed with obstructive sleep apnea in 2012  She currently continues to use CPAP at 8 cm of water pressure  Her data is reviewed from July 6, 2019 to August 4, 2019  She is 97% compliant with average usage of 8 hours and 51 minutes  Her apneic hypopnea index is reduced to 2 4  She continues to use an benefit from this modality  According to her caretaker who is  and accompanies her today, she said she continues to benefit from the same

## 2019-08-05 NOTE — PATIENT INSTRUCTIONS
Asthma   WHAT YOU NEED TO KNOW:   Asthma is a lung disease that makes breathing difficult  Chronic inflammation and reactions to triggers narrow the airways in the lungs  Asthma can become life-threatening if it is not managed  DISCHARGE INSTRUCTIONS:   Return to the emergency department if:   · You have severe shortness of breath  · Your lips or nails turn blue or gray  · The skin around your neck and ribs pulls in with each breath  · You have shortness of breath, even after you take your short-term medicine as directed  · Your peak flow numbers are in the red zone of your AAP  Contact your healthcare provider if:   · You run out of medicine before your next refill is due  · Your symptoms get worse  · You need to take more medicine than usual to control your symptoms  · You have questions or concerns about your condition or care  Medicines:   · Medicines  decrease inflammation, open airways, and make it easier to breathe  Medicines may be inhaled, taken as a pill, or injected  Short-term medicines relieve your symptoms quickly  Long-term medicines are used to prevent future attacks  You may also need medicine to help control your allergies  Ask your healthcare provider for more information about the medicine you are given and how to take it safely  · Take your medicine as directed  Contact your healthcare provider if you think your medicine is not helping or if you have side effects  Tell him of her if you are allergic to any medicine  Keep a list of the medicines, vitamins, and herbs you take  Include the amounts, and when and why you take them  Bring the list or the pill bottles to follow-up visits  Carry your medicine list with you in case of an emergency  Follow up with your healthcare provider as directed: You will need to return to make sure your medicine is working and your symptoms are controlled   You may be referred to an asthma specialist  Precious Nobles may be asked to keep a record of your peak flow values and bring it with you to your appointments  Write down your questions so you remember to ask them during your visits  Manage your symptoms and prevent future attacks:   · Follow your Asthma Action Plan (AAP)  This is a written plan that you and your healthcare provider create  It explains which medicine you need and when to change doses if necessary  It also explains how you can monitor symptoms and use a peak flow meter  The meter measures how well your lungs are working  · Manage other health conditions , such as allergies, acid reflux, and sleep apnea  · Identify and avoid triggers  These may include pets, dust mites, mold, and cockroaches  · Do not smoke or be around others who smoke  Nicotine and other chemicals in cigarettes and cigars can cause lung damage  Ask your healthcare provider for information if you currently smoke and need help to quit  E-cigarettes or smokeless tobacco still contain nicotine  Talk to your healthcare provider before you use these products  · Ask about the flu vaccine  The flu can make your asthma worse  You may need a yearly flu shot  © 2017 [address] [facility] Information is for End User's use only and may not be sold, redistributed or otherwise used for commercial purposes  All illustrations and images included in CareNotes® are the copyrighted property of A D A M , Inc  or [employer]  The above information is an  only  It is not intended as medical advice for individual conditions or treatments  Talk to your doctor, nurse or pharmacist before following any medical regimen to see if it is safe and effective for you

## 2019-08-05 NOTE — ASSESSMENT & PLAN NOTE
I Did personally review images of chest x-ray that was done February 11, 2019  It was abnormal chest x-ray with resolution pneumonia that was seen on chest x-ray in December of 2018  No further imaging is needed

## 2019-08-05 NOTE — ASSESSMENT & PLAN NOTE
Patient has mild intermittent asthma that is currently stable  She continues to use albuterol on an as needed basis  We continue to check her oxygen saturation twice daily  She has been doing well  It is noted that she was hospitalized in December of 2018 for pneumonia  He is currently asymptomatic

## 2019-08-05 NOTE — PROGRESS NOTES
Assessment/Plan:     Problem List Items Addressed This Visit        Respiratory    Obstructive sleep apnea - Primary     LOGAN was initially diagnosed with obstructive sleep apnea in 2012  She currently continues to use CPAP at 8 cm of water pressure  Her data is reviewed from July 6, 2019 to August 4, 2019  She is 97% compliant with average usage of 8 hours and 51 minutes  Her apneic hypopnea index is reduced to 2 4  She continues to use an benefit from this modality  According to her caretaker who is  and accompanies her today, she said she continues to benefit from the same  Mild intermittent asthma without complication     Patient has mild intermittent asthma that is currently stable  She continues to use albuterol on an as needed basis  We continue to check her oxygen saturation twice daily  She has been doing well  It is noted that she was hospitalized in December of 2018 for pneumonia  He is currently asymptomatic  Multilobar lung infiltrate     I Did personally review images of chest x-ray that was done February 11, 2019  It was abnormal chest x-ray with resolution pneumonia that was seen on chest x-ray in December of 2018  No further imaging is needed  Return in about 6 months (around 2/5/2020)  All questions are answered to the patient's satisfaction and understanding  She verbalizes understanding  She is encouraged to call with any further questions or concerns  Portions of the record may have been created with voice recognition software  Occasional wrong word or "sound a like" substitutions may have occurred due to the inherent limitations of voice recognition software  Read the chart carefully and recognize, using context, where substitutions have occurred  HPI:  LOGAN is a 69-year-old female who presents with mild asthma  She is company did today with her     She has a history of obstructive sleep apnea and mild intermittent in asthma without complication  Patient was hospitalized in December of 2018 for a right middle lobe pneumonia  Patient has likely alveolar hypoventilation from obesity  She was seen post hospitalization in the office in December of 2018  She   Electronically Signed by Lizette Rajan, SESAR    ______________________________________________________________________    Chief Complaint:   Chief Complaint   Patient presents with    Shortness of Breath     pt  caregiver states fine    Cough     sometimes  no wheezing    Sleep Apnea     pt caregiver states no issues with machine       Patient ID: Robles Blair is a 44 y o  y o  female has a past medical history of Asthma, Complex partial epilepsy ([facility] [address] [facility] ), Disease of thyroid gland, Down syndrome, Heart murmur, Hyperlipidemia, Long Q-T syndrome, Pneumonia, Psychiatric disorder, and Sleep apnea  8/5/2019  Patient presents today for follow-up visit  Shortness of Breath     Cough   This is a chronic problem  The current episode started more than 1 year ago  The problem has been waxing and waning  The problem occurs every few hours  The cough is non-productive  Associated symptoms include shortness of breath  Nothing aggravates the symptoms  She has tried a beta-agonist inhaler, rest and steroid inhaler for the symptoms  The treatment provided mild relief  Review of Systems   Constitutional: Negative  HENT: Negative  Eyes: Negative  Respiratory: Positive for cough and shortness of breath  Cardiovascular: Negative  Gastrointestinal: Negative  Endocrine: Negative  Genitourinary: Negative  Musculoskeletal: Negative  Skin: Negative  Allergic/Immunologic: Negative  Neurological: Negative  Hematological: Negative  Psychiatric/Behavioral: Negative  Smoking history: She reports that she has never smoked   She has never used smokeless tobacco     The following portions of the patient's history were reviewed and updated as appropriate: allergies, current medications, past family history, past medical history, past social history, past surgical history and problem list     Immunization History   Administered Date(s) Administered    Hep B, adult 11/06/2003, 12/04/2003, 05/06/2004    Td (adult), adsorbed 04/24/2003, 07/31/2013, 05/03/2014    Tuberculin Skin Test-PPD Intradermal 07/31/2015     Current Outpatient Medications   Medication Sig Dispense Refill    albuterol (PROAIR HFA) 90 mcg/act inhaler Inhale 2 puffs every 6 (six) hours as needed for wheezing 8 5 g 0    ARIPiprazole (ABILIFY) 10 mg tablet Take 10 mg by mouth daily      atorvastatin (LIPITOR) 10 mg tablet Take 10 mg by mouth daily   clindamycin (CLINDAGEL) 1 % gel Apply topically 2 (two) times a day Apply to L axilla      divalproex sodium (DEPAKOTE) 500 mg EC tablet Take 1,000 mg by mouth daily at bedtime        erythromycin (ILOTYCIN) ophthalmic ointment Administer 0 5 inches to both eyes daily at bedtime      Eyelid Cleansers (OCUSOFT BABY EYELID & EYELASH EX) Inhale      fluticasone (FLONASE) 50 mcg/act nasal spray 1 spray into each nostril daily 1 Bottle 3    levocetirizine (XYZAL) 5 MG tablet Take 1 tablet (5 mg total) by mouth daily At 8 PM 30 tablet 1    levothyroxine (SYNTHROID) 50 mcg tablet Take 50 mcg by mouth daily  Synthroid 50 MCG Oral Tablet TAKE 1 TABLET DAILY  Refills: 0  Active       metFORMIN (GLUCOPHAGE) 500 mg tablet Take 500 mg by mouth 2 (two) times a day      Mouthwashes (LISTERINE ANTISEPTIC ADVANCED MT) by Per J Tube route      Multiple Vitamins-Minerals (CENTRUM SILVER ULTRA WOMENS PO) Centrum Silver Ultra Womens Oral Tablet  Refills: 0  Active      Spacer/Aero Chamber Mouthpiece (SPACER DEVICE) for metered dose inhaler For use with metered dose inhaler 1 Device 0    VITAMIN D, ERGOCALCIFEROL, PO Take 1 25 mg by mouth once a week   Administer on sunday      Wound Dressings (KERAGELT) GEL Inhale  Wound Dressings ([facility] C[facility]) GEL Inhale Apply to feet daily in AM       ARIPiprazole (ABILIFY) 10 mg tablet Take 10 mg by mouth      divalproex sodium (DEPAKOTE ER) 500 mg 24 hr tablet Take 1,000 mg by mouth daily      fluticasone (FLONASE) 50 mcg/act nasal spray 1 spray into each nostril 2 (two) times a day for 30 days Fluticasone Propionate 50 MCG/ACT Nasal Suspension USE 1 SPRAY IN EACH NOSTRIL TWICE DAILY  Quantity: 1;  Refills: 5    Ricky Harris O ; [id number] GM Bottle  Use that a m  and 8:00 p m  16 g 5     No current facility-administered medications for this visit  Allergies: Avandia [rosiglitazone]    Objective:  Vitals:    08/05/19 1003   BP: 112/62   BP Location: Left arm   Patient Position: Sitting   Pulse: 56   Resp: 12   Temp: (!) 96 4 °F (35 8 °C)   TempSrc: Tympanic   SpO2: 95%   Weight: 88 9 kg (196 lb)   Height: 4' 8" (1 422 m)   Oxygen Therapy  SpO2: 95 %    Wt Readings from Last 3 Encounters:   08/05/19 88 9 kg (196 lb)   07/03/19 86 8 kg (191 lb 6 4 oz)   04/10/19 79 7 kg (175 lb 9 6 oz)     Body mass index is 43 94 kg/m²  Physical Exam   Constitutional: She is oriented to person, place, and time  She appears well-developed and well-nourished  HENT:   Head: Normocephalic and atraumatic  Mouth/Throat: Oropharynx is clear and moist    Mallampati 4   Eyes: Pupils are equal, round, and reactive to light  EOM are normal    Neck: Normal range of motion  Neck supple  Cardiovascular: Normal rate and regular rhythm  Pulmonary/Chest: Effort normal and breath sounds normal    Abdominal: Soft  Musculoskeletal:        Right lower leg: Normal         Left lower leg: Normal    Neurological: She is alert and oriented to person, place, and time  Skin: Skin is warm and dry  Capillary refill takes 2 to 3 seconds  Psychiatric: She has a normal mood and affect  Lab Review:   No visits with results within 6 Month(s) from this visit     Latest known visit with results is:   No results displayed because visit has over 200 results  Diagnostics:  I have personally reviewed pertinent films in PACS    Office Spirometry Results:     ESS:    No results found

## 2019-10-09 ENCOUNTER — HOSPITAL ENCOUNTER (EMERGENCY)
Facility: HOSPITAL | Age: 44
Discharge: HOME | End: 2019-10-09
Attending: EMERGENCY MEDICINE | Admitting: EMERGENCY MEDICINE
Payer: MEDICARE

## 2019-10-09 VITALS
DIASTOLIC BLOOD PRESSURE: 62 MMHG | TEMPERATURE: 97.5 F | SYSTOLIC BLOOD PRESSURE: 124 MMHG | BODY MASS INDEX: 45.18 KG/M2 | HEART RATE: 61 BPM | WEIGHT: 201.5 LBS | OXYGEN SATURATION: 95 % | RESPIRATION RATE: 18 BRPM

## 2019-10-09 DIAGNOSIS — F41.9 ANXIETY: Primary | ICD-10-CM

## 2019-10-09 DIAGNOSIS — Z00.8 ENCOUNTER FOR PSYCHOLOGICAL EVALUATION: ICD-10-CM

## 2019-10-09 LAB
ALBUMIN SERPL BCP-MCNC: 3.4 G/DL (ref 3.5–5)
ALP SERPL-CCNC: 98 U/L (ref 46–116)
ALT SERPL W P-5'-P-CCNC: 31 U/L (ref 12–78)
ANION GAP SERPL CALCULATED.3IONS-SCNC: 4 MMOL/L (ref 4–13)
AST SERPL W P-5'-P-CCNC: 23 U/L (ref 5–45)
BASOPHILS # BLD AUTO: 0.04 THOUSANDS/ΜL (ref 0–0.1)
BASOPHILS NFR BLD AUTO: 1 % (ref 0–1)
BILIRUB SERPL-MCNC: 0.4 MG/DL (ref 0.2–1)
BILIRUB UR QL STRIP: NEGATIVE
BUN SERPL-MCNC: 12 MG/DL (ref 5–25)
CALCIUM SERPL-MCNC: 8.6 MG/DL (ref 8.3–10.1)
CHLORIDE SERPL-SCNC: 103 MMOL/L (ref 100–108)
CLARITY UR: CLEAR
CO2 SERPL-SCNC: 34 MMOL/L (ref 21–32)
COLOR UR: COLORLESS
CREAT SERPL-MCNC: 1.03 MG/DL (ref 0.6–1.3)
EOSINOPHIL # BLD AUTO: 0.02 THOUSAND/ΜL (ref 0–0.61)
EOSINOPHIL NFR BLD AUTO: 0 % (ref 0–6)
ERYTHROCYTE [DISTWIDTH] IN BLOOD BY AUTOMATED COUNT: 12.8 % (ref 11.6–15.1)
EXT PREG TEST URINE: NEGATIVE
EXT. CONTROL ED NAV: NORMAL
GFR SERPL CREATININE-BSD FRML MDRD: 67 ML/MIN/1.73SQ M
GLUCOSE SERPL-MCNC: 117 MG/DL (ref 65–140)
GLUCOSE UR STRIP-MCNC: NEGATIVE MG/DL
HCT VFR BLD AUTO: 44.9 % (ref 34.8–46.1)
HGB BLD-MCNC: 14.5 G/DL (ref 11.5–15.4)
HGB UR QL STRIP.AUTO: NEGATIVE
IMM GRANULOCYTES # BLD AUTO: 0.04 THOUSAND/UL (ref 0–0.2)
IMM GRANULOCYTES NFR BLD AUTO: 1 % (ref 0–2)
KETONES UR STRIP-MCNC: NEGATIVE MG/DL
LEUKOCYTE ESTERASE UR QL STRIP: NEGATIVE
LYMPHOCYTES # BLD AUTO: 2.03 THOUSANDS/ΜL (ref 0.6–4.47)
LYMPHOCYTES NFR BLD AUTO: 33 % (ref 14–44)
MAGNESIUM SERPL-MCNC: 2.1 MG/DL (ref 1.6–2.6)
MCH RBC QN AUTO: 31.9 PG (ref 26.8–34.3)
MCHC RBC AUTO-ENTMCNC: 32.3 G/DL (ref 31.4–37.4)
MCV RBC AUTO: 99 FL (ref 82–98)
MONOCYTES # BLD AUTO: 0.42 THOUSAND/ΜL (ref 0.17–1.22)
MONOCYTES NFR BLD AUTO: 7 % (ref 4–12)
NEUTROPHILS # BLD AUTO: 3.57 THOUSANDS/ΜL (ref 1.85–7.62)
NEUTS SEG NFR BLD AUTO: 58 % (ref 43–75)
NITRITE UR QL STRIP: NEGATIVE
NRBC BLD AUTO-RTO: 0 /100 WBCS
PH UR STRIP.AUTO: 7 [PH]
PLATELET # BLD AUTO: 234 THOUSANDS/UL (ref 149–390)
PMV BLD AUTO: 9.4 FL (ref 8.9–12.7)
POTASSIUM SERPL-SCNC: 4.1 MMOL/L (ref 3.5–5.3)
PROT SERPL-MCNC: 7.2 G/DL (ref 6.4–8.2)
PROT UR STRIP-MCNC: NEGATIVE MG/DL
RBC # BLD AUTO: 4.55 MILLION/UL (ref 3.81–5.12)
SODIUM SERPL-SCNC: 141 MMOL/L (ref 136–145)
SP GR UR STRIP.AUTO: <=1.005 (ref 1–1.03)
UROBILINOGEN UR QL STRIP.AUTO: 0.2 E.U./DL
VALPROATE SERPL-MCNC: 71 UG/ML (ref 50–100)
WBC # BLD AUTO: 6.12 THOUSAND/UL (ref 4.31–10.16)

## 2019-10-09 PROCEDURE — 99284 EMERGENCY DEPT VISIT MOD MDM: CPT

## 2019-10-09 PROCEDURE — 36415 COLL VENOUS BLD VENIPUNCTURE: CPT | Performed by: EMERGENCY MEDICINE

## 2019-10-09 PROCEDURE — 81003 URINALYSIS AUTO W/O SCOPE: CPT | Performed by: EMERGENCY MEDICINE

## 2019-10-09 PROCEDURE — 81025 URINE PREGNANCY TEST: CPT | Performed by: EMERGENCY MEDICINE

## 2019-10-09 PROCEDURE — 83735 ASSAY OF MAGNESIUM: CPT | Performed by: EMERGENCY MEDICINE

## 2019-10-09 PROCEDURE — 80164 ASSAY DIPROPYLACETIC ACD TOT: CPT | Performed by: EMERGENCY MEDICINE

## 2019-10-09 PROCEDURE — 80053 COMPREHEN METABOLIC PANEL: CPT | Performed by: EMERGENCY MEDICINE

## 2019-10-09 PROCEDURE — 85025 COMPLETE CBC W/AUTO DIFF WBC: CPT | Performed by: EMERGENCY MEDICINE

## 2019-10-09 RX ORDER — DIAPER,BRIEF,INFANT-TODD,DISP
1 EACH MISCELLANEOUS 2 TIMES DAILY
Qty: 453.6 G | Refills: 0 | Status: SHIPPED | OUTPATIENT
Start: 2019-10-09 | End: 2019-10-09

## 2019-10-09 NOTE — ED NOTES
10/9/19 @ 1345:  PES met with 44year-old white female and parents, who report that patients behaviors have been decompensating  Mother reports that patient has been spitting, yelling, screaming, and kicking  Patient is DDD and unable to provide accurate information  Patient resides in a group home, and mother says that although they are taking good care of her, she's not getting what she needs  In fact, patient doesn't have a psychiatrist, and is being treated by PCP  Mother reports that her daughter is on Depakote, and her levels haven't been checked in a long time, therefore, PES requested that ED MD orders lab work, and she agreed  Parents don't feel that patient needs inpatient treatment, but would like an outpatient program   Cholo Montes states that CARES/LIBRADO would have to be consulted, as patient requires specialized treatment due to Down's Syndrome and DDD needs  PES will contact Luz Maria Dye [phone]: Tori Sherman from [facility] returned call and will contact group home and mother for full report and set up discharge plan in an attempt to avert inpatient treatment  PES will continue to monitor      Intellectual disability, mild, by history:  Holland Duran, MS

## 2019-10-09 NOTE — ED PROVIDER NOTES
History  Chief Complaint   Patient presents with    Psychiatric Evaluation     mother states patient is picking at herself that she bleeds, is spilling drinks, is being loud and disruptive at the group home, mother wants her eval      69-year-old female presents with behavioral problem where she is spilling drinks and picking herself and upset  She has a history of Down syndrome along with the some behavioral problems  Compliant with her medications  Mother at bedside says that she does this occasionally  No concerns for self-harm or harm to others  No medical complaints at this time  Presents from a group home  History provided by:  Patient and parent   used: No        Prior to Admission Medications   Prescriptions Last Dose Informant Patient Reported? Taking? ARIPiprazole (ABILIFY) 10 mg tablet  Care Giver Yes No   Sig: Take 10 mg by mouth daily   Eyelid Cleansers (OCUSOFT BABY EYELID & EYELASH EX)  Care Giver Yes No   Sig: Inhale   Mouthwashes (LISTERINE ANTISEPTIC ADVANCED MT)  Care Giver Yes No   Sig: by Per J Tube route   Multiple Vitamins-Minerals (CENTRUM SILVER ULTRA WOMENS PO)  Care Giver Yes No   Sig: Centrum Silver Ultra Womens Oral Tablet  Refills: 0  Active   Spacer/Aero Chamber Mouthpiece (SPACER DEVICE) for metered dose inhaler   No No   Sig: For use with metered dose inhaler   VITAMIN D, ERGOCALCIFEROL, PO  Care Giver Yes No   Sig: Take 1 25 mg by mouth once a week  Administer on sunday   albuterol (PROAIR HFA) 90 mcg/act inhaler  Care Giver No No   Sig: Inhale 2 puffs every 6 (six) hours as needed for wheezing   atorvastatin (LIPITOR) 10 mg tablet  Care Giver Yes No   Sig: Take 10 mg by mouth daily     clindamycin (CLINDAGEL) 1 % gel  Care Giver Yes No   Sig: Apply topically 2 (two) times a day Apply to L axilla   divalproex sodium (DEPAKOTE ER) 500 mg 24 hr tablet  Care Giver Yes No   Sig: Take 1,000 mg by mouth daily   erythromycin (ILOTYCIN) ophthalmic ointment Care Giver Yes No   Sig: Administer 0 5 inches to both eyes daily at bedtime   fluticasone (FLONASE) 50 mcg/act nasal spray   No No   Si spray into each nostril daily   levocetirizine (XYZAL) 5 MG tablet  Care Giver No No   Sig: Take 1 tablet (5 mg total) by mouth daily At 8 PM   levothyroxine (SYNTHROID) 50 mcg tablet  Care Giver Yes No   Sig: Take 50 mcg by mouth daily  Synthroid 50 MCG Oral Tablet TAKE 1 TABLET DAILY  Refills: 0  Active    metFORMIN (GLUCOPHAGE) 500 mg tablet  Care Giver Yes No   Sig: Take 500 mg by mouth 2 (two) times a day      Facility-Administered Medications: None       Past Medical History:   Diagnosis Date    Asthma     [facility] [address] )     [facility]     Disease of thyroid gland     Down syndrome     Heart murmur     Hyperlipidemia     Long Q-T syndrome     Pneumonia     Last assessed 2014     Psychiatric disorder     schizo    Sleep apnea        Past Surgical History:   Procedure Laterality Date    CARDIAC SURGERY      patent duct    PATENT DUCTUS ARTERIOUS LIGATION         Family History   Adopted: Yes   Problem Relation Age of Onset    No Known Problems Mother      I have reviewed and agree with the history as documented  Social History     Tobacco Use    Smoking status: Never Smoker    Smokeless tobacco: Never Used   Substance Use Topics    Alcohol use: No    Drug use: No        Review of Systems   All other systems reviewed and are negative  Physical Exam  Physical Exam   Constitutional: She appears well-developed and well-nourished  HENT:   Head: Normocephalic and atraumatic  Eyes: Pupils are equal, round, and reactive to light  EOM are normal    Neck: Normal range of motion  Neck supple  Cardiovascular: Normal rate and regular rhythm  Pulmonary/Chest: Effort normal and breath sounds normal    Abdominal: Soft  Bowel sounds are normal    Musculoskeletal: Normal range of motion  Neurological: She is alert  Skin: Skin is warm and dry  Psychiatric: She has a normal mood and affect  Nursing note and vitals reviewed        Vital Signs  ED Triage Vitals [10/09/19 1136]   Temperature Pulse Respirations Blood Pressure SpO2   (!) 96 6 °F (35 9 °C) 60 18 129/81 97 %      Temp Source Heart Rate Source Patient Position - Orthostatic VS BP Location FiO2 (%)   Tympanic Monitor Sitting Right arm --      Pain Score       No Pain           Vitals:    10/09/19 1136 10/09/19 1742   BP: 129/81 124/62   Pulse: 60 61   Patient Position - Orthostatic VS: Sitting Sitting         Visual Acuity      ED Medications  Medications - No data to display    Diagnostic Studies  Results Reviewed     Procedure Component Value Units Date/Time    Comprehensive metabolic panel [[id number]]  (Abnormal) Collected:  10/09/19 1505    Lab Status:  Final result Specimen:  Blood from Arm, Right Updated:  10/09/19 1531     Sodium 141 mmol/L      Potassium 4 1 mmol/L      Chloride 103 mmol/L      CO2 34 mmol/L      ANION GAP 4 mmol/L      BUN 12 mg/dL      Creatinine 1 03 mg/dL      Glucose 117 mg/dL      Calcium 8 6 mg/dL      AST 23 U/L      ALT 31 U/L      Alkaline Phosphatase 98 U/L      Total Protein 7 2 g/dL      Albumin 3 4 g/dL      Total Bilirubin 0 40 mg/dL      eGFR 67 ml/min/1 73sq m     Narrative:       [place] guidelines for Chronic Kidney Disease (CKD):     Stage 1 with normal or high GFR (GFR > 90 mL/min/1 73 square meters)    Stage 2 Mild CKD (GFR = 60-89 mL/min/1 73 square meters)    Stage 3A Moderate CKD (GFR = 45-59 mL/min/1 73 square meters)    Stage 3B Moderate CKD (GFR = 30-44 mL/min/1 73 square meters)    Stage 4 Severe CKD (GFR = 15-29 mL/min/1 73 square meters)    Stage 5 End Stage CKD (GFR <15 mL/min/1 73 square meters)  Note: GFR calculation is accurate only with a steady state creatinine    Valproic acid level, total [[id number]]  (Normal) Collected:  10/09/19 1505    Lab Status:  Final result Specimen:  Blood from Arm, Right Updated:  10/09/19 1531     Valproic Acid, Total 71 ug/mL     Magnesium [[id number]]  (Normal) Collected:  10/09/19 1505    Lab Status:  Final result Specimen:  Blood from Arm, Right Updated:  10/09/19 1527     Magnesium 2 1 mg/dL     CBC and differential [[id number]]  (Abnormal) Collected:  10/09/19 1505    Lab Status:  Final result Specimen:  Blood from Arm, Right Updated:  10/09/19 1512     WBC 6 12 Thousand/uL      RBC 4 55 Million/uL      Hemoglobin 14 5 g/dL      Hematocrit 44 9 %      MCV 99 fL      MCH 31 9 pg      MCHC 32 3 g/dL      RDW 12 8 %      MPV 9 4 fL      Platelets 427 Thousands/uL      nRBC 0 /100 WBCs      Neutrophils Relative 58 %      Immat GRANS % 1 %      Lymphocytes Relative 33 %      Monocytes Relative 7 %      Eosinophils Relative 0 %      Basophils Relative 1 %      Neutrophils Absolute 3 57 Thousands/µL      Immature Grans Absolute 0 04 Thousand/uL      Lymphocytes Absolute 2 03 Thousands/µL      Monocytes Absolute 0 42 Thousand/µL      Eosinophils Absolute 0 02 Thousand/µL      Basophils Absolute 0 04 Thousands/µL     POCT pregnancy, urine [[id number]]  (Normal) Resulted:  10/09/19 1312    Lab Status:  Final result Updated:  10/09/19 1312     EXT PREG TEST UR (Ref: Negative) negative     Control valid    UA w Reflex to Microscopic [[id number]] Collected:  10/09/19 1229    Lab Status:  Final result Specimen:  Urine, Clean Catch Updated:  10/09/19 1305     Color, UA Colorless     Clarity, UA Clear     Specific Gravity, UA <=1 005     pH, UA 7 0     Leukocytes, UA Negative     Nitrite, UA Negative     Protein, UA Negative mg/dl      Glucose, UA Negative mg/dl      Ketones, UA Negative mg/dl      Urobilinogen, UA 0 2 E U /dl      Bilirubin, UA Negative     Blood, UA Negative                 No orders to display              Procedures  Procedures       ED Course                               MDM  Number of Diagnoses or Management Options  Anxiety:   Encounter for psychological evaluation:   Diagnosis management comments: Patient medically evaluated and cleared  CARES evaluated the patient through phone  Crisis consulted,safe for discharge will follow up outpatient  Mother OK with the plan  Amount and/or Complexity of Data Reviewed  Clinical lab tests: ordered and reviewed  Tests in the medicine section of CPT®: ordered and reviewed    Patient Progress  Patient progress: stable      Disposition  Final diagnoses:   Anxiety   Encounter for psychological evaluation     Time reflects when diagnosis was documented in both MDM as applicable and the Disposition within this note     Time User Action Codes Description Comment    10/9/2019  5:29 PM Daphnie Campos Add [F41 9] Anxiety     10/9/2019  5:29 PM Daphnie Campos Add [Z00 8] Encounter for psychological evaluation       ED Disposition     ED Disposition Condition Date/Time Comment    Discharge Stable Wed Oct 9, 2019  5:29 PM Chela Petersen discharge to home/self care  Follow-up Information     Follow up With Specialties Details Why Contact Info Additional Information    [address] [facility] Emergency Department Emergency Medicine  If symptoms worsen [address] [facility] [address] [facility], [place], [place], [address]          Discharge Medication List as of 10/9/2019  5:30 PM      CONTINUE these medications which have NOT CHANGED    Details   albuterol (PROAIR HFA) 90 mcg/act inhaler Inhale 2 puffs every 6 (six) hours as needed for wheezing, Starting Thu 12/20/2018, Normal      !! ARIPiprazole (ABILIFY) 10 mg tablet Take 10 mg by mouth daily, Starting Tue 1/15/2019, Historical Med      atorvastatin (LIPITOR) 10 mg tablet Take 10 mg by mouth daily  , Historical Med      clindamycin (CLINDAGEL) 1 % gel Apply topically 2 (two) times a day Apply to L axilla, Historical Med      divalproex sodium (DEPAKOTE ER) 500 mg 24 hr tablet Take 1,000 mg by mouth daily, Starting Tue 1/15/2019, Historical Med      erythromycin (ILOTYCIN) ophthalmic ointment Administer 0 5 inches to both eyes daily at bedtime, Historical Med      Eyelid Cleansers (OCUSOFT BABY EYELID & EYELASH EX) Inhale, Historical Med      fluticasone (FLONASE) 50 mcg/act nasal spray 1 spray into each nostril daily, Starting Wed 7/17/2019, Print      levocetirizine (XYZAL) 5 MG tablet Take 1 tablet (5 mg total) by mouth daily At 8 PM, Starting Thu 12/13/2018, Normal      levothyroxine (SYNTHROID) 50 mcg tablet Take 50 mcg by mouth daily  Synthroid 50 MCG Oral Tablet TAKE 1 TABLET DAILY  Refills: 0  Active , Historical Med      metFORMIN (GLUCOPHAGE) 500 mg tablet Take 500 mg by mouth 2 (two) times a day, Historical Med      Mouthwashes (LISTERINE ANTISEPTIC ADVANCED MT) by Per J Tube route, Historical Med      Multiple Vitamins-Minerals (CENTRUM SILVER ULTRA WOMENS PO) Centrum Silver Ultra Womens Oral Tablet  Refills: 0  Active, Historical Med      Spacer/Aero Chamber Mouthpiece (SPACER DEVICE) for metered dose inhaler For use with metered dose inhaler, Normal      VITAMIN D, ERGOCALCIFEROL, PO Take 1 25 mg by mouth once a week  Administer on sunday, Historical Med      !! ARIPiprazole (ABILIFY) 10 mg tablet Take 10 mg by mouth, Starting Tue 7/10/2018, Historical Med      divalproex sodium (DEPAKOTE) 500 mg EC tablet Take 1,000 mg by mouth daily at bedtime  , Historical Med      !! Wound Dressings (KERAGELT) GEL Inhale, Historical Med      !! Wound Dressings ([facility]) GEL Inhale Apply to feet daily in AM , Historical Med       !! - Potential duplicate medications found  Please discuss with provider  No discharge procedures on file      ED Provider  Electronically Signed by           Prudence Brittle, DO  10/11/19 4297

## 2019-10-09 NOTE — ED NOTES
TSH in 2 weeks    1  Cleanse b/l buttocks and sacrum with soap and water, pat dry, apply calazime cream to mid sacrum and apply hydraguard to b/l buttocks TID and PRN   2  Apply skin nourishing cream the entire skin daily for moisture  3  Turn and reposition patient every  2 hours   4  Elevate heels off of bed with pillows to offload pressure   5  Soft care cushion to chair when OOB, if able  6    Apply hydraguard to b/l heels BID and PRN for prevention and protection Valeria Garber from Crisis at bedside for gisselle Bauer RN  10/09/19 3810

## 2019-10-09 NOTE — ED NOTES
Debbie Griggs / Sulma De Oliveira [phone], Alejandra Parsons, notified @ 14:30 that the on- is needed to assess this patient in-person  They will call back with an ETA  They called back and spoke with Lemuel Lai  Called Roxanne/JANINA @ 16:00 - voice mail left for Nallely COLLINS updated patient @ family @ 16:15  Called again @ 16:45 - after hours answered call; on- on another call and will call back  Call was returned - clinician will be assigned to see patient @ group home tomorrow and the on- with call parents tonight  They wanted to have patient referred to [facility] with psychiatry - PES stated that would NOT be advisable  Explained plan to parents who are ok with it  Patient to be d/c'd and Roxanne to follow up tomorrow

## 2019-10-10 ENCOUNTER — OFFICE VISIT (OUTPATIENT)
Dept: FAMILY MEDICINE CLINIC | Facility: CLINIC | Age: 44
End: 2019-10-10
Payer: MEDICARE

## 2019-10-10 VITALS
HEART RATE: 60 BPM | DIASTOLIC BLOOD PRESSURE: 74 MMHG | WEIGHT: 200 LBS | HEIGHT: 57 IN | RESPIRATION RATE: 18 BRPM | OXYGEN SATURATION: 95 % | TEMPERATURE: 97.6 F | BODY MASS INDEX: 43.15 KG/M2 | SYSTOLIC BLOOD PRESSURE: 108 MMHG

## 2019-10-10 DIAGNOSIS — Z76.89 ENCOUNTER TO ESTABLISH CARE WITH NEW DOCTOR: Primary | ICD-10-CM

## 2019-10-10 PROBLEM — R91.8 MULTILOBAR LUNG INFILTRATE: Status: RESOLVED | Noted: 2018-12-19 | Resolved: 2019-10-10

## 2019-10-10 PROCEDURE — 99213 OFFICE O/P EST LOW 20 MIN: CPT | Performed by: FAMILY MEDICINE
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
## 2019-10-23 ENCOUNTER — CLINICAL SUPPORT (OUTPATIENT)
Dept: NUTRITION | Facility: HOSPITAL | Age: 44
End: 2019-10-23
Payer: MEDICARE

## 2019-10-23 VITALS — BODY MASS INDEX: 43.67 KG/M2 | WEIGHT: 201.8 LBS

## 2019-10-23 DIAGNOSIS — E11.9 TYPE 2 DIABETES MELLITUS WITHOUT COMPLICATION, WITHOUT LONG-TERM CURRENT USE OF INSULIN (HCC): ICD-10-CM

## 2019-10-23 PROCEDURE — 97803 MED NUTRITION INDIV SUBSEQ: CPT | Performed by: DIETITIAN, REGISTERED

## 2019-10-23 NOTE — PROGRESS NOTES
Follow-Up Nutrition Assessment Form    Patient Name: Michelle Petersen    YOB: 1975    Sex: Female      Follow Up Date: 10/23/2019  Start Time: 11:08 Stop Time: 11:38 Total Minutes: 30     Data:  Present at session: self and caretaker from institution   Parent/Patient Concerns:  Weight gain   Medical Dx/Reason for Referral:  E11 9   Past Medical History:   Diagnosis Date    Asthma     [facility] [address] [facility]     Diabetes mellitus ([facility] [address] [facility]     Disease of thyroid gland     Down syndrome     Heart murmur     Hyperlipidemia     Long Q-T syndrome     Pneumonia     Last assessed 5/28/2014     Psychiatric disorder     schizo    Sleep apnea        Current Outpatient Medications   Medication Sig Dispense Refill    albuterol (PROAIR HFA) 90 mcg/act inhaler Inhale 2 puffs every 6 (six) hours as needed for wheezing 8 5 g 0    ARIPiprazole (ABILIFY) 10 mg tablet Take 10 mg by mouth daily      atorvastatin (LIPITOR) 10 mg tablet Take 10 mg by mouth daily   clindamycin (CLINDAGEL) 1 % gel Apply topically 2 (two) times a day Apply to L axilla      divalproex sodium (DEPAKOTE ER) 500 mg 24 hr tablet Take 1,000 mg by mouth daily      erythromycin (ILOTYCIN) ophthalmic ointment Administer 0 5 inches to both eyes daily at bedtime      Eyelid Cleansers (OCUSOFT BABY EYELID & EYELASH EX) Inhale      fluticasone (FLONASE) 50 mcg/act nasal spray 1 spray into each nostril daily 1 Bottle 3    levocetirizine (XYZAL) 5 MG tablet Take 1 tablet (5 mg total) by mouth daily At 8 PM 30 tablet 1    levothyroxine (SYNTHROID) 50 mcg tablet Take 50 mcg by mouth daily  Synthroid 50 MCG Oral Tablet TAKE 1 TABLET DAILY    Refills: 0  Active       metFORMIN (GLUCOPHAGE) 500 mg tablet Take 500 mg by mouth 2 (two) times a day      Mouthwashes (LISTERINE ANTISEPTIC ADVANCED MT) by Per J Tube route      Multiple Vitamins-Minerals (CENTRUM SILVER ULTRA WOMENS PO) Centrum Silver Ultra Womens Oral Tablet  Refills: 0 Active      Spacer/Aero Chamber Mouthpiece (SPACER DEVICE) for metered dose inhaler For use with metered dose inhaler 1 Device 0    VITAMIN D, ERGOCALCIFEROL, PO Take 1 25 mg by mouth once a week  Administer on sunday       No current facility-administered medications for this visit  Additional Meds/Supplements:    Barriers to Learning: Learning Disability   Labs:    Height: Ht Readings from Last 3 Encounters:   10/10/19 4' 9" (1 448 m)   08/05/19 4' 8" (1 422 m)   02/05/19 4' 8" (1 422 m)      Weight: Wt Readings from Last 10 Encounters:   10/23/19 91 5 kg (201 lb 12 8 oz)   10/10/19 90 7 kg (200 lb)   10/09/19 91 4 kg (201 lb 8 oz)   08/05/19 88 9 kg (196 lb)   07/03/19 86 8 kg (191 lb 6 4 oz)   04/10/19 79 7 kg (175 lb 9 6 oz)   02/05/19 79 8 kg (176 lb)   12/20/18 82 1 kg (181 lb)   12/03/18 82 3 kg (181 lb 7 oz)   10/10/18 80 5 kg (177 lb 6 4 oz)     Estimated body mass index is 43 67 kg/m² as calculated from the following:    Height as of 10/10/19: 4' 9" (1 448 m)  Weight as of this encounter: 91 5 kg (201 lb 12 8 oz)  Wt  Change Since Last Visit: [x]Yes     []No  Amount:  Pt has gained 10 8 lbs since last visit  Energy Needs: No calculation needed   Pain Screen: Are you having pain now? No      Goals Achieved:  Pt has not achieved previous goals     New Goals:   1  Limit sweets to 1 serving per week   2  Increase vegetables to at least one serving at lunch and dinner and give extra vegetables if Chela complains of hunger   3  Walk at least 30 minutes 3 times per week       Initial PES:       New PES: No Change      New Problem List:  1    2    3        Assessment:   Pt appears heavier       Medical Nutrition Therapy Intervention:  []Individualized Meal Plan []Understanding Lab Values   []Basic Pathophysiology of Disease []Food/Medication Interactions   []Food Diary [x]Exercise   []Lifestyle/Behavior Modification Techniques []Medication, Mechanism of Action   []Label Reading []Self Blood Glucose Monitoring   []Weight/BMI Goals []Other -    Other Notes:  Pt states that she has 3 loose teeth and is to see the dentist next month on the 15th  She states that she has been eating softer foods  It is her birthday this week and she was talking about parties but was unsure if they were past or up coming  Pt tends to spend time talking about all the foods that she likes which may be foods that she has actually eaten versus foods she just likes  Care giver with her was filling in and is not familiar with Chela's specific care  When Marcos Madison was asked about testing BS, she pulled out a glucometer from her purse, so it is possible that she is still testing  She remains off medications for BS control  Discussed goals with care giver and wrote them on paper provided by the group home which will be brought back to care givers         Comprehension: []Excellent  []Very Good  []Good  [x]Fair   []Poor    Receptivity: []Excellent  []Very Good  []Good  [x]Fair   []Poor    Expected Compliance: []Excellent  []Very Good  []Good  [x]Fair   []Poor      Labs:  CMP  Lab Results   Component Value Date     01/08/2016    K 4 1 10/09/2019     10/09/2019    CO2 34 (H) 10/09/2019    ANIONGAP 15 0 01/08/2016    BUN 12 10/09/2019    CREATININE 1 03 10/09/2019    GLUCOSE 116 (H) 01/08/2016    GLUF 95 10/25/2017    CALCIUM 8 6 10/09/2019    AST 23 10/09/2019    ALT 31 10/09/2019    ALKPHOS 98 10/09/2019    PROT 7 2 01/08/2016    BILITOT 0 3 01/08/2016    EGFR 67 10/09/2019       BMP  Lab Results   Component Value Date    GLUCOSE 116 (H) 01/08/2016    CALCIUM 8 6 10/09/2019     01/08/2016    K 4 1 10/09/2019    CO2 34 (H) 10/09/2019     10/09/2019    BUN 12 10/09/2019    CREATININE 1 03 10/09/2019       Lipids  No results found for: CHOL  Lab Results   Component Value Date    HDL 36 (L) 10/25/2017    HDL 37 (L) 07/12/2017    HDL 42 02/08/2016     Lab Results   Component Value Date    LDLCALC 53 10/25/2017 LDLCALC 58 07/12/2017    LDLCALC 71 02/08/2016     Lab Results   Component Value Date    TRIG 119 10/25/2017    TRIG 106 07/12/2017    TRIG 54 02/08/2016     No results found for: CHOLHDL    Hemoglobin A1C  Lab Results   Component Value Date    HGBA1C 5 8 12/04/2018       Fasting Glucose  Lab Results   Component Value Date    GLUF 95 10/25/2017       Insulin     Thyroid  No results found for: TSH, K4ABLME, L1JMOLT, THYROIDAB    Hepatic Function Panel  Lab Results   Component Value Date    ALT 31 10/09/2019    AST 23 10/09/2019    ALKPHOS 98 10/09/2019    BILITOT 0 3 01/08/2016       Celiac Disease Antibody Panel  No results found for: ENDOMYSIAL IGA, GLIADIN IGA, GLIADIN IGG, IGA, TISSUE TRANSGLUT AB, TTG IGA   Iron  No results found for: IRON, TIBC, FERRITIN    Vitamins  No results found for: VITAMIN B2   No results found for: NICOTINAMIDE, NICOTINIC ACID   No results found for: VITAMINB6  No results found for: IDNACTLR43  No results found for: VITB5  No results found for: Q2HUYOZU  No results found for: THYROGLB  No results found for: VITAMIN K   No results found for: 25-HYDROXY VIT D   No components found for: VITAMINE     No follow-ups on file      Dolly Zepeda, MS, RDN, [address]  [address] [place] [phone]

## 2019-11-04 ENCOUNTER — TELEPHONE (OUTPATIENT)
Dept: PULMONOLOGY | Facility: MEDICAL CENTER | Age: 44
End: 2019-11-04

## 2019-11-30 ENCOUNTER — HOSPITAL ENCOUNTER (EMERGENCY)
Facility: HOSPITAL | Age: 44
Discharge: HOME | End: 2019-11-30
Attending: EMERGENCY MEDICINE
Payer: MEDICARE

## 2019-11-30 VITALS
DIASTOLIC BLOOD PRESSURE: 53 MMHG | RESPIRATION RATE: 16 BRPM | SYSTOLIC BLOOD PRESSURE: 106 MMHG | HEART RATE: 54 BPM | TEMPERATURE: 97.5 F | OXYGEN SATURATION: 93 %

## 2019-11-30 DIAGNOSIS — Z76.0 MEDICATION REFILL: Primary | ICD-10-CM

## 2019-11-30 PROCEDURE — 99281 EMR DPT VST MAYX REQ PHY/QHP: CPT

## 2019-11-30 RX ORDER — ARIPIPRAZOLE 10 MG/1
10 TABLET ORAL DAILY
Qty: 30 TABLET | Refills: 0 | Status: SHIPPED | OUTPATIENT
Start: 2019-11-30 | End: 2021-05-14 | Stop reason: SDUPTHER

## 2019-11-30 RX ORDER — DIVALPROEX SODIUM 500 MG/1
1000 TABLET, EXTENDED RELEASE ORAL DAILY
Qty: 60 TABLET | Refills: 0 | Status: SHIPPED | OUTPATIENT
Start: 2019-11-30 | End: 2021-05-14 | Stop reason: SDUPTHER

## 2019-11-30 NOTE — ED PROVIDER NOTES
History  Chief Complaint   Patient presents with    Medication Refill     pt is from a group home and the caretaker states she is out of her abilify and depakote  Denies calling patient's regular doctor     Patient is brought in from her group home by staff member states she ran out of meds yesterday  The patient has Down syndrome and psychiatric issues and has been maintained on Abilify and Depakote as per her psychiatrist   Caregiver informs me patient ran out of meds yesterday and that her doctor will not fill prescriptions until they her  They plan on calling for an appointment on Monday          Prior to Admission Medications   Prescriptions Last Dose Informant Patient Reported? Taking? ARIPiprazole (ABILIFY) 10 mg tablet  Care Giver Yes No   Sig: Take 10 mg by mouth daily   ARIPiprazole (ABILIFY) 10 mg tablet   No No   Sig: Take 1 tablet (10 mg total) by mouth daily   Eyelid Cleansers (OCUSOFT BABY EYELID & EYELASH EX)  Care Giver Yes No   Sig: Inhale   Mouthwashes (LISTERINE ANTISEPTIC ADVANCED MT)  Care Giver Yes No   Sig: by Per J Tube route   Multiple Vitamins-Minerals (CENTRUM SILVER ULTRA WOMENS PO)  Care Giver Yes No   Sig: Centrum Silver Ultra Womens Oral Tablet  Refills: 0  Active   Spacer/Aero Chamber Mouthpiece (SPACER DEVICE) for metered dose inhaler   No No   Sig: For use with metered dose inhaler   VITAMIN D, ERGOCALCIFEROL, PO  Care Giver Yes No   Sig: Take 1 25 mg by mouth once a week  Administer on sunday   albuterol (PROAIR HFA) 90 mcg/act inhaler  Care Giver No No   Sig: Inhale 2 puffs every 6 (six) hours as needed for wheezing   atorvastatin (LIPITOR) 10 mg tablet  Care Giver Yes No   Sig: Take 10 mg by mouth daily     clindamycin (CLINDAGEL) 1 % gel  Care Giver Yes No   Sig: Apply topically 2 (two) times a day Apply to L axilla   divalproex sodium (DEPAKOTE ER) 500 mg 24 hr tablet  Care Giver Yes No   Sig: Take 1,000 mg by mouth daily   divalproex sodium (DEPAKOTE ER) 500 mg 24 hr tablet   No No   Sig: Take 2 tablets (1,000 mg total) by mouth daily   erythromycin (ILOTYCIN) ophthalmic ointment  Care Giver Yes No   Sig: Administer 0 5 inches to both eyes daily at bedtime   fluticasone (FLONASE) 50 mcg/act nasal spray   No No   Si spray into each nostril daily   levocetirizine (XYZAL) 5 MG tablet  Care Giver No No   Sig: Take 1 tablet (5 mg total) by mouth daily At 8 PM   levothyroxine (SYNTHROID) 50 mcg tablet  Care Giver Yes No   Sig: Take 50 mcg by mouth daily  Synthroid 50 MCG Oral Tablet TAKE 1 TABLET DAILY  Refills: 0  Active    metFORMIN (GLUCOPHAGE) 500 mg tablet  Care Giver Yes No   Sig: Take 500 mg by mouth 2 (two) times a day      Facility-Administered Medications: None       Past Medical History:   Diagnosis Date    Asthma     [facility] partial [facility] [address] [facility]     [email or web address]     Disease of thyroid gland     Down syndrome     Heart murmur     Hyperlipidemia     Long Q-T syndrome     Pneumonia     Last assessed 2014     Psychiatric disorder     schizo    Sleep apnea        Past Surgical History:   Procedure Laterality Date    CARDIAC SURGERY      patent duct    PATENT DUCTUS ARTERIOUS LIGATION         Family History   Adopted: Yes   Problem Relation Age of Onset    No Known Problems Mother      I have reviewed and agree with the history as documented  Social History     Tobacco Use    Smoking status: Never Smoker    Smokeless tobacco: Never Used   Substance Use Topics    Alcohol use: No    Drug use: No        Review of Systems   Unable to perform ROS: Psychiatric disorder   Constitutional: Negative for fever  Physical Exam  Physical Exam   Constitutional: She appears well-developed and well-nourished  HENT:   Head: Normocephalic and atraumatic  Eyes: Conjunctivae are normal    Neck: Normal range of motion  Cardiovascular: Normal rate, regular rhythm and normal heart sounds  Pulmonary/Chest: Effort normal    Abdominal: Soft   Bowel sounds are normal  There is no tenderness  Musculoskeletal: Normal range of motion  She exhibits no edema  Neurological: She is alert  Skin: Skin is warm and dry  Capillary refill takes less than 2 seconds  Psychiatric: She has a normal mood and affect  Her behavior is normal    Nursing note and vitals reviewed  Vital Signs  ED Triage Vitals [11/30/19 0644]   Temperature Pulse Respirations Blood Pressure SpO2   97 5 °F (36 4 °C) 69 16 133/58 95 %      Temp Source Heart Rate Source Patient Position - Orthostatic VS BP Location FiO2 (%)   Tympanic -- Lying Right arm --      Pain Score       --           Vitals:    11/30/19 0644   BP: 133/58   Pulse: 69   Patient Position - Orthostatic VS: Lying         Visual Acuity      ED Medications  Medications - No data to display    Diagnostic Studies  Results Reviewed     None                 No orders to display              Procedures  Procedures       ED Course                               MDM  Number of Diagnoses or Management Options  Diagnosis management comments: Patient's last dose was November 28  Spoke to the patient's mother who explains there are satisfied with the previous psychiatrist and are in the process of getting a new one  Disposition  Final diagnoses:   Medication refill     Time reflects when diagnosis was documented in both MDM as applicable and the Disposition within this note     Time User Action Codes Description Comment    11/30/2019  8:02 AM Vadim Dalton Add [Z76 0] Medication refill       ED Disposition     ED Disposition Condition Date/Time Comment    Discharge Stable Sat Nov 30, 2019  8:02 AM Chela Petersen discharge to home/self care              Follow-up Information     Follow up With Specialties Details Why Contact Info    Psychiatrist  Schedule an appointment as soon as possible for a visit             Current Discharge Medication List      CONTINUE these medications which have CHANGED    Details   ARIPiprazole (ABILIFY) 10 mg tablet Take 1 tablet (10 mg total) by mouth daily  Qty: 30 tablet, Refills: 0    Associated Diagnoses: Medication refill      divalproex sodium (DEPAKOTE ER) 500 mg 24 hr tablet Take 2 tablets (1,000 mg total) by mouth daily  Qty: 60 tablet, Refills: 0    Associated Diagnoses: Medication refill         CONTINUE these medications which have NOT CHANGED    Details   albuterol (PROAIR HFA) 90 mcg/act inhaler Inhale 2 puffs every 6 (six) hours as needed for wheezing  Qty: 8 5 g, Refills: 0    Associated Diagnoses: Mild intermittent asthma without complication      atorvastatin (LIPITOR) 10 mg tablet Take 10 mg by mouth daily  clindamycin (CLINDAGEL) 1 % gel Apply topically 2 (two) times a day Apply to L axilla      erythromycin (ILOTYCIN) ophthalmic ointment Administer 0 5 inches to both eyes daily at bedtime      Eyelid Cleansers (OCUSOFT BABY EYELID & EYELASH EX) Inhale      fluticasone (FLONASE) 50 mcg/act nasal spray 1 spray into each nostril daily  Qty: 1 Bottle, Refills: 3    Associated Diagnoses: Allergic rhinitis due to other allergic trigger, unspecified seasonality      levocetirizine (XYZAL) 5 MG tablet Take 1 tablet (5 mg total) by mouth daily At 8 PM  Qty: 30 tablet, Refills: 1    Associated Diagnoses: Allergic rhinitis due to other allergic trigger, unspecified seasonality      levothyroxine (SYNTHROID) 50 mcg tablet Take 50 mcg by mouth daily  Synthroid 50 MCG Oral Tablet TAKE 1 TABLET DAILY    Refills: 0  Active       metFORMIN (GLUCOPHAGE) 500 mg tablet Take 500 mg by mouth 2 (two) times a day      Mouthwashes (LISTERINE ANTISEPTIC ADVANCED MT) by Per J Tube route      Multiple Vitamins-Minerals (CENTRUM SILVER ULTRA WOMENS PO) Centrum Silver Ultra Womens Oral Tablet  Refills: 0  Active      Spacer/Aero Chamber Mouthpiece (SPACER DEVICE) for metered dose inhaler For use with metered dose inhaler  Qty: 1 Device, Refills: 0    Associated Diagnoses: Mild intermittent asthma without complication      VITAMIN D, ERGOCALCIFEROL, PO Take 1 25 mg by mouth once a week  Administer on sunday           No discharge procedures on file      ED Provider  Electronically Signed by           Pablo Artis MD  11/30/19 1505

## 2019-11-30 NOTE — ED NOTES
Spoke with manager at [facility] who states pt is being evaluated by Roxanne and referring pt to new psychiatrist, so at this point pt has no psychiatrist to prescribe daily psych meds that pt takes  Per their protocol they are to take pt to ER for refills of medications  No appointment is planned at this point        Terrie Ortiz RN  11/30/19 1489

## 2019-12-03 ENCOUNTER — TELEPHONE (OUTPATIENT)
Dept: FAMILY MEDICINE CLINIC | Facility: CLINIC | Age: 44
End: 2019-12-03

## 2020-01-14 ENCOUNTER — TELEPHONE (OUTPATIENT)
Dept: FAMILY MEDICINE CLINIC | Facility: CLINIC | Age: 45
End: 2020-01-14

## 2020-01-14 NOTE — TELEPHONE ENCOUNTER
Pt needs new referral/order for Jacquelyn Rife nutrition therapy for this year with dx code E11 9  Patient has Medicare

## 2020-01-15 ENCOUNTER — CLINICAL SUPPORT (OUTPATIENT)
Dept: NUTRITION | Facility: HOSPITAL | Age: 45
End: 2020-01-15
Payer: MEDICARE

## 2020-01-15 ENCOUNTER — TRANSCRIBE ORDERS (OUTPATIENT)
Dept: ADMINISTRATIVE | Facility: HOSPITAL | Age: 45
End: 2020-01-15

## 2020-01-15 VITALS — BODY MASS INDEX: 43.15 KG/M2 | WEIGHT: 199.4 LBS

## 2020-01-15 DIAGNOSIS — E11.9 TYPE 2 DIABETES MELLITUS WITHOUT COMPLICATION, WITHOUT LONG-TERM CURRENT USE OF INSULIN (HCC): Primary | ICD-10-CM

## 2020-01-15 DIAGNOSIS — E11.9 TYPE 2 DIABETES MELLITUS WITHOUT COMPLICATION, WITHOUT LONG-TERM CURRENT USE OF INSULIN (HCC): ICD-10-CM

## 2020-01-15 PROCEDURE — 97803 MED NUTRITION INDIV SUBSEQ: CPT | Performed by: DIETITIAN, REGISTERED

## 2020-01-15 NOTE — PROGRESS NOTES
Follow-Up Nutrition Assessment Form    Patient Name: Crystal Petersen    YOB: 1975    Sex: Female      Follow Up Date: 1/15/2020  Start Time: 3:15 Stop Time: 3:28 Total Minutes: 15     Data:  Present at session: self and caretaker from institution   Parent/Patient Concerns: Weight gain   Medical Dx/Reason for Referral: E11 9   Past Medical History:   Diagnosis Date    Asthma     [facility] [address] [facility]     Diabetes mellitus [facility] [facility]     Disease of thyroid gland     Down syndrome     Heart murmur     Hyperlipidemia     Long Q-T syndrome     Pneumonia     Last assessed 5/28/2014     Psychiatric disorder     schizo    Sleep apnea        Current Outpatient Medications   Medication Sig Dispense Refill    albuterol (PROAIR HFA) 90 mcg/act inhaler Inhale 2 puffs every 6 (six) hours as needed for wheezing 8 5 g 0    ARIPiprazole (ABILIFY) 10 mg tablet Take 1 tablet (10 mg total) by mouth daily 30 tablet 0    atorvastatin (LIPITOR) 10 mg tablet Take 10 mg by mouth daily   clindamycin (CLINDAGEL) 1 % gel Apply topically 2 (two) times a day Apply to L axilla      divalproex sodium (DEPAKOTE ER) 500 mg 24 hr tablet Take 2 tablets (1,000 mg total) by mouth daily 60 tablet 0    erythromycin (ILOTYCIN) ophthalmic ointment Administer 0 5 inches to both eyes daily at bedtime      Eyelid Cleansers (OCUSOFT BABY EYELID & EYELASH EX) Inhale      fluticasone (FLONASE) 50 mcg/act nasal spray 1 spray into each nostril daily 1 Bottle 3    levocetirizine (XYZAL) 5 MG tablet Take 1 tablet (5 mg total) by mouth daily At 8 PM 30 tablet 1    levothyroxine (SYNTHROID) 50 mcg tablet Take 50 mcg by mouth daily  Synthroid 50 MCG Oral Tablet TAKE 1 TABLET DAILY    Refills: 0  Active       metFORMIN (GLUCOPHAGE) 500 mg tablet Take 500 mg by mouth 2 (two) times a day      Mouthwashes (LISTERINE ANTISEPTIC ADVANCED MT) by Per J Tube route      Multiple Vitamins-Minerals (CENTRUM SILVER ULTRA WOMENS PO) Centrum Silver Ultra Womens Oral Tablet  Refills: 0  Active      Spacer/Aero Chamber Mouthpiece (SPACER DEVICE) for metered dose inhaler For use with metered dose inhaler 1 Device 0    VITAMIN D, ERGOCALCIFEROL, PO Take 1 25 mg by mouth once a week  Administer on sunday       No current facility-administered medications for this visit  Additional Meds/Supplements:    Barriers to Learning: Learning Disability   Labs:    Height: Ht Readings from Last 3 Encounters:   10/10/19 4' 9" (1 448 m)   08/05/19 4' 8" (1 422 m)   02/05/19 4' 8" (1 422 m)      Weight: Wt Readings from Last 10 Encounters:   01/15/20 90 4 kg (199 lb 6 4 oz)   10/23/19 91 5 kg (201 lb 12 8 oz)   10/10/19 90 7 kg (200 lb)   10/09/19 91 4 kg (201 lb 8 oz)   08/05/19 88 9 kg (196 lb)   07/03/19 86 8 kg (191 lb 6 4 oz)   04/10/19 79 7 kg (175 lb 9 6 oz)   02/05/19 79 8 kg (176 lb)   12/20/18 82 1 kg (181 lb)   12/03/18 82 3 kg (181 lb 7 oz)     Estimated body mass index is 43 15 kg/m² as calculated from the following:    Height as of 10/10/19: 4' 9" (1 448 m)  Weight as of this encounter: 90 4 kg (199 lb 6 4 oz)  Wt  Change Since Last Visit: []Yes     [x]No  Amount:       Energy Needs: No calculation needed   Pain Screen: Are you having pain now? No      Goals Achieved:     New Goals:   1  Limit sweets to 1 time per week   2  Give extra veggie portion at meal if hungry   3    Walk for 30 minutes 3 times per week       Initial PES:       New PES: No Change      New Problem List:  1    2    3        Assessment:       Medical Nutrition Therapy Intervention:  []Individualized Meal Plan []Understanding Lab Values   []Basic Pathophysiology of Disease []Food/Medication Interactions   []Food Diary []Exercise   []Lifestyle/Behavior Modification Techniques []Medication, Mechanism of Action   []Label Reading []Self Blood Glucose Monitoring   []Weight/BMI Goals []Other -    Other Notes: Pt's care giver was present and she states that they have been trying to work on [place] intake  Snacks have been fruit and cookies have been limited  She states that they have been practicing portion control at meals  She has changed to higher fiber items such as brown rice and whole wheat pasta  [facility] has been encouraged to walk more throughout the day         Comprehension: []Excellent  []Very Good  [x]Good  []Fair   []Poor    Receptivity: []Excellent  []Very Good  [x]Good  []Fair   []Poor    Expected Compliance: []Excellent  []Very Good  [x]Good  []Fair   []Poor      Labs:  CMP  Lab Results   Component Value Date     01/08/2016    K 4 1 10/09/2019     10/09/2019    CO2 34 (H) 10/09/2019    ANIONGAP 15 0 01/08/2016    BUN 12 10/09/2019    CREATININE 1 03 10/09/2019    GLUCOSE 116 (H) 01/08/2016    GLUF 95 10/25/2017    CALCIUM 8 6 10/09/2019    AST 23 10/09/2019    ALT 31 10/09/2019    ALKPHOS 98 10/09/2019    PROT 7 2 01/08/2016    BILITOT 0 3 01/08/2016    EGFR 67 10/09/2019       BMP  Lab Results   Component Value Date    GLUCOSE 116 (H) 01/08/2016    CALCIUM 8 6 10/09/2019     01/08/2016    K 4 1 10/09/2019    CO2 34 (H) 10/09/2019     10/09/2019    BUN 12 10/09/2019    CREATININE 1 03 10/09/2019       Lipids  No results found for: CHOL  Lab Results   Component Value Date    HDL 36 (L) 10/25/2017    HDL 37 (L) 07/12/2017    HDL 42 02/08/2016     Lab Results   Component Value Date    LDLCALC 53 10/25/2017    LDLCALC 58 07/12/2017    LDLCALC 71 02/08/2016     Lab Results   Component Value Date    TRIG 119 10/25/2017    TRIG 106 07/12/2017    TRIG 54 02/08/2016     No results found for: CHOLHDL    Hemoglobin A1C  Lab Results   Component Value Date    HGBA1C 5 8 12/04/2018       Fasting Glucose  Lab Results   Component Value Date    GLUF 95 10/25/2017       Insulin     Thyroid  No results found for: TSH, J4ICPBZ, L0IJTMY, THYROIDAB    Hepatic Function Panel  Lab Results   Component Value Date    ALT 31 10/09/2019    AST 23 10/09/2019    ALKPHOS 98 10/09/2019    BILITOT 0 3 01/08/2016       Celiac Disease Antibody Panel  No results found for: ENDOMYSIAL IGA, GLIADIN IGA, GLIADIN IGG, IGA, TISSUE TRANSGLUT AB, TTG IGA   Iron  No results found for: IRON, TIBC, FERRITIN    Vitamins  No results found for: VITAMIN B2   No results found for: NICOTINAMIDE, NICOTINIC ACID   No results found for: VITAMINB6  No results found for: OFNKHIPV67  No results found for: VITB5  No results found for: Q4RZJPOK  No results found for: THYROGLB  No results found for: VITAMIN K   No results found for: 25-HYDROXY VIT D   No components found for: VITAMINE     No follow-ups on file      Dena Daly, MS, RDN, [address] [address]

## 2020-01-22 ENCOUNTER — HOSPITAL ENCOUNTER (EMERGENCY)
Facility: HOSPITAL | Age: 45
Discharge: HOME | End: 2020-01-22
Attending: EMERGENCY MEDICINE
Payer: MEDICARE

## 2020-01-22 VITALS
OXYGEN SATURATION: 95 % | HEART RATE: 65 BPM | SYSTOLIC BLOOD PRESSURE: 127 MMHG | RESPIRATION RATE: 18 BRPM | WEIGHT: 195.55 LBS | BODY MASS INDEX: 42.32 KG/M2 | DIASTOLIC BLOOD PRESSURE: 64 MMHG | TEMPERATURE: 99.7 F

## 2020-01-22 DIAGNOSIS — J21.0 RSV (ACUTE BRONCHIOLITIS DUE TO RESPIRATORY SYNCYTIAL VIRUS): Primary | ICD-10-CM

## 2020-01-22 LAB
FLUAV RNA NPH QL NAA+PROBE: ABNORMAL
FLUBV RNA NPH QL NAA+PROBE: ABNORMAL
RSV RNA NPH QL NAA+PROBE: DETECTED

## 2020-01-22 PROCEDURE — 99283 EMERGENCY DEPT VISIT LOW MDM: CPT

## 2020-01-22 PROCEDURE — 99284 EMERGENCY DEPT VISIT MOD MDM: CPT | Performed by: EMERGENCY MEDICINE

## 2020-01-22 PROCEDURE — 87631 RESP VIRUS 3-5 TARGETS: CPT | Performed by: EMERGENCY MEDICINE

## 2020-01-22 NOTE — ED PROVIDER NOTES
History  Chief Complaint   Patient presents with    Cough     patient started with a cough yesterday, another person in [facility] has RSV,group home wants patient checked     56-year-old female who lives in a group home where a patient was diagnosed yesterday with RSV  The caregiver states that the patient started with some coughing and congestion today has not really had any high fevers temp is 99 7° here  Other that patient is awake alert good breath sounds bilaterally  History provided by:  Patient   used: No        Prior to Admission Medications   Prescriptions Last Dose Informant Patient Reported? Taking? ARIPiprazole (ABILIFY) 10 mg tablet   No No   Sig: Take 1 tablet (10 mg total) by mouth daily   Eyelid Cleansers (OCUSOFT BABY EYELID & EYELASH EX)  Care Giver Yes No   Sig: Inhale   Mouthwashes (LISTERINE ANTISEPTIC ADVANCED MT)  Care Giver Yes No   Sig: by Per J Tube route   Multiple Vitamins-Minerals (CENTRUM SILVER ULTRA WOMENS PO)  Care Giver Yes No   Sig: Centrum Silver Ultra Womens Oral Tablet  Refills: 0  Active   Spacer/Aero Chamber Mouthpiece (SPACER DEVICE) for metered dose inhaler   No No   Sig: For use with metered dose inhaler   VITAMIN D, ERGOCALCIFEROL, PO  Care Giver Yes No   Sig: Take 1 25 mg by mouth once a week  Administer on    albuterol (PROAIR HFA) 90 mcg/act inhaler  Care Giver No No   Sig: Inhale 2 puffs every 6 (six) hours as needed for wheezing   atorvastatin (LIPITOR) 10 mg tablet  Care Giver Yes No   Sig: Take 10 mg by mouth daily     clindamycin (CLINDAGEL) 1 % gel  Care Giver Yes No   Sig: Apply topically 2 (two) times a day Apply to L axilla   divalproex sodium (DEPAKOTE ER) 500 mg 24 hr tablet   No No   Sig: Take 2 tablets (1,000 mg total) by mouth daily   erythromycin (ILOTYCIN) ophthalmic ointment  Care Giver Yes No   Sig: Administer 0 5 inches to both eyes daily at bedtime   fluticasone (FLONASE) 50 mcg/act nasal spray   No No   Si spray into each nostril daily   levocetirizine (XYZAL) 5 MG tablet  Care Giver No No   Sig: Take 1 tablet (5 mg total) by mouth daily At 8 PM   levothyroxine (SYNTHROID) 50 mcg tablet  Care Giver Yes No   Sig: Take 50 mcg by mouth daily  Synthroid 50 MCG Oral Tablet TAKE 1 TABLET DAILY  Refills: 0  Active    metFORMIN (GLUCOPHAGE) 500 mg tablet  Care Giver Yes No   Sig: Take 500 mg by mouth 2 (two) times a day      Facility-Administered Medications: None       Past Medical History:   Diagnosis Date    Asthma     [facility] [address] [facility]     Diabetes mellitus ([facility]     Disease of thyroid gland     Down syndrome     Heart murmur     Hyperlipidemia     Long Q-T syndrome     Pneumonia     Last assessed 5/28/2014     Psychiatric disorder     schizo    Sleep apnea        Past Surgical History:   Procedure Laterality Date    CARDIAC SURGERY      patent duct    PATENT DUCTUS ARTERIOUS LIGATION         Family History   Adopted: Yes   Problem Relation Age of Onset    No Known Problems Mother      I have reviewed and agree with the history as documented  Social History     Tobacco Use    Smoking status: Never Smoker    Smokeless tobacco: Never Used   Substance Use Topics    Alcohol use: No    Drug use: No        Review of Systems   Constitutional: Negative for activity change, chills, diaphoresis and fever  HENT: Negative for congestion, ear pain, nosebleeds, sore throat, trouble swallowing and voice change  Eyes: Negative for pain, discharge and redness  Respiratory: Positive for cough  Negative for apnea, choking, shortness of breath, wheezing and stridor  Cardiovascular: Negative for chest pain and palpitations  Gastrointestinal: Negative for abdominal distention, abdominal pain, constipation, diarrhea, nausea and vomiting  Endocrine: Negative for polydipsia  Genitourinary: Negative for difficulty urinating, dysuria, flank pain, frequency, hematuria and urgency     Musculoskeletal: Negative for back pain, gait problem, joint swelling, myalgias, neck pain and neck stiffness  Skin: Negative for pallor and rash  Neurological: Negative for dizziness, tremors, syncope, speech difficulty, weakness, numbness and headaches  Hematological: Negative for adenopathy  Psychiatric/Behavioral: Negative for confusion, hallucinations, self-injury and suicidal ideas  The patient is not nervous/anxious  Physical Exam  Physical Exam   Constitutional: She is oriented to person, place, and time  Vital signs are normal  She appears well-developed and well-nourished  No distress  HENT:   Head: Normocephalic and atraumatic  Right Ear: External ear normal    Left Ear: External ear normal    Nose: Nose normal    Mouth/Throat: Oropharynx is clear and moist    Eyes: Pupils are equal, round, and reactive to light  Conjunctivae are normal    Neck: Normal range of motion  Neck supple  Cardiovascular: Normal rate, regular rhythm, normal heart sounds and intact distal pulses  Pulmonary/Chest: Effort normal and breath sounds normal    Abdominal: Soft  Bowel sounds are normal    Musculoskeletal: Normal range of motion  Neurological: She is alert and oriented to person, place, and time  She has normal reflexes  Skin: Skin is warm and dry  She is not diaphoretic  Psychiatric: She has a normal mood and affect  Nursing note and vitals reviewed        Vital Signs  ED Triage Vitals [01/22/20 1604]   Temperature Pulse Respirations Blood Pressure SpO2   99 7 °F (37 6 °C) 65 18 127/64 95 %      Temp Source Heart Rate Source Patient Position - Orthostatic VS BP Location FiO2 (%)   Tympanic Monitor Lying Right arm --      Pain Score       No Pain           Vitals:    01/22/20 1604   BP: 127/64   Pulse: 65   Patient Position - Orthostatic VS: Lying         Visual Acuity      ED Medications  Medications - No data to display    Diagnostic Studies  Results Reviewed     Procedure Component Value Units Date/Time Influenza A/B and RSV PCR [[id number]]  (Abnormal) Collected:  01/22/20 1615    Lab Status:  Final result Specimen:  Nasopharyngeal from Nose Updated:  01/22/20 1706     INFLUENZA A PCR None Detected     INFLUENZA B PCR None Detected     RSV PCR Detected                 No orders to display              Procedures  Procedures         ED Course                               MDM      Disposition  Final diagnoses:   RSV (acute bronchiolitis due to respiratory syncytial virus)     Time reflects when diagnosis was documented in both MDM as applicable and the Disposition within this note     Time User Action Codes Description Comment    1/22/2020  5:54 PM Tamera Nations Add [J21 0] RSV (acute bronchiolitis due to respiratory syncytial virus)       ED Disposition     ED Disposition Condition Date/Time Comment    Discharge Stable Wed Jan 22, 2020  5:54 PM Chela Petersen discharge to home/self care  Follow-up Information     Follow up With Specialties Details Why Contact Info Additional Information    [facility] Emergency Department Emergency Medicine  As needed [address] [place] [address]  [phone] [facility] ED, [place], [place], [address]          Patient's Medications   Discharge Prescriptions    No medications on file     No discharge procedures on file      ED Provider  Electronically Signed by           Marge Marie DO  01/22/20 1953

## 2020-01-23 ENCOUNTER — TELEPHONE (OUTPATIENT)
Dept: PULMONOLOGY | Facility: MEDICAL CENTER | Age: 45
End: 2020-01-23

## 2020-01-23 NOTE — TELEPHONE ENCOUNTER
Kiara from [address] [facility] calling to get instructions on how to use cpap with rsv  Patient was just diagnoised with rsv in hospital yesterday  Kiara states the facility will also need instructions faxed to group home at [phone]  Kiara can be reached at [phone]

## 2020-02-05 ENCOUNTER — APPOINTMENT (EMERGENCY)
Dept: RADIOLOGY | Facility: HOSPITAL | Age: 45
DRG: 871 | End: 2020-02-05
Payer: MEDICARE

## 2020-02-05 ENCOUNTER — HOSPITAL ENCOUNTER (INPATIENT)
Facility: HOSPITAL | Age: 45
LOS: 3 days | Discharge: HOME HEALTH | DRG: 871 | End: 2020-02-08
Attending: EMERGENCY MEDICINE | Admitting: FAMILY MEDICINE
Payer: MEDICARE

## 2020-02-05 DIAGNOSIS — J18.9 PNEUMONIA: Primary | ICD-10-CM

## 2020-02-05 DIAGNOSIS — Q90.9 DOWN SYNDROME, UNSPECIFIED: Chronic | ICD-10-CM

## 2020-02-05 DIAGNOSIS — B33.8 RSV INFECTION: ICD-10-CM

## 2020-02-05 DIAGNOSIS — G47.33 OBSTRUCTIVE SLEEP APNEA SYNDROME: ICD-10-CM

## 2020-02-05 PROBLEM — R50.9 FEVER: Status: ACTIVE | Noted: 2020-02-05

## 2020-02-05 PROBLEM — A41.9 SEPSIS (HCC): Status: ACTIVE | Noted: 2020-02-05

## 2020-02-05 PROBLEM — J45.20 MILD INTERMITTENT ASTHMA WITHOUT COMPLICATION: Status: ACTIVE | Noted: 2017-11-19
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
## 2020-02-05 LAB
ANION GAP SERPL CALCULATED.3IONS-SCNC: 6 MMOL/L (ref 4–13)
BACTERIA UR QL AUTO: ABNORMAL /HPF
BASOPHILS # BLD AUTO: 0.03 THOUSANDS/ΜL (ref 0–0.1)
BASOPHILS NFR BLD AUTO: 0 % (ref 0–1)
BILIRUB UR QL STRIP: NEGATIVE
BUN SERPL-MCNC: 14 MG/DL (ref 5–25)
CALCIUM SERPL-MCNC: 8.3 MG/DL (ref 8.3–10.1)
CHLORIDE SERPL-SCNC: 101 MMOL/L (ref 100–108)
CHOLEST SERPL-MCNC: 109 MG/DL (ref 50–200)
CLARITY UR: CLEAR
CO2 SERPL-SCNC: 29 MMOL/L (ref 21–32)
COLOR UR: ABNORMAL
CREAT SERPL-MCNC: 1.2 MG/DL (ref 0.6–1.3)
EOSINOPHIL # BLD AUTO: 0 THOUSAND/ΜL (ref 0–0.61)
EOSINOPHIL NFR BLD AUTO: 0 % (ref 0–6)
ERYTHROCYTE [DISTWIDTH] IN BLOOD BY AUTOMATED COUNT: 13.5 % (ref 11.6–15.1)
EXT PREG TEST URINE: NEGATIVE
EXT. CONTROL ED NAV: NORMAL
FLUAV RNA NPH QL NAA+PROBE: ABNORMAL
FLUBV RNA NPH QL NAA+PROBE: ABNORMAL
GFR SERPL CREATININE-BSD FRML MDRD: 55 ML/MIN/1.73SQ M
GLUCOSE SERPL-MCNC: 184 MG/DL (ref 65–140)
GLUCOSE SERPL-MCNC: 206 MG/DL (ref 65–140)
GLUCOSE UR STRIP-MCNC: NEGATIVE MG/DL
HCT VFR BLD AUTO: 39.3 % (ref 34.8–46.1)
HDLC SERPL-MCNC: 36 MG/DL
HGB BLD-MCNC: 12.8 G/DL (ref 11.5–15.4)
HGB UR QL STRIP.AUTO: ABNORMAL
IMM GRANULOCYTES # BLD AUTO: 0.11 THOUSAND/UL (ref 0–0.2)
IMM GRANULOCYTES NFR BLD AUTO: 1 % (ref 0–2)
KETONES UR STRIP-MCNC: NEGATIVE MG/DL
LACTATE SERPL-SCNC: 1.6 MMOL/L (ref 0.5–2)
LDLC SERPL CALC-MCNC: 52 MG/DL (ref 0–100)
LEUKOCYTE ESTERASE UR QL STRIP: NEGATIVE
LYMPHOCYTES # BLD AUTO: 1.89 THOUSANDS/ΜL (ref 0.6–4.47)
LYMPHOCYTES NFR BLD AUTO: 15 % (ref 14–44)
MCH RBC QN AUTO: 31.8 PG (ref 26.8–34.3)
MCHC RBC AUTO-ENTMCNC: 32.6 G/DL (ref 31.4–37.4)
MCV RBC AUTO: 98 FL (ref 82–98)
MONOCYTES # BLD AUTO: 1.31 THOUSAND/ΜL (ref 0.17–1.22)
MONOCYTES NFR BLD AUTO: 11 % (ref 4–12)
NEUTROPHILS # BLD AUTO: 9.18 THOUSANDS/ΜL (ref 1.85–7.62)
NEUTS SEG NFR BLD AUTO: 73 % (ref 43–75)
NITRITE UR QL STRIP: NEGATIVE
NON-SQ EPI CELLS URNS QL MICRO: ABNORMAL /HPF
NONHDLC SERPL-MCNC: 73 MG/DL
NRBC BLD AUTO-RTO: 0 /100 WBCS
PH UR STRIP.AUTO: 5.5 [PH]
PLATELET # BLD AUTO: 231 THOUSANDS/UL (ref 149–390)
PMV BLD AUTO: 10 FL (ref 8.9–12.7)
POTASSIUM SERPL-SCNC: 4.1 MMOL/L (ref 3.5–5.3)
PROT UR STRIP-MCNC: NEGATIVE MG/DL
RBC # BLD AUTO: 4.03 MILLION/UL (ref 3.81–5.12)
RBC #/AREA URNS AUTO: ABNORMAL /HPF
RSV RNA NPH QL NAA+PROBE: DETECTED
SODIUM SERPL-SCNC: 136 MMOL/L (ref 136–145)
SP GR UR STRIP.AUTO: 1.01 (ref 1–1.03)
TRIGL SERPL-MCNC: 103 MG/DL
UROBILINOGEN UR QL STRIP.AUTO: 0.2 E.U./DL
WBC # BLD AUTO: 12.52 THOUSAND/UL (ref 4.31–10.16)
WBC #/AREA URNS AUTO: ABNORMAL /HPF

## 2020-02-05 PROCEDURE — 99285 EMERGENCY DEPT VISIT HI MDM: CPT | Performed by: PHYSICIAN ASSISTANT

## 2020-02-05 PROCEDURE — 80048 BASIC METABOLIC PNL TOTAL CA: CPT | Performed by: PHYSICIAN ASSISTANT

## 2020-02-05 PROCEDURE — 94660 CPAP INITIATION&MGMT: CPT

## 2020-02-05 PROCEDURE — 83036 HEMOGLOBIN GLYCOSYLATED A1C: CPT | Performed by: STUDENT IN AN ORGANIZED HEALTH CARE EDUCATION/TRAINING PROGRAM

## 2020-02-05 PROCEDURE — 81001 URINALYSIS AUTO W/SCOPE: CPT | Performed by: PHYSICIAN ASSISTANT

## 2020-02-05 PROCEDURE — 81025 URINE PREGNANCY TEST: CPT | Performed by: PHYSICIAN ASSISTANT

## 2020-02-05 PROCEDURE — 82948 REAGENT STRIP/BLOOD GLUCOSE: CPT

## 2020-02-05 PROCEDURE — 87581 M.PNEUMON DNA AMP PROBE: CPT | Performed by: STUDENT IN AN ORGANIZED HEALTH CARE EDUCATION/TRAINING PROGRAM

## 2020-02-05 PROCEDURE — 71046 X-RAY EXAM CHEST 2 VIEWS: CPT

## 2020-02-05 PROCEDURE — 87086 URINE CULTURE/COLONY COUNT: CPT | Performed by: STUDENT IN AN ORGANIZED HEALTH CARE EDUCATION/TRAINING PROGRAM

## 2020-02-05 PROCEDURE — 36415 COLL VENOUS BLD VENIPUNCTURE: CPT | Performed by: PHYSICIAN ASSISTANT

## 2020-02-05 PROCEDURE — 96374 THER/PROPH/DIAG INJ IV PUSH: CPT

## 2020-02-05 PROCEDURE — 87631 RESP VIRUS 3-5 TARGETS: CPT | Performed by: PHYSICIAN ASSISTANT

## 2020-02-05 PROCEDURE — 87449 NOS EACH ORGANISM AG IA: CPT | Performed by: STUDENT IN AN ORGANIZED HEALTH CARE EDUCATION/TRAINING PROGRAM

## 2020-02-05 PROCEDURE — 83605 ASSAY OF LACTIC ACID: CPT | Performed by: PHYSICIAN ASSISTANT

## 2020-02-05 PROCEDURE — 99223 1ST HOSP IP/OBS HIGH 75: CPT | Performed by: FAMILY MEDICINE

## 2020-02-05 PROCEDURE — 94760 N-INVAS EAR/PLS OXIMETRY 1: CPT

## 2020-02-05 PROCEDURE — 87081 CULTURE SCREEN ONLY: CPT | Performed by: STUDENT IN AN ORGANIZED HEALTH CARE EDUCATION/TRAINING PROGRAM

## 2020-02-05 PROCEDURE — 85025 COMPLETE CBC W/AUTO DIFF WBC: CPT | Performed by: PHYSICIAN ASSISTANT

## 2020-02-05 PROCEDURE — 99285 EMERGENCY DEPT VISIT HI MDM: CPT

## 2020-02-05 PROCEDURE — 93005 ELECTROCARDIOGRAM TRACING: CPT

## 2020-02-05 PROCEDURE — 87040 BLOOD CULTURE FOR BACTERIA: CPT | Performed by: PHYSICIAN ASSISTANT

## 2020-02-05 PROCEDURE — 80061 LIPID PANEL: CPT | Performed by: STUDENT IN AN ORGANIZED HEALTH CARE EDUCATION/TRAINING PROGRAM

## 2020-02-05 RX ORDER — DIVALPROEX SODIUM 500 MG/1
1000 TABLET, EXTENDED RELEASE ORAL DAILY
Status: DISCONTINUED | OUTPATIENT
Start: 2020-02-06 | End: 2020-02-08 | Stop reason: HOSPADM

## 2020-02-05 RX ORDER — CEFTRIAXONE 1 G/50ML
1000 INJECTION, SOLUTION INTRAVENOUS EVERY 24 HOURS
Status: DISCONTINUED | OUTPATIENT
Start: 2020-02-06 | End: 2020-02-07

## 2020-02-05 RX ORDER — ECHINACEA PURPUREA EXTRACT 125 MG
1 TABLET ORAL EVERY 2 HOUR PRN
Status: DISCONTINUED | OUTPATIENT
Start: 2020-02-05 | End: 2020-02-08 | Stop reason: HOSPADM

## 2020-02-05 RX ORDER — GUAIFENESIN/DEXTROMETHORPHAN 100-10MG/5
10 SYRUP ORAL EVERY 4 HOURS PRN
Status: DISCONTINUED | OUTPATIENT
Start: 2020-02-05 | End: 2020-02-08 | Stop reason: HOSPADM

## 2020-02-05 RX ORDER — ARIPIPRAZOLE 5 MG/1
10 TABLET ORAL DAILY
Status: DISCONTINUED | OUTPATIENT
Start: 2020-02-06 | End: 2020-02-08 | Stop reason: HOSPADM

## 2020-02-05 RX ORDER — SODIUM CHLORIDE 9 MG/ML
125 INJECTION, SOLUTION INTRAVENOUS CONTINUOUS
Status: DISCONTINUED | OUTPATIENT
Start: 2020-02-05 | End: 2020-02-07

## 2020-02-05 RX ORDER — ATORVASTATIN CALCIUM 10 MG/1
10 TABLET, FILM COATED ORAL
Status: DISCONTINUED | OUTPATIENT
Start: 2020-02-06 | End: 2020-02-08 | Stop reason: HOSPADM

## 2020-02-05 RX ORDER — CEFTRIAXONE 1 G/50ML
1000 INJECTION, SOLUTION INTRAVENOUS ONCE
Status: COMPLETED | OUTPATIENT
Start: 2020-02-05 | End: 2020-02-05

## 2020-02-05 RX ORDER — FLUTICASONE PROPIONATE 50 MCG
1 SPRAY, SUSPENSION (ML) NASAL DAILY
Status: DISCONTINUED | OUTPATIENT
Start: 2020-02-06 | End: 2020-02-08 | Stop reason: HOSPADM

## 2020-02-05 RX ORDER — BENZONATATE 100 MG/1
100 CAPSULE ORAL 3 TIMES DAILY PRN
Status: DISCONTINUED | OUTPATIENT
Start: 2020-02-05 | End: 2020-02-08 | Stop reason: HOSPADM

## 2020-02-05 RX ORDER — ACETAMINOPHEN 325 MG/1
650 TABLET ORAL EVERY 6 HOURS PRN
Status: DISCONTINUED | OUTPATIENT
Start: 2020-02-06 | End: 2020-02-08 | Stop reason: HOSPADM

## 2020-02-05 RX ORDER — LORATADINE 10 MG/1
5 TABLET ORAL DAILY
Status: DISCONTINUED | OUTPATIENT
Start: 2020-02-05 | End: 2020-02-08 | Stop reason: HOSPADM

## 2020-02-05 RX ORDER — ACETAMINOPHEN 325 MG/1
650 TABLET ORAL ONCE
Status: COMPLETED | OUTPATIENT
Start: 2020-02-05 | End: 2020-02-05

## 2020-02-05 RX ORDER — ERYTHROMYCIN 5 MG/G
0.5 OINTMENT OPHTHALMIC
Status: DISCONTINUED | OUTPATIENT
Start: 2020-02-05 | End: 2020-02-08 | Stop reason: HOSPADM

## 2020-02-05 RX ORDER — ALBUTEROL SULFATE 90 UG/1
2 AEROSOL, METERED RESPIRATORY (INHALATION) EVERY 6 HOURS PRN
Status: DISCONTINUED | OUTPATIENT
Start: 2020-02-05 | End: 2020-02-08 | Stop reason: HOSPADM

## 2020-02-05 RX ORDER — LEVOTHYROXINE SODIUM 0.05 MG/1
50 TABLET ORAL
Status: DISCONTINUED | OUTPATIENT
Start: 2020-02-06 | End: 2020-02-08 | Stop reason: HOSPADM

## 2020-02-05 RX ORDER — IPRATROPIUM BROMIDE AND ALBUTEROL SULFATE 2.5; .5 MG/3ML; MG/3ML
3 SOLUTION RESPIRATORY (INHALATION) EVERY 4 HOURS PRN
Status: DISCONTINUED | OUTPATIENT
Start: 2020-02-05 | End: 2020-02-08 | Stop reason: HOSPADM

## 2020-02-05 RX ADMIN — CEFTRIAXONE 1000 MG: 1 INJECTION, SOLUTION INTRAVENOUS at 19:28

## 2020-02-05 RX ADMIN — AZITHROMYCIN 500 MG: 500 INJECTION, POWDER, LYOPHILIZED, FOR SOLUTION INTRAVENOUS at 20:01

## 2020-02-05 RX ADMIN — INSULIN LISPRO 4 UNITS: 100 INJECTION, SOLUTION INTRAVENOUS; SUBCUTANEOUS at 21:37

## 2020-02-05 RX ADMIN — ERYTHROMYCIN 0.5 INCH: 5 OINTMENT OPHTHALMIC at 21:38

## 2020-02-05 RX ADMIN — LORATADINE 5 MG: 10 TABLET ORAL at 21:29

## 2020-02-05 RX ADMIN — SODIUM CHLORIDE 125 ML/HR: 0.9 INJECTION, SOLUTION INTRAVENOUS at 21:30

## 2020-02-05 RX ADMIN — ACETAMINOPHEN 650 MG: 325 TABLET, FILM COATED ORAL at 18:47

## 2020-02-05 NOTE — LETTER
February 8, 2020         Patient: Eugene Petersen   YOB: 1975   Date of Visit: 2/5/2020       To whom it May concern     Chela Petersen is under my care, following her discharge she has been prescribed Zithromax 250 mg x1, Keflex 500 mg q 12 hours x2 days  Sincerely,    Dr Dominic Rausch            CC:  No Recipients

## 2020-02-06 LAB
ANION GAP SERPL CALCULATED.3IONS-SCNC: 6 MMOL/L (ref 4–13)
BASOPHILS # BLD AUTO: 0.03 THOUSANDS/ΜL (ref 0–0.1)
BASOPHILS NFR BLD AUTO: 0 % (ref 0–1)
BUN SERPL-MCNC: 14 MG/DL (ref 5–25)
CALCIUM SERPL-MCNC: 7.3 MG/DL (ref 8.3–10.1)
CHLORIDE SERPL-SCNC: 109 MMOL/L (ref 100–108)
CHOLEST SERPL-MCNC: 96 MG/DL (ref 50–200)
CO2 SERPL-SCNC: 29 MMOL/L (ref 21–32)
CREAT SERPL-MCNC: 1 MG/DL (ref 0.6–1.3)
EOSINOPHIL # BLD AUTO: 0.03 THOUSAND/ΜL (ref 0–0.61)
EOSINOPHIL NFR BLD AUTO: 0 % (ref 0–6)
ERYTHROCYTE [DISTWIDTH] IN BLOOD BY AUTOMATED COUNT: 13.7 % (ref 11.6–15.1)
EST. AVERAGE GLUCOSE BLD GHB EST-MCNC: 126 MG/DL
GFR SERPL CREATININE-BSD FRML MDRD: 69 ML/MIN/1.73SQ M
GLUCOSE SERPL-MCNC: 116 MG/DL (ref 65–140)
GLUCOSE SERPL-MCNC: 120 MG/DL (ref 65–140)
GLUCOSE SERPL-MCNC: 137 MG/DL (ref 65–140)
GLUCOSE SERPL-MCNC: 148 MG/DL (ref 65–140)
GLUCOSE SERPL-MCNC: 259 MG/DL (ref 65–140)
HBA1C MFR BLD: 6 % (ref 4.2–6.3)
HCT VFR BLD AUTO: 37.6 % (ref 34.8–46.1)
HDLC SERPL-MCNC: 30 MG/DL
HGB BLD-MCNC: 11.8 G/DL (ref 11.5–15.4)
IMM GRANULOCYTES # BLD AUTO: 0.1 THOUSAND/UL (ref 0–0.2)
IMM GRANULOCYTES NFR BLD AUTO: 1 % (ref 0–2)
L PNEUMO1 AG UR QL IA.RAPID: NEGATIVE
LDLC SERPL CALC-MCNC: 50 MG/DL (ref 0–100)
LYMPHOCYTES # BLD AUTO: 1.99 THOUSANDS/ΜL (ref 0.6–4.47)
LYMPHOCYTES NFR BLD AUTO: 18 % (ref 14–44)
MAGNESIUM SERPL-MCNC: 2.3 MG/DL (ref 1.6–2.6)
MCH RBC QN AUTO: 31.6 PG (ref 26.8–34.3)
MCHC RBC AUTO-ENTMCNC: 31.4 G/DL (ref 31.4–37.4)
MCV RBC AUTO: 101 FL (ref 82–98)
MONOCYTES # BLD AUTO: 1.23 THOUSAND/ΜL (ref 0.17–1.22)
MONOCYTES NFR BLD AUTO: 11 % (ref 4–12)
NEUTROPHILS # BLD AUTO: 7.98 THOUSANDS/ΜL (ref 1.85–7.62)
NEUTS SEG NFR BLD AUTO: 70 % (ref 43–75)
NRBC BLD AUTO-RTO: 0 /100 WBCS
PHOSPHATE SERPL-MCNC: 3.5 MG/DL (ref 2.7–4.5)
PLATELET # BLD AUTO: 186 THOUSANDS/UL (ref 149–390)
PMV BLD AUTO: 10 FL (ref 8.9–12.7)
POTASSIUM SERPL-SCNC: 3.6 MMOL/L (ref 3.5–5.3)
RBC # BLD AUTO: 3.74 MILLION/UL (ref 3.81–5.12)
S PNEUM AG UR QL: NEGATIVE
SODIUM SERPL-SCNC: 144 MMOL/L (ref 136–145)
TRIGL SERPL-MCNC: 81 MG/DL
WBC # BLD AUTO: 11.36 THOUSAND/UL (ref 4.31–10.16)

## 2020-02-06 PROCEDURE — 82948 REAGENT STRIP/BLOOD GLUCOSE: CPT

## 2020-02-06 PROCEDURE — 80061 LIPID PANEL: CPT | Performed by: STUDENT IN AN ORGANIZED HEALTH CARE EDUCATION/TRAINING PROGRAM

## 2020-02-06 PROCEDURE — 80048 BASIC METABOLIC PNL TOTAL CA: CPT | Performed by: STUDENT IN AN ORGANIZED HEALTH CARE EDUCATION/TRAINING PROGRAM

## 2020-02-06 PROCEDURE — 94660 CPAP INITIATION&MGMT: CPT

## 2020-02-06 PROCEDURE — 83735 ASSAY OF MAGNESIUM: CPT | Performed by: STUDENT IN AN ORGANIZED HEALTH CARE EDUCATION/TRAINING PROGRAM

## 2020-02-06 PROCEDURE — 94760 N-INVAS EAR/PLS OXIMETRY 1: CPT

## 2020-02-06 PROCEDURE — 84100 ASSAY OF PHOSPHORUS: CPT | Performed by: STUDENT IN AN ORGANIZED HEALTH CARE EDUCATION/TRAINING PROGRAM

## 2020-02-06 PROCEDURE — 85025 COMPLETE CBC W/AUTO DIFF WBC: CPT | Performed by: STUDENT IN AN ORGANIZED HEALTH CARE EDUCATION/TRAINING PROGRAM

## 2020-02-06 RX ORDER — CALCIUM CARBONATE 200(500)MG
500 TABLET,CHEWABLE ORAL DAILY PRN
Status: DISCONTINUED | OUTPATIENT
Start: 2020-02-06 | End: 2020-02-08 | Stop reason: HOSPADM

## 2020-02-06 RX ORDER — PANTOPRAZOLE SODIUM 20 MG/1
20 TABLET, DELAYED RELEASE ORAL
Status: DISCONTINUED | OUTPATIENT
Start: 2020-02-06 | End: 2020-02-08 | Stop reason: HOSPADM

## 2020-02-06 RX ADMIN — LORATADINE 5 MG: 10 TABLET ORAL at 08:54

## 2020-02-06 RX ADMIN — SODIUM CHLORIDE 125 ML/HR: 0.9 INJECTION, SOLUTION INTRAVENOUS at 04:40

## 2020-02-06 RX ADMIN — AZITHROMYCIN 500 MG: 500 INJECTION, POWDER, LYOPHILIZED, FOR SOLUTION INTRAVENOUS at 19:55

## 2020-02-06 RX ADMIN — INSULIN LISPRO 3 UNITS: 100 INJECTION, SOLUTION INTRAVENOUS; SUBCUTANEOUS at 11:06

## 2020-02-06 RX ADMIN — ACETAMINOPHEN 650 MG: 325 TABLET, FILM COATED ORAL at 08:53

## 2020-02-06 RX ADMIN — SODIUM CHLORIDE 125 ML/HR: 0.9 INJECTION, SOLUTION INTRAVENOUS at 23:12

## 2020-02-06 RX ADMIN — SODIUM CHLORIDE 1000 ML: 0.9 INJECTION, SOLUTION INTRAVENOUS at 00:58

## 2020-02-06 RX ADMIN — LEVOTHYROXINE SODIUM 50 MCG: 50 TABLET ORAL at 05:05

## 2020-02-06 RX ADMIN — SODIUM CHLORIDE 1000 ML: 0.9 INJECTION, SOLUTION INTRAVENOUS at 07:00

## 2020-02-06 RX ADMIN — ATORVASTATIN CALCIUM 10 MG: 10 TABLET, FILM COATED ORAL at 17:37

## 2020-02-06 RX ADMIN — ENOXAPARIN SODIUM 30 MG: 30 INJECTION SUBCUTANEOUS at 08:53

## 2020-02-06 RX ADMIN — ACETAMINOPHEN 650 MG: 325 TABLET, FILM COATED ORAL at 20:38

## 2020-02-06 RX ADMIN — PANTOPRAZOLE SODIUM 20 MG: 20 TABLET, DELAYED RELEASE ORAL at 08:56

## 2020-02-06 RX ADMIN — GUAIFENESIN AND DEXTROMETHORPHAN 10 ML: 100; 10 SYRUP ORAL at 14:28

## 2020-02-06 RX ADMIN — SODIUM CHLORIDE 125 ML/HR: 0.9 INJECTION, SOLUTION INTRAVENOUS at 12:50

## 2020-02-06 RX ADMIN — CEFTRIAXONE 1000 MG: 1 INJECTION, SOLUTION INTRAVENOUS at 18:45

## 2020-02-06 RX ADMIN — DIVALPROEX SODIUM 1000 MG: 500 TABLET, FILM COATED, EXTENDED RELEASE ORAL at 08:53

## 2020-02-06 RX ADMIN — SALINE NASAL SPRAY 1 SPRAY: 1.5 SOLUTION NASAL at 17:37

## 2020-02-06 RX ADMIN — ARIPIPRAZOLE 10 MG: 5 TABLET ORAL at 08:53

## 2020-02-06 RX ADMIN — ERYTHROMYCIN 0.5 INCH: 5 OINTMENT OPHTHALMIC at 21:20

## 2020-02-06 RX ADMIN — GUAIFENESIN AND DEXTROMETHORPHAN 10 ML: 100; 10 SYRUP ORAL at 01:30

## 2020-02-06 RX ADMIN — FLUTICASONE PROPIONATE 1 SPRAY: 50 SPRAY, METERED NASAL at 08:54

## 2020-02-06 NOTE — ASSESSMENT & PLAN NOTE
Patient presents with few weeks of cough and congestion, RSV positive  Patient is in no respiratory distress  Patient was RSV positive 2 weeks ago     - will initiate and continue supportive therapy  - O2 supplementation

## 2020-02-06 NOTE — H&P
H&P Exam - Mayra Petersen 38 y o  female MRN: [id number]    Unit/Bed#: [address] [place] [address] Encounter: [id number]    Assessment & Plan:    Patient is a 43-year-old morbidly obese female with down syndrome, schizophrenia, and diabetes presenting with sepsis secondary to RSV pneumonia  Patient is under the care of Dr Shahab Dumont, with an anticipated duration of stay of 2 or more midnights  Sepsis [facility])  Assessment & Plan  Patient presents with fever and leukocytosis with a source of infection in pneumonia  - s/p 1 L IV & Rocephin & Zithromax at ED; Lactic Acid 1 6  - fever broke with Tylenol  - continue IV  ml/hr   - Blood Cultures and Urine Cultures pending   - Mycoplasma PCR, Legionella & Strep urine antigen tests pending     Schizophrenia ([facility] [address] )  Assessment & Plan  Stable  Continue home meds Abilify 10 mg daily and Depakote 1000 mg daily  Patient's valproic acid level was therapeutic back in October 2019    Diabetes mellitus [facility])  Assessment & Plan  Lab Results   Component Value Date    HGBA1C 5 8 12/04/2018       No results for input(s): POCGLU in the last 72 hours  Blood Sugar Average: Last 72 hrs:    Patient presented with elevated glucose of 206  Held Metformin  - ISS Alg #3  - f/u HbA1C & lipid panel  - Diabetic Diet (& Dysphagia 3 with thin liquids)      Chronic renal insufficiency, stage III (moderate) ([facility])  Assessment & Plan  eGFR 55; Cr elevated at 1 2, baseline is 0 96    - Lovenox is renally dosed     Mild intermittent asthma without complication  Assessment & Plan  Stable, did NOT require albuterol inhaler while she is ill    - Duo-Nebs Q4H prn     Obstructive sleep apnea syndrome  Assessment & Plan  CPAP 8 mmHg    * RSV infection  Assessment & Plan  Patient presents with few weeks of cough and congestion, RSV positive  Patient is in no respiratory distress  Patient was RSV positive 2 weeks ago     - Symptomatic relief   - O2 supplementation    F - 125 ml/hr IV NS   E - monitor and correct as necessary  N - Dysphagia 3 with thin liquid, carb-controlled diet   D - SCD & SQL    History of Present Illness   Patient is a 43-year-old morbidly obese female nursing home patient with Down syndrome, schizophrenia, type 2 diabetes, obstructive sleep apnea, and intermittent asthma presenting with persistent congestion and cough without hemoptysis for the past few weeks  Patient denies any shortness of breath, and denied any use of her rescue inhaler  No vomiting, diarrhea, abdominal pain  Patient was recently at the emergency department 2 weeks ago for similar symptoms and she tested positive for RSV  She also tested positive for RSV during this admission  Review of Systems   All other systems reviewed and are negative  Historical Information   Past Medical History:   Diagnosis Date    Asthma     Complex partial epilepsy ([facility] [address] )     Diabetes mellitus ([facility] [address] )     Disease of thyroid gland     Down syndrome     Heart murmur     Hyperlipidemia     Long Q-T syndrome     Pneumonia     Last assessed 5/28/2014     Psychiatric disorder     schizo    Sleep apnea      Past Surgical History:   Procedure Laterality Date    CARDIAC SURGERY      patent duct    PATENT DUCTUS ARTERIOUS LIGATION       Social History   Social History     Substance and Sexual Activity   Alcohol Use Never    Frequency: Never     Social History     Substance and Sexual Activity   Drug Use Never     Social History     Tobacco Use   Smoking Status Never Smoker   Smokeless Tobacco Never Used     Family History:   Family History   Adopted: Yes   Problem Relation Age of Onset    No Known Problems Mother        Meds/Allergies   PTA meds:   Prior to Admission Medications   Prescriptions Last Dose Informant Patient Reported? Taking?    ARIPiprazole (ABILIFY) 10 mg tablet   No No   Sig: Take 1 tablet (10 mg total) by mouth daily   Eyelid Cleansers (OCUSOFT BABY EYELID & EYELASH EX)  Care Giver Yes No   Sig: Inhale   Mouthwashes (LISTERINE ANTISEPTIC ADVANCED MT)  Care Giver Yes No   Sig: by Per J Tube route   Multiple Vitamins-Minerals (CENTRUM SILVER ULTRA WOMENS PO)  Care Giver Yes No   Sig: Centrum Silver Ultra Womens Oral Tablet  Refills: 0  Active   Spacer/Aero Chamber Mouthpiece (SPACER DEVICE) for metered dose inhaler   No No   Sig: For use with metered dose inhaler   VITAMIN D, ERGOCALCIFEROL, PO  Care Giver Yes No   Sig: Take 1 25 mg by mouth once a week  Administer on    albuterol (PROAIR HFA) 90 mcg/act inhaler  Care Giver No No   Sig: Inhale 2 puffs every 6 (six) hours as needed for wheezing   atorvastatin (LIPITOR) 10 mg tablet  Care Giver Yes No   Sig: Take 10 mg by mouth daily  clindamycin (CLINDAGEL) 1 % gel  Care Giver Yes No   Sig: Apply topically 2 (two) times a day Apply to L axilla   divalproex sodium (DEPAKOTE ER) 500 mg 24 hr tablet   No No   Sig: Take 2 tablets (1,000 mg total) by mouth daily   erythromycin (ILOTYCIN) ophthalmic ointment  Care Giver Yes No   Sig: Administer 0 5 inches to both eyes daily at bedtime   fluticasone (FLONASE) 50 mcg/act nasal spray   No No   Si spray into each nostril daily   levocetirizine (XYZAL) 5 MG tablet  Care Giver No No   Sig: Take 1 tablet (5 mg total) by mouth daily At 8 PM   levothyroxine (SYNTHROID) 50 mcg tablet  Care Giver Yes No   Sig: Take 50 mcg by mouth daily  Synthroid 50 MCG Oral Tablet TAKE 1 TABLET DAILY    Refills: 0  Active    metFORMIN (GLUCOPHAGE) 500 mg tablet  Care Giver Yes No   Sig: Take 500 mg by mouth 2 (two) times a day      Facility-Administered Medications: None     Allergies   Allergen Reactions    Avandia [Rosiglitazone]      CHF       Objective   First Vitals:   Blood Pressure: 124/70 (20)  Pulse: 96 (20)  Temperature: (!) 101 2 °F (38 4 °C) (20)  Temp Source: Tympanic (20)  Respirations: (!) 24 (20)  Height: 4' 9" (144 8 cm) (20 2100)  Weight - Scale: 89 4 kg (197 lb) (20 1803)  SpO2: 93 % (02/05/20 1803)    Current Vitals:   Blood Pressure: (!) 87/51 (02/06/20 0336)  Pulse: 59 (02/06/20 0336)  Temperature: 97 6 °F (36 4 °C) (02/06/20 0336)  Temp Source: Tympanic (02/06/20 0336)  Respirations: 18 (02/06/20 0336)  Height: 4' 9" (144 8 cm) (02/05/20 2100)  Weight - Scale: 89 4 kg (197 lb 1 5 oz) (02/05/20 2100)  SpO2: 90 % (02/06/20 0336)      Intake/Output Summary (Last 24 hours) at 2/6/2020 0511  Last data filed at 2/5/2020 2301  Gross per 24 hour   Intake 50 ml   Output 600 ml   Net -550 ml       Invasive Devices     Peripheral Intravenous Line            Peripheral IV 02/05/20 Left Forearm less than 1 day                Physical Exam   Constitutional: She is oriented to person, place, and time  She appears well-nourished  HENT:   Mouth/Throat: Oropharynx is clear and moist  No oropharyngeal exudate  Down facies   Eyes: Right eye exhibits discharge  Left eye exhibits discharge  Cardiovascular: Normal rate, normal heart sounds and intact distal pulses  Pulmonary/Chest: Effort normal  No stridor  No respiratory distress  She has decreased breath sounds in the right lower field  She has no wheezes  She has no rales  Musculoskeletal: She exhibits edema (non-pitting edema b/l)  Lymphadenopathy:     She has no cervical adenopathy  Neurological: She is alert and oriented to person, place, and time  Skin: Skin is warm  No pallor  Psychiatric: She has a normal mood and affect  She is agitated  Cognition and memory are impaired  Vitals reviewed  Lab Results:   Results for orders placed or performed during the hospital encounter of 02/05/20   Blood culture #1   Result Value Ref Range    Blood Culture Received in Microbiology Lab  Culture in Progress  Blood culture #2   Result Value Ref Range    Blood Culture Received in Microbiology Lab  Culture in Progress      Influenza A/B and RSV PCR   Result Value Ref Range    INFLUENZA A PCR None Detected None Detected INFLUENZA B PCR None Detected None Detected    RSV PCR Detected (A) None Detected   CBC and differential   Result Value Ref Range    WBC 12 52 (H) 4 31 - 10 16 Thousand/uL    RBC 4 03 3 81 - 5 12 Million/uL    Hemoglobin 12 8 11 5 - 15 4 g/dL    Hematocrit 39 3 34 8 - 46 1 %    MCV 98 82 - 98 fL    MCH 31 8 26 8 - 34 3 pg    MCHC 32 6 31 4 - 37 4 g/dL    RDW 13 5 11 6 - 15 1 %    MPV 10 0 8 9 - 12 7 fL    Platelets 145 611 - 341 Thousands/uL    nRBC 0 /100 WBCs    Neutrophils Relative 73 43 - 75 %    Immat GRANS % 1 0 - 2 %    Lymphocytes Relative 15 14 - 44 %    Monocytes Relative 11 4 - 12 %    Eosinophils Relative 0 0 - 6 %    Basophils Relative 0 0 - 1 %    Neutrophils Absolute 9 18 (H) 1 85 - 7 62 Thousands/µL    Immature Grans Absolute 0 11 0 00 - 0 20 Thousand/uL    Lymphocytes Absolute 1 89 0 60 - 4 47 Thousands/µL    Monocytes Absolute 1 31 (H) 0 17 - 1 22 Thousand/µL    Eosinophils Absolute 0 00 0 00 - 0 61 Thousand/µL    Basophils Absolute 0 03 0 00 - 0 10 Thousands/µL   Basic metabolic panel   Result Value Ref Range    Sodium 136 136 - 145 mmol/L    Potassium 4 1 3 5 - 5 3 mmol/L    Chloride 101 100 - 108 mmol/L    CO2 29 21 - 32 mmol/L    ANION GAP 6 4 - 13 mmol/L    BUN 14 5 - 25 mg/dL    Creatinine 1 20 0 60 - 1 30 mg/dL    Glucose 206 (H) 65 - 140 mg/dL    Calcium 8 3 8 3 - 10 1 mg/dL    eGFR 55 ml/min/1 73sq m   Lactic acid, plasma   Result Value Ref Range    LACTIC ACID 1 6 0 5 - 2 0 mmol/L   UA (URINE) with reflex to Scope   Result Value Ref Range    Color, UA Light Yellow     Clarity, UA Clear     Specific [place], UA 1 010 1 000 - 1 030    pH, UA 5 5 5 0, 5 5, 6 0, 6 5, 7 0, 7 5, 8 0, 8 5, 9 0    Leukocytes, UA Negative Negative    Nitrite, UA Negative Negative    Protein, UA Negative Negative mg/dl    Glucose, UA Negative Negative mg/dl    Ketones, UA Negative Negative mg/dl    Urobilinogen, UA 0 2 0 2, 1 0 E U /dl E U /dl    Bilirubin, UA Negative Negative    Blood, UA Trace-Intact (A) Negative   Urine Microscopic   Result Value Ref Range    RBC, UA 0-1 (A) None Seen, 0-5 /hpf    WBC, UA 2-4 (A) None Seen, 0-5, 5-55, 5-65 /hpf    Epithelial Cells Occasional None Seen, Occasional /hpf    Bacteria, UA Moderate (A) None Seen, Occasional /hpf   Lipid panel   Result Value Ref Range    Cholesterol 109 50 - 200 mg/dL    Triglycerides 103 <=150 mg/dL    HDL, Direct 36 (L) >=40 mg/dL    LDL Calculated 52 0 - 100 mg/dL    Non-HDL-Chol (CHOL-HDL) 73 mg/dl   POCT pregnancy, urine   Result Value Ref Range    EXT PREG TEST UR (Ref: Negative) Negative     Control Valid    Fingerstick Glucose (POCT)   Result Value Ref Range    POC Glucose 184 (H) 65 - 140 mg/dl       Imaging:   XR chest 2 views    (Results Pending)       EKG, Pathology, and Other Studies:   EKG: normal EKG, normal sinus rhythm  Code Status: Level 1 - Full Code  Advance Directive and Living Will: Yes      Counseling / Coordination of Care: Total floor / unit time spent today 25 minutes

## 2020-02-06 NOTE — ASSESSMENT & PLAN NOTE
Stable  Continue home meds Abilify 10 mg daily and Depakote 1000 mg daily      Patient's valproic acid level was therapeutic back in October 2019

## 2020-02-06 NOTE — ASSESSMENT & PLAN NOTE
Patient presents with fever and leukocytosis and possible source of infection pneumonia  - s/p 1 L IV & Rocephin & Zithromax at ED;  Lactic Acid 1 6  - 1 L IV bolus administered, IV  cc/hour D/c'd on 02/07 as patient is tolerating p o  well  - CXR NAD  - continue Azithromycin and Rocephin  - Blood Cultures and urine cultures no growth to date  - Legionella & Strep urine antigen tests negative  - Mycoplasma PCR pending

## 2020-02-06 NOTE — ED PROVIDER NOTES
History  Chief Complaint   Patient presents with    Fever - 9 weeks to 74 years     here from group home with congestion, and aching, fever in triage noted     59-year-old female history of asthma, diabetes, down syndrome, prolonged qtc presents with cough x3 days  Patient was sent in by group home for further evaluation dry cough, congestion, generalized body aches and pains, as well as a fever  She was recently diagnosed with RSV infection  She has not been given anything for fever today  She has previously had pneumonia and been admitted for hypoxia  No sore throat or ear pain  No chest pain, difficulty breathing, shortness of breath  Prior to Admission Medications   Prescriptions Last Dose Informant Patient Reported? Taking? ARIPiprazole (ABILIFY) 10 mg tablet   No No   Sig: Take 1 tablet (10 mg total) by mouth daily   Eyelid Cleansers (OCUSOFT BABY EYELID & EYELASH EX)  Care Giver Yes No   Sig: Inhale   Mouthwashes (LISTERINE ANTISEPTIC ADVANCED MT)  Care Giver Yes No   Sig: by Per J Tube route   Multiple Vitamins-Minerals (CENTRUM SILVER ULTRA WOMENS PO)  Care Giver Yes No   Sig: Centrum Silver Ultra Womens Oral Tablet  Refills: 0  Active   Spacer/Aero Chamber Mouthpiece (SPACER DEVICE) for metered dose inhaler   No No   Sig: For use with metered dose inhaler   VITAMIN D, ERGOCALCIFEROL, PO  Care Giver Yes No   Sig: Take 1 25 mg by mouth once a week  Administer on sunday   albuterol (PROAIR HFA) 90 mcg/act inhaler  Care Giver No No   Sig: Inhale 2 puffs every 6 (six) hours as needed for wheezing   atorvastatin (LIPITOR) 10 mg tablet  Care Giver Yes No   Sig: Take 10 mg by mouth daily     clindamycin (CLINDAGEL) 1 % gel  Care Giver Yes No   Sig: Apply topically 2 (two) times a day Apply to L axilla   divalproex sodium (DEPAKOTE ER) 500 mg 24 hr tablet   No No   Sig: Take 2 tablets (1,000 mg total) by mouth daily   erythromycin (ILOTYCIN) ophthalmic ointment  Care Giver Yes No   Sig: Administer 0 5 inches to both eyes daily at bedtime   fluticasone (FLONASE) 50 mcg/act nasal spray   No No   Si spray into each nostril daily   levocetirizine (XYZAL) 5 MG tablet  Care Giver No No   Sig: Take 1 tablet (5 mg total) by mouth daily At 8 PM   levothyroxine (SYNTHROID) 50 mcg tablet  Care Giver Yes No   Sig: Take 50 mcg by mouth daily  Synthroid 50 MCG Oral Tablet TAKE 1 TABLET DAILY  Refills: 0  Active    metFORMIN (GLUCOPHAGE) 500 mg tablet  Care Giver Yes No   Sig: Take 500 mg by mouth 2 (two) times a day      Facility-Administered Medications: None       Past Medical History:   Diagnosis Date    Asthma     [facility] partial [facility] [address] [facility]     [facility]     Disease of thyroid gland     Down syndrome     Heart murmur     Hyperlipidemia     Long Q-T syndrome     Pneumonia     Last assessed 2014     Psychiatric disorder     schizo    Sleep apnea        Past Surgical History:   Procedure Laterality Date    CARDIAC SURGERY      patent duct    PATENT DUCTUS ARTERIOUS LIGATION         Family History   Adopted: Yes   Problem Relation Age of Onset    No Known Problems Mother      I have reviewed and agree with the history as documented  Social History     Tobacco Use    Smoking status: Never Smoker    Smokeless tobacco: Never Used   Substance Use Topics    Alcohol use: No    Drug use: No        Review of Systems   Constitutional: Positive for chills and fever  HENT: Positive for congestion and rhinorrhea  Respiratory: Positive for cough  Cardiovascular: Negative  Gastrointestinal: Negative  Genitourinary: Negative  Musculoskeletal: Positive for myalgias  Skin: Negative  Neurological: Negative  All other systems reviewed and are negative  Physical Exam  Physical Exam   Constitutional: She appears well-developed and well-nourished  No distress  HENT:   Head: Normocephalic and atraumatic     Right Ear: Hearing, tympanic membrane, external ear and ear canal normal  Tympanic membrane is not perforated, not erythematous, not retracted and not bulging  Left Ear: Hearing, tympanic membrane, external ear and ear canal normal  Tympanic membrane is not perforated, not erythematous, not retracted and not bulging  Nose: Nose normal    Mouth/Throat: Uvula is midline, oropharynx is clear and moist and mucous membranes are normal  No trismus in the jaw  No uvula swelling  No oropharyngeal exudate, posterior oropharyngeal edema, posterior oropharyngeal erythema or tonsillar abscesses  Eyes: EOM are normal    Neck: Normal range of motion  Cardiovascular: Normal rate, regular rhythm, normal heart sounds and intact distal pulses  Exam reveals no gallop and no friction rub  No murmur heard  Pulmonary/Chest: Effort normal and breath sounds normal  No stridor  No respiratory distress  She has no wheezes  She has no rales  Sp02 is 93% indicating adequate oxygenation on room air   Skin: Skin is warm and dry  Capillary refill takes less than 2 seconds  No rash noted  She is not diaphoretic  No erythema  No pallor  Nursing note and vitals reviewed        Vital Signs  ED Triage Vitals [02/05/20 1803]   Temperature Pulse Respirations Blood Pressure SpO2   (!) 101 2 °F (38 4 °C) 96 (!) 24 124/70 93 %      Temp Source Heart Rate Source Patient Position - Orthostatic VS BP Location FiO2 (%)   Tympanic Monitor Sitting Left arm --      Pain Score       --           Vitals:    02/05/20 1803   BP: 124/70   Pulse: 96   Patient Position - Orthostatic VS: Sitting         Visual Acuity      ED Medications  Medications   cefTRIAXone (ROCEPHIN) IVPB (premix) 1,000 mg (1,000 mg Intravenous New Bag 2/5/20 1928)   azithromycin (ZITHROMAX) 500 mg in sodium chloride 0 9% 250mL IVPB 500 mg (has no administration in time range)   acetaminophen (TYLENOL) tablet 650 mg (650 mg Oral Given 2/5/20 1847)       Diagnostic Studies  Results Reviewed     Procedure Component Value Units Date/Time Influenza A/B and RSV PCR [[id number]]  (Abnormal) Collected:  02/05/20 1846    Lab Status:  Final result Specimen:  Nose Updated:  02/05/20 1939     INFLUENZA A PCR None Detected     INFLUENZA B PCR None Detected     RSV PCR Detected    UA (URINE) with reflex to Scope [[id number]]  (Abnormal) Collected:  02/05/20 1926    Lab Status:  Final result Specimen:  Urine, Clean Catch Updated:  02/05/20 1932     Color, UA Light Yellow     Clarity, UA Clear     Specific Gravity, UA 1 010     pH, UA 5 5     Leukocytes, UA Negative     Nitrite, UA Negative     Protein, UA Negative mg/dl      Glucose, UA Negative mg/dl      Ketones, UA Negative mg/dl      Urobilinogen, UA 0 2 E U /dl      Bilirubin, UA Negative     Blood, UA Trace-Intact    Urine Microscopic [[id number]] Collected:  02/05/20 1926    Lab Status:   In process Specimen:  Urine, Clean Catch Updated:  02/05/20 1932    POCT pregnancy, urine [[id number]]  (Normal) Resulted:  02/05/20 1928    Lab Status:  Final result Updated:  02/05/20 1928     EXT PREG TEST UR (Ref: Negative) Negative     Control Valid    Basic metabolic panel [[id number]]  (Abnormal) Collected:  02/05/20 1844    Lab Status:  Final result Specimen:  Blood from Arm, Left Updated:  02/05/20 1927     Sodium 136 mmol/L      Potassium 4 1 mmol/L      Chloride 101 mmol/L      CO2 29 mmol/L      ANION GAP 6 mmol/L      BUN 14 mg/dL      Creatinine 1 20 mg/dL      Glucose 206 mg/dL      Calcium 8 3 mg/dL      eGFR 55 ml/min/1 73sq m     Narrative:       Luis guidelines for Chronic Kidney Disease (CKD):     Stage 1 with normal or high GFR (GFR > 90 mL/min/1 73 square meters)    Stage 2 Mild CKD (GFR = 60-89 mL/min/1 73 square meters)    Stage 3A Moderate CKD (GFR = 45-59 mL/min/1 73 square meters)    Stage 3B Moderate CKD (GFR = 30-44 mL/min/1 73 square meters)    Stage 4 Severe CKD (GFR = 15-29 mL/min/1 73 square meters)    Stage 5 End Stage CKD (GFR <15 mL/min/1 73 square meters)  Note: GFR calculation is accurate only with a steady state creatinine    Lactic acid, plasma [[id number]]  (Normal) Collected:  02/05/20 1844    Lab Status:  Final result Specimen:  Blood from Arm, Left Updated:  02/05/20 1924     LACTIC ACID 1 6 mmol/L     Narrative:       Result may be elevated if tourniquet was used during collection  CBC and differential [[id number]]  (Abnormal) Collected:  02/05/20 1844    Lab Status:  Final result Specimen:  Blood from Arm, Left Updated:  02/05/20 1903     WBC 12 52 Thousand/uL      RBC 4 03 Million/uL      Hemoglobin 12 8 g/dL      Hematocrit 39 3 %      MCV 98 fL      MCH 31 8 pg      MCHC 32 6 g/dL      RDW 13 5 %      MPV 10 0 fL      Platelets 155 Thousands/uL      nRBC 0 /100 WBCs      Neutrophils Relative 73 %      Immat GRANS % 1 %      Lymphocytes Relative 15 %      Monocytes Relative 11 %      Eosinophils Relative 0 %      Basophils Relative 0 %      Neutrophils Absolute 9 18 Thousands/µL      Immature Grans Absolute 0 11 Thousand/uL      Lymphocytes Absolute 1 89 Thousands/µL      Monocytes Absolute 1 31 Thousand/µL      Eosinophils Absolute 0 00 Thousand/µL      Basophils Absolute 0 03 Thousands/µL     Blood culture #1 [[id number]] Collected:  02/05/20 1844    Lab Status: In process Specimen:  Blood from Arm, Left Updated:  02/05/20 1901    Blood culture #2 [[id number]] Collected:  02/05/20 1852    Lab Status: In process Specimen:  Blood from Arm, Right Updated:  02/05/20 1901                 XR chest 2 views    (Results Pending)              Procedures  ECG 12 Lead Documentation Only  Date/Time: 2/5/2020 7:38 PM  Performed by: Angel Almaraz PA-C  Authorized by:  Angel Almaraz PA-C     Indications / Diagnosis:  Hx prolonged qtc  Patient location:  ED  Interpretation:     Interpretation: normal    Rate:     ECG rate:  83    ECG rate assessment: normal    Rhythm:     Rhythm: sinus rhythm    Ectopy:     Ectopy: none    QRS:     QRS axis:  Normal    QRS intervals: Normal  Conduction:     Conduction: normal    ST segments:     ST segments:  Normal  T waves:     T waves: normal               ED Course                               MDM  Number of Diagnoses or Management Options  Pneumonia:   RSV infection:   Diagnosis management comments: Will admit for pneumonia       Amount and/or Complexity of Data Reviewed  Clinical lab tests: reviewed and ordered  Tests in the radiology section of CPT®: ordered and reviewed  Tests in the medicine section of CPT®: ordered and reviewed  Discussion of test results with the performing providers: yes  Review and summarize past medical records: yes  Discuss the patient with other providers: yes  Independent visualization of images, tracings, or specimens: yes          Disposition  Final diagnoses:   Pneumonia   RSV infection     Time reflects when diagnosis was documented in both MDM as applicable and the Disposition within this note     Time User Action Codes Description Comment    2/5/2020  7:31 PM Balta Lebron Add [J18 9] Pneumonia     2/5/2020  7:39 PM Balta Lebron Add [B97 4] RSV infection       ED Disposition     ED Disposition Condition Date/Time Comment    Admit Stable Wed Feb 5, 2020  7:31 PM Case was discussed with Dr Celina Pa and the patient's admission status was agreed to be Admission Status: inpatient status to the service of Dr Celina Pa   Follow-up Information    None         Patient's Medications   Discharge Prescriptions    No medications on file     No discharge procedures on file      ED Provider  Electronically Signed by           Elsa Ernandez PA-C  02/05/20 1941

## 2020-02-06 NOTE — ASSESSMENT & PLAN NOTE
Lab Results   Component Value Date    HGBA1C 5 8 12/04/2018       No results for input(s): POCGLU in the last 72 hours  Blood Sugar Average: Last 72 hrs:    Patient presented with elevated glucose of 206   Held Metformin  - ISS Alg #3  -HbA1c 6  - Diabetic Diet (& Dysphagia 3 with thin liquids)

## 2020-02-06 NOTE — SOCIAL WORK
Pt is current LOS 1  GMLOS is 4 8 days  Called to pt's Mother Glade Hodgkin for open info  Mom is pt's POA  Pt resides in the [address]  Is generally independent, high functioning  Uses a shower chair  Has CPAP she uses at night  Mom questions if staff is assisting pt with cpap cleaning  Offered to check with Nat conley to see if pt needs any refill parts to the cpap  Made referral, pt is due to head gear and humidifier chamber  Will update mom  Also inquired about post hospitalization after RSV, if any extra care needed  Inquired if [facility] can provide education to group home staff  Mom states group home got new staff  Mom states pt is lacking some adequate psych follow up outpatient, but does attend a program   Per documentation, phone for group home is [phone] and fax is [phone]  Explained role of cm, will follow for any d/c needs  CM reviewed d/c planning process including the following: identifying help at home, patient preference for d/c planning needs, and availability of the treatment team to discuss questions or concerns patient and/or family may have regarding understanding of medications and recognizing signs and symptoms once discharged  CM also encouraged patient to follow up with all recommended appointments after discharge  Patient advised of importance for patient and family to participate in managing patient's medical well being

## 2020-02-07 LAB
ANION GAP SERPL CALCULATED.3IONS-SCNC: 6 MMOL/L (ref 4–13)
ATRIAL RATE: 83 BPM
BACTERIA UR CULT: NORMAL
BASOPHILS # BLD AUTO: 0.05 THOUSANDS/ΜL (ref 0–0.1)
BASOPHILS NFR BLD AUTO: 1 % (ref 0–1)
BUN SERPL-MCNC: 9 MG/DL (ref 5–25)
CALCIUM SERPL-MCNC: 7.2 MG/DL (ref 8.3–10.1)
CHLORIDE SERPL-SCNC: 109 MMOL/L (ref 100–108)
CO2 SERPL-SCNC: 28 MMOL/L (ref 21–32)
CREAT SERPL-MCNC: 0.88 MG/DL (ref 0.6–1.3)
EOSINOPHIL # BLD AUTO: 0.11 THOUSAND/ΜL (ref 0–0.61)
EOSINOPHIL NFR BLD AUTO: 1 % (ref 0–6)
ERYTHROCYTE [DISTWIDTH] IN BLOOD BY AUTOMATED COUNT: 13.7 % (ref 11.6–15.1)
GFR SERPL CREATININE-BSD FRML MDRD: 81 ML/MIN/1.73SQ M
GLUCOSE SERPL-MCNC: 115 MG/DL (ref 65–140)
GLUCOSE SERPL-MCNC: 150 MG/DL (ref 65–140)
GLUCOSE SERPL-MCNC: 182 MG/DL (ref 65–140)
GLUCOSE SERPL-MCNC: 258 MG/DL (ref 65–140)
GLUCOSE SERPL-MCNC: 98 MG/DL (ref 65–140)
HCT VFR BLD AUTO: 39.4 % (ref 34.8–46.1)
HGB BLD-MCNC: 12.4 G/DL (ref 11.5–15.4)
IMM GRANULOCYTES # BLD AUTO: 0.14 THOUSAND/UL (ref 0–0.2)
IMM GRANULOCYTES NFR BLD AUTO: 2 % (ref 0–2)
LYMPHOCYTES # BLD AUTO: 2.58 THOUSANDS/ΜL (ref 0.6–4.47)
LYMPHOCYTES NFR BLD AUTO: 33 % (ref 14–44)
MAGNESIUM SERPL-MCNC: 2.1 MG/DL (ref 1.6–2.6)
MCH RBC QN AUTO: 31.8 PG (ref 26.8–34.3)
MCHC RBC AUTO-ENTMCNC: 31.5 G/DL (ref 31.4–37.4)
MCV RBC AUTO: 101 FL (ref 82–98)
MONOCYTES # BLD AUTO: 0.67 THOUSAND/ΜL (ref 0.17–1.22)
MONOCYTES NFR BLD AUTO: 9 % (ref 4–12)
MRSA NOSE QL CULT: NORMAL
NEUTROPHILS # BLD AUTO: 4.3 THOUSANDS/ΜL (ref 1.85–7.62)
NEUTS SEG NFR BLD AUTO: 54 % (ref 43–75)
NRBC BLD AUTO-RTO: 0 /100 WBCS
P AXIS: 33 DEGREES
PHOSPHATE SERPL-MCNC: 2.5 MG/DL (ref 2.7–4.5)
PLATELET # BLD AUTO: 194 THOUSANDS/UL (ref 149–390)
PMV BLD AUTO: 10.5 FL (ref 8.9–12.7)
POTASSIUM SERPL-SCNC: 3.9 MMOL/L (ref 3.5–5.3)
PR INTERVAL: 148 MS
QRS AXIS: 34 DEGREES
QRSD INTERVAL: 84 MS
QT INTERVAL: 382 MS
QTC INTERVAL: 448 MS
RBC # BLD AUTO: 3.9 MILLION/UL (ref 3.81–5.12)
SODIUM SERPL-SCNC: 143 MMOL/L (ref 136–145)
T WAVE AXIS: 17 DEGREES
VENTRICULAR RATE: 83 BPM
WBC # BLD AUTO: 7.85 THOUSAND/UL (ref 4.31–10.16)

## 2020-02-07 PROCEDURE — 94760 N-INVAS EAR/PLS OXIMETRY 1: CPT

## 2020-02-07 PROCEDURE — 84100 ASSAY OF PHOSPHORUS: CPT | Performed by: STUDENT IN AN ORGANIZED HEALTH CARE EDUCATION/TRAINING PROGRAM

## 2020-02-07 PROCEDURE — 80048 BASIC METABOLIC PNL TOTAL CA: CPT | Performed by: STUDENT IN AN ORGANIZED HEALTH CARE EDUCATION/TRAINING PROGRAM

## 2020-02-07 PROCEDURE — 93010 ELECTROCARDIOGRAM REPORT: CPT | Performed by: INTERNAL MEDICINE

## 2020-02-07 PROCEDURE — 99232 SBSQ HOSP IP/OBS MODERATE 35: CPT | Performed by: FAMILY MEDICINE

## 2020-02-07 PROCEDURE — 94660 CPAP INITIATION&MGMT: CPT

## 2020-02-07 PROCEDURE — 82948 REAGENT STRIP/BLOOD GLUCOSE: CPT

## 2020-02-07 PROCEDURE — 83735 ASSAY OF MAGNESIUM: CPT | Performed by: STUDENT IN AN ORGANIZED HEALTH CARE EDUCATION/TRAINING PROGRAM

## 2020-02-07 PROCEDURE — 85025 COMPLETE CBC W/AUTO DIFF WBC: CPT | Performed by: STUDENT IN AN ORGANIZED HEALTH CARE EDUCATION/TRAINING PROGRAM

## 2020-02-07 RX ORDER — AZITHROMYCIN 250 MG/1
250 TABLET, FILM COATED ORAL EVERY 24 HOURS
Status: DISCONTINUED | OUTPATIENT
Start: 2020-02-08 | End: 2020-02-08 | Stop reason: HOSPADM

## 2020-02-07 RX ADMIN — PANTOPRAZOLE SODIUM 20 MG: 20 TABLET, DELAYED RELEASE ORAL at 06:12

## 2020-02-07 RX ADMIN — ENOXAPARIN SODIUM 30 MG: 30 INJECTION SUBCUTANEOUS at 10:19

## 2020-02-07 RX ADMIN — FLUTICASONE PROPIONATE 1 SPRAY: 50 SPRAY, METERED NASAL at 08:31

## 2020-02-07 RX ADMIN — INSULIN LISPRO 1 UNITS: 100 INJECTION, SOLUTION INTRAVENOUS; SUBCUTANEOUS at 11:18

## 2020-02-07 RX ADMIN — DIBASIC SODIUM PHOSPHATE, MONOBASIC POTASSIUM PHOSPHATE AND MONOBASIC SODIUM PHOSPHATE 2 TABLET: 852; 155; 130 TABLET ORAL at 08:31

## 2020-02-07 RX ADMIN — INSULIN LISPRO 1 UNITS: 100 INJECTION, SOLUTION INTRAVENOUS; SUBCUTANEOUS at 17:00

## 2020-02-07 RX ADMIN — ATORVASTATIN CALCIUM 10 MG: 10 TABLET, FILM COATED ORAL at 17:02

## 2020-02-07 RX ADMIN — ERYTHROMYCIN 0.5 INCH: 5 OINTMENT OPHTHALMIC at 23:11

## 2020-02-07 RX ADMIN — INSULIN LISPRO 3 UNITS: 100 INJECTION, SOLUTION INTRAVENOUS; SUBCUTANEOUS at 23:12

## 2020-02-07 RX ADMIN — CEFTRIAXONE 1000 MG: 1 INJECTION, SOLUTION INTRAVENOUS at 19:39

## 2020-02-07 RX ADMIN — SODIUM CHLORIDE 125 ML/HR: 0.9 INJECTION, SOLUTION INTRAVENOUS at 06:44

## 2020-02-07 RX ADMIN — AZITHROMYCIN 500 MG: 500 INJECTION, POWDER, LYOPHILIZED, FOR SOLUTION INTRAVENOUS at 20:45

## 2020-02-07 RX ADMIN — ARIPIPRAZOLE 10 MG: 5 TABLET ORAL at 08:31

## 2020-02-07 RX ADMIN — LEVOTHYROXINE SODIUM 50 MCG: 50 TABLET ORAL at 06:12

## 2020-02-07 RX ADMIN — BENZONATATE 100 MG: 100 CAPSULE ORAL at 19:39

## 2020-02-07 RX ADMIN — LORATADINE 5 MG: 10 TABLET ORAL at 08:31

## 2020-02-07 RX ADMIN — DIVALPROEX SODIUM 1000 MG: 500 TABLET, FILM COATED, EXTENDED RELEASE ORAL at 08:30

## 2020-02-07 NOTE — SOCIAL WORK
CM met with pt and pts mother Дмитрий Molina, at bedside to discuss discharge planning  Дмитрий Tracy stated she's concerned because she feels staff at [facility] do not know how to properly clean pts CPAP machine  She's asking CM to ask Davina MARCELINO w/Pulmonology his recommendation for CPAP care  CM offered [place] [facility] [address] services and offered choices for pt once she's discharged back to group home  Дмитрий Molina stated she has no preference and choose [facility]  Referral sent in [employer] and placed their contact information on pts AVS     CM will contact Director of group home to confirm [facility]

## 2020-02-07 NOTE — SOCIAL WORK
MAYI contacted pts mother Bessy Cantu to inform her of [facility] PA recommendation for DME respiratory therapist to go to group home to teach staff CPAP machine care  Bessy Cantu stated the machine is from [address], they sent a therapist a while ago, with the staff turnover they   likely need to go teach staff again  CM offered to contact [address] for pt  Bessy Cantu stated she will contact [address] because she wants to speak with the  first to make sure staff are available for the demonstration  MAYI reviewed MB Letter and IMM with pts mother Bessy Cantu  MAYI contacted  Christie Villa Ph: [phone] who confirmed they allow [facility] services at the group home  JOSE stated the group home provides transportation back to the home when pts are discharged  JOSE provided CM group home contacts for the weekend: [address] Manager=Jaimee Neves Ph: [phone]  [address] Ph: [phone]

## 2020-02-07 NOTE — SOCIAL WORK
[address] Pulmonology PA recommends that a respiratory therapist from the CPAP DME company go to the group home to demonstrate care for the CPAP Machine

## 2020-02-07 NOTE — PROGRESS NOTES
[facility] Progress Note - Yessica Petersen 38 y o  female MRN: [id number]    Unit/Bed#: [address] [place] [address] [id number] Encounter: [id number]      Assessment/Plan:    * RSV infection  Assessment & Plan  Patient presents with few weeks of cough and congestion, RSV positive  Patient is in no respiratory distress  Patient was RSV positive 2 weeks ago  - Symptomatic relief   - O2 supplementation    Sepsis ([facility] [address] )  Assessment & Plan  Patient presents with fever and leukocytosis and possible source of infection pneumonia  - s/p 1 L IV & Rocephin & Zithromax at ED; Lactic Acid 1 6  - 1 L IV bolus administered  - CXR NAD  - Azithromycin and Rocephin  - continue IV  ml/hr   - Blood Cultures no growth to date   - Urine Cultures pending   - Mycoplasma PCR pending  - Legionella & Strep urine antigen tests negative    Schizophrenia ([facility])  Assessment & Plan  Stable  Continue home meds Abilify 10 mg daily and Depakote 1000 mg daily  Patient's valproic acid level was therapeutic back in October 2019    Obstructive sleep apnea syndrome  Assessment & Plan  CPAP 8 mmHg     Diabetes mellitus ([facility])  Assessment & Plan  Lab Results   Component Value Date    HGBA1C 5 8 12/04/2018       No results for input(s): POCGLU in the last 72 hours  Blood Sugar Average: Last 72 hrs:    Patient presented with elevated glucose of 206  Held Metformin  - ISS Alg #3  - f/u HbA1C & lipid panel  - Diabetic Diet (& Dysphagia 3 with thin liquids)      Chronic renal insufficiency, stage III (moderate) ([facility])  Assessment & Plan  eGFR 55; Cr elevated at 1 2, baseline is 0 96    - Lovenox is renally dosed     Mild intermittent asthma without complication  Assessment & Plan  Stable, did NOT require albuterol inhaler while she is ill    - Duo-Nebs Q4H prn        Subjective:     44-year-old female seen and examined at bedside this morning  T-max 101 2° F  Patient reports she feels "dreadful"  Admits to cough    Patient inquires that she needs counseling on how to clean her CPAP machine  Objective:     Vitals: Blood pressure 111/70, pulse 73, temperature 98 2 °F (36 8 °C), resp  rate 20, height 4' 9" (1 448 m), weight 89 4 kg (197 lb 1 5 oz), SpO2 94 %, not currently breastfeeding  ,Body mass index is 42 65 kg/m²  Wt Readings from Last 3 Encounters:   02/05/20 89 4 kg (197 lb 1 5 oz)   01/22/20 88 7 kg (195 lb 8 8 oz)   01/15/20 90 4 kg (199 lb 6 4 oz)       Intake/Output Summary (Last 24 hours) at 2/7/2020 1753  Last data filed at 2/7/2020 1200  Gross per 24 hour   Intake 1720 ml   Output 150 ml   Net 1570 ml       Physical Exam:    General: Pt is AAO x 3, not in any acute distress  Cardio: RRR, S1 and S2 +  Resp: CTA b/l  Abdomen: soft, NT/ND, BS+  Extremities: no cyanosis or edema  PP 2+ b/l       Recent Results (from the past 24 hour(s))   Fingerstick Glucose (POCT)    Collection Time: 02/06/20  8:35 PM   Result Value Ref Range    POC Glucose 148 (H) 65 - 140 mg/dl   Basic metabolic panel    Collection Time: 02/07/20  5:45 AM   Result Value Ref Range    Sodium 143 136 - 145 mmol/L    Potassium 3 9 3 5 - 5 3 mmol/L    Chloride 109 (H) 100 - 108 mmol/L    CO2 28 21 - 32 mmol/L    ANION GAP 6 4 - 13 mmol/L    BUN 9 5 - 25 mg/dL    Creatinine 0 88 0 60 - 1 30 mg/dL    Glucose 115 65 - 140 mg/dL    Calcium 7 2 (L) 8 3 - 10 1 mg/dL    eGFR 81 ml/min/1 73sq m   CBC and differential    Collection Time: 02/07/20  5:45 AM   Result Value Ref Range    WBC 7 85 4 31 - 10 16 Thousand/uL    RBC 3 90 3 81 - 5 12 Million/uL    Hemoglobin 12 4 11 5 - 15 4 g/dL    Hematocrit 39 4 34 8 - 46 1 %     (H) 82 - 98 fL    MCH 31 8 26 8 - 34 3 pg    MCHC 31 5 31 4 - 37 4 g/dL    RDW 13 7 11 6 - 15 1 %    MPV 10 5 8 9 - 12 7 fL    Platelets 243 369 - 091 Thousands/uL    nRBC 0 /100 WBCs    Neutrophils Relative 54 43 - 75 %    Immat GRANS % 2 0 - 2 %    Lymphocytes Relative 33 14 - 44 %    Monocytes Relative 9 4 - 12 %    Eosinophils Relative 1 0 - 6 %    Basophils Relative 1 0 - 1 %    Neutrophils Absolute 4 30 1 85 - 7 62 Thousands/µL    Immature Grans Absolute 0 14 0 00 - 0 20 Thousand/uL    Lymphocytes Absolute 2 58 0 60 - 4 47 Thousands/µL    Monocytes Absolute 0 67 0 17 - 1 22 Thousand/µL    Eosinophils Absolute 0 11 0 00 - 0 61 Thousand/µL    Basophils Absolute 0 05 0 00 - 0 10 Thousands/µL   Magnesium    Collection Time: 02/07/20  5:45 AM   Result Value Ref Range    Magnesium 2 1 1 6 - 2 6 mg/dL   Phosphorus    Collection Time: 02/07/20  5:45 AM   Result Value Ref Range    Phosphorus 2 5 (L) 2 7 - 4 5 mg/dL   Fingerstick Glucose (POCT)    Collection Time: 02/07/20  7:35 AM   Result Value Ref Range    POC Glucose 98 65 - 140 mg/dl   Fingerstick Glucose (POCT)    Collection Time: 02/07/20 11:01 AM   Result Value Ref Range    POC Glucose 182 (H) 65 - 140 mg/dl   Fingerstick Glucose (POCT)    Collection Time: 02/07/20  4:48 PM   Result Value Ref Range    POC Glucose 150 (H) 65 - 140 mg/dl       Current Facility-Administered Medications   Medication Dose Route Frequency Provider Last Rate Last Dose    acetaminophen (TYLENOL) tablet 650 mg  650 mg Oral Q6H PRN Gerlean Haider, DO   650 mg at 02/06/20 2038    albuterol (PROVENTIL HFA,VENTOLIN HFA) inhaler 2 puff  2 puff Inhalation Q6H PRN Gerlean Haider, DO        ARIPiprazole (ABILIFY) tablet 10 mg  10 mg Oral Daily Gerlean Haider, DO   10 mg at 02/07/20 0831    atorvastatin (LIPITOR) tablet 10 mg  10 mg Oral Daily With [facility], DO   10 mg at 02/07/20 1702    azithromycin (ZITHROMAX) 500 mg in sodium chloride 0 9 % 250 mL IVPB  500 mg Intravenous Q24H Hu Lugo  mL/hr at 02/06/20 1955 500 mg at 02/06/20 1955    benzonatate (TESSALON PERLES) capsule 100 mg  100 mg Oral TID PRN Gerlean Haider, DO        calcium carbonate (TUMS) chewable tablet 500 mg  500 mg Oral Daily PRN Jenny Higgins,         cefTRIAXone (ROCEPHIN) IVPB (premix) 1,000 mg  1,000 mg Intravenous Q24H Hu Lugo  mL/hr at 02/06/20 1845 1,000 mg at 02/06/20 1845    dextromethorphan-guaiFENesin (ROBITUSSIN DM)  mg/5 mL oral syrup 10 mL  10 mL Oral Q4H PRN Hartland Columbus, DO   10 mL at 02/06/20 1428    divalproex sodium (DEPAKOTE ER) 24 hr tablet 1,000 mg  1,000 mg Oral Daily Hartland Columbus, DO   1,000 mg at 02/07/20 0830    enoxaparin (LOVENOX) subcutaneous injection 30 mg  30 mg Subcutaneous Daily Hartland Columbus, DO   30 mg at 02/07/20 1019    erythromycin (ILOTYCIN) 0 5 % ophthalmic ointment 0 5 inch  0 5 inch Both Eyes HS Hartland Columbus, DO   0 5 inch at 02/06/20 2120    fluticasone (FLONASE) 50 mcg/act nasal spray 1 spray  1 spray Nasal Daily Hartland Columbus, DO   1 spray at 02/07/20 0831    insulin lispro (HumaLOG) 100 units/mL subcutaneous injection 1-6 Units  1-6 Units Subcutaneous 4x Daily (AC & HS) Hartland Columbus, DO   1 Units at 02/07/20 1700    ipratropium-albuterol (DUO-NEB) 0 5-2 5 mg/3 mL inhalation solution 3 mL  3 mL Nebulization Q4H PRN Mio Olivares MD        levothyroxine tablet 50 mcg  50 mcg Oral Early Morning Hartland Columbus, DO   50 mcg at 02/07/20 0612    loratadine (CLARITIN) tablet 5 mg  5 mg Oral Daily Hartland Columbus, DO   5 mg at 02/07/20 0831    pantoprazole (PROTONIX) EC tablet 20 mg  20 mg Oral Early Morning Danielle Viotto, DO   20 mg at 02/07/20 5211    sodium chloride (OCEAN) 0 65 % nasal spray 1 spray  1 spray Each Nare Q2H PRN Hartland Columbus, DO   1 spray at 02/06/20 1737       Invasive Devices     Peripheral Intravenous Line            Peripheral IV 02/05/20 Left Forearm 1 day                Lab, Imaging and other studies: I have personally reviewed pertinent reports      VTE Pharmacologic Prophylaxis: Enoxaparin (Lovenox)  VTE Mechanical Prophylaxis: sequential compression device    Catrina Diaz DO

## 2020-02-08 VITALS
HEART RATE: 57 BPM | RESPIRATION RATE: 20 BRPM | BODY MASS INDEX: 42.52 KG/M2 | WEIGHT: 197.09 LBS | OXYGEN SATURATION: 94 % | DIASTOLIC BLOOD PRESSURE: 57 MMHG | HEIGHT: 57 IN | SYSTOLIC BLOOD PRESSURE: 119 MMHG | TEMPERATURE: 98.4 F

## 2020-02-08 LAB
ANION GAP SERPL CALCULATED.3IONS-SCNC: 4 MMOL/L (ref 4–13)
BASOPHILS # BLD AUTO: 0.07 THOUSANDS/ΜL (ref 0–0.1)
BASOPHILS NFR BLD AUTO: 1 % (ref 0–1)
BUN SERPL-MCNC: 11 MG/DL (ref 5–25)
CALCIUM SERPL-MCNC: 7.4 MG/DL (ref 8.3–10.1)
CHLORIDE SERPL-SCNC: 106 MMOL/L (ref 100–108)
CO2 SERPL-SCNC: 30 MMOL/L (ref 21–32)
CREAT SERPL-MCNC: 0.9 MG/DL (ref 0.6–1.3)
EOSINOPHIL # BLD AUTO: 0.08 THOUSAND/ΜL (ref 0–0.61)
EOSINOPHIL NFR BLD AUTO: 1 % (ref 0–6)
ERYTHROCYTE [DISTWIDTH] IN BLOOD BY AUTOMATED COUNT: 13.4 % (ref 11.6–15.1)
GFR SERPL CREATININE-BSD FRML MDRD: 79 ML/MIN/1.73SQ M
GLUCOSE SERPL-MCNC: 115 MG/DL (ref 65–140)
GLUCOSE SERPL-MCNC: 125 MG/DL (ref 65–140)
GLUCOSE SERPL-MCNC: 155 MG/DL (ref 65–140)
HCT VFR BLD AUTO: 36.6 % (ref 34.8–46.1)
HGB BLD-MCNC: 11.5 G/DL (ref 11.5–15.4)
IMM GRANULOCYTES # BLD AUTO: 0.2 THOUSAND/UL (ref 0–0.2)
IMM GRANULOCYTES NFR BLD AUTO: 2 % (ref 0–2)
LYMPHOCYTES # BLD AUTO: 2.21 THOUSANDS/ΜL (ref 0.6–4.47)
LYMPHOCYTES NFR BLD AUTO: 26 % (ref 14–44)
MAGNESIUM SERPL-MCNC: 2.1 MG/DL (ref 1.6–2.6)
MCH RBC QN AUTO: 31.6 PG (ref 26.8–34.3)
MCHC RBC AUTO-ENTMCNC: 31.4 G/DL (ref 31.4–37.4)
MCV RBC AUTO: 101 FL (ref 82–98)
MONOCYTES # BLD AUTO: 0.79 THOUSAND/ΜL (ref 0.17–1.22)
MONOCYTES NFR BLD AUTO: 9 % (ref 4–12)
NEUTROPHILS # BLD AUTO: 5.01 THOUSANDS/ΜL (ref 1.85–7.62)
NEUTS SEG NFR BLD AUTO: 61 % (ref 43–75)
NRBC BLD AUTO-RTO: 0 /100 WBCS
PHOSPHATE SERPL-MCNC: 3.3 MG/DL (ref 2.7–4.5)
PLATELET # BLD AUTO: 209 THOUSANDS/UL (ref 149–390)
PMV BLD AUTO: 10.5 FL (ref 8.9–12.7)
POTASSIUM SERPL-SCNC: 3.7 MMOL/L (ref 3.5–5.3)
RBC # BLD AUTO: 3.64 MILLION/UL (ref 3.81–5.12)
SODIUM SERPL-SCNC: 140 MMOL/L (ref 136–145)
WBC # BLD AUTO: 8.36 THOUSAND/UL (ref 4.31–10.16)

## 2020-02-08 PROCEDURE — 83735 ASSAY OF MAGNESIUM: CPT | Performed by: STUDENT IN AN ORGANIZED HEALTH CARE EDUCATION/TRAINING PROGRAM

## 2020-02-08 PROCEDURE — 84100 ASSAY OF PHOSPHORUS: CPT | Performed by: STUDENT IN AN ORGANIZED HEALTH CARE EDUCATION/TRAINING PROGRAM

## 2020-02-08 PROCEDURE — 80048 BASIC METABOLIC PNL TOTAL CA: CPT | Performed by: STUDENT IN AN ORGANIZED HEALTH CARE EDUCATION/TRAINING PROGRAM

## 2020-02-08 PROCEDURE — 99238 HOSP IP/OBS DSCHRG MGMT 30/<: CPT | Performed by: FAMILY MEDICINE

## 2020-02-08 PROCEDURE — NC001 PR NO CHARGE: Performed by: FAMILY MEDICINE

## 2020-02-08 PROCEDURE — 97161 PT EVAL LOW COMPLEX 20 MIN: CPT

## 2020-02-08 PROCEDURE — 82948 REAGENT STRIP/BLOOD GLUCOSE: CPT

## 2020-02-08 PROCEDURE — 85025 COMPLETE CBC W/AUTO DIFF WBC: CPT | Performed by: STUDENT IN AN ORGANIZED HEALTH CARE EDUCATION/TRAINING PROGRAM

## 2020-02-08 PROCEDURE — 94760 N-INVAS EAR/PLS OXIMETRY 1: CPT

## 2020-02-08 RX ORDER — ACETAMINOPHEN 325 MG/1
650 TABLET ORAL ONCE
Status: COMPLETED | OUTPATIENT
Start: 2020-02-08 | End: 2020-02-08

## 2020-02-08 RX ORDER — POTASSIUM CHLORIDE 20 MEQ/1
40 TABLET, EXTENDED RELEASE ORAL ONCE
Status: COMPLETED | OUTPATIENT
Start: 2020-02-08 | End: 2020-02-08

## 2020-02-08 RX ORDER — CEPHALEXIN 500 MG/1
500 CAPSULE ORAL EVERY 12 HOURS SCHEDULED
Qty: 4 CAPSULE | Refills: 0 | Status: SHIPPED | OUTPATIENT
Start: 2020-02-08 | End: 2020-02-10

## 2020-02-08 RX ORDER — AZITHROMYCIN 250 MG/1
250 TABLET, FILM COATED ORAL EVERY 24 HOURS
Qty: 1 TABLET | Refills: 0 | Status: SHIPPED | OUTPATIENT
Start: 2020-02-09 | End: 2020-02-10

## 2020-02-08 RX ADMIN — DIVALPROEX SODIUM 1000 MG: 500 TABLET, FILM COATED, EXTENDED RELEASE ORAL at 09:08

## 2020-02-08 RX ADMIN — FLUTICASONE PROPIONATE 1 SPRAY: 50 SPRAY, METERED NASAL at 09:11

## 2020-02-08 RX ADMIN — ARIPIPRAZOLE 10 MG: 5 TABLET ORAL at 09:07

## 2020-02-08 RX ADMIN — PANTOPRAZOLE SODIUM 20 MG: 20 TABLET, DELAYED RELEASE ORAL at 05:26

## 2020-02-08 RX ADMIN — ENOXAPARIN SODIUM 30 MG: 30 INJECTION SUBCUTANEOUS at 09:06

## 2020-02-08 RX ADMIN — POTASSIUM CHLORIDE 40 MEQ: 1500 TABLET, EXTENDED RELEASE ORAL at 09:10

## 2020-02-08 RX ADMIN — LORATADINE 5 MG: 10 TABLET ORAL at 09:07

## 2020-02-08 RX ADMIN — AZITHROMYCIN MONOHYDRATE 250 MG: 250 TABLET ORAL at 09:12

## 2020-02-08 RX ADMIN — LEVOTHYROXINE SODIUM 50 MCG: 50 TABLET ORAL at 05:26

## 2020-02-08 RX ADMIN — GUAIFENESIN AND DEXTROMETHORPHAN 10 ML: 100; 10 SYRUP ORAL at 02:40

## 2020-02-08 RX ADMIN — ACETAMINOPHEN 650 MG: 325 TABLET, FILM COATED ORAL at 09:07

## 2020-02-08 NOTE — DISCHARGE INSTR - AVS FIRST PAGE
· Take azthromycin for one more tablet for one day on 2/9 to finish your course  · Take Keflex 1 tablet every 12 hours starting 2/8 afternoon for total of 4 tablets  · Group home would need to properly clean and sterilize patient's room  · Patient may check oxygen levels at home up to 3 times a day

## 2020-02-08 NOTE — RESPIRATORY THERAPY NOTE
Home Oxygen Qualifying Test       Patient name: Heidy Avitia        : 1975   Date of Test:  2020  Diagnosis:      Home Oxygen Test:    **Medicare Guidelines require item(s) 1-5 on all ambulatory patients or 1 and 2 on non-ambulatory patients  1   Baseline SPO2 on Room Air at rest 98 %  2   SPO2 during exercise on Room Air 97 %  During exercise monitor SpO2  If SPO2 increases >=89% with ambulation do not add supplemental             oxygen  If <= 88% on room air add O2 via NC and titrate patient  Patient must be ambulated with O2 and titrated to > 88% with exertion  3   SPO2 on Oxygen at rest 99 % 2 lpm     4   SPO2 during exercise on Oxygen % a liter flow of lpm     5   Exercise performed:          walking, duration 6 (min)          []  Supplemental Home Oxygen is indicated  [x]  Client does not qualify for home oxygen        Respiratory Additional Notes-  Patient walked without any distress    Dayanna Lafleur, RT

## 2020-02-08 NOTE — DISCHARGE INSTRUCTIONS
Respiratory Syncytial Virus Immune Globulin, Human (RSV) (By injection)   Respiratory Syncytial Virus Immune Globulin, Human (RES-pi-ra-tor-ee sin-SISH-al VYE-eleanor Schneider HUE-man)  Prevents serious lung infections caused by RSV in babies born prematurely or those who have lung problems  Brand Name(s):   There may be other brand names for this medicine  When This Medicine Should Not Be Used: Your child should not receive this medicine if he or she has had an allergic reaction to RSV immune globulin or other immune globulin medicines  How to Use This Medicine:   Injectable  · Your child's doctor will prescribe the exact dose and tell you how often it should be given  This medicine is given through a needle placed in a vein  · A nurse or other caregiver trained to give injections will give your child the shots  · This medicine is usually given once a month during the time of year that RSV is most common  If a dose is missed:   · Your child needs to receive these shots on a fixed schedule  If you must cancel an appointment, reschedule it as soon as possible  Drugs and Foods to Avoid:   Ask your doctor or pharmacist before using any other medicine, including over-the-counter medicines, vitamins, and herbal products  · If your child receives any other type of vaccine (immunization), make sure the doctor or nurse knows that your child has received the RSV immune globulin  Warnings While Using This Medicine:   · Make sure your doctor knows if your child has lung or heart disease or has ever had heart surgery    Possible Side Effects While Using This Medicine:   Call your doctor right away if you notice any of these side effects:  · Allergic reaction: Itching or hives, swelling in face or hands, swelling or tingling in the mouth or throat, tightness in chest, trouble breathing, blue lips or skin  · Drowsiness, fever, stiff muscles, discomfort in bright light, nausea and vomiting (up to 2 days after treatment)  If you notice these less serious side effects, talk with your doctor:   · Diarrhea  · Slight fever  If you notice other side effects that you think are caused by this medicine, tell your doctor  Call your doctor for medical advice about side effects  You may report side effects to FDA at 1-432-FDA-9897  © 2017 [address] Information is for End User's use only and may not be sold, redistributed or otherwise used for commercial purposes  The above information is an  only  It is not intended as medical advice for individual conditions or treatments  Talk to your doctor, nurse or pharmacist before following any medical regimen to see if it is safe and effective for you

## 2020-02-08 NOTE — PHYSICAL THERAPY NOTE
PT EVALUATION     02/08/20 1340   Pain Assessment   Pain Assessment Watts-Baker FACES   Watts-Baker FACES Pain Rating 4  (IV site in L UE to touch)   Home Living   Type of Home Group Home   Additional Comments patient questionable historian at times, does not appear to require assistive device prior to admission   Prior Function   Level of [place] Needs assistance with ADLs and functional mobility   Lives With Facility staff  (group home)   Receives Help From Personal care attendant   ADL Assistance Needs assistance   IADLs Needs assistance   Restrictions/Precautions   Other Precautions Chair Alarm; Bed Alarm; Fall Risk   General   Additional Pertinent History chart reviewed,patient admitted with sepsis due to RSV  Patient from group home and now demonstrating ability to ambulate with supervision and required mod assist for toileting hygiene for urination during evaluation      Family/Caregiver Present No   Cognition   Overall Cognitive Status Impaired   Arousal/Participation Cooperative   Orientation Level Oriented to person;Oriented to place   Following Commands Follows multistep commands with increased time or repetition   Comments patient easily distracted at times but cooperative   RUE Assessment   RUE Assessment WFL   LUE Assessment   LUE Assessment WFL   RLE Assessment   RLE Assessment WFL   LLE Assessment   LLE Assessment WFL   Coordination   Movements are Fluid and Coordinated 1   Bed Mobility   Supine to Sit 5  Supervision   Sit to Supine 5  Supervision   Transfers   Sit to Stand 5  Supervision   Stand to Sit 5  Supervision   Toilet transfer 5  Supervision   Additional items   (assist for hygiene following urination at patient request)   Additional Comments patient states, "I can't reach" with hygiene with toilet paper after urination   Ambulation/Elevation   Gait Assistance 5  Supervision   Assistive Device   (none)   Distance 30 feet with change in direction, short shuffling type gait patterning at times with forward flexed posturing but without balance loss noted   Balance   Static Sitting Fair +   Dynamic Sitting Fair   Static Standing Fair   Dynamic Standing Fair   Ambulatory Fair -   Activity Tolerance   Activity Tolerance Patient tolerated treatment well;Patient limited by fatigue   Nurse Made Aware yes   Assessment   Prognosis Good   Problem List Decreased strength;Decreased range of motion;Decreased endurance; Impaired balance;Decreased mobility; Decreased coordination;Decreased cognition; Impaired judgement;Decreased safety awareness   Assessment Patient seen for Physical Therapy evaluation  Patient admitted with RSV/sepsis  Comorbidities affecting patient's physical performance include: down syndrome, ckd, dm  Patient appears to be at baseline of functional mobility at this time and will benefit from increasing functional mobility to improve endurance  Personal factors affecting patient at time of initial evaluation include: inability to navigate level surfaces without external assistance and inability to live alone  Prior to admission, patient was independent with functional mobility without assistive device  Please find objective findings from Physical Therapy assessment regarding body systems outlined above with impairments and limitations including weakness, impaired balance, decreased activity tolerance, decreased functional mobility tolerance and SOB upon exertion  The Barthel Index was used as a functional outcome tool presenting with a score of 60 today indicating moderate limitations of functional mobility and ADLS  Patient's clinical presentation is currently stable as seen in patient's presentation of increased fall risk, new onset of impairment of functional mobility, decreased endurance and new onset of weakness  Pt would benefit from continued Physical Therapy treatment to address deficits as defined above and minimal level of functional mobility   As demonstrated by objective findings, the assigned level of complexity for this evaluation is low  Goals   Patient Goals none stated, patient with Down's syndrome   STG Expiration Date 02/15/20   Short Term Goal #1 transfers and gait without assistive device independently   Short Term Goal #2 gait endurance to function group home distances, strength BLEs 4-/5   LTG Expiration Date 02/22/20   Long Term Goal #1 gait endurance to functional community distances   Plan   Treatment/Interventions ADL retraining;Functional transfer training;LE strengthening/ROM; Therapeutic exercise; Endurance training;Cognitive reorientation;Patient/family training;Equipment eval/education; Bed mobility;Gait training; Compensatory technique education   PT Frequency 5x/wk;2-3x/wk   Recommendation   Recommendation Other (Comment)  (return to group home with assist as before admission)   Barthel Index   Feeding 10   Bathing 0   Grooming Score 0   Dressing Score 5   Bladder Score 5   Bowels Score 10   Toilet Use Score 5   Transfers (Bed/Chair) Score 10   Mobility (Level Surface) Score 10   Stairs Score 5   Barthel Index Score 61   Licensure   [place] License Number  Minta Proper PT [id number]

## 2020-02-08 NOTE — DISCHARGE INSTR - OTHER ORDERS
CPAP maintenance and cleaning instructions per on-call [facility] RT    - NO soap or cleansers should be used because they would then be inhaled    - If planning to clean white vinegar is recommended be used  White vinegar can be run through the tubing and poured into the water tank             The mask cushion can be placed in white vinegar if desired but not left to soak          After cleaning with vinegar all pieces should be rinsed and let to air dry      [facility] on-call available all weekend for additional questions - [phone]

## 2020-02-08 NOTE — PLAN OF CARE
Problem: Potential for Falls  Goal: Patient will remain free of falls  Description  INTERVENTIONS:  - Assess patient frequently for physical needs  -  Identify cognitive and physical deficits and behaviors that affect risk of falls    -  [place] fall precautions as indicated by assessment   - Educate patient/family on patient safety including physical limitations  - Instruct patient to call for assistance with activity based on assessment  - Modify environment to reduce risk of injury  - Consider OT/PT consult to assist with strengthening/mobility  Outcome: Progressing

## 2020-02-08 NOTE — PROGRESS NOTES
[address] [facility] [address] [facility] Practice Progress Note - Shakira Petersen 38 y o  female MRN: [id number]    Unit/Bed#: [address] [place] [address]- Encounter: [id number]      Assessment/Plan:  Sepsis [facility])  Assessment & Plan  Patient presents with fever and leukocytosis and possible source of infection pneumonia  - s/p 1 L IV & Rocephin & Zithromax at ED; Lactic Acid 1 6  - 1 L IV bolus administered, IV  cc/hour D/c'd on 02/07 as patient is tolerating p o  well  - CXR NAD  - continue Azithromycin and Rocephin  - Blood Cultures and urine cultures no growth to date  - Legionella & Strep urine antigen tests negative  - Mycoplasma PCR pending    * RSV infection  Assessment & Plan  Patient presents with few weeks of cough and congestion, RSV positive  Patient is in no respiratory distress  Patient was RSV positive 2 weeks ago  - will initiate and continue supportive therapy  - O2 supplementation    Schizophrenia ([facility] [address] )  Assessment & Plan  Stable  Continue home meds Abilify 10 mg daily and Depakote 1000 mg daily  Patient's valproic acid level was therapeutic back in October 2019    Hyperlipidemia  Assessment & Plan  · Lipid panel WNL with the exception of HDL decreased to 30    Diabetes mellitus ([facility] [address] )  Assessment & Plan  Lab Results   Component Value Date    HGBA1C 5 8 12/04/2018       No results for input(s): POCGLU in the last 72 hours  Blood Sugar Average: Last 72 hrs:    Patient presented with elevated glucose of 206   Held Metformin  - ISS Alg #3  -HbA1c 6  - Diabetic Diet (& Dysphagia 3 with thin liquids)      Chronic renal insufficiency, stage III (moderate) ([facility])  Assessment & Plan  eGFR 55; Cr elevated at 1 2, baseline is 0 96    - Lovenox is renally dosed     Mild intermittent asthma without complication  Assessment & Plan  Stable, did NOT require albuterol inhaler while she is ill    - Duo-Nebs Q4H prn      Obstructive sleep apnea syndrome  Assessment & Plan  CPAP 8 mmHg     GLOBAL:  Replete electrolytes as needed  SCDs and Lovenox  Dysphagia level 3 carbohydrate diet  Hep-Lock      Subjective:   Patient seen examined bedside, no acute events overnight  Patient states that she still feels sick and has not improved since yesterday  Today she also states she has a headache  Patient denies fevers, chills, shortness of breath or chest pain  Objective:     Vitals: Blood pressure 100/55, pulse 60, temperature 98 2 °F (36 8 °C), temperature source Tympanic, resp  rate 16, height 4' 9" (1 448 m), weight 89 4 kg (197 lb 1 5 oz), SpO2 94 %, not currently breastfeeding  ,Body mass index is 42 65 kg/m²  Wt Readings from Last 3 Encounters:   02/05/20 89 4 kg (197 lb 1 5 oz)   01/22/20 88 7 kg (195 lb 8 8 oz)   01/15/20 90 4 kg (199 lb 6 4 oz)       Intake/Output Summary (Last 24 hours) at 2/8/2020 0813  Last data filed at 2/8/2020 0301  Gross per 24 hour   Intake 420 ml   Output 300 ml   Net 120 ml       Physical Exam: General: Pt is AAO x 3, not in any acute distress  Cardio: RRR, S1 and S2 +, no murmurs/rubs/gallops/clicks  Resp: CTA b/l, no wheezes/rales/rhonchi  Abdomen: soft, NT/ND, BS+  Extremities: no cyanosis or edema  PP 2+ b/l         Recent Results (from the past 24 hour(s))   Fingerstick Glucose (POCT)    Collection Time: 02/07/20 11:01 AM   Result Value Ref Range    POC Glucose 182 (H) 65 - 140 mg/dl   Fingerstick Glucose (POCT)    Collection Time: 02/07/20  4:48 PM   Result Value Ref Range    POC Glucose 150 (H) 65 - 140 mg/dl   Fingerstick Glucose (POCT)    Collection Time: 02/07/20  8:35 PM   Result Value Ref Range    POC Glucose 258 (H) 65 - 140 mg/dl   Basic metabolic panel    Collection Time: 02/08/20  5:24 AM   Result Value Ref Range    Sodium 140 136 - 145 mmol/L    Potassium 3 7 3 5 - 5 3 mmol/L    Chloride 106 100 - 108 mmol/L    CO2 30 21 - 32 mmol/L    ANION GAP 4 4 - 13 mmol/L    BUN 11 5 - 25 mg/dL    Creatinine 0 90 0 60 - 1 30 mg/dL    Glucose 115 65 - 140 mg/dL    Calcium 7 4 (L) 8 3 - 10 1 mg/dL    eGFR 79 ml/min/1 73sq m   CBC and differential    Collection Time: 02/08/20  5:24 AM   Result Value Ref Range    WBC 8 36 4 31 - 10 16 Thousand/uL    RBC 3 64 (L) 3 81 - 5 12 Million/uL    Hemoglobin 11 5 11 5 - 15 4 g/dL    Hematocrit 36 6 34 8 - 46 1 %     (H) 82 - 98 fL    MCH 31 6 26 8 - 34 3 pg    MCHC 31 4 31 4 - 37 4 g/dL    RDW 13 4 11 6 - 15 1 %    MPV 10 5 8 9 - 12 7 fL    Platelets 002 533 - 248 Thousands/uL    nRBC 0 /100 WBCs    Neutrophils Relative 61 43 - 75 %    Immat GRANS % 2 0 - 2 %    Lymphocytes Relative 26 14 - 44 %    Monocytes Relative 9 4 - 12 %    Eosinophils Relative 1 0 - 6 %    Basophils Relative 1 0 - 1 %    Neutrophils Absolute 5 01 1 85 - 7 62 Thousands/µL    Immature Grans Absolute 0 20 0 00 - 0 20 Thousand/uL    Lymphocytes Absolute 2 21 0 60 - 4 47 Thousands/µL    Monocytes Absolute 0 79 0 17 - 1 22 Thousand/µL    Eosinophils Absolute 0 08 0 00 - 0 61 Thousand/µL    Basophils Absolute 0 07 0 00 - 0 10 Thousands/µL   Magnesium    Collection Time: 02/08/20  5:24 AM   Result Value Ref Range    Magnesium 2 1 1 6 - 2 6 mg/dL   Phosphorus    Collection Time: 02/08/20  5:24 AM   Result Value Ref Range    Phosphorus 3 3 2 7 - 4 5 mg/dL   Fingerstick Glucose (POCT)    Collection Time: 02/08/20  7:36 AM   Result Value Ref Range    POC Glucose 125 65 - 140 mg/dl       Current Facility-Administered Medications   Medication Dose Route Frequency Provider Last Rate Last Dose    acetaminophen (TYLENOL) tablet 650 mg  650 mg Oral Q6H PRN Gilbert Trujillo DO   650 mg at 02/06/20 2038    acetaminophen (TYLENOL) tablet 650 mg  650 mg Oral Once Jailyn Gunn MD        albuterol (PROVENTIL HFA,VENTOLIN HFA) inhaler 2 puff  2 puff Inhalation Q6H PRN Gilbert Trujillo DO        ARIPiprazole (ABILIFY) tablet 10 mg  10 mg Oral Daily Gilbert Trujillo DO   10 mg at 02/07/20 0831    atorvastatin (LIPITOR) tablet 10 mg  10 mg Oral Daily With [facility], DO   10 mg at 02/07/20 1702    azithromycin (ZITHROMAX) tablet 250 mg  250 mg Oral Q24H Ocilla Spore, DO        benzonatate (TESSALON PERLES) capsule 100 mg  100 mg Oral TID PRN Jonathan Spore, DO   100 mg at 02/07/20 1939    calcium carbonate (TUMS) chewable tablet 500 mg  500 mg Oral Daily PRN Lenora Bellis, DO        dextromethorphan-guaiFENesin (ROBITUSSIN DM)  mg/5 mL oral syrup 10 mL  10 mL Oral Q4H PRN Jonathan Spore, DO   10 mL at 02/08/20 0240    divalproex sodium (DEPAKOTE ER) 24 hr tablet 1,000 mg  1,000 mg Oral Daily Jonathan Spore, DO   1,000 mg at 02/07/20 0830    enoxaparin (LOVENOX) subcutaneous injection 30 mg  30 mg Subcutaneous Daily Jonathan Spore, DO   30 mg at 02/07/20 1019    erythromycin (ILOTYCIN) 0 5 % ophthalmic ointment 0 5 inch  0 5 inch Both Eyes HS Jonathan Spore, DO   0 5 inch at 02/07/20 2311    fluticasone (FLONASE) 50 mcg/act nasal spray 1 spray  1 spray Nasal Daily Ocilla Spore, DO   1 spray at 02/07/20 0831    insulin lispro (HumaLOG) 100 units/mL subcutaneous injection 1-6 Units  1-6 Units Subcutaneous 4x Daily (AC & HS) Jonathan Spore, DO   3 Units at 02/07/20 2312    ipratropium-albuterol (DUO-NEB) 0 5-2 5 mg/3 mL inhalation solution 3 mL  3 mL Nebulization Q4H PRN Aj Narvaez MD        levothyroxine tablet 50 mcg  50 mcg Oral Early Morning Jonathan Spore, DO   50 mcg at 02/08/20 0526    loratadine (CLARITIN) tablet 5 mg  5 mg Oral Daily Ocilla Spore, DO   5 mg at 02/07/20 0831    pantoprazole (PROTONIX) EC tablet 20 mg  20 mg Oral Early Morning Danielle Viotto, DO   20 mg at 02/08/20 0526    potassium chloride (K-DUR,KLOR-CON) CR tablet 40 mEq  40 mEq Oral Once Nancy Feliciano MD        sodium chloride (OCEAN) 0 65 % nasal spray 1 spray  1 spray Each Nare Q2H PRN Ocilla Spore, DO   1 spray at 02/06/20 1737       Invasive Devices     Peripheral Intravenous Line            Peripheral IV 02/05/20 Left Forearm 2 days                Lab, Imaging and other studies: I have personally reviewed pertinent reports      VTE Pharmacologic Prophylaxis: Enoxaparin (Lovenox)  VTE Mechanical Prophylaxis: sequential compression device    Jarad Cavazos MD

## 2020-02-08 NOTE — SOCIAL WORK
LOS - 3 days    CPR2  Bundle-Sepsis    SW following to assist with DCP  Approached by [facility] physician to discuss pt's discharge plan  Physician would like to discharge pt back to her group home and discussed anticipated discharge with pt's mother  Mother expressed concern about the group home's readiness to accept pt and the condition of pt's CPAP due to recent respiratory infection  Per CM note from yesterday pt's mother was going to contact [facility] and [facility] to coordinate cleaning and demonstration for staff  Physician planning to ask mother if she spoke with [facility]  SW placed call to [place], weekend covering  for group home, to discuss pt's return and inquire about contact with [facility]  Per Ms  Nathaliahenry Jacques they did not have any contact with [facility] but are prepared to accept pt back if discharged  SW then placed call to [facility] ([phone])  Spoke with answering service and explained situation  Office closed but offered to contact on-call respiratory therapist   Shortly thereafter SW received call from [facility] on-call respirtory therapist, Mis Tom  SW explained mother's concern of pt getting reinfected from using CPAP after illness and inquired about cleaning process  Per RT there is a low likelihood of reinfection from CPAP  For cleaning NO soap or cleansers should be used because they would then be inhaled  It is recommended that white vinegar be used  White vinegar can be run through the tubing and poured into the water tank  The mask cushion can be placed in white vinegar if desired but not left to soak  RT said after cleaning with vinegar all pieces should be rinsed and let to air dry  SW asked for written cleaning instructions for group home  Per RT there are no written instructions because cleaning is usually not necessary  SW will discussed above with  for group home    Ms Sloan Sawyer asked that instructions from [facility] be placed on AVS   SW will add to AVS    Ms Norma Vilchis also stated that group home staff member will be able to pick pt up when discharge ready  SW will discuss above with Isaias Hartmann

## 2020-02-08 NOTE — DISCHARGE SUMMARY
[facility] Discharge Summary - Medical Angelica Petersen 38 y o  female MRN: [id number]    [place] [facility] / Bed: [address] [facility] [address] [facility] Encounter: [id number]    BRIEF OVERVIEW  Admitting Provider: Jinny Dominguez MD         Discharge Provider: Yaritza Adorno MD    Discharge To: Group Home     Outpatient Follow-Up:   F/U at [facility] for Transition of Care Appt      Things to address at first follow up visit:   Pt had RSV pneumonia, covered empirically also with antibiotics  Completion of total 5 day course of azithromycin  Any continued symptoms? Cough? Congestion? Home O2 eval done on d/c, discuss if patient uses Oxygen concentrator regularly at home  Has f/u with [facility] on 2/10    Labs and results pending at discharge: None     Admission Date: 2/5/2020     Discharge Date: 2/8/2020    Discharge Diagnoses:  Principal Problem:    RSV infection  Active Problems:    Obstructive sleep apnea syndrome    Mild intermittent asthma without complication    Community acquired pneumonia of right lower lobe of lung (HCC)    Down syndrome, unspecified    Chronic renal insufficiency, stage III (moderate) (HCC)    Diabetes mellitus ([facility] [address] )    Hypothyroidism    Hyperlipidemia    Schizophrenia ([facility] [address] )    Vitamin D deficiency    Fever    Sepsis ([facility] [address] )  Resolved Problems:    * No resolved hospital problems  *    Consulting Providers - None    [address] [place] [address]    HPI (Per Admission H&P)-  52-year-old morbidly obese female nursing home patient with Down syndrome, schizophrenia, type 2 diabetes, obstructive sleep apnea, and intermittent asthma presenting with persistent congestion and cough without hemoptysis for the past few weeks  Patient denies any shortness of breath, and denied any use of her rescue inhaler  No vomiting, diarrhea, abdominal pain  Patient was recently at the emergency department 2 weeks ago for similar symptoms and she tested positive for RSV    She also tested positive for RSV during this admission  Azithromycin for one day on discharge  Keflex 500 bid on discharge  [address] had an uncomplicated hospitalization requiring 3 nights day for RSV pneumonia in the context of mild intermittent asthma, down syndrome with ROBERTO  She was admitted and started on antibiotics Rocephin and azithromycin, as well as fluid hydration as she met sepsis criteria because of an elevated white count, fever, and tachypnea  She also had episodes of low blood pressure such as 87/51, which eventually resumed a normal level  Patient had albuterol, nasal spray, Robitussin, and Tessalon Perles to help her symptoms  She was anticoagulated with Lovenox, and covered for her diabetes with insulin sliding scale  Her white blood cell count improved, her urine Legionella and strep were negative as was her influenza testing  Her chest x-ray showed no acute disease  Physical Exam at Discharge  Vitals:    02/07/20 2008   BP: 125/60   Pulse: 64   Resp: 20   Temp: 98 °F (36 7 °C)   SpO2: 91%   Please see progress note on day of discharge    Procedures Performed/Pertinent Test results  Xr Chest 2 Views      Result Date: 2/6/2020  Impression: No acute cardiopulmonary disease  Medications (Per AVS)-  Your Medications     TAKE these medications     TAKE these medications     Morning Afternoon Evening Bedtime As Needed    albuterol 90 mcg/act inhaler   Commonly known as: PROAIR HFA   Inhale 2 puffs every 6 (six) hours as needed for wheezing   Refills: 0          ARIPiprazole 10 mg tablet   Commonly known as: ABILIFY   Take 1 tablet (10 mg total) by mouth daily   Refills: 0          atorvastatin 10 mg tablet   Commonly known as: LIPITOR   Take 10 mg by mouth daily     Refills: 0          azithromycin 250 mg tablet   Commonly known as: ZITHROMAX   Start taking on: February 9, 2020   Take 1 tablet (250 mg total) by mouth every 24 hours for 1 dose To be taken on 2/9   Refills: 0          CENTRUM SILVER ULTRA WOMENS PO   Centrum Silver Ultra Womens Oral Tablet Refills: 0 Active   Refills: 0          cephalexin 500 mg capsule   Commonly known as: KEFLEX   Take 1 capsule (500 mg total) by mouth every 12 (twelve) hours for 2 days Start on 2/8 afternoon   Refills: 0          clindamycin 1 % gel   Commonly known as: CLINDAGEL   Apply topically 2 (two) times a day Apply to L axilla   Refills: 0          divalproex sodium 500 mg 24 hr tablet   Commonly known as: DEPAKOTE ER   Take 2 tablets (1,000 mg total) by mouth daily   Refills: 0          erythromycin ophthalmic ointment   Commonly known as: ILOTYCIN   Administer 0 5 inches to both eyes daily at bedtime   Refills: 0          fluticasone 50 mcg/act nasal spray   Commonly known as: FLONASE   1 spray into each nostril daily   Refills: 3          levocetirizine 5 MG tablet   Commonly known as: XYZAL   Take 1 tablet (5 mg total) by mouth daily At 8 PM   Refills: 1          LISTERINE ANTISEPTIC ADVANCED MT   by Per J Tube route   Refills: 0          metFORMIN 500 mg tablet   Commonly known as: GLUCOPHAGE   Take 500 mg by mouth 2 (two) times a day   Refills: 0          OCUSOFT BABY EYELID & EYELASH EX   Inhale   Refills: 0          SYNTHROID 50 mcg tablet   Generic drug: levothyroxine   Take 50 mcg by mouth daily  Synthroid 50 MCG Oral Tablet TAKE 1 TABLET DAILY  Refills: 0 Active   Refills: 0          VITAMIN D (ERGOCALCIFEROL) PO   Take 1 25 mg by mouth once a week   Administer on sunday   Refills: 0         OTHER medications on file     OTHER medications on file     Morning Afternoon Evening Bedtime As Needed    spacer device for metered dose inhaler   For use with metered dose inhaler   Refills: 0               Allergies  Allergies   Allergen Reactions    Avandia [Rosiglitazone]      CHF     Diet restrictions: Standard for patient:  Dysphagia 3 with thin liquids  Activity restrictions: none  Code Status: Level 1 - Full Code    Discharge Condition: stable    Discharge  Statement   I spent 30 minutes discharging the patient  This time was spent on the day of discharge  I had direct contact with the patient on the day of discharge  Additional documentation is required if more than 30 minutes were spent on discharge

## 2020-02-08 NOTE — SOCIAL WORK
Discharge ordered  Pt [facility]  Transportation was provided by group home staff  Spoke with Suzy Stauffer at [facility] to [facility] to notify agency of discharge  AVS was sent to agency through [employer]

## 2020-02-10 ENCOUNTER — TRANSITIONAL CARE MANAGEMENT (OUTPATIENT)
Dept: FAMILY MEDICINE CLINIC | Facility: CLINIC | Age: 45
End: 2020-02-10

## 2020-02-10 ENCOUNTER — TELEPHONE (OUTPATIENT)
Dept: PULMONOLOGY | Facility: MEDICAL CENTER | Age: 45
End: 2020-02-10

## 2020-02-10 ENCOUNTER — OFFICE VISIT (OUTPATIENT)
Dept: PULMONOLOGY | Facility: MEDICAL CENTER | Age: 45
End: 2020-02-10
Payer: MEDICARE

## 2020-02-10 ENCOUNTER — EPISODE CHANGES (OUTPATIENT)
Dept: CASE MANAGEMENT | Facility: HOSPITAL | Age: 45
End: 2020-02-10

## 2020-02-10 VITALS
TEMPERATURE: 97.8 F | HEIGHT: 56 IN | HEART RATE: 65 BPM | WEIGHT: 212 LBS | RESPIRATION RATE: 12 BRPM | OXYGEN SATURATION: 92 % | DIASTOLIC BLOOD PRESSURE: 68 MMHG | BODY MASS INDEX: 47.69 KG/M2 | SYSTOLIC BLOOD PRESSURE: 118 MMHG

## 2020-02-10 DIAGNOSIS — J45.20 MILD INTERMITTENT ASTHMA WITHOUT COMPLICATION: ICD-10-CM

## 2020-02-10 DIAGNOSIS — R06.02 SHORTNESS OF BREATH: ICD-10-CM

## 2020-02-10 DIAGNOSIS — J45.30 MILD PERSISTENT ASTHMA WITHOUT COMPLICATION: Primary | ICD-10-CM

## 2020-02-10 DIAGNOSIS — J18.9 COMMUNITY ACQUIRED PNEUMONIA OF RIGHT LOWER LOBE OF LUNG: ICD-10-CM

## 2020-02-10 DIAGNOSIS — Z71.89 COMPLEX CARE COORDINATION: Primary | ICD-10-CM

## 2020-02-10 DIAGNOSIS — R06.02 SHORTNESS OF BREATH: Primary | ICD-10-CM

## 2020-02-10 DIAGNOSIS — G47.33 OBSTRUCTIVE SLEEP APNEA SYNDROME: ICD-10-CM

## 2020-02-10 LAB
BACTERIA BLD CULT: NORMAL
BACTERIA BLD CULT: NORMAL
M PNEUMO IGG SER IA-ACNC: NEGATIVE

## 2020-02-10 PROCEDURE — 1111F DSCHRG MED/CURRENT MED MERGE: CPT | Performed by: NURSE PRACTITIONER

## 2020-02-10 PROCEDURE — 99214 OFFICE O/P EST MOD 30 MIN: CPT | Performed by: NURSE PRACTITIONER

## 2020-02-10 PROCEDURE — 3074F SYST BP LT 130 MM HG: CPT | Performed by: NURSE PRACTITIONER

## 2020-02-10 PROCEDURE — 94010 BREATHING CAPACITY TEST: CPT | Performed by: NURSE PRACTITIONER

## 2020-02-10 PROCEDURE — 3008F BODY MASS INDEX DOCD: CPT | Performed by: NURSE PRACTITIONER

## 2020-02-10 PROCEDURE — 1036F TOBACCO NON-USER: CPT | Performed by: NURSE PRACTITIONER

## 2020-02-10 PROCEDURE — 3078F DIAST BP <80 MM HG: CPT | Performed by: NURSE PRACTITIONER

## 2020-02-10 PROCEDURE — 3044F HG A1C LEVEL LT 7.0%: CPT | Performed by: NURSE PRACTITIONER

## 2020-02-10 PROCEDURE — 3066F NEPHROPATHY DOC TX: CPT | Performed by: NURSE PRACTITIONER

## 2020-02-10 RX ORDER — ALBUTEROL SULFATE 2.5 MG/3ML
2.5 SOLUTION RESPIRATORY (INHALATION) DAILY
Qty: 90 ML | Refills: 3 | Status: SHIPPED | OUTPATIENT
Start: 2020-02-10 | End: 2020-03-11

## 2020-02-10 RX ORDER — ALBUTEROL SULFATE 2.5 MG/3ML
2.5 SOLUTION RESPIRATORY (INHALATION) DAILY
Qty: 90 ML | Refills: 3 | Status: SHIPPED | OUTPATIENT
Start: 2020-02-10 | End: 2020-02-10

## 2020-02-10 RX ORDER — ALBUTEROL SULFATE 2.5 MG/3ML
2.5 SOLUTION RESPIRATORY (INHALATION) DAILY
Qty: 90 ML | Refills: 0 | Status: SHIPPED | OUTPATIENT
Start: 2020-02-10 | End: 2020-02-10 | Stop reason: SDUPTHER

## 2020-02-10 NOTE — PROGRESS NOTES
Assessment/Plan:     Problem List Items Addressed This Visit        Respiratory    Obstructive sleep apnea syndrome     Patient has been using an benefitting from a CPAP  She is on 8 cm of water pressure  Use over the last 90 days is 90%  Her average usage is 8 hours and 54 minutes  AHI is elevated at 8 9  I am going to change her pressure to auto CPAP  Her last apneic hypopnea in dex when she was seen in the office was over 11  It does not appear that her current machine has been changed to auto CPAP  Relevant Orders    PAP DME Pressure Change    Mild intermittent asthma without complication     Patient has stable intermittent asthma  She is currently using albuterol via nebulizer as needed  I think it would be of benefit to her to use at least once daily  This should be done in the morning  Will write this order and will be done accordingly  PFTs were done in the office today  However is difficult to ascertain if the effort was adequate  Her forced vital capacity was 1 79 L or 62% of predicted, FEV1 was 1 75 L or 74% of predicted obstruction ratio 98%  Flow volume loop was equivocal   Her asthma appears to be stable  However since she had a recent diagnosis of pneumonia, I am suggesting that she use nebulized albuterol once daily at 8:00 a m  Corry Oneil Relevant Medications    albuterol (2 5 mg/3 mL) 0 083 % nebulizer solution    Community acquired pneumonia of right lower lobe of lung ([address] )     Patient was recently discharged from [address] [place] [address] with pneumonia  Chest x-ray was negative  She completed both Rocephin and Zithromax  She was also discharged on oral antibiotic  She is asymptomatic at this time  I did instruct her care taker from group [place] who accompanies her today that they should have her sit upright with eating and not to allow her to lay flat after meals  She does not need any follow-up chest x-ray    I personally reviewed her x-ray that was done in the hospital   There was no evidence of pneumonia  I also reviewed admission and discharge at length  Relevant Medications    albuterol (2 5 mg/3 mL) 0 083 % nebulizer solution      Other Visit Diagnoses     Shortness of breath    -  Primary    Relevant Medications    albuterol (2 5 mg/3 mL) 0 083 % nebulizer solution    Other Relevant Orders    POCT spirometry (Completed)            Return in about 6 months (around 8/10/2020)  All questions are answered to the patient's satisfaction and understanding  She verbalizes understanding  She is encouraged to call with any further questions or concerns  Portions of the record may have been created with voice recognition software  Occasional wrong word or "sound a like" substitutions may have occurred due to the inherent limitations of voice recognition software  Read the chart carefully and recognize, using context, where substitutions have occurred  HPI:  Niles Navarrete is a 36-year-old female who was recently hospitalized for pneumonia  This was on February 5, 2020  This was an uncomplicated stay requiring 3 days for RSV pneumonia in the context of mild intermittent asthma  Patient was admitted and started on antibiotics  She was on both Rocephin and azithromycin as well as fluid hydration  She was given albuterol to be used as needed on discharge  She was also on azithromycin which she completed yesterday  Additionally she was on cephalexin 500 mg which she started 2 days ago  Patient did have a chest x-ray done while hospitalized  There was no evidence of acute pulmonary disease pneumothorax or pleural effusion    Electronically Signed by SESAR Julian    ______________________________________________________________________    Chief Complaint:   Chief Complaint   Patient presents with   [facility] pneumonia       Patient ID: Niles Navarrete is a 38 y o  y o  female has a past medical history of Asthma, Complex partial epilepsy ([facility] [facility] ), Diabetes mellitus ([facility] [address] [facility] ), Disease of thyroid gland, Down syndrome, Heart murmur, Hyperlipidemia, Long Q-T syndrome, Pneumonia, Psychiatric disorder, and Sleep apnea  2/10/2020  Patient presents today for follow-up visit  Cough   This is a chronic problem  The current episode started more than 1 year ago  The problem has been unchanged  The problem occurs every few hours  The cough is non-productive  Associated symptoms include postnasal drip  Risk factors for lung disease include animal exposure  She has tried steroid inhaler for the symptoms  The treatment provided mild relief  Her past medical history is significant for asthma  Review of Systems   Constitutional: Negative  HENT: Positive for postnasal drip  Eyes: Negative  Respiratory: Positive for cough  Cardiovascular: Negative  Gastrointestinal: Negative  Endocrine: Negative  Genitourinary: Negative  Musculoskeletal: Negative  Neurological: Negative  Hematological: Negative  Psychiatric/Behavioral: Negative  Smoking history: She reports that she has never smoked  She has never used smokeless tobacco     The following portions of the patient's history were reviewed and updated as appropriate: current medications, past family history, past medical history, past social history, past surgical history and problem list     Immunization History   Administered Date(s) Administered    Hep B, adult 11/06/2003, 12/04/2003, 05/06/2004    INFLUENZA 10/08/2019    Td (adult), adsorbed 04/24/2003, 07/31/2013, 05/03/2014    Tuberculin Skin Test-PPD Intradermal 07/31/2015     Current Outpatient Medications   Medication Sig Dispense Refill    albuterol (PROAIR HFA) 90 mcg/act inhaler Inhale 2 puffs every 6 (six) hours as needed for wheezing 8 5 g 0    ARIPiprazole (ABILIFY) 10 mg tablet Take 1 tablet (10 mg total) by mouth daily 30 tablet 0    atorvastatin (LIPITOR) 10 mg tablet Take 10 mg by mouth daily        cephalexin (KEFLEX) 500 mg capsule Take 1 capsule (500 mg total) by mouth every 12 (twelve) hours for 2 days Start on 2/8 afternoon 4 capsule 0    clindamycin (CLINDAGEL) 1 % gel Apply topically 2 (two) times a day Apply to L axilla      divalproex sodium (DEPAKOTE ER) 500 mg 24 hr tablet Take 2 tablets (1,000 mg total) by mouth daily 60 tablet 0    erythromycin (ILOTYCIN) ophthalmic ointment Administer 0 5 inches to both eyes daily at bedtime      Eyelid Cleansers (OCUSOFT BABY EYELID & EYELASH EX) Inhale      fluticasone (FLONASE) 50 mcg/act nasal spray 1 spray into each nostril daily 1 Bottle 3    levocetirizine (XYZAL) 5 MG tablet Take 1 tablet (5 mg total) by mouth daily At 8 PM 30 tablet 1    levothyroxine (SYNTHROID) 50 mcg tablet Take 50 mcg by mouth daily  Synthroid 50 MCG Oral Tablet TAKE 1 TABLET DAILY  Refills: 0  Active       metFORMIN (GLUCOPHAGE) 500 mg tablet Take 500 mg by mouth 2 (two) times a day      Mouthwashes (LISTERINE ANTISEPTIC ADVANCED MT) by Per J Tube route      Multiple Vitamins-Minerals (CENTRUM SILVER ULTRA WOMENS PO) Centrum Silver Ultra Womens Oral Tablet  Refills: 0  Active      Spacer/Aero Chamber Mouthpiece (SPACER DEVICE) for metered dose inhaler For use with metered dose inhaler 1 Device 0    VITAMIN D, ERGOCALCIFEROL, PO Take 1 25 mg by mouth once a week  Administer on sunday      albuterol (2 5 mg/3 mL) 0 083 % nebulizer solution Take 1 vial (2 5 mg total) by nebulization daily 90 mL 0    azithromycin (ZITHROMAX) 250 mg tablet Take 1 tablet (250 mg total) by mouth every 24 hours for 1 dose To be taken on 2/9 (Patient not taking: Reported on 2/10/2020) 1 tablet 0     No current facility-administered medications for this visit        Allergies: Avandia [rosiglitazone]    Objective:  Vitals:    02/10/20 0856   BP: 118/68   BP Location: Left arm   Patient Position: Sitting   Cuff Size: Large   Pulse: 65   Resp: 12   Temp: 97 8 °F (36 6 °C)   TempSrc: Tympanic   SpO2: 92%   Weight: 96 2 kg (212 lb) Height: 4' 8" (1 422 m)   Oxygen Therapy  SpO2: 92 %    Wt Readings from Last 3 Encounters:   02/10/20 96 2 kg (212 lb)   02/05/20 89 4 kg (197 lb 1 5 oz)   01/22/20 88 7 kg (195 lb 8 8 oz)     Body mass index is 47 53 kg/m²  Physical Exam   Constitutional: She is oriented to person, place, and time  She appears well-developed and well-nourished  BMI 47   HENT:   Head: Normocephalic and atraumatic  Eyes: Pupils are equal, round, and reactive to light  Neck: Normal range of motion  Cardiovascular: Normal rate and regular rhythm  Pulmonary/Chest: Effort normal    Abdominal: Soft  Musculoskeletal: Normal range of motion  Neurological: She is oriented to person, place, and time     Psychiatric: Her behavior is normal        Lab Review:   Admission on 02/05/2020, Discharged on 02/08/2020   Component Date Value    WBC 02/05/2020 12 52*    RBC 02/05/2020 4 03     Hemoglobin 02/05/2020 12 8     Hematocrit 02/05/2020 39 3     MCV 02/05/2020 98     MCH 02/05/2020 31 8     MCHC 02/05/2020 32 6     RDW 02/05/2020 13 5     MPV 02/05/2020 10 0     Platelets 65/46/6146 231     nRBC 02/05/2020 0     Neutrophils Relative 02/05/2020 73     Immat GRANS % 02/05/2020 1     Lymphocytes Relative 02/05/2020 15     Monocytes Relative 02/05/2020 11     Eosinophils Relative 02/05/2020 0     Basophils Relative 02/05/2020 0     Neutrophils Absolute 02/05/2020 9 18*    Immature Grans Absolute 02/05/2020 0 11     Lymphocytes Absolute 02/05/2020 1 89     Monocytes Absolute 02/05/2020 1 31*    Eosinophils Absolute 02/05/2020 0 00     Basophils Absolute 02/05/2020 0 03     Sodium 02/05/2020 136     Potassium 02/05/2020 4 1     Chloride 02/05/2020 101     CO2 02/05/2020 29     ANION GAP 02/05/2020 6     BUN 02/05/2020 14     Creatinine 02/05/2020 1 20     Glucose 02/05/2020 206*    Calcium 02/05/2020 8 3     eGFR 02/05/2020 55     LACTIC ACID 02/05/2020 1 6     Blood Culture 02/05/2020 No Growth After 4 Days   Blood Culture 02/05/2020 No Growth After 4 Days       EXT PREG TEST UR (Ref: N* 02/05/2020 Negative     Control 02/05/2020 Valid     Color, UA 02/05/2020 Light Yellow     Clarity, UA 02/05/2020 Clear     Specific Gravity, UA 02/05/2020 1 010     pH, UA 02/05/2020 5 5     Leukocytes, UA 02/05/2020 Negative     Nitrite, UA 02/05/2020 Negative     Protein, UA 02/05/2020 Negative     Glucose, UA 02/05/2020 Negative     Ketones, UA 02/05/2020 Negative     Urobilinogen, UA 02/05/2020 0 2     Bilirubin, UA 02/05/2020 Negative     Blood, UA 02/05/2020 Trace-Intact*    INFLUENZA A PCR 02/05/2020 None Detected     INFLUENZA B PCR 02/05/2020 None Detected     RSV PCR 02/05/2020 Detected*    RBC, UA 02/05/2020 0-1*    WBC, UA 02/05/2020 2-4*    Epithelial Cells 02/05/2020 Occasional     Bacteria, UA 02/05/2020 Moderate*    Cholesterol 02/05/2020 109     Triglycerides 02/05/2020 103     HDL, Direct 02/05/2020 36*    LDL Calculated 02/05/2020 52     Non-HDL-Chol (CHOL-HDL) 02/05/2020 73     Hemoglobin A1C 02/05/2020 6 0     EAG 02/05/2020 126     Urine Culture 02/05/2020 No Growth <1000 cfu/mL     MRSA Culture Only 02/05/2020 No Methicillin Resistant Staphlyococcus aureus (MRSA) isolated     Mycoplasma pneumo by PCR 02/05/2020 Negative     Legionella Urinary Antig* 02/05/2020 Negative     Strep pneumoniae antigen* 02/05/2020 Negative     POC Glucose 02/05/2020 184*    Cholesterol 02/06/2020 96     Triglycerides 02/06/2020 81     HDL, Direct 02/06/2020 30*    LDL Calculated 02/06/2020 50     Sodium 02/06/2020 144     Potassium 02/06/2020 3 6     Chloride 02/06/2020 109*    CO2 02/06/2020 29     ANION GAP 02/06/2020 6     BUN 02/06/2020 14     Creatinine 02/06/2020 1 00     Glucose 02/06/2020 116     Calcium 02/06/2020 7 3*    eGFR 02/06/2020 69     WBC 02/06/2020 11 36*    RBC 02/06/2020 3 74*    Hemoglobin 02/06/2020 11 8     Hematocrit 02/06/2020 37 6     MCV 02/06/2020 101*    MCH 02/06/2020 31 6     MCHC 02/06/2020 31 4     RDW 02/06/2020 13 7     MPV 02/06/2020 10 0     Platelets 90/47/0389 186     nRBC 02/06/2020 0     Neutrophils Relative 02/06/2020 70     Immat GRANS % 02/06/2020 1     Lymphocytes Relative 02/06/2020 18     Monocytes Relative 02/06/2020 11     Eosinophils Relative 02/06/2020 0     Basophils Relative 02/06/2020 0     Neutrophils Absolute 02/06/2020 7 98*    Immature Grans Absolute 02/06/2020 0 10     Lymphocytes Absolute 02/06/2020 1 99     Monocytes Absolute 02/06/2020 1 23*    Eosinophils Absolute 02/06/2020 0 03     Basophils Absolute 02/06/2020 0 03     Magnesium 02/06/2020 2 3     Phosphorus 02/06/2020 3 5     POC Glucose 02/06/2020 120     POC Glucose 02/06/2020 259*    POC Glucose 02/06/2020 137     POC Glucose 02/06/2020 148*    Sodium 02/07/2020 143     Potassium 02/07/2020 3 9     Chloride 02/07/2020 109*    CO2 02/07/2020 28     ANION GAP 02/07/2020 6     BUN 02/07/2020 9     Creatinine 02/07/2020 0 88     Glucose 02/07/2020 115     Calcium 02/07/2020 7 2*    eGFR 02/07/2020 81     WBC 02/07/2020 7 85     RBC 02/07/2020 3 90     Hemoglobin 02/07/2020 12 4     Hematocrit 02/07/2020 39 4     MCV 02/07/2020 101*    MCH 02/07/2020 31 8     MCHC 02/07/2020 31 5     RDW 02/07/2020 13 7     MPV 02/07/2020 10 5     Platelets 74/33/3508 194     nRBC 02/07/2020 0     Neutrophils Relative 02/07/2020 54     Immat GRANS % 02/07/2020 2     Lymphocytes Relative 02/07/2020 33     Monocytes Relative 02/07/2020 9     Eosinophils Relative 02/07/2020 1     Basophils Relative 02/07/2020 1     Neutrophils Absolute 02/07/2020 4 30     Immature Grans Absolute 02/07/2020 0 14     Lymphocytes Absolute 02/07/2020 2 58     Monocytes Absolute 02/07/2020 0 67     Eosinophils Absolute 02/07/2020 0 11     Basophils Absolute 02/07/2020 0 05     Magnesium 02/07/2020 2 1     Phosphorus 02/07/2020 2 5*    POC Glucose 02/07/2020 98     POC Glucose 02/07/2020 182*    Ventricular Rate 02/05/2020 83     Atrial Rate 02/05/2020 83     MT Interval 02/05/2020 148     QRSD Interval 02/05/2020 84     QT Interval 02/05/2020 382     QTC Interval 02/05/2020 448     P Axis 02/05/2020 33     QRS Axis 02/05/2020 34     T Wave Axis 02/05/2020 17     POC Glucose 02/07/2020 150*    POC Glucose 02/07/2020 258*    Sodium 02/08/2020 140     Potassium 02/08/2020 3 7     Chloride 02/08/2020 106     CO2 02/08/2020 30     ANION GAP 02/08/2020 4     BUN 02/08/2020 11     Creatinine 02/08/2020 0 90     Glucose 02/08/2020 115     Calcium 02/08/2020 7 4*    eGFR 02/08/2020 79     WBC 02/08/2020 8 36     RBC 02/08/2020 3 64*    Hemoglobin 02/08/2020 11 5     Hematocrit 02/08/2020 36 6     MCV 02/08/2020 101*    MCH 02/08/2020 31 6     MCHC 02/08/2020 31 4     RDW 02/08/2020 13 4     MPV 02/08/2020 10 5     Platelets 64/38/3894 209     nRBC 02/08/2020 0     Neutrophils Relative 02/08/2020 61     Immat GRANS % 02/08/2020 2     Lymphocytes Relative 02/08/2020 26     Monocytes Relative 02/08/2020 9     Eosinophils Relative 02/08/2020 1     Basophils Relative 02/08/2020 1     Neutrophils Absolute 02/08/2020 5 01     Immature Grans Absolute 02/08/2020 0 20     Lymphocytes Absolute 02/08/2020 2 21     Monocytes Absolute 02/08/2020 0 79     Eosinophils Absolute 02/08/2020 0 08     Basophils Absolute 02/08/2020 0 07     Magnesium 02/08/2020 2 1     Phosphorus 02/08/2020 3 3     POC Glucose 02/08/2020 125     POC Glucose 02/08/2020 155*   Admission on 01/22/2020, Discharged on 01/22/2020   Component Date Value    INFLUENZA A PCR 01/22/2020 None Detected     INFLUENZA B PCR 01/22/2020 None Detected     RSV PCR 01/22/2020 Detected*   Admission on 10/09/2019, Discharged on 10/09/2019   Component Date Value    Color, UA 10/09/2019 Colorless     Clarity, UA 10/09/2019 Clear     Specific [place], UA 10/09/2019 <=1 005     pH, UA 10/09/2019 7 0     Leukocytes, UA 10/09/2019 Negative     Nitrite, UA 10/09/2019 Negative     Protein, UA 10/09/2019 Negative     Glucose, UA 10/09/2019 Negative     Ketones, UA 10/09/2019 Negative     Urobilinogen, UA 10/09/2019 0 2     Bilirubin, UA 10/09/2019 Negative     Blood, UA 10/09/2019 Negative     EXT PREG TEST UR (Ref: N* 10/09/2019 negative     Control 10/09/2019 valid     WBC 10/09/2019 6 12     RBC 10/09/2019 4 55     Hemoglobin 10/09/2019 14 5     Hematocrit 10/09/2019 44 9     MCV 10/09/2019 99*    MCH 10/09/2019 31 9     MCHC 10/09/2019 32 3     RDW 10/09/2019 12 8     MPV 10/09/2019 9 4     Platelets 69/15/8192 234     nRBC 10/09/2019 0     Neutrophils Relative 10/09/2019 58     Immat GRANS % 10/09/2019 1     Lymphocytes Relative 10/09/2019 33     Monocytes Relative 10/09/2019 7     Eosinophils Relative 10/09/2019 0     Basophils Relative 10/09/2019 1     Neutrophils Absolute 10/09/2019 3 57     Immature Grans Absolute 10/09/2019 0 04     Lymphocytes Absolute 10/09/2019 2 03     Monocytes Absolute 10/09/2019 0 42     Eosinophils Absolute 10/09/2019 0 02     Basophils Absolute 10/09/2019 0 04     Sodium 10/09/2019 141     Potassium 10/09/2019 4 1     Chloride 10/09/2019 103     CO2 10/09/2019 34*    ANION GAP 10/09/2019 4     BUN 10/09/2019 12     Creatinine 10/09/2019 1 03     Glucose 10/09/2019 117     Calcium 10/09/2019 8 6     AST 10/09/2019 23     ALT 10/09/2019 31     Alkaline Phosphatase 10/09/2019 98     Total Protein 10/09/2019 7 2     Albumin 10/09/2019 3 4*    Total Bilirubin 10/09/2019 0 40     eGFR 10/09/2019 67     Valproic Acid, Total 10/09/2019 71     Magnesium 10/09/2019 2 1        Past Surgical History:   Procedure Laterality Date    CARDIAC SURGERY      patent duct    PATENT DUCTUS ARTERIOUS LIGATION          Family History   Adopted: Yes   Problem Relation Age of Onset    No Known Problems Mother Diagnostics:  I have personally reviewed pertinent films in PACS  Office Spirometry Results:  FEV1: 1 75 liters(74%)  FVC: 1 7 liters(62%)  FEV1/FVC: 102 9 %  ESS:    Xr Chest 2 Views    Result Date: 2/6/2020  Narrative: CHEST INDICATION:   cough, fever  COMPARISON:  2/11/2019 EXAM PERFORMED/VIEWS:  XR CHEST PA & LATERAL FINDINGS: Cardiomediastinal silhouette appears unremarkable  No evidence of acute pulmonary disease, pneumothorax, pleural effusion  Right cardiophrenic angle density has not changed since earlier examinations, such as 12/3/2018 Osseous structures appear within normal limits for patient age  Impression: No acute cardiopulmonary disease   Workstation performed: [id number]

## 2020-02-10 NOTE — ASSESSMENT & PLAN NOTE
Patient has stable intermittent asthma  She is currently using albuterol via nebulizer as needed  I think it would be of benefit to her to use at least once daily  This should be done in the morning  Will write this order and will be done accordingly  PFTs were done in the office today  However is difficult to ascertain if the effort was adequate  Her forced vital capacity was 1 79 L or 62% of predicted, FEV1 was 1 75 L or 74% of predicted obstruction ratio 98%  Flow volume loop was equivocal   Her asthma appears to be stable  However since she had a recent diagnosis of pneumonia, I am suggesting that she use nebulized albuterol once daily at 8:00 a m  Chris Dickens

## 2020-02-10 NOTE — ASSESSMENT & PLAN NOTE
Patient was recently discharged from [address] [place] [address] with pneumonia  Chest x-ray was negative  She completed both Rocephin and Zithromax  She was also discharged on oral antibiotic  She is asymptomatic at this time  I did instruct her care taker from group home who accompanies her today that they should have her sit upright with eating and not to allow her to lay flat after meals  She does not need any follow-up chest x-ray  I personally reviewed her x-ray that was done in the hospital   There was no evidence of pneumonia  I also reviewed admission and discharge at length

## 2020-02-10 NOTE — PATIENT INSTRUCTIONS
You appear to be stable at this time  You do not need a follow-up chest x-ray  I have asked you used albuterol via nebulizer  Daily at 8:00 a m   You can also use an as-needed nebulizer if needed  Be certain to sit up and not fall asleep after eating  There is no evidence of pneumonia at this time

## 2020-02-10 NOTE — TELEPHONE ENCOUNTER
Patients  called to ask for the script for the patients albuterol to be more specific  Per medicare guidelines it needs to have specific instructions can no tjust say as needed  Please be sure to add the Dx code as well     I can fax to the [facility] when it is done   Thank you

## 2020-02-10 NOTE — ASSESSMENT & PLAN NOTE
Patient has been using an benefitting from a CPAP  She is on 8 cm of water pressure  Use over the last 90 days is 90%  Her average usage is 8 hours and 54 minutes  AHI is elevated at 8 9  I am going to change her pressure to auto CPAP  Her last apneic hypopnea in dex when she was seen in the office was over 11  It does not appear that her current machine has been changed to auto CPAP

## 2020-02-14 ENCOUNTER — PATIENT OUTREACH (OUTPATIENT)
Dept: FAMILY MEDICINE CLINIC | Facility: CLINIC | Age: 45
End: 2020-02-14

## 2020-02-14 NOTE — PROGRESS NOTES
Outreach to patients mother  Mother states her daughter [address] in [address] [place]  Explained purpose of bundle program  Verbalizes understanding  States group home is run by [facility] contact name: [place] [phone]  Outreach to [employer]  S/W Delvin  Advised that patient needs follow up appointment with PCP  States patient is doing well  Has not noticed any cough  Appetite good  Pt is at baseline according to [place]  Call transferred to schedulers  CM contact information provided  CM will meet with patient during PCP visit

## 2020-02-17 ENCOUNTER — OFFICE VISIT (OUTPATIENT)
Dept: FAMILY MEDICINE CLINIC | Facility: CLINIC | Age: 45
End: 2020-02-17
Payer: MEDICARE

## 2020-02-17 VITALS
HEART RATE: 63 BPM | DIASTOLIC BLOOD PRESSURE: 76 MMHG | HEIGHT: 56 IN | TEMPERATURE: 98.3 F | BODY MASS INDEX: 44.79 KG/M2 | OXYGEN SATURATION: 96 % | WEIGHT: 199.13 LBS | SYSTOLIC BLOOD PRESSURE: 120 MMHG

## 2020-02-17 DIAGNOSIS — J30.89 ALLERGIC RHINITIS DUE TO OTHER ALLERGIC TRIGGER, UNSPECIFIED SEASONALITY: ICD-10-CM

## 2020-02-17 DIAGNOSIS — Z09 HOSPITAL DISCHARGE FOLLOW-UP: Primary | ICD-10-CM

## 2020-02-17 DIAGNOSIS — Z12.31 ENCOUNTER FOR SCREENING MAMMOGRAM FOR BREAST CANCER: ICD-10-CM

## 2020-02-17 PROCEDURE — 1111F DSCHRG MED/CURRENT MED MERGE: CPT | Performed by: FAMILY MEDICINE

## 2020-02-17 PROCEDURE — 1036F TOBACCO NON-USER: CPT | Performed by: FAMILY MEDICINE

## 2020-02-17 PROCEDURE — 3074F SYST BP LT 130 MM HG: CPT | Performed by: FAMILY MEDICINE

## 2020-02-17 PROCEDURE — 3066F NEPHROPATHY DOC TX: CPT | Performed by: FAMILY MEDICINE

## 2020-02-17 PROCEDURE — 3044F HG A1C LEVEL LT 7.0%: CPT | Performed by: FAMILY MEDICINE

## 2020-02-17 PROCEDURE — 3078F DIAST BP <80 MM HG: CPT | Performed by: FAMILY MEDICINE

## 2020-02-17 PROCEDURE — 99213 OFFICE O/P EST LOW 20 MIN: CPT | Performed by: FAMILY MEDICINE

## 2020-02-17 RX ORDER — FLUTICASONE PROPIONATE 50 MCG
1 SPRAY, SUSPENSION (ML) NASAL DAILY
Qty: 1 BOTTLE | Refills: 3 | Status: SHIPPED | OUTPATIENT
Start: 2020-02-17 | End: 2022-01-11 | Stop reason: SDUPTHER

## 2020-02-17 NOTE — PROGRESS NOTES
Assessment/Plan:     Diagnoses and all orders for this visit:    Hospital discharge follow-up    Encounter for screening mammogram for breast cancer  -     Mammo screening bilateral w 3d & cad; Future    Continue Albuterol NEB 1puff daily; Continue CPAP (Auto); Continue Supportive Care; F/U with Pulmonology in 6mths      Subjective:      Patient ID: Juliano Jasmine is a 38 y o  female  HPI    TCM Call (since 1/17/2020)     Date and time call was made  2/10/2020  1:40 PM    Hospital care reviewed  Records reviewed    Patient was hospitialized at  [address] [place] [address]    Date of Admission  02/05/20    Date of discharge  02/08/20    Diagnosis  RSV    Disposition  Home      TCM Call (since 1/17/2020)     I have advised the patient to call PCP with any new or worsening symptoms  Geovanyrina Priscilla RAMIREZ           38year old female presents to clinic for follow-up after hospital discharge  Admitted on 2/5/20 and dischagred on 2/8/20  Diagnosed with RSV infection in the setting of Mild Intermittent Asthma  She was admitted and started on IV Abx - Rocephin and azithromycin, as well as fluid hydration as she met sepsis criteria because of an elevated white count, fever, and tachypnea  Symptoms further improved with albuterol, nasal spray, Robitussin, and Tessalon Perles  CXR during hospital course did not show any acute cardiopulmonary disease  She was discharged with plan to take an additional dose of Azithromycin on 2/9/20 and advised to follow-up with Pulm, for which she has  Today, with caretaker, notes continued improvement of symptoms and patient is back at baseline  The following portions of the patient's history were reviewed and updated as appropriate: allergies, current medications, past family history, past medical history, past social history, past surgical history and problem list     Review of Systems   Constitutional: Negative for chills, diaphoresis, fatigue, fever and unexpected weight change  Respiratory: Negative for cough, chest tightness, shortness of breath and wheezing  Cardiovascular: Negative for chest pain and palpitations  Gastrointestinal: Negative for abdominal pain, constipation, diarrhea, nausea and vomiting  All other systems reviewed and are negative  Objective:    /76 (BP Location: Left arm, Patient Position: Sitting, Cuff Size: Adult)   Pulse 63   Temp 98 3 °F (36 8 °C) (Tympanic)   Ht 4' 8" (1 422 m)   Wt 90 3 kg (199 lb 2 oz)   LMP  (LMP Unknown)   SpO2 96%   BMI 44 64 kg/m²      Physical Exam   Constitutional: She appears well-developed and well-nourished  No distress  Cardiovascular: Normal rate, regular rhythm, normal heart sounds and intact distal pulses  Exam reveals no friction rub  No murmur heard  Pulmonary/Chest: Effort normal and breath sounds normal  No stridor  No respiratory distress  She has no wheezes  She has no rales  She exhibits no tenderness  Abdominal: Soft  Bowel sounds are normal  She exhibits no distension  There is no tenderness  Skin: Capillary refill takes less than 2 seconds  She is not diaphoretic  Nursing note and vitals reviewed

## 2020-02-21 ENCOUNTER — TELEPHONE (OUTPATIENT)
Dept: FAMILY MEDICINE CLINIC | Facility: CLINIC | Age: 45
End: 2020-02-21

## 2020-02-21 NOTE — TELEPHONE ENCOUNTER
Sylvia Bush from visiting nurse service states she is missing the visit this week but patient is doing well and will see her next week

## 2020-02-24 ENCOUNTER — TELEPHONE (OUTPATIENT)
Dept: PULMONOLOGY | Facility: MEDICAL CENTER | Age: 45
End: 2020-02-24

## 2020-02-24 DIAGNOSIS — J45.20 MILD INTERMITTENT ASTHMA WITHOUT COMPLICATION: ICD-10-CM

## 2020-02-24 RX ORDER — ALBUTEROL SULFATE 90 UG/1
2 AEROSOL, METERED RESPIRATORY (INHALATION) EVERY 6 HOURS PRN
Qty: 8.5 G | Refills: 3 | Status: SHIPPED | OUTPATIENT
Start: 2020-02-24 | End: 2021-01-25 | Stop reason: SDUPTHER

## 2020-02-24 NOTE — TELEPHONE ENCOUNTER
Leena from [address] [place] [address] would like to speak with you regarding whether or not patient should still be on rescue inhaler  Can you please give her a call at [phone]

## 2020-02-24 NOTE — PROGRESS NOTES
I spoke to Izabella Berger LPN, group home nurse  She would like to have a order for rescue inhalation for Chela  She will use albuterol 2 puffs every 6 hours as needed for shortness of breath or cough  Additionally, this will not be used more than 3 times daily

## 2020-03-02 ENCOUNTER — PATIENT OUTREACH (OUTPATIENT)
Dept: FAMILY MEDICINE CLINIC | Facility: CLINIC | Age: 45
End: 2020-03-02

## 2020-03-02 NOTE — PROGRESS NOTES
Outreach to group home  It is reported that patient is doing well  No s/s of shortness of breath  All upcoming appointments reviewed  Pt taking all medications as directed

## 2020-03-03 ENCOUNTER — TELEPHONE (OUTPATIENT)
Dept: PULMONOLOGY | Facility: MEDICAL CENTER | Age: 45
End: 2020-03-03

## 2020-03-03 NOTE — TELEPHONE ENCOUNTER
The group home called  The last script they received for Chela's nasal spray only states that she is using it at 8am    They need a new script stating that she uses it twice a day once at 8am and once at 8pm    Can you please rewrite the script and fax it to the [place] [phone]

## 2020-03-05 ENCOUNTER — TELEPHONE (OUTPATIENT)
Dept: FAMILY MEDICINE CLINIC | Facility: CLINIC | Age: 45
End: 2020-03-05

## 2020-03-13 ENCOUNTER — TELEPHONE (OUTPATIENT)
Dept: FAMILY MEDICINE CLINIC | Facility: CLINIC | Age: 45
End: 2020-03-13

## 2020-03-31 DIAGNOSIS — J30.89 ALLERGIC RHINITIS DUE TO OTHER ALLERGIC TRIGGER, UNSPECIFIED SEASONALITY: ICD-10-CM

## 2020-03-31 RX ORDER — LEVOCETIRIZINE DIHYDROCHLORIDE 5 MG/1
5 TABLET, FILM COATED ORAL DAILY
Qty: 30 TABLET | Refills: 5 | Status: SHIPPED | OUTPATIENT
Start: 2020-03-31 | End: 2020-06-11 | Stop reason: SDUPTHER

## 2020-04-10 ENCOUNTER — TELEPHONE (OUTPATIENT)
Dept: PULMONOLOGY | Facility: MEDICAL CENTER | Age: 45
End: 2020-04-10

## 2020-04-10 ENCOUNTER — DOCUMENTATION (OUTPATIENT)
Dept: URGENT CARE | Facility: CLINIC | Age: 45
End: 2020-04-10

## 2020-04-10 DIAGNOSIS — Z20.828 EXPOSURE TO SARS-ASSOCIATED CORONAVIRUS: ICD-10-CM

## 2020-04-10 DIAGNOSIS — Z20.828 EXPOSURE TO SARS-ASSOCIATED CORONAVIRUS: Primary | ICD-10-CM

## 2020-04-10 PROCEDURE — 87635 SARS-COV-2 COVID-19 AMP PRB: CPT

## 2020-04-11 ENCOUNTER — HOSPITAL ENCOUNTER (EMERGENCY)
Facility: HOSPITAL | Age: 45
Discharge: HOME | End: 2020-04-11
Attending: EMERGENCY MEDICINE
Payer: MEDICARE

## 2020-04-11 ENCOUNTER — APPOINTMENT (EMERGENCY)
Dept: RADIOLOGY | Facility: HOSPITAL | Age: 45
End: 2020-04-11
Payer: MEDICARE

## 2020-04-11 ENCOUNTER — TELEPHONE (OUTPATIENT)
Dept: OTHER | Facility: OTHER | Age: 45
End: 2020-04-11

## 2020-04-11 VITALS
WEIGHT: 199 LBS | RESPIRATION RATE: 20 BRPM | HEART RATE: 60 BPM | TEMPERATURE: 97.8 F | BODY MASS INDEX: 44.61 KG/M2 | OXYGEN SATURATION: 94 % | SYSTOLIC BLOOD PRESSURE: 122 MMHG | DIASTOLIC BLOOD PRESSURE: 60 MMHG

## 2020-04-11 DIAGNOSIS — R06.02 SOB (SHORTNESS OF BREATH): Primary | ICD-10-CM

## 2020-04-11 LAB
ALBUMIN SERPL BCP-MCNC: 2.4 G/DL (ref 3.5–5)
ALP SERPL-CCNC: 84 U/L (ref 46–116)
ALT SERPL W P-5'-P-CCNC: 34 U/L (ref 12–78)
ANION GAP SERPL CALCULATED.3IONS-SCNC: 6 MMOL/L (ref 4–13)
AST SERPL W P-5'-P-CCNC: 41 U/L (ref 5–45)
BASOPHILS # BLD AUTO: 0.02 THOUSANDS/ΜL (ref 0–0.1)
BASOPHILS NFR BLD AUTO: 0 % (ref 0–1)
BILIRUB SERPL-MCNC: 0.2 MG/DL (ref 0.2–1)
BUN SERPL-MCNC: 20 MG/DL (ref 5–25)
CALCIUM SERPL-MCNC: 8 MG/DL (ref 8.3–10.1)
CHLORIDE SERPL-SCNC: 106 MMOL/L (ref 100–108)
CO2 SERPL-SCNC: 28 MMOL/L (ref 21–32)
CREAT SERPL-MCNC: 1.15 MG/DL (ref 0.6–1.3)
CRP SERPL QL: 2.6 MG/L
EOSINOPHIL # BLD AUTO: 0.03 THOUSAND/ΜL (ref 0–0.61)
EOSINOPHIL NFR BLD AUTO: 1 % (ref 0–6)
ERYTHROCYTE [DISTWIDTH] IN BLOOD BY AUTOMATED COUNT: 14.2 % (ref 11.6–15.1)
GFR SERPL CREATININE-BSD FRML MDRD: 58 ML/MIN/1.73SQ M
GLUCOSE SERPL-MCNC: 174 MG/DL (ref 65–140)
HCT VFR BLD AUTO: 43.5 % (ref 34.8–46.1)
HGB BLD-MCNC: 14.1 G/DL (ref 11.5–15.4)
IMM GRANULOCYTES # BLD AUTO: 0.06 THOUSAND/UL (ref 0–0.2)
IMM GRANULOCYTES NFR BLD AUTO: 1 % (ref 0–2)
LYMPHOCYTES # BLD AUTO: 2.18 THOUSANDS/ΜL (ref 0.6–4.47)
LYMPHOCYTES NFR BLD AUTO: 47 % (ref 14–44)
MCH RBC QN AUTO: 32.5 PG (ref 26.8–34.3)
MCHC RBC AUTO-ENTMCNC: 32.4 G/DL (ref 31.4–37.4)
MCV RBC AUTO: 100 FL (ref 82–98)
MONOCYTES # BLD AUTO: 0.55 THOUSAND/ΜL (ref 0.17–1.22)
MONOCYTES NFR BLD AUTO: 12 % (ref 4–12)
NEUTROPHILS # BLD AUTO: 1.84 THOUSANDS/ΜL (ref 1.85–7.62)
NEUTS SEG NFR BLD AUTO: 39 % (ref 43–75)
NRBC BLD AUTO-RTO: 0 /100 WBCS
PLATELET # BLD AUTO: 232 THOUSANDS/UL (ref 149–390)
PMV BLD AUTO: 10.4 FL (ref 8.9–12.7)
POTASSIUM SERPL-SCNC: 4.3 MMOL/L (ref 3.5–5.3)
PROT SERPL-MCNC: 6.4 G/DL (ref 6.4–8.2)
RBC # BLD AUTO: 4.34 MILLION/UL (ref 3.81–5.12)
SODIUM SERPL-SCNC: 140 MMOL/L (ref 136–145)
TROPONIN I SERPL-MCNC: <0.02 NG/ML
WBC # BLD AUTO: 4.68 THOUSAND/UL (ref 4.31–10.16)

## 2020-04-11 PROCEDURE — 80053 COMPREHEN METABOLIC PANEL: CPT | Performed by: EMERGENCY MEDICINE

## 2020-04-11 PROCEDURE — 99282 EMERGENCY DEPT VISIT SF MDM: CPT | Performed by: EMERGENCY MEDICINE

## 2020-04-11 PROCEDURE — 93005 ELECTROCARDIOGRAM TRACING: CPT

## 2020-04-11 PROCEDURE — 84484 ASSAY OF TROPONIN QUANT: CPT | Performed by: EMERGENCY MEDICINE

## 2020-04-11 PROCEDURE — 82728 ASSAY OF FERRITIN: CPT | Performed by: EMERGENCY MEDICINE

## 2020-04-11 PROCEDURE — 85025 COMPLETE CBC W/AUTO DIFF WBC: CPT | Performed by: EMERGENCY MEDICINE

## 2020-04-11 PROCEDURE — 71045 X-RAY EXAM CHEST 1 VIEW: CPT

## 2020-04-11 PROCEDURE — 36415 COLL VENOUS BLD VENIPUNCTURE: CPT | Performed by: EMERGENCY MEDICINE

## 2020-04-11 PROCEDURE — 99285 EMERGENCY DEPT VISIT HI MDM: CPT

## 2020-04-11 PROCEDURE — 86140 C-REACTIVE PROTEIN: CPT | Performed by: EMERGENCY MEDICINE

## 2020-04-12 LAB — FERRITIN SERPL-MCNC: 318 NG/ML (ref 8–388)

## 2020-04-13 ENCOUNTER — TELEPHONE (OUTPATIENT)
Dept: OTHER | Facility: OTHER | Age: 45
End: 2020-04-13

## 2020-04-13 ENCOUNTER — PATIENT OUTREACH (OUTPATIENT)
Dept: FAMILY MEDICINE CLINIC | Facility: CLINIC | Age: 45
End: 2020-04-13

## 2020-04-13 LAB
ATRIAL RATE: 61 BPM
P AXIS: 14 DEGREES
PR INTERVAL: 142 MS
QRS AXIS: 8 DEGREES
QRSD INTERVAL: 84 MS
QT INTERVAL: 440 MS
QTC INTERVAL: 442 MS
SARS-COV-2 RNA SPEC QL NAA+PROBE: DETECTED
T WAVE AXIS: 14 DEGREES
VENTRICULAR RATE: 61 BPM

## 2020-04-13 PROCEDURE — 93010 ELECTROCARDIOGRAM REPORT: CPT | Performed by: INTERNAL MEDICINE

## 2020-04-14 ENCOUNTER — PATIENT OUTREACH (OUTPATIENT)
Dept: FAMILY MEDICINE CLINIC | Facility: CLINIC | Age: 45
End: 2020-04-14

## 2020-04-17 ENCOUNTER — TELEPHONE (OUTPATIENT)
Dept: OTHER | Facility: OTHER | Age: 45
End: 2020-04-17

## 2020-04-20 ENCOUNTER — TELEPHONE (OUTPATIENT)
Dept: PULMONOLOGY | Facility: MEDICAL CENTER | Age: 45
End: 2020-04-20

## 2020-04-20 DIAGNOSIS — R05.9 COUGH: Primary | ICD-10-CM

## 2020-04-20 DIAGNOSIS — R52 MILD PAIN: ICD-10-CM

## 2020-04-20 RX ORDER — ACETAMINOPHEN 325 MG/1
325 TABLET ORAL EVERY 6 HOURS PRN
Qty: 30 TABLET | Refills: 3 | Status: SHIPPED | OUTPATIENT
Start: 2020-04-20 | End: 2020-05-20

## 2020-04-20 RX ORDER — BENZONATATE 100 MG/1
100 CAPSULE ORAL 2 TIMES DAILY
Qty: 30 CAPSULE | Refills: 1 | Status: SHIPPED | OUTPATIENT
Start: 2020-04-20 | End: 2021-01-11 | Stop reason: ALTCHOICE

## 2020-04-24 ENCOUNTER — TELEPHONE (OUTPATIENT)
Dept: PULMONOLOGY | Facility: MEDICAL CENTER | Age: 45
End: 2020-04-24

## 2020-04-27 ENCOUNTER — TELEPHONE (OUTPATIENT)
Dept: OTHER | Facility: OTHER | Age: 45
End: 2020-04-27

## 2020-04-28 DIAGNOSIS — R05.3 PERSISTENT COUGH FOR 3 WEEKS OR LONGER: Primary | ICD-10-CM

## 2020-04-28 RX ORDER — BENZONATATE 100 MG/1
CAPSULE ORAL
Qty: 30 CAPSULE | Refills: 0 | Status: SHIPPED | OUTPATIENT
Start: 2020-04-28 | End: 2021-01-11 | Stop reason: ALTCHOICE

## 2020-05-07 ENCOUNTER — PATIENT OUTREACH (OUTPATIENT)
Dept: CASE MANAGEMENT | Facility: OTHER | Age: 45
End: 2020-05-07

## 2020-05-07 ENCOUNTER — PATIENT OUTREACH (OUTPATIENT)
Dept: FAMILY MEDICINE CLINIC | Facility: CLINIC | Age: 45
End: 2020-05-07

## 2020-05-21 ENCOUNTER — TELEPHONE (OUTPATIENT)
Dept: FAMILY MEDICINE CLINIC | Facility: CLINIC | Age: 45
End: 2020-05-21

## 2020-06-11 DIAGNOSIS — J30.89 ALLERGIC RHINITIS DUE TO OTHER ALLERGIC TRIGGER, UNSPECIFIED SEASONALITY: ICD-10-CM

## 2020-06-11 RX ORDER — LEVOCETIRIZINE DIHYDROCHLORIDE 5 MG/1
5 TABLET, FILM COATED ORAL DAILY
Qty: 30 TABLET | Refills: 5 | Status: SHIPPED | OUTPATIENT
Start: 2020-06-11 | End: 2020-12-21 | Stop reason: SDUPTHER

## 2020-09-14 DIAGNOSIS — E03.9 HYPOTHYROIDISM, UNSPECIFIED TYPE: Primary | ICD-10-CM

## 2020-09-15 RX ORDER — LEVOTHYROXINE SODIUM 0.05 MG/1
50 TABLET ORAL DAILY
Qty: 30 TABLET | Refills: 0 | Status: SHIPPED | OUTPATIENT
Start: 2020-09-15 | End: 2021-01-11 | Stop reason: DRUGHIGH

## 2020-09-16 ENCOUNTER — TELEPHONE (OUTPATIENT)
Dept: FAMILY MEDICINE CLINIC | Facility: CLINIC | Age: 45
End: 2020-09-16

## 2020-09-16 NOTE — TELEPHONE ENCOUNTER
Called patient and left them a VM asking to call back to set up an apt  Please set one up when patient calls back  I E  Please call patient to setup appointment for reassessing her medical conditions including her hypothyroidism  - Pre Dr Davis Stage      Thank you!

## 2020-09-23 ENCOUNTER — TELEPHONE (OUTPATIENT)
Dept: OTHER | Facility: OTHER | Age: 45
End: 2020-09-23

## 2020-09-23 NOTE — TELEPHONE ENCOUNTER
Cristóbal Parnell is returning a call regarding the following information:  I E    Please call patient to setup appointment for reassessing her medical conditions including her hypothyroidism  - Pre Dr Kellen David

## 2020-12-21 DIAGNOSIS — J30.89 ALLERGIC RHINITIS DUE TO OTHER ALLERGIC TRIGGER, UNSPECIFIED SEASONALITY: ICD-10-CM

## 2020-12-23 RX ORDER — LEVOCETIRIZINE DIHYDROCHLORIDE 5 MG/1
5 TABLET, FILM COATED ORAL DAILY
Qty: 30 TABLET | Refills: 5 | Status: SHIPPED | OUTPATIENT
Start: 2020-12-23 | End: 2021-07-06 | Stop reason: SDUPTHER

## 2021-01-11 ENCOUNTER — OFFICE VISIT (OUTPATIENT)
Dept: FAMILY MEDICINE CLINIC | Facility: CLINIC | Age: 46
End: 2021-01-11
Payer: MEDICARE

## 2021-01-11 VITALS
TEMPERATURE: 97.2 F | HEART RATE: 80 BPM | DIASTOLIC BLOOD PRESSURE: 90 MMHG | HEIGHT: 56 IN | SYSTOLIC BLOOD PRESSURE: 100 MMHG | WEIGHT: 181 LBS | OXYGEN SATURATION: 94 % | RESPIRATION RATE: 16 BRPM | BODY MASS INDEX: 40.72 KG/M2

## 2021-01-11 DIAGNOSIS — H01.133 ECZEMA OF RIGHT EYELID: Primary | ICD-10-CM

## 2021-01-11 PROCEDURE — 99213 OFFICE O/P EST LOW 20 MIN: CPT | Performed by: FAMILY MEDICINE

## 2021-01-11 RX ORDER — CEPHALEXIN 500 MG/1
500 CAPSULE ORAL 2 TIMES DAILY
COMMUNITY
Start: 2021-01-07 | End: 2021-01-14

## 2021-01-11 RX ORDER — VALACYCLOVIR HYDROCHLORIDE 1 G/1
1000 TABLET, FILM COATED ORAL 3 TIMES DAILY
COMMUNITY
Start: 2021-01-07 | End: 2021-01-14

## 2021-01-11 RX ORDER — OFLOXACIN 3 MG/ML
SOLUTION/ DROPS OPHTHALMIC
COMMUNITY
Start: 2021-01-07

## 2021-01-11 RX ORDER — DIAPER,BRIEF,INFANT-TODD,DISP
EACH MISCELLANEOUS 4 TIMES DAILY PRN
Qty: 30 G | Refills: 0 | Status: SHIPPED | OUTPATIENT
Start: 2021-01-11

## 2021-01-11 RX ORDER — LEVOTHYROXINE SODIUM 0.07 MG/1
TABLET ORAL
COMMUNITY
Start: 2021-01-06 | End: 2021-07-07 | Stop reason: SDUPTHER

## 2021-01-11 RX ORDER — PHENOL 1.4 %
AEROSOL, SPRAY (ML) MUCOUS MEMBRANE
COMMUNITY
Start: 2021-01-06 | End: 2021-07-07 | Stop reason: SDUPTHER

## 2021-01-11 NOTE — PROGRESS NOTES
Subjective     Chela Petersen is a 41 y o  female who presents for evaluation of blurred vision, itching, pain and tearing in the right eye  She has noticed the above symptoms for 7 days  She reports scratching her eyelids  Patient denies erythema, foreign body sensation and photophobia  Started last week on Monday  Been using warm compress 4x a week, and using no-tear shampoo on eyelid skin  Spoke with , who explained patient went to urgent care and was given valcyclovir due to suspicion for HSV and given ophthalmic and oral antibiotics due to concern for bacterial infection  patient was subsequently taken to eye doctor who had los suspicion for conjunctivitis and more likely skin irritation but he supported continuation of medications for their 7 day course, while managing symptoms with warm compresses and use of no-tear shampoo  Review of Systems    Patient has: rhinorrhea, cough  Denies: fever, chills, SOB, chest pain, headache    Objective     There were no vitals taken for this visit  General: alert and oriented, in no acute distress   Eyes:  conjunctivae/corneas clear  PERRL, EOM's intact  Dried tears in corners of right eye   Cardio  Systolic murmur hear loudest in Right 2nd intercostal   Skin  Scaling/excoriation of eyelid skin, R>L     Assessment/Plan     Eczema of eyelids  Local eye care discussed  Asif Bronson Apply small amount of 1% hydrocortisone cream to eyelids PRN itching

## 2021-01-13 ENCOUNTER — TELEPHONE (OUTPATIENT)
Dept: FAMILY MEDICINE CLINIC | Facility: CLINIC | Age: 46
End: 2021-01-13

## 2021-01-20 PROBLEM — R01.1 MURMUR, CARDIAC: Status: ACTIVE | Noted: 2021-01-20

## 2021-01-24 NOTE — PATIENT INSTRUCTIONS
Wellness Visit for Adults   AMBULATORY CARE:   A wellness visit  is when you see your healthcare provider to get screened for health problems  Your healthcare provider will also give you advice on how to stay healthy  Write down your questions so you remember to ask them  Ask your healthcare provider how often you should have a wellness visit  What happens at a wellness visit:  Your healthcare provider will ask about your health, and your family history of health problems  This includes high blood pressure, heart disease, and cancer  He or she will ask if you have symptoms that concern you, if you smoke, and about your mood  You may also be asked about your intake of medicines, supplements, food, and alcohol  Any of the following may be done:  · Your weight  will be checked  Your height may also be checked so your body mass index (BMI) can be calculated  Your BMI shows if you are at a healthy weight  · Your blood pressure  and heart rate will be checked  Your temperature may also be checked  · Blood and urine tests  may be done  Blood tests may be done to check your cholesterol levels  Abnormal cholesterol levels increase your risk for heart disease and stroke  You may also need a blood or urine test to check for diabetes if you are at increased risk  Urine tests may be done to look for signs of an infection or kidney disease  · A physical exam  includes checking your heartbeat and lungs with a stethoscope  Your healthcare provider may also check your skin to look for sun damage  · Screening tests  may be recommended  A screening test is done to check for diseases that may not cause symptoms  The screening tests you may need depend on your age, gender, family history, and lifestyle habits  For example, colorectal screening may be recommended if you are 48years old or older  Screening tests you need if you are a woman:   · A Pap smear  is used to screen for cervical cancer   Pap smears are usually done every 3 to 5 years depending on your age  You may need them more often if you have had abnormal Pap smear test results in the past  Ask your healthcare provider how often you should have a Pap smear  · A mammogram  is an x-ray of your breasts to screen for breast cancer  Experts recommend mammograms every 2 years starting at age 48 years  You may need a mammogram at age 52 years or younger if you have an increased risk for breast cancer  Talk to your healthcare provider about when you should start having mammograms and how often you need them  Vaccines you may need:   · Get an influenza vaccine  every year  The influenza vaccine protects you from the flu  Several types of viruses cause the flu  The viruses change over time, so new vaccines are made each year  · Get a tetanus-diphtheria (Td) booster vaccine  every 10 years  This vaccine protects you against tetanus and diphtheria  Tetanus is a severe infection that may cause painful muscle spasms and lockjaw  Diphtheria is a severe bacterial infection that causes a thick covering in the back of your mouth and throat  · Get a human papillomavirus (HPV) vaccine  if you are female and aged 23 to 32 or male 23 to 24 and never received it  This vaccine protects you from HPV infection  HPV is the most common infection spread by sexual contact  HPV may also cause vaginal, penile, and anal cancers  · Get a pneumococcal vaccine  if you are aged 72 years or older  The pneumococcal vaccine is an injection given to protect you from pneumococcal disease  Pneumococcal disease is an infection caused by pneumococcal bacteria  The infection may cause pneumonia, meningitis, or an ear infection  · Get a shingles vaccine  if you are 60 or older, even if you have had shingles before  The shingles vaccine is an injection to protect you from the varicella-zoster virus  This is the same virus that causes chickenpox   Shingles is a painful rash that develops in people who had chickenpox or have been exposed to the virus  How to eat healthy:  My Plate is a model for planning healthy meals  It shows the types and amounts of foods that should go on your plate  Fruits and vegetables make up about half of your plate, and grains and protein make up the other half  A serving of dairy is included on the side of your plate  The amount of calories and serving sizes you need depends on your age, gender, weight, and height  Examples of healthy foods are listed below:  · Eat a variety of vegetables  such as dark green, red, and orange vegetables  You can also include canned vegetables low in sodium (salt) and frozen vegetables without added butter or sauces  · Eat a variety of fresh fruits , canned fruit in 100% juice, frozen fruit, and dried fruit  · Include whole grains  At least half of the grains you eat should be whole grains  Examples include whole-wheat bread, wheat pasta, brown rice, and whole-grain cereals such as oatmeal     · Eat a variety of protein foods such as seafood (fish and shellfish), lean meat, and poultry without skin (turkey and chicken)  Examples of lean meats include pork leg, shoulder, or tenderloin, and beef round, sirloin, tenderloin, and extra lean ground beef  Other protein foods include eggs and egg substitutes, beans, peas, soy products, nuts, and seeds  · Choose low-fat dairy products such as skim or 1% milk or low-fat yogurt, cheese, and cottage cheese  · Limit unhealthy fats  such as butter, hard margarine, and shortening  Exercise:  Exercise at least 30 minutes per day on most days of the week  Some examples of exercise include walking, biking, dancing, and swimming  You can also fit in more physical activity by taking the stairs instead of the elevator or parking farther away from stores  Include muscle strengthening activities 2 days each week  Regular exercise provides many health benefits   It helps you manage your weight, and decreases your risk for type 2 diabetes, heart disease, stroke, and high blood pressure  Exercise can also help improve your mood  Ask your healthcare provider about the best exercise plan for you  General health and safety guidelines:   · Do not smoke  Nicotine and other chemicals in cigarettes and cigars can cause lung damage  Ask your healthcare provider for information if you currently smoke and need help to quit  E-cigarettes or smokeless tobacco still contain nicotine  Talk to your healthcare provider before you use these products  · Limit alcohol  A drink of alcohol is 12 ounces of beer, 5 ounces of wine, or 1½ ounces of liquor  · Lose weight, if needed  Being overweight increases your risk of certain health conditions  These include heart disease, high blood pressure, type 2 diabetes, and certain types of cancer  · Protect your skin  Do not sunbathe or use tanning beds  Use sunscreen with a SPF 15 or higher  Apply sunscreen at least 15 minutes before you go outside  Reapply sunscreen every 2 hours  Wear protective clothing, hats, and sunglasses when you are outside  · Drive safely  Always wear your seatbelt  Make sure everyone in your car wears a seatbelt  A seatbelt can save your life if you are in an accident  Do not use your cell phone when you are driving  This could distract you and cause an accident  Pull over if you need to make a call or send a text message  · Practice safe sex  Use latex condoms if are sexually active and have more than one partner  Your healthcare provider may recommend screening tests for sexually transmitted infections (STIs)  · Wear helmets, lifejackets, and protective gear  Always wear a helmet when you ride a bike or motorcycle, go skiing, or play sports that could cause a head injury  Wear protective equipment when you play sports  Wear a lifejacket when you are on a boat or doing water sports      © Copyright [employer] 2020 Information is for End User's use only and may not be sold, redistributed or otherwise used for commercial purposes  All illustrations and images included in CareNotes® are the copyrighted property of A D A M [employer] Inc  or [employer]  The above information is an  only  It is not intended as medical advice for individual conditions or treatments  Talk to your doctor, nurse or pharmacist before following any medical regimen to see if it is safe and effective for you  Medicare Preventive Visit Patient Instructions  Thank you for completing your Welcome to Medicare Visit or Medicare Annual Wellness Visit today  Your next wellness visit will be due in one year (1/23/2022)  The screening/preventive services that you may require over the next 5-10 years are detailed below  Some tests may not apply to you based off risk factors and/or age  Screening tests ordered at today's visit but not completed yet may show as past due  Also, please note that scanned in results may not display below  Preventive Screenings:  Service Recommendations Previous Testing/Comments   Colorectal Cancer Screening  * Colonoscopy    * Fecal Occult Blood Test (FOBT)/Fecal Immunochemical Test (FIT)  * Fecal DNA/Cologuard Test  * Flexible Sigmoidoscopy Age: 54-65 years old   Colonoscopy: every 10 years (may be performed more frequently if at higher risk)  OR  FOBT/FIT: every 1 year  OR  Cologuard: every 3 years  OR  Sigmoidoscopy: every 5 years  Screening may be recommended earlier than age 48 if at higher risk for colorectal cancer  Also, an individualized decision between you and your healthcare provider will decide whether screening between the ages of 74-80 would be appropriate   Colonoscopy: Not on file  FOBT/FIT: Not on file  Cologuard: Not on file  Sigmoidoscopy: Not on file         Breast Cancer Screening Age: 36 years old  Frequency: every 1-2 years  Not required if history of left and right mastectomy Mammogram: 08/19/2016 Cervical Cancer Screening Between the ages of 18-28, pap smear recommended once every 3 years  Between the ages of 33-67, can perform pap smear with HPV co-testing every 5 years  Recommendations may differ for women with a history of total hysterectomy, cervical cancer, or abnormal pap smears in past  Pap Smear: 12/16/2015       Hepatitis C Screening Once for adults born between 1945 and 1965  More frequently in patients at high risk for Hepatitis C Hep C Antibody: Not on file       Diabetes Screening 1-2 times per year if you're at risk for diabetes or have pre-diabetes Fasting glucose: 95 mg/dL   A1C: 6 0 %       Cholesterol Screening Once every 5 years if you don't have a lipid disorder  May order more often based on risk factors  Lipid panel: 02/06/2020         Other Preventive Screenings Covered by Medicare:  1  Abdominal Aortic Aneurysm (AAA) Screening: covered once if your at risk  You're considered to be at risk if you have a family history of AAA  2  Lung Cancer Screening: covers low dose CT scan once per year if you meet all of the following conditions: (1) Age 50-69; (2) No signs or symptoms of lung cancer; (3) Current smoker or have quit smoking within the last 15 years; (4) You have a tobacco smoking history of at least 30 pack years (packs per day multiplied by number of years you smoked); (5) You get a written order from a healthcare provider  3  Glaucoma Screening: covered annually if you're considered high risk: (1) You have diabetes OR (2) Family history of glaucoma OR (3)  aged 48 and older OR (3)  American aged 72 and older  3   Osteoporosis Screening: covered every 2 years if you meet one of the following conditions: (1) You're estrogen deficient and at risk for osteoporosis based off medical history and other findings; (2) Have a vertebral abnormality; (3) On glucocorticoid therapy for more than 3 months; (4) Have primary hyperparathyroidism; (5) On osteoporosis medications and need to assess response to drug therapy  · Last bone density test (DXA Scan): Not on file  5  HIV Screening: covered annually if you're between the age of 12-76  Also covered annually if you are younger than 13 and older than 72 with risk factors for HIV infection  For pregnant patients, it is covered up to 3 times per pregnancy  Immunizations:  Immunization Recommendations   Influenza Vaccine Annual influenza vaccination during flu season is recommended for all persons aged >= 6 months who do not have contraindications   Pneumococcal Vaccine (Prevnar and Pneumovax)  * Prevnar = PCV13  * Pneumovax = PPSV23   Adults 25-60 years old: 1-3 doses may be recommended based on certain risk factors  Adults 72 years old: Prevnar (PCV13) vaccine recommended followed by Pneumovax (PPSV23) vaccine  If already received PPSV23 since turning 65, then PCV13 recommended at least one year after PPSV23 dose  Hepatitis B Vaccine 3 dose series if at intermediate or high risk (ex: diabetes, end stage renal disease, liver disease)   Tetanus (Td) Vaccine - COST NOT COVERED BY MEDICARE PART B Following completion of primary series, a booster dose should be given every 10 years to maintain immunity against tetanus  Td may also be given as tetanus wound prophylaxis  Tdap Vaccine - COST NOT COVERED BY MEDICARE PART B Recommended at least once for all adults  For pregnant patients, recommended with each pregnancy     Shingles Vaccine (Shingrix) - COST NOT COVERED BY MEDICARE PART B  2 shot series recommended in those aged 48 and above     Health Maintenance Due:      Topic Date Due    HIV Screening  10/25/1991    MAMMOGRAM  08/19/2017    Cervical Cancer Screening  12/16/2018     Immunizations Due:      Topic Date Due    Pneumococcal Vaccine: Pediatrics (0 to 5 Years) and At-Risk Patients (6 to 59 Years) (1 of 1 - PPSV23) 10/25/1982    DTaP,Tdap,and Td Vaccines (1 - Tdap) 10/25/1997    Influenza Vaccine (1) 09/01/2020     Advance Directives   What are advance directives? Advance directives are legal documents that state your wishes and plans for medical care  These plans are made ahead of time in case you lose your ability to make decisions for yourself  Advance directives can apply to any medical decision, such as the treatments you want, and if you want to donate organs  What are the types of advance directives? There are many types of advance directives, and each state has rules about how to use them  You may choose a combination of any of the following:  · Living will: This is a written record of the treatment you want  You can also choose which treatments you do not want, which to limit, and which to stop at a certain time  This includes surgery, medicine, IV fluid, and tube feedings  · Durable power of  for healthcare [facility]): This is a written record that states who you want to make healthcare choices for you when you are unable to make them for yourself  This person, called a proxy, is usually a family member or a friend  You may choose more than 1 proxy  · Do not resuscitate (DNR) order:  A DNR order is used in case your heart stops beating or you stop breathing  It is a request not to have certain forms of treatment, such as CPR  A DNR order may be included in other types of advance directives  · Medical directive: This covers the care that you want if you are in a coma, near death, or unable to make decisions for yourself  You can list the treatments you want for each condition  Treatment may include pain medicine, surgery, blood transfusions, dialysis, IV or tube feedings, and a ventilator (breathing machine)  · Values history: This document has questions about your views, beliefs, and how you feel and think about life  This information can help others choose the care that you would choose  Why are advance directives important? An advance directive helps you control your care  Although spoken wishes may be used, it is better to have your wishes written down  Spoken wishes can be misunderstood, or not followed  Treatments may be given even if you do not want them  An advance directive may make it easier for your family to make difficult choices about your care  Weight Management   Why it is important to manage your weight:  Being overweight increases your risk of health conditions such as heart disease, high blood pressure, type 2 diabetes, and certain types of cancer  It can also increase your risk for osteoarthritis, sleep apnea, and other respiratory problems  Aim for a slow, steady weight loss  Even a small amount of weight loss can lower your risk of health problems  How to lose weight safely:  A safe and healthy way to lose weight is to eat fewer calories and get regular exercise  You can lose up about 1 pound a week by decreasing the number of calories you eat by 500 calories each day  Healthy meal plan for weight management:  A healthy meal plan includes a variety of foods, contains fewer calories, and helps you stay healthy  A healthy meal plan includes the following:  · Eat whole-grain foods more often  A healthy meal plan should contain fiber  Fiber is the part of grains, fruits, and vegetables that is not broken down by your body  Whole-grain foods are healthy and provide extra fiber in your diet  Some examples of whole-grain foods are whole-wheat breads and pastas, oatmeal, brown rice, and bulgur  · Eat a variety of vegetables every day  Include dark, leafy greens such as spinach, kale, tera greens, and mustard greens  Eat yellow and orange vegetables such as carrots, sweet potatoes, and winter squash  · Eat a variety of fruits every day  Choose fresh or canned fruit (canned in its own juice or light syrup) instead of juice  Fruit juice has very little or no fiber  · Eat low-fat dairy foods  Drink fat-free (skim) milk or 1% milk   Eat fat-free yogurt and low-fat cottage cheese  Try low-fat cheeses such as mozzarella and other reduced-fat cheeses  · Choose meat and other protein foods that are low in fat  Choose beans or other legumes such as split peas or lentils  Choose fish, skinless poultry (chicken or turkey), or lean cuts of red meat (beef or pork)  Before you cook meat or poultry, cut off any visible fat  · Use less fat and oil  Try baking foods instead of frying them  Add less fat, such as margarine, sour cream, regular salad dressing and mayonnaise to foods  Eat fewer high-fat foods  Some examples of high-fat foods include french fries, doughnuts, ice cream, and cakes  · Eat fewer sweets  Limit foods and drinks that are high in sugar  This includes candy, cookies, regular soda, and sweetened drinks  Exercise:  Exercise at least 30 minutes per day on most days of the week  Some examples of exercise include walking, biking, dancing, and swimming  You can also fit in more physical activity by taking the stairs instead of the elevator or parking farther away from stores  Ask your healthcare provider about the best exercise plan for you  © Copyright [employer] 2018 Information is for End User's use only and may not be sold, redistributed or otherwise used for commercial purposes   All illustrations and images included in CareNotes® are the copyrighted property of A D A M , Inc  or [address] [place] [address]
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
## 2021-01-25 ENCOUNTER — OFFICE VISIT (OUTPATIENT)
Dept: FAMILY MEDICINE CLINIC | Facility: CLINIC | Age: 46
End: 2021-01-25
Payer: MEDICARE

## 2021-01-25 ENCOUNTER — TELEPHONE (OUTPATIENT)
Dept: FAMILY MEDICINE CLINIC | Facility: CLINIC | Age: 46
End: 2021-01-25

## 2021-01-25 VITALS
RESPIRATION RATE: 18 BRPM | DIASTOLIC BLOOD PRESSURE: 58 MMHG | TEMPERATURE: 96.3 F | SYSTOLIC BLOOD PRESSURE: 78 MMHG | WEIGHT: 180.4 LBS | HEIGHT: 56 IN | BODY MASS INDEX: 40.58 KG/M2

## 2021-01-25 DIAGNOSIS — Z00.00 ENCOUNTER FOR ANNUAL WELLNESS EXAM IN MEDICARE PATIENT: Primary | ICD-10-CM

## 2021-01-25 DIAGNOSIS — Z13.1 SCREENING FOR DIABETES MELLITUS (DM): ICD-10-CM

## 2021-01-25 DIAGNOSIS — Z11.59 NEED FOR HEPATITIS C SCREENING TEST: ICD-10-CM

## 2021-01-25 DIAGNOSIS — J45.20 MILD INTERMITTENT ASTHMA WITHOUT COMPLICATION: ICD-10-CM

## 2021-01-25 DIAGNOSIS — E78.2 MIXED HYPERLIPIDEMIA: ICD-10-CM

## 2021-01-25 DIAGNOSIS — Z86.39 HISTORY OF DIABETES MELLITUS, TYPE II: ICD-10-CM

## 2021-01-25 DIAGNOSIS — E63.8 IMBALANCED NUTRITION: ICD-10-CM

## 2021-01-25 PROCEDURE — G0438 PPPS, INITIAL VISIT: HCPCS | Performed by: FAMILY MEDICINE

## 2021-01-25 RX ORDER — ALBUTEROL SULFATE 90 UG/1
2 AEROSOL, METERED RESPIRATORY (INHALATION) EVERY 6 HOURS PRN
Qty: 8.5 G | Refills: 3 | Status: SHIPPED | OUTPATIENT
Start: 2021-01-25 | End: 2021-01-25

## 2021-01-25 RX ORDER — ALBUTEROL SULFATE 2.5 MG/3ML
2.5 SOLUTION RESPIRATORY (INHALATION)
Qty: 90 ML | Refills: 2 | Status: SHIPPED | OUTPATIENT
Start: 2021-01-25 | End: 2021-04-25
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
## 2021-01-25 NOTE — TELEPHONE ENCOUNTER
Received a phone call from patients pharmacy  Patient uses a nebulizer and not an inhaler  Script needs to be rewritten for Albuterol inhalation solution 0 083%  1 vial (3ml) 1x a day at 8am  Dx Mild persistent asthma w/o complications  Specific directions for group home  Thank you!

## 2021-01-31 LAB
BASOPHILS # BLD AUTO: 0 X10E3/UL (ref 0–0.2)
BASOPHILS NFR BLD AUTO: 1 %
BUN SERPL-MCNC: 15 MG/DL (ref 6–24)
BUN/CREAT SERPL: 14 (ref 9–23)
CALCIUM SERPL-MCNC: 8.8 MG/DL (ref 8.7–10.2)
CHLORIDE SERPL-SCNC: 102 MMOL/L (ref 96–106)
CHOLEST SERPL-MCNC: 117 MG/DL (ref 100–199)
CO2 SERPL-SCNC: 28 MMOL/L (ref 20–29)
CREAT SERPL-MCNC: 1.1 MG/DL (ref 0.57–1)
EOSINOPHIL # BLD AUTO: 0 X10E3/UL (ref 0–0.4)
EOSINOPHIL NFR BLD AUTO: 0 %
ERYTHROCYTE [DISTWIDTH] IN BLOOD BY AUTOMATED COUNT: 13.9 % (ref 11.7–15.4)
EST. AVERAGE GLUCOSE BLD GHB EST-MCNC: 120 MG/DL
GLUCOSE SERPL-MCNC: 90 MG/DL (ref 65–99)
HBA1C MFR BLD: 5.8 % (ref 4.8–5.6)
HCT VFR BLD AUTO: 41.8 % (ref 34–46.6)
HCV AB S/CO SERPL IA: <0.1 S/CO RATIO (ref 0–0.9)
HDLC SERPL-MCNC: 31 MG/DL
HGB BLD-MCNC: 14.2 G/DL (ref 11.1–15.9)
IMM GRANULOCYTES # BLD: 0.1 X10E3/UL (ref 0–0.1)
IMM GRANULOCYTES NFR BLD: 1 %
LDLC SERPL CALC-MCNC: 63 MG/DL (ref 0–99)
LYMPHOCYTES # BLD AUTO: 2.4 X10E3/UL (ref 0.7–3.1)
LYMPHOCYTES NFR BLD AUTO: 42 %
MCH RBC QN AUTO: 33.9 PG (ref 26.6–33)
MCHC RBC AUTO-ENTMCNC: 34 G/DL (ref 31.5–35.7)
MCV RBC AUTO: 100 FL (ref 79–97)
MONOCYTES # BLD AUTO: 0.5 X10E3/UL (ref 0.1–0.9)
MONOCYTES NFR BLD AUTO: 9 %
NEUTROPHILS # BLD AUTO: 2.7 X10E3/UL (ref 1.4–7)
NEUTROPHILS NFR BLD AUTO: 47 %
PLATELET # BLD AUTO: 230 X10E3/UL (ref 150–450)
POTASSIUM SERPL-SCNC: 4.3 MMOL/L (ref 3.5–5.2)
RBC # BLD AUTO: 4.19 X10E6/UL (ref 3.77–5.28)
SL AMB EGFR AFRICAN AMERICAN: 71 ML/MIN/1.73
SL AMB EGFR NON AFRICAN AMERICAN: 61 ML/MIN/1.73
SL AMB VLDL CHOLESTEROL CALC: 23 MG/DL (ref 5–40)
SODIUM SERPL-SCNC: 142 MMOL/L (ref 134–144)
TRIGL SERPL-MCNC: 131 MG/DL (ref 0–149)
WBC # BLD AUTO: 5.8 X10E3/UL (ref 3.4–10.8)

## 2021-03-31 ENCOUNTER — TELEPHONE (OUTPATIENT)
Dept: FAMILY MEDICINE CLINIC | Facility: CLINIC | Age: 46
End: 2021-03-31

## 2021-04-12 DIAGNOSIS — J45.30 MILD PERSISTENT ASTHMA WITHOUT COMPLICATION: Primary | ICD-10-CM

## 2021-04-15 DIAGNOSIS — E78.5 HYPERLIPIDEMIA, UNSPECIFIED HYPERLIPIDEMIA TYPE: Primary | ICD-10-CM

## 2021-04-15 RX ORDER — ATORVASTATIN CALCIUM 10 MG/1
10 TABLET, FILM COATED ORAL DAILY
Qty: 30 TABLET | Refills: 5 | Status: SHIPPED | OUTPATIENT
Start: 2021-04-15 | End: 2021-05-05 | Stop reason: SDUPTHER

## 2021-04-15 NOTE — TELEPHONE ENCOUNTER
MARTÍN JOSHI/gaby Craig () is requesting we fax order to Pharmacy at the fax number listed below as the order was not received   Printed and faxed to the number listed below;      [phone]

## 2021-05-05 DIAGNOSIS — E78.5 HYPERLIPIDEMIA, UNSPECIFIED HYPERLIPIDEMIA TYPE: ICD-10-CM

## 2021-05-06 RX ORDER — ATORVASTATIN CALCIUM 10 MG/1
10 TABLET, FILM COATED ORAL DAILY
Qty: 90 TABLET | Refills: 2 | Status: SHIPPED | OUTPATIENT
Start: 2021-05-06 | End: 2021-07-07 | Stop reason: SDUPTHER

## 2021-05-14 DIAGNOSIS — Z76.0 MEDICATION REFILL: ICD-10-CM

## 2021-05-17 DIAGNOSIS — Z76.0 MEDICATION REFILL: ICD-10-CM

## 2021-05-17 RX ORDER — DIVALPROEX SODIUM 500 MG/1
1000 TABLET, EXTENDED RELEASE ORAL DAILY
Qty: 60 TABLET | Refills: 0 | OUTPATIENT
Start: 2021-05-17

## 2021-05-17 RX ORDER — DIVALPROEX SODIUM 500 MG/1
1000 TABLET, EXTENDED RELEASE ORAL DAILY
Qty: 60 TABLET | Refills: 0 | Status: SHIPPED | OUTPATIENT
Start: 2021-05-17 | End: 2021-05-24 | Stop reason: SDUPTHER

## 2021-05-17 RX ORDER — ARIPIPRAZOLE 10 MG/1
10 TABLET ORAL DAILY
Qty: 30 TABLET | Refills: 0 | Status: SHIPPED | OUTPATIENT
Start: 2021-05-17 | End: 2021-06-01 | Stop reason: SDUPTHER

## 2021-05-18 ENCOUNTER — OFFICE VISIT (OUTPATIENT)
Dept: URGENT CARE | Facility: CLINIC | Age: 46
End: 2021-05-18
Payer: MEDICARE

## 2021-05-18 VITALS
SYSTOLIC BLOOD PRESSURE: 100 MMHG | WEIGHT: 171.2 LBS | HEART RATE: 57 BPM | RESPIRATION RATE: 18 BRPM | BODY MASS INDEX: 39.62 KG/M2 | HEIGHT: 55 IN | TEMPERATURE: 97.1 F | DIASTOLIC BLOOD PRESSURE: 60 MMHG | OXYGEN SATURATION: 100 %

## 2021-05-18 DIAGNOSIS — H10.021 OTHER MUCOPURULENT CONJUNCTIVITIS OF RIGHT EYE: ICD-10-CM

## 2021-05-18 DIAGNOSIS — B35.8 TINEA FACIALE: ICD-10-CM

## 2021-05-18 DIAGNOSIS — L03.311 CELLULITIS OF ABDOMINAL WALL: Primary | ICD-10-CM

## 2021-05-18 LAB — HBA1C MFR BLD HPLC: 5.3 %

## 2021-05-18 PROCEDURE — 99213 OFFICE O/P EST LOW 20 MIN: CPT | Performed by: FAMILY MEDICINE

## 2021-05-18 RX ORDER — ERYTHROMYCIN 5 MG/G
0.5 OINTMENT OPHTHALMIC
Qty: 3.5 G | Refills: 0 | Status: SHIPPED | OUTPATIENT
Start: 2021-05-18 | End: 2021-05-23

## 2021-05-18 RX ORDER — ALBUTEROL SULFATE 90 UG/1
2 AEROSOL, METERED RESPIRATORY (INHALATION) EVERY 4 HOURS PRN
COMMUNITY
End: 2021-08-02 | Stop reason: SDUPTHER

## 2021-05-18 RX ORDER — ERGOCALCIFEROL 1.25 MG/1
CAPSULE ORAL
COMMUNITY
Start: 2021-05-05

## 2021-05-18 RX ORDER — CEPHALEXIN 500 MG/1
500 CAPSULE ORAL EVERY 12 HOURS SCHEDULED
Qty: 10 CAPSULE | Refills: 0 | Status: SHIPPED | OUTPATIENT
Start: 2021-05-18 | End: 2021-05-23

## 2021-05-18 RX ORDER — CLOTRIMAZOLE 1 G/ML
1 SOLUTION TOPICAL 2 TIMES DAILY
Qty: 30 ML | Refills: 0 | Status: SHIPPED | OUTPATIENT
Start: 2021-05-18 | End: 2021-06-01

## 2021-05-18 NOTE — PROGRESS NOTES
[facility]        NAME: Carlos Vilchis is a 41 y o  female  : 1975    MRN: [id number]  DATE: May 18, 2021  TIME: 1:48 PM    Assessment and Plan   Cellulitis of abdominal wall [L03 [id number]]  1  Cellulitis of abdominal wall  cephalexin (KEFLEX) 500 mg capsule   2  Tinea faciale  clotrimazole 1 % external solution   3  Other mucopurulent conjunctivitis of right eye  erythromycin (ILOTYCIN) ophthalmic ointment     Cellulitis of the left lower quadrant  Keflex prescribed for the next 5 days  Acute bacterial conjunctivitis of the right eye  Erythromycin ice off prescribed for the next 5 days  Tinea infection below the nose; antifungal topical prescribed for the next 2 weeks  Now that she is COVID-19 vaccinated, would recommend not wearing her mask on a regular basis as this may potentiate fungal infection  Patient Instructions     Follow up with PCP in 3-5 days  Proceed to  ER if symptoms worsen  Chief Complaint     Chief Complaint   Patient presents with    Cellulitis     PMH hydradinitis  Lives at group home  Mother visited for first time today after 6 months  Has red, oozing irritation entire R eye with swelling, under nostrils, under ken ear lobes, L waist has red, oozing pimple like lesion, has diffuse rash under breasts  History of Present Illness     50-year-old female with pertinent history of down syndrome presents today with multiple areas of possible skin infection including the right eye, nose, scalp, ears and torso  Lives in a group home where she receives care from nonmedical staff  Today her mom picked her up to take her to an endocrinology appointment when she noticed the above-mentioned issues  States that she was not contacted by the group home and desires improved communication  Denies any obvious fevers, chills, chest pain, difficulty breathing, abdominal pain, nausea or dizziness  No obvious sick contacts        Review of Systems   Review of Systems Constitutional: Negative for chills and fever  Respiratory: Negative for cough, shortness of breath and wheezing  Musculoskeletal: Negative for arthralgias  Skin: Positive for color change and rash  Neurological: Negative for headaches         Current Medications       Current Outpatient Medications:     albuterol (PROVENTIL HFA,VENTOLIN HFA) 90 mcg/act inhaler, Inhale 2 puffs every 4 (four) hours as needed for wheezing, Disp: , Rfl:     ARIPiprazole (ABILIFY) 10 mg tablet, Take 1 tablet (10 mg total) by mouth daily, Disp: 30 tablet, Rfl: 0    atorvastatin (LIPITOR) 10 mg tablet, Take 1 tablet (10 mg total) by mouth daily At 8pm, Disp: 90 tablet, Rfl: 2    calcium carbonate (OS-ESTEPHANIA) 600 MG tablet, , Disp: , Rfl:     clindamycin (CLINDAGEL) 1 % gel, Apply topically 2 (two) times a day Apply to L axilla, Disp: , Rfl:     divalproex sodium (DEPAKOTE ER) 500 mg 24 hr tablet, Take 2 tablets (1,000 mg total) by mouth daily, Disp: 60 tablet, Rfl: 0    ergocalciferol (VITAMIN D2) 50,000 units, every 14 (fourteen) days, Disp: , Rfl:     Eyelid Cleansers (OCUSOFT BABY EYELID & EYELASH EX), Inhale, Disp: , Rfl:     fluticasone (FLONASE) 50 mcg/act nasal spray, 1 spray into each nostril daily, Disp: 1 Bottle, Rfl: 3    levocetirizine (XYZAL) 5 MG tablet, Take 1 tablet (5 mg total) by mouth daily At 8 PM, Disp: 30 tablet, Rfl: 5    levothyroxine 75 mcg tablet, , Disp: , Rfl:     NON FORMULARY, , Disp: , Rfl:     Spacer/Aero Chamber Mouthpiece (SPACER DEVICE) for metered dose inhaler, For use with metered dose inhaler, Disp: 1 Device, Rfl: 0    cephalexin (KEFLEX) 500 mg capsule, Take 1 capsule (500 mg total) by mouth every 12 (twelve) hours for 5 days, Disp: 10 capsule, Rfl: 0    clotrimazole 1 % external solution, Apply 1 application topically 2 (two) times a day for 14 days, Disp: 30 mL, Rfl: 0    erythromycin (ILOTYCIN) ophthalmic ointment, Administer 0 5 inches to both eyes daily at bedtime, Disp: , Rfl:     erythromycin (ILOTYCIN) ophthalmic ointment, Administer 0 5 inches to the right eye daily at bedtime for 5 days, Disp: 3 5 g, Rfl: 0    hydrocortisone 1 % cream, Apply topically 4 (four) times a day as needed (apply small amount to eyelid 3 times a day) May apply to dryness of both upper eyelids (Patient not taking: Reported on 1/25/2021), Disp: 30 g, Rfl: 0    metFORMIN (GLUCOPHAGE) 500 mg tablet, Take 500 mg by mouth 2 (two) times a day, Disp: , Rfl:     Mouthwashes (LISTERINE ANTISEPTIC ADVANCED MT), by Per J Tube route, Disp: , Rfl:     Multiple Vitamins-Minerals (CENTRUM SILVER ULTRA WOMENS PO), Centrum Silver Ultra Womens Oral Tablet  Refills: 0  Active, Disp: , Rfl:     ofloxacin (OCUFLOX) 0 3 % ophthalmic solution, INSTILL 2 DROPS INTO RIGHT EYE EVERY 4 HOURS FOR 7 DAYS, Disp: , Rfl:     valACYclovir (VALTREX) 1,000 mg tablet, Take 1,000 mg by mouth Three times a day, Disp: , Rfl:     VITAMIN D, ERGOCALCIFEROL, PO, Take 1 25 mg by mouth once a week   Administer on sunday, Disp: , Rfl:     Current Allergies     Allergies as of 05/18/2021 - Reviewed 05/18/2021   Allergen Reaction Noted    Avandia [rosiglitazone]  05/28/2014    Nsaids Other (See Comments) 01/07/2021            The following portions of the patient's history were reviewed and updated as appropriate: allergies, current medications, past family history, past medical history, past social history, past surgical history and problem list      Past Medical History:   Diagnosis Date    Asthma     Complex partial epilepsy ([address] )     Diabetes mellitus ([facility] [address] )     Disease of thyroid gland     Down syndrome     Heart murmur     Hydradenitis     Hyperlipidemia     Long Q-T syndrome     Pneumonia     Last assessed 5/28/2014     Psychiatric disorder     schizo    Sleep apnea        Past Surgical History:   Procedure Laterality Date    CARDIAC SURGERY      patent duct    PATENT DUCTUS ARTERIOUS LIGATION         Family History Adopted: Yes   Problem Relation Age of Onset    No Known Problems Mother          Medications have been verified  Objective   /60   Pulse 57   Temp (!) 97 1 °F (36 2 °C)   Resp 18   Ht 4' 7" (1 397 m)   Wt 77 7 kg (171 lb 3 2 oz)   SpO2 100%   BMI 39 79 kg/m²   No LMP recorded  Patient is perimenopausal        Physical Exam     Physical Exam  Vitals signs and nursing note reviewed  Constitutional:       General: She is in acute distress  Appearance: Normal appearance  She is obese  She is not ill-appearing, toxic-appearing or diaphoretic  HENT:      Head: Normocephalic and atraumatic  Nose: Rhinorrhea present  Mouth/Throat:      Mouth: Mucous membranes are moist       Pharynx: No posterior oropharyngeal erythema  Eyes:      General:         Right eye: No discharge  Left eye: No discharge  Conjunctiva/sclera: Conjunctivae normal    Cardiovascular:      Rate and Rhythm: Normal rate and regular rhythm  Pulmonary:      Effort: Pulmonary effort is normal  No respiratory distress  Breath sounds: Normal breath sounds  No wheezing, rhonchi or rales  Skin:     General: Skin is warm  Findings: Erythema (upper and lower right eyelids  Patch over the LLQ of abdomen), lesion (macerated philtrum ) and rash present  Neurological:      Mental Status: She is alert  Mental status is at baseline  Motor: No weakness  Psychiatric:         Mood and Affect: Mood normal          Behavior: Behavior normal          Thought Content:  Thought content normal          Judgment: Judgment normal

## 2021-05-19 ENCOUNTER — TELEPHONE (OUTPATIENT)
Dept: FAMILY MEDICINE CLINIC | Facility: CLINIC | Age: 46
End: 2021-05-19

## 2021-05-19 DIAGNOSIS — Z76.0 MEDICATION REFILL: ICD-10-CM

## 2021-05-19 NOTE — TELEPHONE ENCOUNTER
Group Home is request a D/C Order for (Depakote 1,500 mg ) as she is currently taking 1,000 mg daily  They states Patient has been taking 1,500 mg  I do not see record of this in Patients chart  They would like this to be placed in her chart

## 2021-05-20 NOTE — TELEPHONE ENCOUNTER
Group home called again  PAtient will be off medication until clarified  Please clarify depakote order

## 2021-05-24 RX ORDER — DIVALPROEX SODIUM 500 MG/1
1500 TABLET, EXTENDED RELEASE ORAL DAILY
Qty: 90 TABLET | Refills: 5 | Status: SHIPPED | OUTPATIENT
Start: 2021-05-24 | End: 2021-07-07 | Stop reason: SDUPTHER

## 2021-06-01 DIAGNOSIS — Z76.0 MEDICATION REFILL: ICD-10-CM

## 2021-06-03 RX ORDER — ARIPIPRAZOLE 10 MG/1
10 TABLET ORAL DAILY
Qty: 30 TABLET | Refills: 0 | Status: SHIPPED | OUTPATIENT
Start: 2021-06-03 | End: 2021-06-08 | Stop reason: SDUPTHER

## 2021-06-08 DIAGNOSIS — Z76.0 MEDICATION REFILL: ICD-10-CM

## 2021-06-08 NOTE — TELEPHONE ENCOUNTER
[address] called requesting Aripiprazole  Patient out of medication        Last seen 1/25/21    thanks

## 2021-06-09 ENCOUNTER — TELEPHONE (OUTPATIENT)
Dept: FAMILY MEDICINE CLINIC | Facility: CLINIC | Age: 46
End: 2021-06-09

## 2021-06-09 RX ORDER — ARIPIPRAZOLE 10 MG/1
10 TABLET ORAL DAILY
Qty: 30 TABLET | Refills: 0 | Status: SHIPPED | OUTPATIENT
Start: 2021-06-09 | End: 2021-07-07 | Stop reason: SDUPTHER

## 2021-06-09 NOTE — TELEPHONE ENCOUNTER
Care plan from [facility] received  Placed in Dr Jodie Marshall folder in the precept room for completion

## 2021-07-01 DIAGNOSIS — J30.89 ALLERGIC RHINITIS DUE TO OTHER ALLERGIC TRIGGER, UNSPECIFIED SEASONALITY: ICD-10-CM

## 2021-07-01 RX ORDER — LEVOCETIRIZINE DIHYDROCHLORIDE 5 MG/1
5 TABLET, FILM COATED ORAL DAILY
Qty: 30 TABLET | Refills: 5 | Status: CANCELLED | OUTPATIENT
Start: 2021-07-01

## 2021-07-06 DIAGNOSIS — E78.5 HYPERLIPIDEMIA, UNSPECIFIED HYPERLIPIDEMIA TYPE: ICD-10-CM

## 2021-07-06 RX ORDER — ATORVASTATIN CALCIUM 10 MG/1
10 TABLET, FILM COATED ORAL DAILY
Qty: 90 TABLET | Refills: 2 | OUTPATIENT
Start: 2021-07-06

## 2021-07-06 RX ORDER — LEVOCETIRIZINE DIHYDROCHLORIDE 5 MG/1
5 TABLET, FILM COATED ORAL DAILY
Qty: 30 TABLET | Refills: 5 | Status: SHIPPED | OUTPATIENT
Start: 2021-07-06 | End: 2021-07-07 | Stop reason: SDUPTHER

## 2021-07-06 NOTE — TELEPHONE ENCOUNTER
Pt only has 2 pills left of each medication - this is a group home patient  Please refill all current meds      Dr Roberto Cabrera at [facility] - routing to a team member

## 2021-07-06 NOTE — TELEPHONE ENCOUNTER
Refill request for atorvastatin to be sent to [facility], #90 day supply  Dr Disha Bocanegra, can you refill this for Dr Antonio Jefferson since he is in [place]?   Thank you

## 2021-07-07 DIAGNOSIS — R80.9 TYPE 2 DIABETES MELLITUS WITH MICROALBUMINURIA, WITHOUT LONG-TERM CURRENT USE OF INSULIN (HCC): ICD-10-CM

## 2021-07-07 DIAGNOSIS — J30.89 ALLERGIC RHINITIS DUE TO OTHER ALLERGIC TRIGGER, UNSPECIFIED SEASONALITY: ICD-10-CM

## 2021-07-07 DIAGNOSIS — E78.5 HYPERLIPIDEMIA, UNSPECIFIED HYPERLIPIDEMIA TYPE: ICD-10-CM

## 2021-07-07 DIAGNOSIS — E11.29 TYPE 2 DIABETES MELLITUS WITH MICROALBUMINURIA, WITHOUT LONG-TERM CURRENT USE OF INSULIN (HCC): ICD-10-CM

## 2021-07-07 DIAGNOSIS — Z76.0 MEDICATION REFILL: Primary | ICD-10-CM

## 2021-07-07 DIAGNOSIS — E83.51 HYPOCALCEMIA: ICD-10-CM

## 2021-07-07 DIAGNOSIS — E03.9 ACQUIRED HYPOTHYROIDISM: ICD-10-CM

## 2021-07-07 RX ORDER — DIVALPROEX SODIUM 500 MG/1
1500 TABLET, EXTENDED RELEASE ORAL DAILY
Qty: 90 TABLET | Refills: 5 | Status: SHIPPED | OUTPATIENT
Start: 2021-07-07 | End: 2021-12-01

## 2021-07-07 RX ORDER — ATORVASTATIN CALCIUM 10 MG/1
10 TABLET, FILM COATED ORAL DAILY
Qty: 90 TABLET | Refills: 3 | Status: SHIPPED | OUTPATIENT
Start: 2021-07-07 | End: 2021-09-01 | Stop reason: SDUPTHER

## 2021-07-07 RX ORDER — LEVOCETIRIZINE DIHYDROCHLORIDE 5 MG/1
5 TABLET, FILM COATED ORAL DAILY
Qty: 30 TABLET | Refills: 5 | Status: SHIPPED | OUTPATIENT
Start: 2021-07-07 | End: 2022-01-11 | Stop reason: SDUPTHER

## 2021-07-07 RX ORDER — ARIPIPRAZOLE 10 MG/1
10 TABLET ORAL DAILY
Qty: 90 TABLET | Refills: 3 | Status: SHIPPED | OUTPATIENT
Start: 2021-07-07 | End: 2022-08-01 | Stop reason: SDUPTHER

## 2021-07-07 RX ORDER — LEVOTHYROXINE SODIUM 0.07 MG/1
75 TABLET ORAL
Qty: 90 TABLET | Refills: 3 | Status: SHIPPED | OUTPATIENT
Start: 2021-07-07 | End: 2022-03-23 | Stop reason: SDUPTHER

## 2021-07-07 RX ORDER — PHENOL 1.4 %
600 AEROSOL, SPRAY (ML) MUCOUS MEMBRANE DAILY
Qty: 90 TABLET | Refills: 3 | Status: SHIPPED | OUTPATIENT
Start: 2021-07-07 | End: 2021-12-30 | Stop reason: SDUPTHER

## 2021-07-07 RX ORDER — MULTIVIT,IRON,MINERALS/LUTEIN
1 TABLET ORAL DAILY
Qty: 90 TABLET | Refills: 3 | Status: SHIPPED | OUTPATIENT
Start: 2021-07-07 | End: 2022-07-01 | Stop reason: SDUPTHER

## 2021-07-16 ENCOUNTER — TELEPHONE (OUTPATIENT)
Dept: FAMILY MEDICINE CLINIC | Facility: CLINIC | Age: 46
End: 2021-07-16

## 2021-07-16 NOTE — TELEPHONE ENCOUNTER
Kiara () is requesting a letter be placed in patients chart for Diet Instructions as follow;    1800 Calorie Diet  High Calorie Snack @ 8pm and Breakfast  Vitamin Water @ 12 pm

## 2021-07-26 ENCOUNTER — TELEPHONE (OUTPATIENT)
Dept: FAMILY MEDICINE CLINIC | Facility: CLINIC | Age: 46
End: 2021-07-26

## 2021-07-26 NOTE — TELEPHONE ENCOUNTER
Patient requires a form to be completed  Patient is aware of 5-7 business day turn around time  Please refer to the following information:       Type of Form:  physical    Date of Visit (if applicable):  7/19/5089    Doctor: Adriane Gonzales    Expected date: How patient would like to receive form:     Fax number:     Patient phone number: [phone]      Copy scanned to encounter  Copy provided to patient  Original in white team folder to be completed

## 2021-07-28 NOTE — TELEPHONE ENCOUNTER
Presently unable to complete form because patient has not had a complete physical in last 1 year  Also I saw patient on 01/25/2021 for annual Medicare well visit  I have sent form back to the MA

## 2021-07-30 ENCOUNTER — TELEPHONE (OUTPATIENT)
Dept: FAMILY MEDICINE CLINIC | Facility: CLINIC | Age: 46
End: 2021-07-30

## 2021-08-02 ENCOUNTER — CLINICAL SUPPORT (OUTPATIENT)
Dept: FAMILY MEDICINE CLINIC | Facility: CLINIC | Age: 46
End: 2021-08-02
Payer: MEDICARE

## 2021-08-02 DIAGNOSIS — Z11.1 SCREENING FOR TUBERCULOSIS: Primary | ICD-10-CM

## 2021-08-02 DIAGNOSIS — J45.20 MILD INTERMITTENT ASTHMA WITHOUT COMPLICATION: ICD-10-CM

## 2021-08-02 DIAGNOSIS — J45.20 MILD INTERMITTENT ASTHMA WITHOUT COMPLICATION: Primary | ICD-10-CM

## 2021-08-02 PROCEDURE — 86580 TB INTRADERMAL TEST: CPT

## 2021-08-02 RX ORDER — ALBUTEROL SULFATE 90 UG/1
2 AEROSOL, METERED RESPIRATORY (INHALATION) EVERY 4 HOURS PRN
Qty: 18 G | Refills: 1 | Status: SHIPPED | OUTPATIENT
Start: 2021-08-02

## 2021-08-02 RX ORDER — ALBUTEROL SULFATE 90 UG/1
2 AEROSOL, METERED RESPIRATORY (INHALATION) EVERY 4 HOURS PRN
Qty: 18 G | Refills: 1 | Status: SHIPPED | OUTPATIENT
Start: 2021-08-02 | End: 2021-08-02 | Stop reason: SDUPTHER

## 2021-08-02 RX ORDER — ALBUTEROL SULFATE 90 UG/1
2 AEROSOL, METERED RESPIRATORY (INHALATION) EVERY 4 HOURS PRN
Status: CANCELLED | OUTPATIENT
Start: 2021-08-02 | End: 2021-10-31

## 2021-08-02 NOTE — TELEPHONE ENCOUNTER
Kiara from the patient's group home walked in to follow up request for patient's medication, states she called last week requesting refill  Patient is completely out of her inhaler  Please advise  Thank you

## 2021-08-02 NOTE — TELEPHONE ENCOUNTER
Dr Teena Cardoza,     Patients with medicare cannot be billed for a CPE  Their "physical" is a [place]  During the medicare annual wellness, the same actions done during a physical should be completed  Can you please complete this form based on your notes from the [facility]? Thank you!

## 2021-08-03 ENCOUNTER — TELEPHONE (OUTPATIENT)
Dept: FAMILY MEDICINE CLINIC | Facility: CLINIC | Age: 46
End: 2021-08-03

## 2021-08-03 NOTE — TELEPHONE ENCOUNTER
Received a new rx request for albuterol inhaler with a change in the sig  The original sig has always been 2 puffs every 6 hrs as needed for wheezing  The new sig sent over stated 2 puffs by mouth every 4 hrs for wheezing  The pharmacy is wondering which is correct? If the new order for every 4 hours is what it should be then please advise the pharmacy, they would also need the original script d/c    Please call  [phone] to clarify [phone]

## 2021-08-04 ENCOUNTER — CLINICAL SUPPORT (OUTPATIENT)
Dept: FAMILY MEDICINE CLINIC | Facility: CLINIC | Age: 46
End: 2021-08-04

## 2021-08-04 ENCOUNTER — TELEPHONE (OUTPATIENT)
Dept: FAMILY MEDICINE CLINIC | Facility: CLINIC | Age: 46
End: 2021-08-04

## 2021-08-04 DIAGNOSIS — Z11.1 SCREENING FOR TUBERCULOSIS: Primary | ICD-10-CM

## 2021-08-04 LAB
INDURATION: 0 MM
TB SKIN TEST: NEGATIVE

## 2021-08-04 NOTE — TELEPHONE ENCOUNTER
Patient came in office requesting refill of     albuterol (2 5 mg/3 mL) 0 083 % nebulizer solution        Confirm to send to [facility]

## 2021-08-11 ENCOUNTER — TELEPHONE (OUTPATIENT)
Dept: FAMILY MEDICINE CLINIC | Facility: CLINIC | Age: 46
End: 2021-08-11

## 2021-08-11 NOTE — TELEPHONE ENCOUNTER
Kiara from [address] [place] [address] is requesting a Rx for Albuterol Nebulizer Solution be called into the pharmacy

## 2021-08-12 NOTE — TELEPHONE ENCOUNTER
Patient came in for completed form  Upset its still not done  Needs to done asap  Patient has came in multiple times for form

## 2021-08-17 NOTE — TELEPHONE ENCOUNTER
Dr Jazz Hsieh filled out what he could of form  Printed the into needed to complete rest of form, however he had to leave ASAP and cannot fill out remainder of form until he returns to office Monday 8/23  Patient needs this ASAP  Dr Wilver Krishnamurthy, Dr Florentin Hernandez, Dr Dirk Ontiveros, and Dr Michael Jordan (including the chiefs as well) - Would one of you please be willing to complete this form since Dr Jazz Hsieh and Dr Guanako Mann are both the white team residents on [facility] but are both now out of office? The form is in my office

## 2021-08-18 NOTE — TELEPHONE ENCOUNTER
Done and handed back to you   If they need any of that lab work done, let me know and I will order it

## 2021-08-19 NOTE — TELEPHONE ENCOUNTER
Called patient to inform form ready for   Made copies of completed form and placed in "to be scanned" bin  Original placed in envelope and placed in  bin for

## 2021-09-01 DIAGNOSIS — E11.29 TYPE 2 DIABETES MELLITUS WITH MICROALBUMINURIA, WITHOUT LONG-TERM CURRENT USE OF INSULIN (HCC): ICD-10-CM

## 2021-09-01 DIAGNOSIS — E78.5 HYPERLIPIDEMIA, UNSPECIFIED HYPERLIPIDEMIA TYPE: ICD-10-CM

## 2021-09-01 DIAGNOSIS — R80.9 TYPE 2 DIABETES MELLITUS WITH MICROALBUMINURIA, WITHOUT LONG-TERM CURRENT USE OF INSULIN (HCC): ICD-10-CM

## 2021-09-01 RX ORDER — ATORVASTATIN CALCIUM 10 MG/1
10 TABLET, FILM COATED ORAL DAILY
Qty: 90 TABLET | Refills: 3 | Status: SHIPPED | OUTPATIENT
Start: 2021-09-01 | End: 2022-02-23 | Stop reason: SDUPTHER

## 2021-11-29 ENCOUNTER — TELEPHONE (OUTPATIENT)
Dept: FAMILY MEDICINE CLINIC | Facility: CLINIC | Age: 46
End: 2021-11-29

## 2021-12-01 DIAGNOSIS — Z76.0 MEDICATION REFILL: ICD-10-CM

## 2021-12-01 RX ORDER — DIVALPROEX SODIUM 500 MG/1
1500 TABLET, EXTENDED RELEASE ORAL DAILY
Qty: 270 TABLET | Refills: 3 | Status: SHIPPED | OUTPATIENT
Start: 2021-12-01 | End: 2022-07-01 | Stop reason: SDUPTHER

## 2021-12-02 ENCOUNTER — TELEPHONE (OUTPATIENT)
Dept: FAMILY MEDICINE CLINIC | Facility: CLINIC | Age: 46
End: 2021-12-02

## 2021-12-30 DIAGNOSIS — E83.51 HYPOCALCEMIA: ICD-10-CM

## 2021-12-30 RX ORDER — PHENOL 1.4 %
600 AEROSOL, SPRAY (ML) MUCOUS MEMBRANE DAILY
Qty: 90 TABLET | Refills: 3 | Status: SHIPPED | OUTPATIENT
Start: 2021-12-30 | End: 2022-01-04 | Stop reason: SDUPTHER

## 2022-01-03 ENCOUNTER — TELEPHONE (OUTPATIENT)
Dept: FAMILY MEDICINE CLINIC | Facility: CLINIC | Age: 47
End: 2022-01-03

## 2022-01-03 DIAGNOSIS — E83.51 HYPOCALCEMIA: ICD-10-CM

## 2022-01-03 NOTE — TELEPHONE ENCOUNTER
Pharmacy asking for clarification on Calcium  Script sent was for daily, they have twice daily  Want to know if this is a change  Please advise

## 2022-01-04 RX ORDER — PHENOL 1.4 %
600 AEROSOL, SPRAY (ML) MUCOUS MEMBRANE 2 TIMES DAILY WITH MEALS
Qty: 180 TABLET | Refills: 3 | Status: SHIPPED | OUTPATIENT
Start: 2022-01-04

## 2022-01-11 ENCOUNTER — OFFICE VISIT (OUTPATIENT)
Dept: FAMILY MEDICINE CLINIC | Facility: CLINIC | Age: 47
End: 2022-01-11
Payer: MEDICARE

## 2022-01-11 VITALS
HEIGHT: 55 IN | DIASTOLIC BLOOD PRESSURE: 60 MMHG | BODY MASS INDEX: 38.88 KG/M2 | OXYGEN SATURATION: 98 % | HEART RATE: 64 BPM | WEIGHT: 168 LBS | SYSTOLIC BLOOD PRESSURE: 92 MMHG | TEMPERATURE: 98.6 F | RESPIRATION RATE: 20 BRPM

## 2022-01-11 DIAGNOSIS — J30.89 ALLERGIC RHINITIS DUE TO OTHER ALLERGIC TRIGGER, UNSPECIFIED SEASONALITY: ICD-10-CM

## 2022-01-11 DIAGNOSIS — Z09 FOLLOW UP: Primary | ICD-10-CM

## 2022-01-11 PROCEDURE — 99213 OFFICE O/P EST LOW 20 MIN: CPT | Performed by: STUDENT IN AN ORGANIZED HEALTH CARE EDUCATION/TRAINING PROGRAM

## 2022-01-11 RX ORDER — FLUTICASONE PROPIONATE 50 MCG
1 SPRAY, SUSPENSION (ML) NASAL DAILY
Qty: 16 ML | Refills: 1 | Status: SHIPPED | OUTPATIENT
Start: 2022-01-11 | End: 2022-01-11 | Stop reason: SDUPTHER

## 2022-01-11 RX ORDER — FLUTICASONE PROPIONATE 50 MCG
1 SPRAY, SUSPENSION (ML) NASAL DAILY
Qty: 16 ML | Refills: 1 | Status: SHIPPED | OUTPATIENT
Start: 2022-01-11

## 2022-01-11 RX ORDER — LEVOCETIRIZINE DIHYDROCHLORIDE 5 MG/1
5 TABLET, FILM COATED ORAL DAILY
Qty: 30 TABLET | Refills: 5 | Status: SHIPPED | OUTPATIENT
Start: 2022-01-11 | End: 2022-07-28

## 2022-01-11 NOTE — PROGRESS NOTES
Assessment/Plan:      Diagnoses and all orders for this visit:    Follow up  · Patient's mom states that she had recent blood work done in June at [facility] 140 mom to reach out to group home and/or [facility] to have results forwarded to our office  Additionally, patient due for yearly physical encouraged mom to obtain lab work prior so that may be reviewed with PCP  Annual physical in 1 month  Allergic rhinitis due to other allergic trigger, unspecified seasonality  · Benign rhinorrhea, likely due to allergies - refilled Xyzal and Flonase       Subjective:     Patient ID: Ivet Rosen is a 40 y o  female  HPI  Patient here to follow-up chronic conditions  Patient last seen here on January 2021  History of hypothyroidism, diabetes, asthma, down syndrome, hyperlipidemia, prolonged QT, no murmur  Follows up with specialists, do not appear to be within [facility]  Additionally, mom complaining that Rigoberto Brink has chronic runny nose  Says it is non smelly and clear in color  Tends to ruin masks she is wearing  Denies fever, chills, or headaches  Review of Systems   Constitutional: Negative for chills and fever  HENT: Positive for rhinorrhea  Negative for sore throat  Respiratory: Negative for cough and shortness of breath  Cardiovascular: Negative for chest pain and palpitations  Gastrointestinal: Negative for abdominal pain, constipation, diarrhea, nausea and vomiting  Genitourinary: Negative for difficulty urinating and dysuria  Neurological: Negative for dizziness and headaches  Objective:  Vitals:    01/11/22 0957   BP: 92/60   Pulse: 64   Resp: 20   Temp: 98 6 °F (37 °C)   SpO2: 98%          Physical Exam  Constitutional:       Appearance: Normal appearance  HENT:      Head: Normocephalic and atraumatic  Cardiovascular:      Rate and Rhythm: Normal rate and regular rhythm  Heart sounds: Normal heart sounds  No murmur heard        Pulmonary: Effort: Pulmonary effort is normal  No respiratory distress  Breath sounds: Normal breath sounds  No wheezing  Abdominal:      Palpations: Abdomen is soft  Tenderness: There is no abdominal tenderness  Musculoskeletal:      Right lower leg: No edema  Left lower leg: No edema  Neurological:      Mental Status: She is alert

## 2022-01-12 ENCOUNTER — TELEPHONE (OUTPATIENT)
Dept: FAMILY MEDICINE CLINIC | Facility: CLINIC | Age: 47
End: 2022-01-12

## 2022-01-12 NOTE — TELEPHONE ENCOUNTER
Patient requires a form to be completed  Patient is aware of 5-7 business day turn around time  Please refer to the following information:       Type of Form: annual physical and wellness check    How patient would like to receive form: fax    Fax number: [phone]      Copy scanned to encounter  Copy provided to patient  Original in red team folder to be completed

## 2022-01-30 ENCOUNTER — OFFICE VISIT (OUTPATIENT)
Dept: URGENT CARE | Facility: CLINIC | Age: 47
End: 2022-01-30
Payer: MEDICARE

## 2022-01-30 VITALS — HEART RATE: 76 BPM | RESPIRATION RATE: 16 BRPM | OXYGEN SATURATION: 98 % | TEMPERATURE: 98.9 F

## 2022-01-30 DIAGNOSIS — H01.00A BLEPHARITIS OF UPPER AND LOWER EYELIDS OF BOTH EYES, UNSPECIFIED TYPE: Primary | ICD-10-CM

## 2022-01-30 DIAGNOSIS — H01.00B BLEPHARITIS OF UPPER AND LOWER EYELIDS OF BOTH EYES, UNSPECIFIED TYPE: Primary | ICD-10-CM

## 2022-01-30 DIAGNOSIS — H10.31 ACUTE CONJUNCTIVITIS OF RIGHT EYE, UNSPECIFIED ACUTE CONJUNCTIVITIS TYPE: ICD-10-CM

## 2022-01-30 PROCEDURE — 99203 OFFICE O/P NEW LOW 30 MIN: CPT | Performed by: PHYSICIAN ASSISTANT

## 2022-01-30 RX ORDER — ERYTHROMYCIN 5 MG/G
0.5 OINTMENT OPHTHALMIC
Qty: 3.5 G | Refills: 0 | Status: SHIPPED | OUTPATIENT
Start: 2022-01-30 | End: 2022-02-06

## 2022-01-30 RX ORDER — KETOROLAC TROMETHAMINE 5 MG/ML
1 SOLUTION OPHTHALMIC 4 TIMES DAILY
Qty: 5 ML | Refills: 0 | Status: SHIPPED | OUTPATIENT
Start: 2022-01-30 | End: 2022-02-06

## 2022-01-30 NOTE — PATIENT INSTRUCTIONS
Please follow up with your primary care provider within the next week  Please remember that your visit today was with an urgent care provider and should not replace follow up with your primary care provider for chronic medical issues or annual physicals  Blepharitis   WHAT YOU NEED TO KNOW:   Blepharitis is inflammation of one or both eyelids  Your eyelid, eyelashes, oil glands, or whites of the eye may be affected  DISCHARGE INSTRUCTIONS:   Return to the emergency department if:   · You have severe pain  · You have vision loss  Call your doctor or ophthalmologist if:   · Your vision changes  · Your signs and symptoms return or get worse, even after treatment  · You have a lump on your eyelid  · You have a pus-filled sore on your eyelid  · You have questions or concerns about your condition or care  Medicines:   · Medicines  can help decrease pain and swelling, or treat an infection  · Take your medicine as directed  Contact your healthcare provider if you think your medicine is not helping or if you have side effects  Tell him or her if you are allergic to any medicine  Keep a list of the medicines, vitamins, and herbs you take  Include the amounts, and when and why you take them  Bring the list or the pill bottles to follow-up visits  Carry your medicine list with you in case of an emergency  Manage blepharitis:  Always wash your hands with soap and water before and after eye care:     · Use artificial tears  2 times each day if you have dry eye  · Apply a warm compress  for 10 minutes 1 to 2 times each day, or as directed  This will loosen crusts and decrease itching and burning  · Gently scrub your upper and lower eyelid  2 times each day  This will help open your clogged oil glands and remove pus or other material stuck to your eyelid  Your healthcare provider may recommend a cleaning solution to buy   You can instead make one by putting 2 to 3 drops of baby shampoo in ½ cup warm water  · Massage your upper and lower eyelid in small circles for 5 seconds  to loosen oil plugs and decrease inflammation  · Do not wear contact lenses or eye makeup  until your eye has healed  Follow up with your doctor or ophthalmologist as directed:  Write down your questions so you remember to ask them during your visits  © Copyright [employer] 2021 Information is for End User's use only and may not be sold, redistributed or otherwise used for commercial purposes  All illustrations and images included in CareNotes® are the copyrighted property of [employer]  or [facility] Nickolas Tiwari   The above information is an  only  It is not intended as medical advice for individual conditions or treatments  Talk to your doctor, nurse or pharmacist before following any medical regimen to see if it is safe and effective for you  Patient/Caregiver provided printed discharge information.

## 2022-01-30 NOTE — PROGRESS NOTES
[facility]        NAME: Kurt Garcia is a 40 y o  female  : 1975    MRN: [id number]  DATE: 2022  TIME: 4:18 PM    Assessment and Plan   Blepharitis of upper and lower eyelids of both eyes, unspecified type [H01 00A, H01 00B]  1  Blepharitis of upper and lower eyelids of both eyes, unspecified type  ketorolac (ACULAR) 0 5 % ophthalmic solution   2  Acute conjunctivitis of right eye, unspecified acute conjunctivitis type  erythromycin (ILOTYCIN) ophthalmic ointment    ketorolac (ACULAR) 0 5 % ophthalmic solution         Patient Instructions       Follow up with PCP in 3-5 days  Proceed to  ER if symptoms worsen  Chief Complaint     Chief Complaint   Patient presents with    Eye Problem     itching, discharge, burning pain         History of Present Illness       Patient is a 43-year-old female with past medical history down syndrome who presents with bilateral eye irritation times several days  Patient resides in a long-term care facility but no known sick contacts  Patient states eyes are [place] itchy  Mom (caregiver) has been giving eyedrops with some relief  She endorses crusting, matting in the morning  No URI symptoms  History of same related to mask usage  Review of Systems   Review of Systems   Constitutional: Negative for fever  Eyes: Positive for discharge, redness and itching  Negative for photophobia  Respiratory: Negative for shortness of breath  Cardiovascular: Negative for chest pain  Neurological: Negative for headaches           Current Medications       Current Outpatient Medications:     albuterol (PROVENTIL HFA,VENTOLIN HFA) 90 mcg/act inhaler, Inhale 2 puffs every 4 (four) hours as needed for wheezing, Disp: 18 g, Rfl: 1    ARIPiprazole (ABILIFY) 10 mg tablet, Take 1 tablet (10 mg total) by mouth daily, Disp: 90 tablet, Rfl: 3    atorvastatin (LIPITOR) 10 mg tablet, Take 1 tablet (10 mg total) by mouth daily At 8pm, Disp: 90 tablet, Rfl: 3    calcium carbonate (OS-ESTEPHANIA) 600 MG tablet, Take 1 tablet (600 mg total) by mouth 2 (two) times a day with meals, Disp: 180 tablet, Rfl: 3    clindamycin (CLINDAGEL) 1 % gel, Apply topically 2 (two) times a day Apply to L axilla, Disp: , Rfl:     clotrimazole 1 % external solution, Apply 1 application topically 2 (two) times a day for 14 days, Disp: 30 mL, Rfl: 0    divalproex sodium (DEPAKOTE ER) 500 mg 24 hr tablet, Take 3 tablets (1,500 mg total) by mouth daily, Disp: 270 tablet, Rfl: 3    ergocalciferol (VITAMIN D2) 50,000 units, every 14 (fourteen) days, Disp: , Rfl:     erythromycin (ILOTYCIN) ophthalmic ointment, Administer 0 5 inches to the right eye every 4 (four) hours for 7 days, Disp: 3 5 g, Rfl: 0    Eyelid Cleansers (OCUSOFT BABY EYELID & EYELASH EX), Inhale, Disp: , Rfl:     fluticasone (FLONASE) 50 mcg/act nasal spray, 1 spray into each nostril daily, Disp: 16 mL, Rfl: 1    hydrocortisone 1 % cream, Apply topically 4 (four) times a day as needed (apply small amount to eyelid 3 times a day) May apply to dryness of both upper eyelids (Patient not taking: Reported on 1/25/2021), Disp: 30 g, Rfl: 0    ketorolac (ACULAR) 0 5 % ophthalmic solution, Administer 1 drop to both eyes 4 (four) times a day for 7 days, Disp: 5 mL, Rfl: 0    levocetirizine (XYZAL) 5 MG tablet, Take 1 tablet (5 mg total) by mouth daily At 8 PM, Disp: 30 tablet, Rfl: 5    levothyroxine 75 mcg tablet, Take 1 tablet (75 mcg total) by mouth daily in the early morning, Disp: 90 tablet, Rfl: 3    metFORMIN (GLUCOPHAGE) 500 mg tablet, Take 1 tablet (500 mg total) by mouth 2 (two) times a day, Disp: 180 tablet, Rfl: 3    Mouthwashes (LISTERINE ANTISEPTIC ADVANCED MT), by Per J Tube route, Disp: , Rfl:     Multiple Vitamins-Minerals (Centrum Silver Ultra Womens) TABS, Take 1 tablet by mouth daily Centrum Silver Ultra Womens Oral Tablet  Refills: 0  Active, Disp: 90 tablet, Rfl: 3    NON FORMULARY, , Disp: , Rfl:     ofloxacin (OCUFLOX) 0 3 % ophthalmic solution, INSTILL 2 DROPS INTO RIGHT EYE EVERY 4 HOURS FOR 7 DAYS, Disp: , Rfl:     Spacer/Aero Chamber Mouthpiece (SPACER DEVICE) for metered dose inhaler, For use with metered dose inhaler, Disp: 1 Device, Rfl: 0    valACYclovir (VALTREX) 1,000 mg tablet, Take 1,000 mg by mouth Three times a day, Disp: , Rfl:     VITAMIN D, ERGOCALCIFEROL, PO, Take 1 25 mg by mouth once a week  Administer on sunday, Disp: , Rfl:     Current Allergies     Allergies as of 01/30/2022 - Reviewed 01/30/2022   Allergen Reaction Noted    Avandia [rosiglitazone]  05/28/2014    Nsaids Other (See Comments) 01/07/2021            The following portions of the patient's history were reviewed and updated as appropriate: allergies, current medications, past family history, past medical history, past social history, past surgical history and problem list      Past Medical History:   Diagnosis Date    Asthma     Complex partial epilepsy ([facility]     Diabetes mellitus ([facility]     Disease of thyroid gland     Down syndrome     Heart murmur     Hydradenitis     Hyperlipidemia     Long Q-T syndrome     Pneumonia     Last assessed 5/28/2014     Psychiatric disorder     schizo    Sleep apnea        Past Surgical History:   Procedure Laterality Date    CARDIAC SURGERY      patent duct    PATENT DUCTUS ARTERIOUS LIGATION         Family History   Adopted: Yes   Problem Relation Age of Onset    No Known Problems Mother          Medications have been verified  Objective   Pulse 76   Temp 98 9 °F (37 2 °C)   Resp 16   SpO2 98%        Physical Exam     Physical Exam  Vitals and nursing note reviewed  Constitutional:       General: She is not in acute distress  Appearance: Normal appearance  She is not ill-appearing  HENT:      Head: Normocephalic and atraumatic  Eyes:      General:         Right eye: Discharge present  Left eye: Discharge present       Conjunctiva/sclera: Right eye: Right conjunctiva is injected  Exudate present  Pupils: Pupils are equal, round, and reactive to light  Comments: Eyelashes contain yellowish crusty discharge bilaterally  Lids erythematous and irritated  Cardiovascular:      Rate and Rhythm: Normal rate  Pulmonary:      Effort: Pulmonary effort is normal    Skin:     General: Skin is warm and dry  Capillary Refill: Capillary refill takes less than 2 seconds  Neurological:      Mental Status: She is alert  Mental status is at baseline

## 2022-02-08 ENCOUNTER — OFFICE VISIT (OUTPATIENT)
Dept: FAMILY MEDICINE CLINIC | Facility: CLINIC | Age: 47
End: 2022-02-08
Payer: MEDICARE

## 2022-02-08 VITALS
WEIGHT: 171 LBS | RESPIRATION RATE: 18 BRPM | DIASTOLIC BLOOD PRESSURE: 50 MMHG | SYSTOLIC BLOOD PRESSURE: 90 MMHG | BODY MASS INDEX: 38.46 KG/M2 | TEMPERATURE: 96.9 F | HEART RATE: 68 BPM | HEIGHT: 56 IN

## 2022-02-08 DIAGNOSIS — Z12.31 ENCOUNTER FOR SCREENING MAMMOGRAM FOR BREAST CANCER: ICD-10-CM

## 2022-02-08 DIAGNOSIS — H04.203 LACRIMATION, BILATERAL: ICD-10-CM

## 2022-02-08 DIAGNOSIS — Z00.00 ENCOUNTER FOR ANNUAL WELLNESS VISIT (AWV) IN MEDICARE PATIENT: Primary | ICD-10-CM

## 2022-02-08 DIAGNOSIS — R80.9 TYPE 2 DIABETES MELLITUS WITH MICROALBUMINURIA, WITHOUT LONG-TERM CURRENT USE OF INSULIN (HCC): ICD-10-CM

## 2022-02-08 DIAGNOSIS — Z78.0 POSTMENOPAUSAL: ICD-10-CM

## 2022-02-08 DIAGNOSIS — G47.33 OBSTRUCTIVE SLEEP APNEA SYNDROME: ICD-10-CM

## 2022-02-08 DIAGNOSIS — E11.29 TYPE 2 DIABETES MELLITUS WITH MICROALBUMINURIA, WITHOUT LONG-TERM CURRENT USE OF INSULIN (HCC): ICD-10-CM

## 2022-02-08 LAB
SL AMB POCT GLUCOSE BLD: 94
SL AMB POCT HEMOGLOBIN AIC: 5 (ref ?–6.5)

## 2022-02-08 PROCEDURE — G0439 PPPS, SUBSEQ VISIT: HCPCS | Performed by: STUDENT IN AN ORGANIZED HEALTH CARE EDUCATION/TRAINING PROGRAM

## 2022-02-08 PROCEDURE — 83036 HEMOGLOBIN GLYCOSYLATED A1C: CPT | Performed by: STUDENT IN AN ORGANIZED HEALTH CARE EDUCATION/TRAINING PROGRAM

## 2022-02-08 PROCEDURE — 82948 REAGENT STRIP/BLOOD GLUCOSE: CPT | Performed by: STUDENT IN AN ORGANIZED HEALTH CARE EDUCATION/TRAINING PROGRAM

## 2022-02-08 RX ORDER — CALCIUM CARBONATE 600 MG
TABLET ORAL
COMMUNITY
Start: 2022-01-30
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
## 2022-02-08 NOTE — PATIENT INSTRUCTIONS
Medicare Preventive Visit Patient Instructions  Thank you for completing your Welcome to Medicare Visit or Medicare Annual Wellness Visit today  Your next wellness visit will be due in one year (2/9/2023)  The screening/preventive services that you may require over the next 5-10 years are detailed below  Some tests may not apply to you based off risk factors and/or age  Screening tests ordered at today's visit but not completed yet may show as past due  Also, please note that scanned in results may not display below  Preventive Screenings:  Service Recommendations Previous Testing/Comments   Colorectal Cancer Screening  * Colonoscopy    * Fecal Occult Blood Test (FOBT)/Fecal Immunochemical Test (FIT)  * Fecal DNA/Cologuard Test  * Flexible Sigmoidoscopy Age: 54-65 years old   Colonoscopy: every 10 years (may be performed more frequently if at higher risk)  OR  FOBT/FIT: every 1 year  OR  Cologuard: every 3 years  OR  Sigmoidoscopy: every 5 years  Screening may be recommended earlier than age 48 if at higher risk for colorectal cancer  Also, an individualized decision between you and your healthcare provider will decide whether screening between the ages of 74-80 would be appropriate  Colonoscopy: 08/30/2013  FOBT/FIT: Not on file  Cologuard: Not on file  Sigmoidoscopy: Not on file          Breast Cancer Screening Age: 36 years old  Frequency: every 1-2 years  Not required if history of left and right mastectomy Mammogram: 08/19/2016        Cervical Cancer Screening Between the ages of 21-29, pap smear recommended once every 3 years  Between the ages of 33-67, can perform pap smear with HPV co-testing every 5 years     Recommendations may differ for women with a history of total hysterectomy, cervical cancer, or abnormal pap smears in past  Pap Smear: 12/16/2015        Hepatitis C Screening Once for adults born between 1945 and 1965  More frequently in patients at high risk for Hepatitis C Hep C Antibody: 01/30/2021    Screening Current   Diabetes Screening 1-2 times per year if you're at risk for diabetes or have pre-diabetes Fasting glucose: 95 mg/dL   A1C: 5 0    Screening Not Indicated  History Diabetes   Cholesterol Screening Once every 5 years if you don't have a lipid disorder  May order more often based on risk factors  Lipid panel: 01/30/2021    Screening Not Indicated  History Lipid Disorder     Other Preventive Screenings Covered by Medicare:  1  Abdominal Aortic Aneurysm (AAA) Screening: covered once if your at risk  You're considered to be at risk if you have a family history of AAA  2  Lung Cancer Screening: covers low dose CT scan once per year if you meet all of the following conditions: (1) Age 50-69; (2) No signs or symptoms of lung cancer; (3) Current smoker or have quit smoking within the last 15 years; (4) You have a tobacco smoking history of at least 30 pack years (packs per day multiplied by number of years you smoked); (5) You get a written order from a healthcare provider  3  Glaucoma Screening: covered annually if you're considered high risk: (1) You have diabetes OR (2) Family history of glaucoma OR (3)  aged 48 and older OR (3)  American aged 72 and older  3  Osteoporosis Screening: covered every 2 years if you meet one of the following conditions: (1) You're estrogen deficient and at risk for osteoporosis based off medical history and other findings; (2) Have a vertebral abnormality; (3) On glucocorticoid therapy for more than 3 months; (4) Have primary hyperparathyroidism; (5) On osteoporosis medications and need to assess response to drug therapy  · Last bone density test (DXA Scan): Not on file  5  HIV Screening: covered annually if you're between the age of 12-76  Also covered annually if you are younger than 13 and older than 72 with risk factors for HIV infection   For pregnant patients, it is covered up to 3 times per pregnancy  Immunizations:  Immunization Recommendations   Influenza Vaccine Annual influenza vaccination during flu season is recommended for all persons aged >= 6 months who do not have contraindications   Pneumococcal Vaccine (Prevnar and Pneumovax)  * Prevnar = PCV13  * Pneumovax = PPSV23   Adults 25-60 years old: 1-3 doses may be recommended based on certain risk factors  Adults 72 years old: Prevnar (PCV13) vaccine recommended followed by Pneumovax (PPSV23) vaccine  If already received PPSV23 since turning 65, then PCV13 recommended at least one year after PPSV23 dose  Hepatitis B Vaccine 3 dose series if at intermediate or high risk (ex: diabetes, end stage renal disease, liver disease)   Tetanus (Td) Vaccine - COST NOT COVERED BY MEDICARE PART B Following completion of primary series, a booster dose should be given every 10 years to maintain immunity against tetanus  Td may also be given as tetanus wound prophylaxis  Tdap Vaccine - COST NOT COVERED BY MEDICARE PART B Recommended at least once for all adults  For pregnant patients, recommended with each pregnancy  Shingles Vaccine (Shingrix) - COST NOT COVERED BY MEDICARE PART B  2 shot series recommended in those aged 48 and above     Health Maintenance Due:      Topic Date Due    HIV Screening  Never done    Breast Cancer Screening: Mammogram  08/19/2017    Cervical Cancer Screening  12/16/2018    Hepatitis C Screening  Completed     Immunizations Due:      Topic Date Due    Pneumococcal Vaccine: Pediatrics (0 to 5 Years) and At-Risk Patients (6 to 59 Years) (1 of 2 - PPSV23) Never done     Advance Directives   What are advance directives? Advance directives are legal documents that state your wishes and plans for medical care  These plans are made ahead of time in case you lose your ability to make decisions for yourself  Advance directives can apply to any medical decision, such as the treatments you want, and if you want to donate organs  What are the types of advance directives? There are many types of advance directives, and each state has rules about how to use them  You may choose a combination of any of the following:  · Living will: This is a written record of the treatment you want  You can also choose which treatments you do not want, which to limit, and which to stop at a certain time  This includes surgery, medicine, IV fluid, and tube feedings  · Durable power of  for healthcare [facility]): This is a written record that states who you want to make healthcare choices for you when you are unable to make them for yourself  This person, called a proxy, is usually a family member or a friend  You may choose more than 1 proxy  · Do not resuscitate (DNR) order:  A DNR order is used in case your heart stops beating or you stop breathing  It is a request not to have certain forms of treatment, such as CPR  A DNR order may be included in other types of advance directives  · Medical directive: This covers the care that you want if you are in a coma, near death, or unable to make decisions for yourself  You can list the treatments you want for each condition  Treatment may include pain medicine, surgery, blood transfusions, dialysis, IV or tube feedings, and a ventilator (breathing machine)  · Values history: This document has questions about your views, beliefs, and how you feel and think about life  This information can help others choose the care that you would choose  Why are advance directives important? An advance directive helps you control your care  Although spoken wishes may be used, it is better to have your wishes written down  Spoken wishes can be misunderstood, or not followed  Treatments may be given even if you do not want them  An advance directive may make it easier for your family to make difficult choices about your care     Weight Management   Why it is important to manage your weight:  Being overweight increases your risk of health conditions such as heart disease, high blood pressure, type 2 diabetes, and certain types of cancer  It can also increase your risk for osteoarthritis, sleep apnea, and other respiratory problems  Aim for a slow, steady weight loss  Even a small amount of weight loss can lower your risk of health problems  How to lose weight safely:  A safe and healthy way to lose weight is to eat fewer calories and get regular exercise  You can lose up about 1 pound a week by decreasing the number of calories you eat by 500 calories each day  Healthy meal plan for weight management:  A healthy meal plan includes a variety of foods, contains fewer calories, and helps you stay healthy  A healthy meal plan includes the following:  · Eat whole-grain foods more often  A healthy meal plan should contain fiber  Fiber is the part of grains, fruits, and vegetables that is not broken down by your body  Whole-grain foods are healthy and provide extra fiber in your diet  Some examples of whole-grain foods are whole-wheat breads and pastas, oatmeal, brown rice, and bulgur  · Eat a variety of vegetables every day  Include dark, leafy greens such as spinach, kale, tera greens, and mustard greens  Eat yellow and orange vegetables such as carrots, sweet potatoes, and winter squash  · Eat a variety of fruits every day  Choose fresh or canned fruit (canned in its own juice or light syrup) instead of juice  Fruit juice has very little or no fiber  · Eat low-fat dairy foods  Drink fat-free (skim) milk or 1% milk  Eat fat-free yogurt and low-fat cottage cheese  Try low-fat cheeses such as mozzarella and other reduced-fat cheeses  · Choose meat and other protein foods that are low in fat  Choose beans or other legumes such as split peas or lentils  Choose fish, skinless poultry (chicken or turkey), or lean cuts of red meat (beef or pork)  Before you cook meat or poultry, cut off any visible fat     · Use less fat and oil   Try baking foods instead of frying them  Add less fat, such as margarine, sour cream, regular salad dressing and mayonnaise to foods  Eat fewer high-fat foods  Some examples of high-fat foods include french fries, doughnuts, ice cream, and cakes  · Eat fewer sweets  Limit foods and drinks that are high in sugar  This includes candy, cookies, regular soda, and sweetened drinks  Exercise:  Exercise at least 30 minutes per day on most days of the week  Some examples of exercise include walking, biking, dancing, and swimming  You can also fit in more physical activity by taking the stairs instead of the elevator or parking farther away from stores  Ask your healthcare provider about the best exercise plan for you  © Copyright [employer] 2018 Information is for End User's use only and may not be sold, redistributed or otherwise used for commercial purposes   All illustrations and images included in CareNotes® are the copyrighted property of A D [employer] M , Inc  or [address] [place] [address]

## 2022-02-23 DIAGNOSIS — E78.5 HYPERLIPIDEMIA, UNSPECIFIED HYPERLIPIDEMIA TYPE: ICD-10-CM

## 2022-02-23 RX ORDER — ATORVASTATIN CALCIUM 10 MG/1
10 TABLET, FILM COATED ORAL DAILY
Qty: 90 TABLET | Refills: 3 | Status: SHIPPED | OUTPATIENT
Start: 2022-02-23 | End: 2022-06-27 | Stop reason: SDUPTHER

## 2022-03-23 DIAGNOSIS — E03.9 ACQUIRED HYPOTHYROIDISM: ICD-10-CM

## 2022-03-23 RX ORDER — LEVOTHYROXINE SODIUM 0.07 MG/1
75 TABLET ORAL
Qty: 90 TABLET | Refills: 3 | Status: SHIPPED | OUTPATIENT
Start: 2022-03-23 | End: 2022-06-21

## 2022-03-30 ENCOUNTER — OFFICE VISIT (OUTPATIENT)
Dept: PULMONOLOGY | Facility: MEDICAL CENTER | Age: 47
End: 2022-03-30
Payer: MEDICARE

## 2022-03-30 VITALS
RESPIRATION RATE: 12 BRPM | BODY MASS INDEX: 35.45 KG/M2 | SYSTOLIC BLOOD PRESSURE: 122 MMHG | HEART RATE: 72 BPM | WEIGHT: 157.6 LBS | HEIGHT: 56 IN | DIASTOLIC BLOOD PRESSURE: 82 MMHG | OXYGEN SATURATION: 98 % | TEMPERATURE: 95.2 F

## 2022-03-30 DIAGNOSIS — G47.33 OBSTRUCTIVE SLEEP APNEA SYNDROME: Chronic | ICD-10-CM

## 2022-03-30 DIAGNOSIS — J45.20 MILD INTERMITTENT ASTHMA WITHOUT COMPLICATION: ICD-10-CM

## 2022-03-30 DIAGNOSIS — G47.34 NOCTURNAL HYPOXIA: Primary | ICD-10-CM

## 2022-03-30 PROBLEM — J18.9 COMMUNITY ACQUIRED PNEUMONIA OF RIGHT LOWER LOBE OF LUNG: Status: RESOLVED | Noted: 2018-12-03 | Resolved: 2022-03-30

## 2022-03-30 PROCEDURE — 99214 OFFICE O/P EST MOD 30 MIN: CPT | Performed by: NURSE PRACTITIONER

## 2022-03-30 NOTE — PROGRESS NOTES
Assessment/Plan:     Problem List Items Addressed This Visit        Respiratory    Obstructive sleep apnea syndrome (Chronic)     Patient is currently on an auto CPAP 8-12 cm of water pressure  She is using this 100% of the time in average usage is 9 hours and 56 minutes  It appears that her average pressure in the 95th percentile is 11 7  Her AHI is elevated to 10  However it is noted that she has a large air leak  According to her mother she has not needed any supplemental oxygen or is not using supplemental oxygen with a auto CPAP  Therefore I will be ordering a nocturnal pulse oximetry on room air CPAP  Additionally I would advise that Chela have a mask refit as there does appear to be a air leak  She has lost approximately 50+ lb  Suspect that she still has a diagnosis as she has a crowded oral airway  Plan is agree         Mild intermittent asthma without complication     Patient has overall all been doing well  I have reviewed her medication list and she currently does have albuterol 1 vial as needed once daily  According to her mom she has not gone this and has overall continued to be asymptomatic  This could be given as needed if necessary otherwise spatial patient does appear stable  Other Visit Diagnoses     Nocturnal hypoxia    -  Primary    Relevant Orders    Pulse oximetry overnight            Return in about 6 months (around 9/30/2022)  All questions are answered to the patient's satisfaction and understanding  She verbalizes understanding  She is encouraged to call with any further questions or concerns  Portions of the record may have been created with voice recognition software  Occasional wrong word or "sound a like" substitutions may have occurred due to the inherent limitations of voice recognition software  Read the chart carefully and recognize, using context, where substitutions have occurred      Electronically Signed by Angus Johnson CRNP    ______________________________________________________________________    Chief Complaint: No chief complaint on file  Patient ID: Katelyn Alonso is a 40 y o  y o  female has a past medical history of Asthma, Complex partial epilepsy ([facility] [address] ), COVID (02/2021), Diabetes mellitus ([facility] ), Disease of thyroid gland, Down syndrome, Heart murmur, Hydradenitis, Hyperlipidemia, Long Q-T syndrome, Pneumonia, Psychiatric disorder, and Sleep apnea  3/30/2022  Patient presents today for follow-up visit  HPI  Katelyn Alonso is here today for a follow-up visit  Her last office visit was nearly 2 years ago  She was here in February 2020  She has a history of obstructive sleep apnea and is currently on auto CPAP 8-12 cm of water pressure  Additionally she also has mild stable intermittent asthma  She has had PFTs in the past   Her last PFT revealed forced vital capacity 1 79 L or 62% of predicted, FEV1 was 1 75 L or 62% of predicted and obstruction volume was 89%  Patient also had COVID in approximately 2 years ago  She is here today with her mother and is doing well  Review of Systems   Constitutional: Negative  HENT: Negative  Eyes: Negative  Respiratory: Positive for cough  Cardiovascular: Negative  Gastrointestinal: Negative  Endocrine: Negative  Genitourinary: Negative  Skin: Negative  Allergic/Immunologic: Negative  Neurological: Negative  Hematological: Negative  Psychiatric/Behavioral: Negative  Smoking history: She reports that she has never smoked   She has never used smokeless tobacco     The following portions of the patient's history were reviewed and updated as appropriate: current medications, past family history, past medical history, past social history, past surgical history and problem list     Immunization History   Administered Date(s) Administered    Hep B, adult 11/06/2003, 12/04/2003, 05/06/2004    INFLUENZA 10/08/2019, 11/23/2021    Td (adult), adsorbed 04/24/2003, 07/31/2013, 05/03/2014    Tuberculin Skin Test-PPD Intradermal 07/31/2015, 08/02/2021     Current Outpatient Medications   Medication Sig Dispense Refill    albuterol (PROVENTIL HFA,VENTOLIN HFA) 90 mcg/act inhaler Inhale 2 puffs every 4 (four) hours as needed for wheezing 18 g 1    ARIPiprazole (ABILIFY) 10 mg tablet Take 1 tablet (10 mg total) by mouth daily 90 tablet 3    atorvastatin (LIPITOR) 10 mg tablet Take 1 tablet (10 mg total) by mouth daily At 8pm 90 tablet 3    clindamycin (CLINDAGEL) 1 % gel Apply topically 2 (two) times a day Apply to L axilla      divalproex sodium (DEPAKOTE ER) 500 mg 24 hr tablet Take 3 tablets (1,500 mg total) by mouth daily 270 tablet 3    ergocalciferol (VITAMIN D2) 50,000 units every 14 (fourteen) days      Eyelid Cleansers (OCUSOFT BABY EYELID & EYELASH EX) Inhale      levocetirizine (XYZAL) 5 MG tablet Take 1 tablet (5 mg total) by mouth daily At 8 PM 30 tablet 5    levothyroxine 75 mcg tablet Take 1 tablet (75 mcg total) by mouth daily in the early morning 90 tablet 3    metFORMIN (GLUCOPHAGE) 500 mg tablet Take 1 tablet (500 mg total) by mouth 2 (two) times a day 180 tablet 3    Mouthwashes (LISTERINE ANTISEPTIC ADVANCED MT) by Per J Tube route      Multiple Vitamins-Minerals (Centrum Silver Ultra Womens) TABS Take 1 tablet by mouth daily Centrum Silver Ultra Womens Oral Tablet  Refills: 0  Active 90 tablet 3    NON FORMULARY       ofloxacin (OCUFLOX) 0 3 % ophthalmic solution INSTILL 2 DROPS INTO RIGHT EYE EVERY 4 HOURS FOR 7 DAYS      Spacer/Aero Chamber Mouthpiece (SPACER DEVICE) for metered dose inhaler For use with metered dose inhaler 1 Device 0    VITAMIN D, ERGOCALCIFEROL, PO Take 1 25 mg by mouth once a week Administer on sunday        Calcium 600 1500 (600 Ca) MG TABS       calcium carbonate (OS-ESTEPHANIA) 600 MG tablet Take 1 tablet (600 mg total) by mouth 2 (two) times a day with meals 180 tablet 3    clotrimazole 1 % external solution Apply 1 application topically 2 (two) times a day for 14 days 30 mL 0    fluticasone (FLONASE) 50 mcg/act nasal spray 1 spray into each nostril daily 16 mL 1    hydrocortisone 1 % cream Apply topically 4 (four) times a day as needed (apply small amount to eyelid 3 times a day) May apply to dryness of both upper eyelids (Patient not taking: Reported on 1/25/2021) 30 g 0    ketorolac (ACULAR) 0 5 % ophthalmic solution Administer 1 drop to both eyes 4 (four) times a day for 7 days (Patient not taking: Reported on 2/8/2022 ) 5 mL 0    valACYclovir (VALTREX) 1,000 mg tablet Take 1,000 mg by mouth Three times a day       No current facility-administered medications for this visit  Allergies: Avandia [rosiglitazone] and Nsaids    Objective:  Vitals:    03/30/22 0827   BP: 122/82   Pulse: 72   Resp: 12   Temp: (!) 95 2 °F (35 1 °C)   TempSrc: Temporal   SpO2: 98%   Weight: 71 5 kg (157 lb 9 6 oz)   Height: 4' 8 2" (1 427 m)   Oxygen Therapy  SpO2: 98 %    Wt Readings from Last 3 Encounters:   03/30/22 71 5 kg (157 lb 9 6 oz)   02/08/22 77 6 kg (171 lb)   01/11/22 76 2 kg (168 lb)     Body mass index is 35 08 kg/m²  Physical Exam  Constitutional:       Appearance: She is normal weight  HENT:      Head: Normocephalic  Comments: Mallampati 4     Nose: Nose normal    Eyes:      Pupils: Pupils are equal, round, and reactive to light  Cardiovascular:      Rate and Rhythm: Normal rate and regular rhythm  Abdominal:      General: Abdomen is flat  Musculoskeletal:         General: Normal range of motion  Skin:     General: Skin is warm  Capillary Refill: Capillary refill takes less than 2 seconds  Neurological:      General: No focal deficit present  Mental Status: She is alert     Psychiatric:         Mood and Affect: Mood normal          Lab Review:   Office Visit on 02/08/2022   Component Date Value    GLUCOSE BLD 02/08/2022 94     Hemoglobin A1C 02/08/2022 5 0        Past Surgical History: Procedure Laterality Date    CARDIAC SURGERY      patent duct    PATENT DUCTUS ARTERIOUS LIGATION          Family History   Adopted: Yes   Problem Relation Age of Onset    No Known Problems Mother         Diagnostics:  I have personally reviewed pertinent films in PACS    Office Spirometry Results:     ESS:    No results found

## 2022-03-30 NOTE — ASSESSMENT & PLAN NOTE
Patient has overall all been doing well  I have reviewed her medication list and she currently does have albuterol 1 vial as needed once daily  According to her mom she has not gone this and has overall continued to be asymptomatic  This could be given as needed if necessary otherwise spatial patient does appear stable

## 2022-03-30 NOTE — ASSESSMENT & PLAN NOTE
Patient is currently on an auto CPAP 8-12 cm of water pressure  She is using this 100% of the time in average usage is 9 hours and 56 minutes  It appears that her average pressure in the 95th percentile is 11 7  Her AHI is elevated to 10  However it is noted that she has a large air leak  According to her mother she has not needed any supplemental oxygen or is not using supplemental oxygen with a auto CPAP  Therefore I will be ordering a nocturnal pulse oximetry on room air CPAP  Additionally I would advise that Chela have a mask refit as there does appear to be a air leak  She has lost approximately 50+ lb  Suspect that she still has a diagnosis as she has a crowded oral airway    Plan is agree

## 2022-04-04 ENCOUNTER — TELEPHONE (OUTPATIENT)
Dept: PULMONOLOGY | Facility: MEDICAL CENTER | Age: 47
End: 2022-04-04

## 2022-04-05 ENCOUNTER — APPOINTMENT (OUTPATIENT)
Dept: LAB | Facility: HOSPITAL | Age: 47
End: 2022-04-05
Payer: MEDICARE

## 2022-04-05 ENCOUNTER — HOSPITAL ENCOUNTER (OUTPATIENT)
Dept: RADIOLOGY | Facility: HOSPITAL | Age: 47
Discharge: HOME | End: 2022-04-05
Payer: MEDICARE

## 2022-04-05 VITALS — HEIGHT: 56 IN | BODY MASS INDEX: 35.32 KG/M2 | WEIGHT: 157 LBS

## 2022-04-05 DIAGNOSIS — Z12.31 ENCOUNTER FOR SCREENING MAMMOGRAM FOR BREAST CANCER: ICD-10-CM

## 2022-04-05 DIAGNOSIS — E11.29 TYPE 2 DIABETES MELLITUS WITH MICROALBUMINURIA, WITHOUT LONG-TERM CURRENT USE OF INSULIN (HCC): ICD-10-CM

## 2022-04-05 DIAGNOSIS — R80.9 TYPE 2 DIABETES MELLITUS WITH MICROALBUMINURIA, WITHOUT LONG-TERM CURRENT USE OF INSULIN (HCC): ICD-10-CM

## 2022-04-05 LAB
CREAT UR-MCNC: 72.1 MG/DL
MICROALBUMIN UR-MCNC: 13.5 MG/L (ref 0–20)
MICROALBUMIN/CREAT 24H UR: 19 MG/G CREATININE (ref 0–30)

## 2022-04-05 PROCEDURE — 77067 SCR MAMMO BI INCL CAD: CPT

## 2022-04-05 PROCEDURE — 82570 ASSAY OF URINE CREATININE: CPT

## 2022-04-05 PROCEDURE — 77063 BREAST TOMOSYNTHESIS BI: CPT

## 2022-04-05 PROCEDURE — 82043 UR ALBUMIN QUANTITATIVE: CPT

## 2022-04-20 ENCOUNTER — TELEPHONE (OUTPATIENT)
Dept: PULMONOLOGY | Facility: MEDICAL CENTER | Age: 47
End: 2022-04-20

## 2022-04-20 NOTE — TELEPHONE ENCOUNTER
Patients mother called after meeting with Taniya Cadet he suggested a Respironics Whip Pediatric Nasal CPAP Mask with tubing be ordered for patient  Patients mother stated  only [id number] [facility] carries this mask

## 2022-04-21 DIAGNOSIS — R80.9 TYPE 2 DIABETES MELLITUS WITH MICROALBUMINURIA, WITHOUT LONG-TERM CURRENT USE OF INSULIN (HCC): ICD-10-CM

## 2022-04-21 DIAGNOSIS — E11.29 TYPE 2 DIABETES MELLITUS WITH MICROALBUMINURIA, WITHOUT LONG-TERM CURRENT USE OF INSULIN (HCC): ICD-10-CM

## 2022-04-22 DIAGNOSIS — G47.33 OSA (OBSTRUCTIVE SLEEP APNEA): Primary | ICD-10-CM

## 2022-05-17 ENCOUNTER — APPOINTMENT (OUTPATIENT)
Dept: LAB | Facility: HOSPITAL | Age: 47
End: 2022-05-17
Payer: MEDICARE

## 2022-05-17 DIAGNOSIS — E78.00 HYPERCHOLESTEROLEMIA: ICD-10-CM

## 2022-05-17 DIAGNOSIS — Z79.899 LONG TERM USE OF DRUG: ICD-10-CM

## 2022-05-17 DIAGNOSIS — I10 HYPERTENSION, UNSPECIFIED TYPE: ICD-10-CM

## 2022-05-17 DIAGNOSIS — I25.119 CORONARY ARTERY DISEASE WITH ANGINA PECTORIS, UNSPECIFIED VESSEL OR LESION TYPE, UNSPECIFIED WHETHER NATIVE OR TRANSPLANTED HEART (HCC): ICD-10-CM

## 2022-05-17 LAB — BILIRUB DIRECT SERPL-MCNC: 0.1 MG/DL (ref 0–0.2)

## 2022-05-17 PROCEDURE — 36415 COLL VENOUS BLD VENIPUNCTURE: CPT

## 2022-05-17 PROCEDURE — 82248 BILIRUBIN DIRECT: CPT

## 2022-05-18 DIAGNOSIS — J96.11 CHRONIC RESPIRATORY FAILURE WITH HYPOXIA (HCC): Primary | ICD-10-CM

## 2022-05-18 NOTE — PROGRESS NOTES
I spoke to [facility] mom regarding oxygen done via CPAP at room air  Done on 5/12/22, room air oxygen below 88% was 1 hour and 36 minutes  I am suggesting that she use 2L supplemental oxygen with CPAP  I am ordering 2L to be used with her CPAP machine    I will attempt to contact the

## 2022-05-24 ENCOUNTER — TELEPHONE (OUTPATIENT)
Dept: FAMILY MEDICINE CLINIC | Facility: CLINIC | Age: 47
End: 2022-05-24

## 2022-05-24 NOTE — TELEPHONE ENCOUNTER
Ralph Velarde is calling from the Group home requesting a D/C for (Metformin) as patient was seen at Endocrinology and was informed she does not need to continue the medication as her A1C is 5 2  Requesting this D/C order be faxed to the pharmacy and also printed for  at [facility]  I will take care of faxing and printing D/C order once complete            Ralph Velarde [phone] Call when ready for   Pharmacy fax [phone]

## 2022-05-25 NOTE — TELEPHONE ENCOUNTER
Group Home is calling to check the status of D/C Order for Metformin as the Group Home is required to continue administering Medication until it is D/C  Patient's guardian blackwell NOT want her taking being she no longer needs medication, Please see previous message

## 2022-05-31 NOTE — TELEPHONE ENCOUNTER
Kiara from Group home called  Pt's family is upset that the d/c order has not been sent over yet  Pt's endovcrinologist does not want the pt on Metformin, her A1C is 5 2  Please write a DC order and fax it to the group home  Without an order the group home must continue giving this medication       Please send to red team member in the building

## 2022-05-31 NOTE — TELEPHONE ENCOUNTER
Hello all,     If this order to stop medication was made by a provider outside of [facility], I feel the group home and/or parents/guardian should be reaching out to them to rectify this  I have reviewed chart and I do not see this noted anywhere  Do you want me to call group home and talk with Chastity?

## 2022-05-31 NOTE — TELEPHONE ENCOUNTER
Patients mother called back for D/c order  Explained Dr Marcelina Pelletier not in office, that is not why it is not done yet  Patients parent wants D/C order sent ASAP so patient can stop taking this medication  She is worried this is harming her by taking it w/o a need for it  Patient sees an Endocrinologist with [facility] and no medication was prescribed them  Explained Dr Marcelina Pelletier is in office tomorrow evening and can review message then to better provide insight as to why this medication was started  [phone], mother Vita Beltre

## 2022-06-01 NOTE — TELEPHONE ENCOUNTER
Raz Santizo,     From what mom told me, no one (other than pts mom) is requesting to stop the medication  She wants it stopped because she has no idea why the patient was put on it  She said with her current A1C there should not be need for metformin, and if there was concern for her current A1C, it should be addressed by pts endo (who also feels there isnt a need for metform with current A1C)     I truly dont know if her current A1C warrants medication or not, but mom seems to think A1C is fine and does not warrant being on a medication  If that is not the case, and her A1C should be controlled with medication, mom would like pts endocrinologist to take over the care of this medication

## 2022-06-01 NOTE — TELEPHONE ENCOUNTER
Reached out to [address] [place] [address] regarding D/C request for Metformin, She provided me with contact information for Dr Nolan Lazo out of [facility]  Unable to reach anyone at that office  S/w Mom asked if she could have Endo office send something over stating they are requesting a D/c for this medication due to A1C levels  Mom states she does have a report from the recent visit and she will bring a copy to scan in patients chart as report addresses patient not needing this medication

## 2022-06-03 NOTE — PROGRESS NOTES
It is my medical opinion patient may stop taking metformin  We can continue to monitor her A1c and reassess moving forward

## 2022-06-03 NOTE — LETTER
Nicolle 3, 2022     Patient: Malu Petersen   YOB: 1975   Date of Visit: 6/3/2022       To Whom It May Concern: It is my medical opinion that Corey Brown may stop taking her metformin  It would still be worthwhile to monitor her A1c level  If you have any questions or concerns, please don't hesitate to call           Sincerely,        Dorita Nicholas DO    CC: No Recipients

## 2022-06-27 DIAGNOSIS — E78.5 HYPERLIPIDEMIA, UNSPECIFIED HYPERLIPIDEMIA TYPE: ICD-10-CM

## 2022-06-27 RX ORDER — ATORVASTATIN CALCIUM 10 MG/1
10 TABLET, FILM COATED ORAL DAILY
Qty: 90 TABLET | Refills: 3 | Status: SHIPPED | OUTPATIENT
Start: 2022-06-27 | End: 2022-07-27 | Stop reason: SDUPTHER

## 2022-07-01 DIAGNOSIS — Z76.0 MEDICATION REFILL: ICD-10-CM

## 2022-07-01 NOTE — TELEPHONE ENCOUNTER
Medication Refill Request     Name Multiple Vitamins-Minerals (Centrum Silver Ultra Womens) TABS    Dose/Frequency Take 1 tablet by mouth daily   Quantity 90  Verified pharmacy   [x]  Verified ordering Provider   [x]  Verified enough for 3 days  [x]

## 2022-07-01 NOTE — TELEPHONE ENCOUNTER
Medication Refill Request     Name divalproex sodium (DEPAKOTE ER) 500 mg 24 hr tablet  Dose/Frequency Take 3 tablets (1,500 mg total) by mouth daily  Quantity 270  Verified pharmacy   [x]  Verified ordering Provider   [x]  Verified enough for 3 days  [x]

## 2022-07-04 RX ORDER — DIVALPROEX SODIUM 500 MG/1
1500 TABLET, EXTENDED RELEASE ORAL DAILY
Qty: 270 TABLET | Refills: 0 | Status: SHIPPED | OUTPATIENT
Start: 2022-07-04 | End: 2022-10-07 | Stop reason: SDUPTHER

## 2022-07-04 RX ORDER — MULTIVIT,IRON,MINERALS/LUTEIN
1 TABLET ORAL DAILY
Qty: 90 TABLET | Refills: 0 | Status: SHIPPED | OUTPATIENT
Start: 2022-07-04

## 2022-07-15 ENCOUNTER — TELEPHONE (OUTPATIENT)
Dept: PULMONOLOGY | Facility: MEDICAL CENTER | Age: 47
End: 2022-07-15

## 2022-07-19 ENCOUNTER — OFFICE VISIT (OUTPATIENT)
Dept: PULMONOLOGY | Facility: MEDICAL CENTER | Age: 47
End: 2022-07-19
Payer: MEDICARE

## 2022-07-19 VITALS
SYSTOLIC BLOOD PRESSURE: 108 MMHG | HEART RATE: 72 BPM | HEIGHT: 56 IN | WEIGHT: 163 LBS | BODY MASS INDEX: 36.67 KG/M2 | RESPIRATION RATE: 16 BRPM | OXYGEN SATURATION: 95 % | DIASTOLIC BLOOD PRESSURE: 62 MMHG | TEMPERATURE: 97.9 F

## 2022-07-19 DIAGNOSIS — G47.33 OBSTRUCTIVE SLEEP APNEA SYNDROME: Chronic | ICD-10-CM

## 2022-07-19 DIAGNOSIS — J45.20 MILD INTERMITTENT ASTHMA WITHOUT COMPLICATION: Chronic | ICD-10-CM

## 2022-07-19 DIAGNOSIS — G47.33 OSA (OBSTRUCTIVE SLEEP APNEA): Primary | ICD-10-CM

## 2022-07-19 DIAGNOSIS — E66.01 OBESITY, MORBID (HCC): ICD-10-CM

## 2022-07-19 PROCEDURE — 99214 OFFICE O/P EST MOD 30 MIN: CPT | Performed by: NURSE PRACTITIONER

## 2022-07-19 NOTE — ASSESSMENT & PLAN NOTE
Patient continues to use an benefit from a CPAP  At her last visit her AHI was elevated at 10  She also had a large air leak  Patient did have a overnight pulse oximetry that was done on May 11, 2022  This was on for 10 hours and 10 minutes  Done on CPAP on room air her oxygen below 88% was 1 hour and 36 minutes and below 89% was for  3 hours and 25 minutes  I did request that patient have 2 L added to her supplemental CPAP  Apparently there was a issue with [facility] [employer] and this was  Never done  Therefore I am going to order a repeat nocturnal pulse oximetry done on CPAP on room air  Her mother accompanies her today and is aware of this order  Additionally I am reviewing her CPAP from June 19th to July 18, 2022  She is 97% compliance with average usage of 10 hours and 14 minutes  This is on a auto CPAP 8-12 cm of water pressure  Her maximum pressure is 11 1 and 95th percentile is 11 1 AHI is reduced to 4 8  I am going to change her pressure from 8-14 cm of water pressure is I feel she is at her max of 11 1  Her mother is aware of this change

## 2022-07-19 NOTE — PROGRESS NOTES
Assessment/Plan:     Problem List Items Addressed This Visit        Respiratory    Obstructive sleep apnea syndrome (Chronic)     Patient continues to use an benefit from a CPAP  At her last visit her AHI was elevated at 10  She also had a large air leak  Patient did have a overnight pulse oximetry that was done on May 11, 2022  This was on for 10 hours and 10 minutes  Done on CPAP on room air her oxygen below 88% was 1 hour and 36 minutes and below 89% was for  3 hours and 25 minutes  I did request that patient have 2 L added to her supplemental CPAP  Apparently there was a issue with [employer] and this was  Never done  Therefore I am going to order a repeat nocturnal pulse oximetry done on CPAP on room air  Her mother accompanies her today and is aware of this order  Additionally I am reviewing her CPAP from June 19th to July 18, 2022  She is 97% compliance with average usage of 10 hours and 14 minutes  This is on a auto CPAP 8-12 cm of water pressure  Her maximum pressure is 11 1 and 95th percentile is 11 1 AHI is reduced to 4 8  I am going to change her pressure from 8-14 cm of water pressure is I feel she is at her max of 11 1  Her mother is aware of this change  Mild intermittent asthma without complication (Chronic)     Patient has history of mild asthma  Her lungs are clear to auscultation today  She does live in a group home and she has not been using any albuterol  Other Visit Diagnoses     ROBERTO (obstructive sleep apnea)    -  Primary    Relevant Orders    Pulse oximetry overnight    Obesity, morbid ([address] )                Return in about 4 weeks (around 8/16/2022)  All questions are answered to the patient's satisfaction and understanding  She verbalizes understanding  She is encouraged to call with any further questions or concerns  Portions of the record may have been created with voice recognition software    Occasional wrong word or "sound a like" substitutions may have occurred due to the inherent limitations of voice recognition software  Read the chart carefully and recognize, using context, where substitutions have occurred  Electronically Signed by SESAR Denny    ______________________________________________________________________    Chief Complaint: No chief complaint on file  Patient ID: ST RODRIGUEZ is a 40 y o  y o  female has a past medical history of Asthma, Complex partial epilepsy ([facility] [address] ), COVID (02/2021), Diabetes mellitus ([facility] [address] ), Disease of thyroid gland, Down syndrome, Heart murmur, Hydradenitis, Hyperlipidemia, Long Q-T syndrome, Pneumonia, Psychiatric disorder, and Sleep apnea  7/19/2022  Patient presents today for follow-up visit  HPI  ST RODRIGUEZ is here today for follow-up  This 27-year-old female was last seen in the office in March of 2022  She does have a diagnosis of obstructive sleep apnea     If patient also has a history of mild asthma  PFTs in the past revealed forced vital capacity 1 79 L or 62% of predicted, FEV1 was 1 75 L or 62% of predicted and obstruction ratio is 89%  At her last visit it was noted that her AHI was elevated at 10 and patient was noted to have a large air leak  She was not using CPAP with supplemental oxygen  Review of Systems   Constitutional: Negative  HENT: Negative  Eyes: Negative  Respiratory: Positive for shortness of breath  Cardiovascular: Negative  Gastrointestinal: Negative  Endocrine: Negative  Genitourinary: Negative  Musculoskeletal: Negative  Allergic/Immunologic: Negative  Neurological: Negative  Hematological: Negative  Psychiatric/Behavioral: Negative  Smoking history: She reports that she has never smoked   She has never used smokeless tobacco     The following portions of the patient's history were reviewed and updated as appropriate: current medications, past family history, past medical history, past social history, past surgical history and problem list     Immunization History   Administered Date(s) Administered    Hep B, adult 11/06/2003, 12/04/2003, 05/06/2004    INFLUENZA 10/08/2019, 11/23/2021    Td (adult), adsorbed 04/24/2003, 07/31/2013, 05/03/2014    Tuberculin Skin Test-PPD Intradermal 07/31/2015, 08/02/2021     Current Outpatient Medications   Medication Sig Dispense Refill    albuterol (PROVENTIL HFA,VENTOLIN HFA) 90 mcg/act inhaler Inhale 2 puffs every 4 (four) hours as needed for wheezing 18 g 1    ARIPiprazole (ABILIFY) 10 mg tablet Take 1 tablet (10 mg total) by mouth daily 90 tablet 3    atorvastatin (LIPITOR) 10 mg tablet Take 1 tablet (10 mg total) by mouth daily At 8pm 90 tablet 3    Calcium 600 1500 (600 Ca) MG TABS       calcium carbonate (OS-ESTEPHANIA) 600 MG tablet Take 1 tablet (600 mg total) by mouth 2 (two) times a day with meals 180 tablet 3    clindamycin (CLINDAGEL) 1 % gel Apply topically 2 (two) times a day Apply to L axilla      divalproex sodium (DEPAKOTE ER) 500 mg 24 hr tablet Take 3 tablets (1,500 mg total) by mouth daily 270 tablet 0    ergocalciferol (VITAMIN D2) 50,000 units every 14 (fourteen) days      Eyelid Cleansers (OCUSOFT BABY EYELID & EYELASH EX) Inhale      fluticasone (FLONASE) 50 mcg/act nasal spray 1 spray into each nostril daily 16 mL 1    levocetirizine (XYZAL) 5 MG tablet Take 1 tablet (5 mg total) by mouth daily At 8 PM 30 tablet 5    Mouthwashes (LISTERINE ANTISEPTIC ADVANCED MT) by Per J Tube route      Multiple Vitamins-Minerals (Centrum Silver Ultra Womens) TABS Take 1 tablet by mouth daily Centrum Silver Ultra Womens Oral Tablet  Refills: 0  Active 90 tablet 0    NON FORMULARY       ofloxacin (OCUFLOX) 0 3 % ophthalmic solution INSTILL 2 DROPS INTO RIGHT EYE EVERY 4 HOURS FOR 7 DAYS      Spacer/Aero Chamber Mouthpiece (SPACER DEVICE) for metered dose inhaler For use with metered dose inhaler 1 Device 0    VITAMIN D, ERGOCALCIFEROL, PO Take 1 25 mg by mouth once a week Administer on sunday        clotrimazole 1 % external solution Apply 1 application topically 2 (two) times a day for 14 days 30 mL 0    hydrocortisone 1 % cream Apply topically 4 (four) times a day as needed (apply small amount to eyelid 3 times a day) May apply to dryness of both upper eyelids (Patient not taking: No sig reported) 30 g 0    ketorolac (ACULAR) 0 5 % ophthalmic solution Administer 1 drop to both eyes 4 (four) times a day for 7 days (Patient not taking: Reported on 2/8/2022 ) 5 mL 0    levothyroxine 75 mcg tablet Take 1 tablet (75 mcg total) by mouth daily in the early morning 90 tablet 3    valACYclovir (VALTREX) 1,000 mg tablet Take 1,000 mg by mouth Three times a day       No current facility-administered medications for this visit  Allergies: Avandia [rosiglitazone] and Nsaids    Objective:  Vitals:    07/19/22 1405   BP: 108/62   Pulse: 72   Resp: 16   Temp: 97 9 °F (36 6 °C)   TempSrc: Temporal   SpO2: 95%   Weight: 73 9 kg (163 lb)   Height: 4' 8" (1 422 m)   Oxygen Therapy  SpO2: 95 %    Wt Readings from Last 3 Encounters:   07/19/22 73 9 kg (163 lb)   04/05/22 71 2 kg (157 lb)   03/30/22 71 5 kg (157 lb 9 6 oz)     Body mass index is 36 54 kg/m²  Physical Exam  Constitutional:       Appearance: She is obese  HENT:      Head: Normocephalic and atraumatic  Comments: Mallampati 4     Nose: Nose normal       Mouth/Throat:      Mouth: Mucous membranes are dry  Cardiovascular:      Rate and Rhythm: Normal rate and regular rhythm  Pulses: Normal pulses  Pulmonary:      Effort: Pulmonary effort is normal    Musculoskeletal:         General: Normal range of motion  Cervical back: Normal range of motion  Skin:     General: Skin is warm and dry  Capillary Refill: Capillary refill takes less than 2 seconds  Neurological:      General: No focal deficit present  Mental Status: She is alert and oriented to person, place, and time     Psychiatric:         Mood and Affect: Mood normal          Behavior: Behavior normal          Lab Review:   Transcribe Orders on 05/17/2022   Component Date Value    Sodium 05/17/2022 142     Potassium 05/17/2022 4 2     Chloride 05/17/2022 103     CO2 05/17/2022 35 (A)    ANION GAP 05/17/2022 4     BUN 05/17/2022 10     Creatinine 05/17/2022 1 01     Glucose, Fasting 05/17/2022 87     Calcium 05/17/2022 8 9     Corrected Calcium 05/17/2022 9 7     AST 05/17/2022 20     ALT 05/17/2022 21     Alkaline Phosphatase 05/17/2022 77     Total Protein 05/17/2022 7 1     Albumin 05/17/2022 3 0 (A)    Total Bilirubin 05/17/2022 0 28     eGFR 05/17/2022 67     Phosphorus 05/17/2022 4 7 (A)    TSH 3RD GENERATON 05/17/2022 2 512     Vit D, 25-Hydroxy 05/17/2022 78 0    Transcribe Orders on 05/17/2022   Component Date Value    Cholesterol 05/17/2022 124     Triglycerides 05/17/2022 107     HDL, Direct 05/17/2022 42 (A)    LDL Calculated 05/17/2022 61     Non-HDL-Chol (CHOL-HDL) 05/17/2022 82     Total CK 05/17/2022 53    Appointment on 05/17/2022   Component Date Value    Bilirubin, Direct 05/17/2022 0 10    Orders Only on 04/14/2022   Component Date Value    Supplier Name 04/14/2022 [employer] [facility]     Supplier Phone Number 04/14/2022 [phone]     Order Status 04/14/2022 Delivery Successful     Delivery Request Date 04/14/2022 04/14/2022     Date Delivered  04/14/2022 05/11/2022     Supplier Name 04/14/2022 05/11/2022     Item Description 04/14/2022 Overnight Oximetry Test    Appointment on 04/05/2022   Component Date Value    Creatinine, Ur 04/05/2022 72 1     Microalbum  ,U,Random 04/05/2022 13 5     Microalb Creat Ratio 04/05/2022 19    Office Visit on 02/08/2022   Component Date Value    GLUCOSE BLD 02/08/2022 94     Hemoglobin A1C 02/08/2022 5 0        Past Surgical History:   Procedure Laterality Date    CARDIAC SURGERY      patent duct    PATENT DUCTUS ARTERIOUS LIGATION Family History   Adopted: Yes   Problem Relation Age of Onset    No Known Problems Mother         Diagnostics:  I have personally reviewed pertinent reports  Office Spirometry Results:     ESS:    No results found

## 2022-07-19 NOTE — PATIENT INSTRUCTIONS
Sleep Apnea   AMBULATORY CARE:   Sleep apnea  is a condition that causes you to stop breathing often during sleep  Types of sleep apnea:   Obstructive sleep apnea (ROBERTO)  is the most common kind  The muscles and tissues around your throat relax and block air from passing through  Obesity, use of alcohol or cigarettes, or a family history are common causes  ROBERTO may increase your risk for complications after surgery  Central sleep apnea (CSA)  means your brain does not send signals to the muscles that control breathing  You do not take a breath even though your airway is open  Common causes include medical conditions such as heart failure, being older than 40, or use of opioids  Complex (or mixed) sleep apnea  means you have both obstructive and central sleep apnea  Common signs and symptoms:   Loud snoring or long pauses in breathing    Feeling sleepy, slow, and tired during the day    Snorting, gasping, or choking while you sleep, and waking up suddenly because of these    Feeling irritable during the day    Dry mouth or a headache in the mornings    Heavy night sweating    A hard time thinking, remembering things, or focusing on your tasks the following day    Call your local emergency number (911 in the [address] [facility]) if:   You have chest pain or trouble breathing  Call your doctor if:   You have new or worsening signs or symptoms  You have questions or concerns about your condition or care  Treatment  depends on the kind of apnea you have  A mouth device  may be needed if you have mild sleep apnea  These are designed to keep your throat open  Ask your dentist or healthcare provider about the best mouth device for you  A machine  may be used to help you get more air during sleep  A mask may be placed over your nose and mouth, or just your nose  The mask is hooked to the machine  You will get air through the mask      A continuous positive airway pressure (CPAP) machine  is used to keep your airway open during sleep  The machine blows a gentle stream of air into the mask when you breathe  This helps keep your airway open so you can breathe more regularly  Extra oxygen may be given through the machine  A bilevel positive airway pressure (BiPAP) machine  gives air but lowers the pressure when you breathe out  An adaptive servo-ventilator (ASV)  is a machine that learns your usual breathing pattern  Then, it uses pressure to give you air and prevent stops in your breathing  Surgery  to expand your airway or remove extra tissues may be needed  Surgery is usually only considered if other treatments do not work  Manage or prevent sleep apnea:   Reach and maintain a healthy weight  Ask your healthcare provider what a healthy weight is for you  Ask him or her to help you create a safe weight loss plan if you are overweight  Even a small goal of a 10% weight loss can improve your symptoms  Do not smoke  Nicotine and other chemicals in cigarettes and cigars can cause lung damage  Ask your healthcare provider for information if you currently smoke and need help to quit  E-cigarettes or smokeless tobacco still contain nicotine  Talk to your healthcare provider before you use these products  Do not drink alcohol or take sedative medicine before you go to sleep  Alcohol and sedatives can relax the muscles and tissues around your throat  This can block the airflow to your lungs  Sleep on your side or use pillows designed to prevent sleep apnea  This prevents your tongue or other tissues from blocking your throat  You can also raise the head of your bed  Follow up with your doctor or specialist as directed: You may need to have blood tests during your follow-up visits  Work with your provider to find the right breathing support equipment and settings for you  Write down your questions so you remember to ask them during your visits    © Copyright [employer] 2022 Information is for End User's use only and may not be sold, redistributed or otherwise used for commercial purposes  All illustrations and images included in CareNotes® are the copyrighted property of A D [employer] M [employer] Inc  or [employer]  The above information is an  only  It is not intended as medical advice for individual conditions or treatments  Talk to your doctor, nurse or pharmacist before following any medical regimen to see if it is safe and effective for you

## 2022-07-19 NOTE — ASSESSMENT & PLAN NOTE
Patient has history of mild asthma  Her lungs are clear to auscultation today  She does live in a group home and she has not been using any albuterol

## 2022-07-27 DIAGNOSIS — E78.5 HYPERLIPIDEMIA, UNSPECIFIED HYPERLIPIDEMIA TYPE: ICD-10-CM

## 2022-07-27 RX ORDER — ATORVASTATIN CALCIUM 10 MG/1
10 TABLET, FILM COATED ORAL DAILY
Qty: 90 TABLET | Refills: 3 | Status: SHIPPED | OUTPATIENT
Start: 2022-07-27 | End: 2022-09-20 | Stop reason: SDUPTHER

## 2022-07-28 DIAGNOSIS — J30.89 ALLERGIC RHINITIS DUE TO OTHER ALLERGIC TRIGGER, UNSPECIFIED SEASONALITY: ICD-10-CM

## 2022-07-28 LAB
DME PARACHUTE DELIVERY DATE ACTUAL: NORMAL
DME PARACHUTE DELIVERY DATE EXPECTED: NORMAL
DME PARACHUTE DELIVERY DATE REQUESTED: NORMAL
DME PARACHUTE ITEM DESCRIPTION: NORMAL
DME PARACHUTE ITEM DESCRIPTION: NORMAL
DME PARACHUTE ORDER STATUS: NORMAL
DME PARACHUTE SUPPLIER NAME: NORMAL
DME PARACHUTE SUPPLIER PHONE: NORMAL

## 2022-07-28 RX ORDER — LEVOCETIRIZINE DIHYDROCHLORIDE 5 MG/1
TABLET, FILM COATED ORAL
Qty: 30 TABLET | Refills: 10 | Status: SHIPPED | OUTPATIENT
Start: 2022-07-28

## 2022-08-01 DIAGNOSIS — Z76.0 MEDICATION REFILL: ICD-10-CM

## 2022-08-02 RX ORDER — ARIPIPRAZOLE 10 MG/1
10 TABLET ORAL DAILY
Qty: 90 TABLET | Refills: 3 | Status: SHIPPED | OUTPATIENT
Start: 2022-08-02

## 2022-09-14 ENCOUNTER — OFFICE VISIT (OUTPATIENT)
Dept: PULMONOLOGY | Facility: MEDICAL CENTER | Age: 47
End: 2022-09-14
Payer: MEDICARE

## 2022-09-14 VITALS
OXYGEN SATURATION: 95 % | DIASTOLIC BLOOD PRESSURE: 58 MMHG | HEART RATE: 68 BPM | WEIGHT: 181 LBS | TEMPERATURE: 97.1 F | RESPIRATION RATE: 12 BRPM | HEIGHT: 56 IN | BODY MASS INDEX: 40.72 KG/M2 | SYSTOLIC BLOOD PRESSURE: 98 MMHG

## 2022-09-14 DIAGNOSIS — G47.33 OBSTRUCTIVE SLEEP APNEA SYNDROME: Primary | Chronic | ICD-10-CM

## 2022-09-14 DIAGNOSIS — J45.20 MILD INTERMITTENT ASTHMA WITHOUT COMPLICATION: Chronic | ICD-10-CM

## 2022-09-14 PROCEDURE — 99214 OFFICE O/P EST MOD 30 MIN: CPT | Performed by: NURSE PRACTITIONER

## 2022-09-14 RX ORDER — ACETAMINOPHEN 325 MG/1
TABLET ORAL
COMMUNITY
Start: 2022-08-15

## 2022-09-14 RX ORDER — GUAIFENESIN AND DEXTROMETHORPHAN HYDROBROMIDE 100; 10 MG/5ML; MG/5ML
LIQUID ORAL
COMMUNITY
Start: 2022-08-15

## 2022-09-14 RX ORDER — GINSENG 100 MG
CAPSULE ORAL
COMMUNITY
Start: 2022-08-15

## 2022-09-14 RX ORDER — MAGNESIUM HYDROXIDE 1200 MG/15ML
SUSPENSION ORAL
COMMUNITY
Start: 2022-08-15

## 2022-09-14 RX ORDER — BISMUTH SUBSALICYLATE 525 MG/15ML
SUSPENSION ORAL
COMMUNITY
Start: 2022-08-15

## 2022-09-14 NOTE — PROGRESS NOTES
Assessment/Plan:     Problem List Items Addressed This Visit        Respiratory    Obstructive sleep apnea syndrome - Primary (Chronic)     Patient continues to use an benefit from an auto CPAP  She is using 8-14 cm of water pressure and is 100% compliant  Her average usage is 7 hours and 55 minutes  This is on auto CPAP 8-14 cm of water pressure  Her 95th percentile pressure is 12 8 max is 13 3 in AHI is reduced to 2 9  At this point I am still waiting to obtain her nocturnal pulse oximetry that was done approximately at the beginning of August 2022  It is not available at this moment  However she is using an benefit in AHI is appropriate and acceptable at 2 9 events per hour  Nocturnal pulse oximetry was done on room air CPAP  Done on August 1st 2022 oxygen below 88% was 3 hours and 49 minutes  I am therefore going to order supplemental oxygen 2 L/hr at a chest with auto CPAP  Tenna Mile and her mother who are here today are agreeable to this plan of care         Relevant Orders    Oxygen    Mild intermittent asthma without complication (Chronic)     Patient currently is stable she is using albuterol only as needed  Her asthma is mild and well controlled at this time  Overall she is doing well                 Return in about 6 months (around 3/14/2023)  All questions are answered to the patient's satisfaction and understanding  She verbalizes understanding  She is encouraged to call with any further questions or concerns  Portions of the record may have been created with voice recognition software  Occasional wrong word or "sound a like" substitutions may have occurred due to the inherent limitations of voice recognition software  Read the chart carefully and recognize, using context, where substitutions have occurred      Electronically Signed by SESAR Aden    ______________________________________________________________________    Chief Complaint: No chief complaint on file       Patient ID: Sarah Beasley is a 40 y o  y o  female has a past medical history of Asthma, Complex partial epilepsy ([facility] [address] ), COVID (02/2021), Diabetes mellitus ([facility] [address] ), Disease of thyroid gland, Down syndrome, Heart murmur, Hydradenitis, Hyperlipidemia, Long Q-T syndrome, Pneumonia, Psychiatric disorder, and Sleep apnea  9/14/2022  Patient presents today for follow-up visit  HPI  Sarah Beasley is here today as a follow-up  She is accompanied by her mother  Her last office visit was in July 19, 2022  Essentially she had a previous nocturnal pulse oximetry done on May 11, 2022  Room air oxygen below 88% at that point was 1 hour and 36 minutes  This was done on her CPAP which is 8-14 cm of water pressure  She has had a history of asthma which is mild but is doing well    Review of Systems   Constitutional: Negative  HENT: Negative  Eyes: Negative  Respiratory: Negative  Cardiovascular: Negative  Gastrointestinal: Negative  Endocrine: Negative  Genitourinary: Negative  Musculoskeletal: Negative  Skin: Negative  Allergic/Immunologic: Negative  Neurological: Negative  Hematological: Negative  Psychiatric/Behavioral: Negative  Smoking history: She reports that she has never smoked   She has never used smokeless tobacco     The following portions of the patient's history were reviewed and updated as appropriate: current medications, past family history, past medical history, past social history, past surgical history and problem list     Immunization History   Administered Date(s) Administered    Hep B, adult 11/06/2003, 12/04/2003, 05/06/2004    INFLUENZA 10/08/2019, 11/23/2021    Td (adult), adsorbed 04/24/2003, 07/31/2013, 05/03/2014    Tuberculin Skin Test-PPD Intradermal 07/31/2015, 08/02/2021     Current Outpatient Medications   Medication Sig Dispense Refill    acetaminophen (TYLENOL) 325 mg tablet       albuterol (PROVENTIL HFA,VENTOLIN HFA) 90 mcg/act inhaler Inhale 2 puffs every 4 (four) hours as needed for wheezing 18 g 1    Anti-Diarrheal 2 MG tablet       ARIPiprazole (ABILIFY) 10 mg tablet Take 1 tablet (10 mg total) by mouth daily 90 tablet 3    atorvastatin (LIPITOR) 10 mg tablet Take 1 tablet (10 mg total) by mouth daily At 8pm 90 tablet 3    bacitracin topical ointment 500 units/g topical ointment       Calcium 600 1500 (600 Ca) MG TABS       calcium carbonate (OS-ESTEPHANIA) 600 MG tablet Take 1 tablet (600 mg total) by mouth 2 (two) times a day with meals 180 tablet 3    clindamycin (CLINDAGEL) 1 % gel Apply topically 2 (two) times a day Apply to L axilla      divalproex sodium (DEPAKOTE ER) 500 mg 24 hr tablet Take 3 tablets (1,500 mg total) by mouth daily 270 tablet 0    ergocalciferol (VITAMIN D2) 50,000 units every 14 (fourteen) days      Eyelid Cleansers (OCUSOFT BABY EYELID & EYELASH EX) Inhale      fluticasone (FLONASE) 50 mcg/act nasal spray 1 spray into each nostril daily 16 mL 1    levocetirizine (XYZAL) 5 MG tablet TAKE 1 TABLET BY MOUTH ONCE DAILY AT 8PM 30 tablet 10    Milk of Magnesia 400 MG/5ML oral suspension       Mouthwashes (LISTERINE ANTISEPTIC ADVANCED MT) by Per J Tube route      Multiple Vitamins-Minerals (Centrum Silver Ultra Womens) TABS Take 1 tablet by mouth daily Centrum Silver Ultra Womens Oral Tablet  Refills: 0  Active 90 tablet 0    NON FORMULARY       ofloxacin (OCUFLOX) 0 3 % ophthalmic solution INSTILL 2 DROPS INTO RIGHT EYE EVERY 4 HOURS FOR 7 DAYS      SILTUSSIN DM ALCOHOL FREE 100-10 MG/5ML oral syrup       Spacer/Aero Chamber Mouthpiece (SPACER DEVICE) for metered dose inhaler For use with metered dose inhaler 1 Device 0    VITAMIN D, ERGOCALCIFEROL, PO Take 1 25 mg by mouth once a week Administer on sunday        clotrimazole 1 % external solution Apply 1 application topically 2 (two) times a day for 14 days 30 mL 0    hydrocortisone 1 % cream Apply topically 4 (four) times a day as needed (apply small amount to eyelid 3 times a day) May apply to dryness of both upper eyelids (Patient not taking: Reported on 9/14/2022) 30 g 0    ketorolac (ACULAR) 0 5 % ophthalmic solution Administer 1 drop to both eyes 4 (four) times a day for 7 days (Patient not taking: Reported on 2/8/2022 ) 5 mL 0    levothyroxine 75 mcg tablet Take 1 tablet (75 mcg total) by mouth daily in the early morning 90 tablet 3    valACYclovir (VALTREX) 1,000 mg tablet Take 1,000 mg by mouth Three times a day       No current facility-administered medications for this visit  Allergies: Avandia [rosiglitazone] and Nsaids    Objective:  Vitals:    09/14/22 0805   BP: 98/58   BP Location: Left arm   Patient Position: Sitting   Cuff Size: Standard   Pulse: 68   Resp: 12   Temp: (!) 97 1 °F (36 2 °C)   TempSrc: Temporal   SpO2: 95%   Weight: 82 1 kg (181 lb)   Height: 4' 8" (1 422 m)   Oxygen Therapy  SpO2: 95 %    Wt Readings from Last 3 Encounters:   09/14/22 82 1 kg (181 lb)   07/19/22 73 9 kg (163 lb)   04/05/22 71 2 kg (157 lb)     Body mass index is 40 58 kg/m²  Physical Exam  Constitutional:       Appearance: She is obese  HENT:      Head: Normocephalic and atraumatic  Nose: Nose normal       Comments: Down's syndrome  Depressed nasal bone     Mouth/Throat:      Mouth: Mucous membranes are moist    Eyes:      Pupils: Pupils are equal, round, and reactive to light  Cardiovascular:      Rate and Rhythm: Normal rate and regular rhythm  Pulses: Normal pulses  Pulmonary:      Effort: Pulmonary effort is normal    Musculoskeletal:         General: Normal range of motion  Cervical back: Normal range of motion  Skin:     General: Skin is warm and dry  Capillary Refill: Capillary refill takes less than 2 seconds  Neurological:      Mental Status: She is alert and oriented to person, place, and time     Psychiatric:         Mood and Affect: Mood normal          Behavior: Behavior normal          Lab Review:   Office Visit on 07/19/2022   Component Date Value    Supplier Name 07/19/2022 [employer] [place]     Supplier Phone Number 07/19/2022 [phone]     Order Status 07/19/2022 Delivery Successful     Delivery Request Date 07/19/2022 07/19/2022     Date Delivered  07/19/2022 07/28/2022     Supplier Name 07/19/2022 07/28/2022     Item Description 07/19/2022 Pressure Change     Item Description 07/19/2022 Overnight Oximetry Test    Transcribe Orders on 05/17/2022   Component Date Value    Sodium 05/17/2022 142     Potassium 05/17/2022 4 2     Chloride 05/17/2022 103     CO2 05/17/2022 35 (A)    ANION GAP 05/17/2022 4     BUN 05/17/2022 10     Creatinine 05/17/2022 1 01     Glucose, Fasting 05/17/2022 87     Calcium 05/17/2022 8 9     Corrected Calcium 05/17/2022 9 7     AST 05/17/2022 20     ALT 05/17/2022 21     Alkaline Phosphatase 05/17/2022 77     Total Protein 05/17/2022 7 1     Albumin 05/17/2022 3 0 (A)    Total Bilirubin 05/17/2022 0 28     eGFR 05/17/2022 67     Phosphorus 05/17/2022 4 7 (A)    TSH 3RD GENERATON 05/17/2022 2 512     Vit D, 25-Hydroxy 05/17/2022 78 0    Transcribe Orders on 05/17/2022   Component Date Value    Cholesterol 05/17/2022 124     Triglycerides 05/17/2022 107     HDL, Direct 05/17/2022 42 (A)    LDL Calculated 05/17/2022 61     Non-HDL-Chol (CHOL-HDL) 05/17/2022 82     Total CK 05/17/2022 53    Appointment on 05/17/2022   Component Date Value    Bilirubin, Direct 05/17/2022 0 10    Orders Only on 04/14/2022   Component Date Value    Supplier Name 04/14/2022 [employer] [facility] [id number] [facility] Supplier Phone Number 04/14/2022 [phone]     Order Status 04/14/2022 Delivery Successful     Delivery Request Date 04/14/2022 04/14/2022     Date Delivered  04/14/2022 05/11/2022     Supplier Name 04/14/2022 05/11/2022     Item Description 04/14/2022 Overnight Oximetry Test    Appointment on 04/05/2022   Component Date Value    Creatinine, Ur 04/05/2022 72 1     Microalbum  ,U,Random 04/05/2022 13 5     Microalb Creat Ratio 04/05/2022 19        Past Surgical History:   Procedure Laterality Date    CARDIAC SURGERY      patent duct    PATENT DUCTUS ARTERIOUS LIGATION          Family History   Adopted: Yes   Problem Relation Age of Onset    No Known Problems Mother         Diagnostics:  I have personally reviewed pertinent reports  Office Spirometry Results:     ESS:    No results found

## 2022-09-14 NOTE — ASSESSMENT & PLAN NOTE
Patient currently is stable she is using albuterol only as needed  Her asthma is mild and well controlled at this time    Overall she is doing well

## 2022-09-14 NOTE — ASSESSMENT & PLAN NOTE
Patient continues to use an benefit from an auto CPAP  She is using 8-14 cm of water pressure and is 100% compliant  Her average usage is 7 hours and 55 minutes  This is on auto CPAP 8-14 cm of water pressure  Her 95th percentile pressure is 12 8 max is 13 3 in AHI is reduced to 2 9  At this point I am still waiting to obtain her nocturnal pulse oximetry that was done approximately at the beginning of August 2022  It is not available at this moment  However she is using an benefit in AHI is appropriate and acceptable at 2 9 events per hour  Nocturnal pulse oximetry was done on room air CPAP  Done on August 1st 2022 oxygen below 88% was 3 hours and 49 minutes  I am therefore going to order supplemental oxygen 2 L/hr at a chest with auto CPAP    Go Helm and her mother who are here today are agreeable to this plan of care

## 2022-09-14 NOTE — PATIENT INSTRUCTIONS
Sleep Apnea   AMBULATORY CARE:   Sleep apnea  is a condition that causes you to stop breathing often during sleep  Types of sleep apnea:   Obstructive sleep apnea (ROBERTO)  is the most common kind  The muscles and tissues around your throat relax and block air from passing through  Obesity, use of alcohol or cigarettes, or a family history are common causes  ROBERTO may increase your risk for complications after surgery  Central sleep apnea (CSA)  means your brain does not send signals to the muscles that control breathing  You do not take a breath even though your airway is open  Common causes include medical conditions such as heart failure, being older than 40, or use of opioids  Complex (or mixed) sleep apnea  means you have both obstructive and central sleep apnea  Common signs and symptoms:   Loud snoring or long pauses in breathing    Feeling sleepy, slow, and tired during the day    Snorting, gasping, or choking while you sleep, and waking up suddenly because of these    Feeling irritable during the day    Dry mouth or a headache in the mornings    Heavy night sweating    A hard time thinking, remembering things, or focusing on your tasks the following day    Call your local emergency number (911 in the [address] [facility]) if:   You have chest pain or trouble breathing  Call your doctor if:   You have new or worsening signs or symptoms  You have questions or concerns about your condition or care  Treatment  depends on the kind of apnea you have  A mouth device  may be needed if you have mild sleep apnea  These are designed to keep your throat open  Ask your dentist or healthcare provider about the best mouth device for you  A machine  may be used to help you get more air during sleep  A mask may be placed over your nose and mouth, or just your nose  The mask is hooked to the machine  You will get air through the mask      A continuous positive airway pressure (CPAP) machine  is used to keep your airway open during sleep  The machine blows a gentle stream of air into the mask when you breathe  This helps keep your airway open so you can breathe more regularly  Extra oxygen may be given through the machine  A bilevel positive airway pressure (BiPAP) machine  gives air but lowers the pressure when you breathe out  An adaptive servo-ventilator (ASV)  is a machine that learns your usual breathing pattern  Then, it uses pressure to give you air and prevent stops in your breathing  Surgery  to expand your airway or remove extra tissues may be needed  Surgery is usually only considered if other treatments do not work  Manage or prevent sleep apnea:   Reach and maintain a healthy weight  Ask your healthcare provider what a healthy weight is for you  Ask him or her to help you create a safe weight loss plan if you are overweight  Even a small goal of a 10% weight loss can improve your symptoms  Do not smoke  Nicotine and other chemicals in cigarettes and cigars can cause lung damage  Ask your healthcare provider for information if you currently smoke and need help to quit  E-cigarettes or smokeless tobacco still contain nicotine  Talk to your healthcare provider before you use these products  Do not drink alcohol or take sedative medicine before you go to sleep  Alcohol and sedatives can relax the muscles and tissues around your throat  This can block the airflow to your lungs  Sleep on your side or use pillows designed to prevent sleep apnea  This prevents your tongue or other tissues from blocking your throat  You can also raise the head of your bed  Follow up with your doctor or specialist as directed: You may need to have blood tests during your follow-up visits  Work with your provider to find the right breathing support equipment and settings for you  Write down your questions so you remember to ask them during your visits    © Copyright [employer] 2022 Information is for End User's use only and may not be sold, redistributed or otherwise used for commercial purposes  All illustrations and images included in CareNotes® are the copyrighted property of [employer] Inc  or [employer]  The above information is an  only  It is not intended as medical advice for individual conditions or treatments  Talk to your doctor, nurse or pharmacist before following any medical regimen to see if it is safe and effective for you

## 2022-09-20 DIAGNOSIS — G47.33 OSA (OBSTRUCTIVE SLEEP APNEA): Primary | ICD-10-CM

## 2022-09-20 DIAGNOSIS — E78.5 HYPERLIPIDEMIA, UNSPECIFIED HYPERLIPIDEMIA TYPE: ICD-10-CM

## 2022-09-20 RX ORDER — ATORVASTATIN CALCIUM 10 MG/1
10 TABLET, FILM COATED ORAL DAILY
Qty: 90 TABLET | Refills: 3 | Status: SHIPPED | OUTPATIENT
Start: 2022-09-20 | End: 2022-09-26 | Stop reason: SDUPTHER

## 2022-09-21 DIAGNOSIS — Z76.0 MEDICATION REFILL: ICD-10-CM

## 2022-09-21 DIAGNOSIS — Z51.81 MEDICATION MONITORING ENCOUNTER: ICD-10-CM

## 2022-09-21 DIAGNOSIS — F91.9 DISRUPTIVE BEHAVIOR: Primary | ICD-10-CM

## 2022-09-21 DIAGNOSIS — E11.29 TYPE 2 DIABETES MELLITUS WITH MICROALBUMINURIA, WITHOUT LONG-TERM CURRENT USE OF INSULIN (HCC): ICD-10-CM

## 2022-09-21 DIAGNOSIS — R80.9 TYPE 2 DIABETES MELLITUS WITH MICROALBUMINURIA, WITHOUT LONG-TERM CURRENT USE OF INSULIN (HCC): ICD-10-CM

## 2022-09-21 RX ORDER — DIVALPROEX SODIUM 500 MG/1
1500 TABLET, EXTENDED RELEASE ORAL DAILY
Qty: 270 TABLET | Refills: 0 | Status: CANCELLED | OUTPATIENT
Start: 2022-09-21

## 2022-09-26 DIAGNOSIS — E78.5 HYPERLIPIDEMIA, UNSPECIFIED HYPERLIPIDEMIA TYPE: ICD-10-CM

## 2022-09-26 RX ORDER — ATORVASTATIN CALCIUM 10 MG/1
10 TABLET, FILM COATED ORAL DAILY
Qty: 90 TABLET | Refills: 3 | Status: SHIPPED | OUTPATIENT
Start: 2022-09-26

## 2022-10-07 NOTE — TELEPHONE ENCOUNTER
Dr Sugar Herzog tried to reach patient to verify diagnosis and because patient needs to complete labs  Dr Sugar Herzog is in the office this afternoon, so maybe we can contact the group home and get the information we need

## 2022-10-07 NOTE — TELEPHONE ENCOUNTER
Kiara , , came into office requesting status of refill  States pt needs medications to calm her down of her outbursts   Any further info needed please call [phone]

## 2022-10-08 RX ORDER — DIVALPROEX SODIUM 500 MG/1
1500 TABLET, EXTENDED RELEASE ORAL DAILY
Qty: 270 TABLET | Refills: 0 | Status: SHIPPED | OUTPATIENT
Start: 2022-10-08

## 2022-12-14 DIAGNOSIS — J30.89 ALLERGIC RHINITIS DUE TO OTHER ALLERGIC TRIGGER, UNSPECIFIED SEASONALITY: ICD-10-CM

## 2022-12-15 RX ORDER — FLUTICASONE PROPIONATE 50 MCG
SPRAY, SUSPENSION (ML) NASAL
Qty: 16 G | Refills: 0 | Status: SHIPPED | OUTPATIENT
Start: 2022-12-15

## 2023-01-01 ENCOUNTER — APPOINTMENT (EMERGENCY)
Dept: RADIOLOGY | Facility: HOSPITAL | Age: 48
End: 2023-01-01

## 2023-01-01 ENCOUNTER — HOSPITAL ENCOUNTER (INPATIENT)
Facility: HOSPITAL | Age: 48
LOS: 10 days | Discharge: HOME | End: 2023-01-11
Attending: EMERGENCY MEDICINE | Admitting: FAMILY MEDICINE

## 2023-01-01 DIAGNOSIS — Z76.0 MEDICATION REFILL: ICD-10-CM

## 2023-01-01 DIAGNOSIS — J45.20 MILD INTERMITTENT ASTHMA WITHOUT COMPLICATION: ICD-10-CM

## 2023-01-01 DIAGNOSIS — F91.9 DISRUPTIVE BEHAVIOR: ICD-10-CM

## 2023-01-01 DIAGNOSIS — J18.9 MULTIFOCAL PNEUMONIA: Primary | ICD-10-CM

## 2023-01-01 DIAGNOSIS — E83.51 HYPOCALCEMIA: ICD-10-CM

## 2023-01-01 DIAGNOSIS — B35.8 TINEA FACIALE: ICD-10-CM

## 2023-01-01 DIAGNOSIS — R50.9 FEVER, UNSPECIFIED FEVER CAUSE: ICD-10-CM

## 2023-01-01 DIAGNOSIS — E55.9 VITAMIN D DEFICIENCY: ICD-10-CM

## 2023-01-01 DIAGNOSIS — E78.5 HYPERLIPIDEMIA, UNSPECIFIED HYPERLIPIDEMIA TYPE: ICD-10-CM

## 2023-01-01 DIAGNOSIS — E03.9 ACQUIRED HYPOTHYROIDISM: ICD-10-CM

## 2023-01-01 DIAGNOSIS — B00.9 HSV (HERPES SIMPLEX VIRUS) INFECTION: ICD-10-CM

## 2023-01-01 DIAGNOSIS — Z91.09 ALLERGY TO ENVIRONMENTAL FACTORS: ICD-10-CM

## 2023-01-01 DIAGNOSIS — L73.2 HIDRADENITIS: ICD-10-CM

## 2023-01-01 DIAGNOSIS — K59.00 CONSTIPATION: ICD-10-CM

## 2023-01-01 DIAGNOSIS — J30.89 ALLERGIC RHINITIS DUE TO OTHER ALLERGIC TRIGGER, UNSPECIFIED SEASONALITY: ICD-10-CM

## 2023-01-01 DIAGNOSIS — H01.133 ECZEMA OF RIGHT EYELID: ICD-10-CM

## 2023-01-01 PROBLEM — R65.20 SEVERE SEPSIS (HCC): Status: ACTIVE | Noted: 2023-01-01

## 2023-01-01 PROBLEM — A41.9 SEVERE SEPSIS (HCC): Status: ACTIVE | Noted: 2023-01-01

## 2023-01-01 PROBLEM — J96.21 ACUTE ON CHRONIC RESPIRATORY FAILURE WITH HYPOXIA (HCC): Status: ACTIVE | Noted: 2023-01-01

## 2023-01-01 LAB
ALBUMIN SERPL BCP-MCNC: 2.5 G/DL (ref 3.5–5)
ALP SERPL-CCNC: 60 U/L (ref 46–116)
ALT SERPL W P-5'-P-CCNC: 27 U/L (ref 12–78)
ANION GAP SERPL CALCULATED.3IONS-SCNC: 5 MMOL/L (ref 4–13)
APTT PPP: 34 SECONDS (ref 23–37)
ARTERIAL PATENCY WRIST A: YES
AST SERPL W P-5'-P-CCNC: 52 U/L (ref 5–45)
BASE EX.OXY STD BLDV CALC-SCNC: 40.6 % (ref 60–80)
BASE EXCESS BLDA CALC-SCNC: -0.2 MMOL/L
BASE EXCESS BLDV CALC-SCNC: 3.1 MMOL/L
BASOPHILS # BLD AUTO: 0.02 THOUSANDS/ÂΜL (ref 0–0.1)
BASOPHILS NFR BLD AUTO: 0 % (ref 0–1)
BILIRUB SERPL-MCNC: 0.25 MG/DL (ref 0.2–1)
BILIRUB UR QL STRIP: NEGATIVE
BODY TEMPERATURE: 98.6 DEGREES FEHRENHEIT
BUN SERPL-MCNC: 27 MG/DL (ref 5–25)
CALCIUM ALBUM COR SERPL-MCNC: 9 MG/DL (ref 8.3–10.1)
CALCIUM SERPL-MCNC: 7.8 MG/DL (ref 8.3–10.1)
CHLORIDE SERPL-SCNC: 102 MMOL/L (ref 96–108)
CLARITY UR: NORMAL
CO2 SERPL-SCNC: 32 MMOL/L (ref 21–32)
COLOR UR: YELLOW
CREAT SERPL-MCNC: 1.51 MG/DL (ref 0.6–1.3)
EOSINOPHIL # BLD AUTO: 0 THOUSAND/ÂΜL (ref 0–0.61)
EOSINOPHIL NFR BLD AUTO: 0 % (ref 0–6)
ERYTHROCYTE [DISTWIDTH] IN BLOOD BY AUTOMATED COUNT: 14.5 % (ref 11.6–15.1)
EXT PREGNANCY TEST URINE: NEGATIVE
EXT. CONTROL: NORMAL
FLUAV RNA RESP QL NAA+PROBE: NEGATIVE
FLUBV RNA RESP QL NAA+PROBE: NEGATIVE
GFR SERPL CREATININE-BSD FRML MDRD: 41 ML/MIN/1.73SQ M
GLUCOSE SERPL-MCNC: 177 MG/DL (ref 65–140)
GLUCOSE SERPL-MCNC: 327 MG/DL (ref 65–140)
GLUCOSE SERPL-MCNC: 333 MG/DL (ref 65–140)
GLUCOSE SERPL-MCNC: 333 MG/DL (ref 65–140)
GLUCOSE UR STRIP-MCNC: NEGATIVE MG/DL
HCO3 BLDA-SCNC: 26.1 MMOL/L (ref 22–28)
HCO3 BLDV-SCNC: 29.6 MMOL/L (ref 24–30)
HCT VFR BLD AUTO: 39.2 % (ref 34.8–46.1)
HGB BLD-MCNC: 12.5 G/DL (ref 11.5–15.4)
HGB UR QL STRIP.AUTO: NEGATIVE
IMM GRANULOCYTES # BLD AUTO: 0.04 THOUSAND/UL (ref 0–0.2)
IMM GRANULOCYTES NFR BLD AUTO: 1 % (ref 0–2)
INR PPP: 1.16 (ref 0.84–1.19)
KETONES UR STRIP-MCNC: NEGATIVE MG/DL
LACTATE SERPL-SCNC: 1.9 MMOL/L (ref 0.5–2)
LEUKOCYTE ESTERASE UR QL STRIP: NEGATIVE
LYMPHOCYTES # BLD AUTO: 1.74 THOUSANDS/ÂΜL (ref 0.6–4.47)
LYMPHOCYTES NFR BLD AUTO: 31 % (ref 14–44)
MCH RBC QN AUTO: 32.6 PG (ref 26.8–34.3)
MCHC RBC AUTO-ENTMCNC: 31.9 G/DL (ref 31.4–37.4)
MCV RBC AUTO: 102 FL (ref 82–98)
MONOCYTES # BLD AUTO: 0.55 THOUSAND/ÂΜL (ref 0.17–1.22)
MONOCYTES NFR BLD AUTO: 10 % (ref 4–12)
NEUTROPHILS # BLD AUTO: 3.2 THOUSANDS/ÂΜL (ref 1.85–7.62)
NEUTS SEG NFR BLD AUTO: 58 % (ref 43–75)
NITRITE UR QL STRIP: NEGATIVE
NRBC BLD AUTO-RTO: 0 /100 WBCS
O2 CT BLDA-SCNC: 16.1 ML/DL (ref 16–23)
O2 CT BLDV-SCNC: 7.7 ML/DL
OTHER FIO2: ABNORMAL %
OXYHGB MFR BLDA: 91.2 % (ref 94–97)
PCO2 BLDA: 49.4 MM HG (ref 36–44)
PCO2 BLDV: 53.3 MM HG (ref 42–50)
PCO2 TEMP ADJ BLDA: 49.4 MM HG (ref 36–44)
PH BLD: 7.34 [PH] (ref 7.35–7.45)
PH BLDA: 7.34 [PH] (ref 7.35–7.45)
PH BLDV: 7.36 [PH] (ref 7.3–7.4)
PH UR STRIP.AUTO: 6 [PH]
PLATELET # BLD AUTO: 158 THOUSANDS/UL (ref 149–390)
PMV BLD AUTO: 9.3 FL (ref 8.9–12.7)
PO2 BLD: 65.8 MM HG (ref 75–129)
PO2 BLDA: 65.8 MM HG (ref 75–129)
PO2 BLDV: 24.6 MM HG (ref 35–45)
POTASSIUM SERPL-SCNC: 4.2 MMOL/L (ref 3.5–5.3)
PROCALCITONIN SERPL-MCNC: 1.06 NG/ML
PROT SERPL-MCNC: 6.4 G/DL (ref 6.4–8.4)
PROT UR STRIP-MCNC: NEGATIVE MG/DL
PROTHROMBIN TIME: 14.9 SECONDS (ref 11.6–14.5)
RBC # BLD AUTO: 3.84 MILLION/UL (ref 3.81–5.12)
RSV RNA RESP QL NAA+PROBE: NEGATIVE
SARS-COV-2 RNA RESP QL NAA+PROBE: NEGATIVE
SODIUM SERPL-SCNC: 139 MMOL/L (ref 135–147)
SP GR UR STRIP.AUTO: >=1.03 (ref 1–1.03)
SPECIMEN SOURCE: ABNORMAL
UROBILINOGEN UR QL STRIP.AUTO: 0.2 E.U./DL
VALPROATE SERPL-MCNC: 68.4 UG/ML (ref 50–100)
WBC # BLD AUTO: 5.55 THOUSAND/UL (ref 4.31–10.16)

## 2023-01-01 RX ORDER — ALBUMIN, HUMAN INJ 5% 5 %
12.5 SOLUTION INTRAVENOUS ONCE
Status: COMPLETED | OUTPATIENT
Start: 2023-01-01 | End: 2023-01-01

## 2023-01-01 RX ORDER — CEFTRIAXONE 2 G/50ML
2000 INJECTION, SOLUTION INTRAVENOUS EVERY 24 HOURS
Status: DISCONTINUED | OUTPATIENT
Start: 2023-01-02 | End: 2023-01-11

## 2023-01-01 RX ORDER — LEVALBUTEROL 1.25 MG/.5ML
1.25 SOLUTION, CONCENTRATE RESPIRATORY (INHALATION)
Status: DISCONTINUED | OUTPATIENT
Start: 2023-01-01 | End: 2023-01-11 | Stop reason: HOSPADM

## 2023-01-01 RX ORDER — INSULIN LISPRO 100 [IU]/ML
1-6 INJECTION, SOLUTION INTRAVENOUS; SUBCUTANEOUS EVERY 6 HOURS SCHEDULED
Status: DISCONTINUED | OUTPATIENT
Start: 2023-01-01 | End: 2023-01-02

## 2023-01-01 RX ORDER — METHYLPREDNISOLONE SODIUM SUCCINATE 40 MG/ML
40 INJECTION, POWDER, LYOPHILIZED, FOR SOLUTION INTRAMUSCULAR; INTRAVENOUS EVERY 8 HOURS SCHEDULED
Status: DISCONTINUED | OUTPATIENT
Start: 2023-01-01 | End: 2023-01-02

## 2023-01-01 RX ORDER — CEFTRIAXONE 1 G/50ML
1000 INJECTION, SOLUTION INTRAVENOUS ONCE
Status: COMPLETED | OUTPATIENT
Start: 2023-01-01 | End: 2023-01-01

## 2023-01-01 RX ORDER — SODIUM CHLORIDE, SODIUM GLUCONATE, SODIUM ACETATE, POTASSIUM CHLORIDE, MAGNESIUM CHLORIDE, SODIUM PHOSPHATE, DIBASIC, AND POTASSIUM PHOSPHATE .53; .5; .37; .037; .03; .012; .00082 G/100ML; G/100ML; G/100ML; G/100ML; G/100ML; G/100ML; G/100ML
75 INJECTION, SOLUTION INTRAVENOUS CONTINUOUS
Status: DISCONTINUED | OUTPATIENT
Start: 2023-01-01 | End: 2023-01-06

## 2023-01-01 RX ORDER — METHYLPREDNISOLONE SODIUM SUCCINATE 125 MG/2ML
125 INJECTION, POWDER, LYOPHILIZED, FOR SOLUTION INTRAMUSCULAR; INTRAVENOUS ONCE
Status: COMPLETED | OUTPATIENT
Start: 2023-01-01 | End: 2023-01-01

## 2023-01-01 RX ORDER — SODIUM CHLORIDE FOR INHALATION 0.9 %
3 VIAL, NEBULIZER (ML) INHALATION ONCE
Status: COMPLETED | OUTPATIENT
Start: 2023-01-01 | End: 2023-01-01

## 2023-01-01 RX ORDER — SODIUM CHLORIDE FOR INHALATION 0.9 %
3 VIAL, NEBULIZER (ML) INHALATION EVERY 6 HOURS PRN
Status: DISCONTINUED | OUTPATIENT
Start: 2023-01-01 | End: 2023-01-11 | Stop reason: HOSPADM

## 2023-01-01 RX ORDER — HEPARIN SODIUM 5000 [USP'U]/ML
5000 INJECTION, SOLUTION INTRAVENOUS; SUBCUTANEOUS EVERY 8 HOURS SCHEDULED
Status: DISCONTINUED | OUTPATIENT
Start: 2023-01-01 | End: 2023-01-11 | Stop reason: HOSPADM

## 2023-01-01 RX ADMIN — ISODIUM CHLORIDE 3 ML: 0.03 SOLUTION RESPIRATORY (INHALATION) at 14:23

## 2023-01-01 RX ADMIN — HEPARIN SODIUM 5000 UNITS: 5000 INJECTION INTRAVENOUS; SUBCUTANEOUS at 22:20

## 2023-01-01 RX ADMIN — SODIUM CHLORIDE 500 MG: 9 INJECTION, SOLUTION INTRAVENOUS at 22:19

## 2023-01-01 RX ADMIN — INSULIN LISPRO 5 UNITS: 100 INJECTION, SOLUTION INTRAVENOUS; SUBCUTANEOUS at 23:27

## 2023-01-01 RX ADMIN — METHYLPREDNISOLONE SODIUM SUCCINATE 125 MG: 125 INJECTION, POWDER, FOR SOLUTION INTRAMUSCULAR; INTRAVENOUS at 14:20

## 2023-01-01 RX ADMIN — ALBUMIN (HUMAN) 12.5 G: 12.5 INJECTION, SOLUTION INTRAVENOUS at 23:12

## 2023-01-01 RX ADMIN — HEPARIN SODIUM 5000 UNITS: 5000 INJECTION INTRAVENOUS; SUBCUTANEOUS at 18:46

## 2023-01-01 RX ADMIN — IPRATROPIUM BROMIDE 0.5 MG: 0.5 SOLUTION RESPIRATORY (INHALATION) at 20:58

## 2023-01-01 RX ADMIN — SODIUM CHLORIDE 500 MG: 0.9 INJECTION, SOLUTION INTRAVENOUS at 16:00

## 2023-01-01 RX ADMIN — IOHEXOL 100 ML: 350 INJECTION, SOLUTION INTRAVENOUS at 16:03

## 2023-01-01 RX ADMIN — ALBUMIN (HUMAN) 12.5 G: 12.5 INJECTION, SOLUTION INTRAVENOUS at 21:48

## 2023-01-01 RX ADMIN — SODIUM CHLORIDE, SODIUM GLUCONATE, SODIUM ACETATE, POTASSIUM CHLORIDE, MAGNESIUM CHLORIDE, SODIUM PHOSPHATE, DIBASIC, AND POTASSIUM PHOSPHATE 75 ML/HR: .53; .5; .37; .037; .03; .012; .00082 INJECTION, SOLUTION INTRAVENOUS at 18:45

## 2023-01-01 RX ADMIN — ALBUTEROL SULFATE 10 MG: 2.5 SOLUTION RESPIRATORY (INHALATION) at 14:22

## 2023-01-01 RX ADMIN — METHYLPREDNISOLONE SODIUM SUCCINATE 40 MG: 40 INJECTION, POWDER, FOR SOLUTION INTRAMUSCULAR; INTRAVENOUS at 21:02

## 2023-01-01 RX ADMIN — CEFTRIAXONE 1000 MG: 1 INJECTION, SOLUTION INTRAVENOUS at 14:26

## 2023-01-01 RX ADMIN — LEVALBUTEROL HYDROCHLORIDE 1.25 MG: 1.25 SOLUTION, CONCENTRATE RESPIRATORY (INHALATION) at 20:58

## 2023-01-01 RX ADMIN — SODIUM CHLORIDE 500 ML: 0.9 INJECTION, SOLUTION INTRAVENOUS at 16:07

## 2023-01-01 RX ADMIN — IPRATROPIUM BROMIDE 1 MG: 0.5 SOLUTION RESPIRATORY (INHALATION) at 14:22

## 2023-01-01 RX ADMIN — SODIUM CHLORIDE 1000 ML: 0.9 INJECTION, SOLUTION INTRAVENOUS at 14:18

## 2023-01-01 NOTE — ASSESSMENT & PLAN NOTE
· Wears 2L nc at home PRN and CPAP hs  · Started with cough at group home 2 days PTA   Noted by mom today to be feverish, not herself and with low sats at home  · Imaging in the ED showing multilobular PNA  · trialed on 7L NC and spo2 87%, changed to FM and sats 89-90%  · Continue supplemental O2 and wean for sat > 88%  · Xopenex/atrovent Q6h  · Empiric abx and f/u cultures  · Low threshold to intubate if decompensates

## 2023-01-01 NOTE — H&P
Geoff 46  H&P- Katerina Griffiths Gentle 1975, 56 y o  female MRN: [id number]  Unit/Bed#: ED 03 Encounter: [id number]  Primary Care Provider: Tari Fernandez DO   Date and time admitted to hospital: 1/1/2023  1:46 PM    Severe sepsis [facility])  Assessment & Plan  · Secondary to multilobular PNA  · Given azithromycin and rocephin in ED, will continue  · Flu/rsv/covid neg  · F/U BCs  · U/A negative UTI  · Check strep/legionella  · PCT 1 06  · Trend Fever curve and WBC    Hypothyroidism  Assessment & Plan  · Holding home medications due to NPO    Mild intellectual disability  Assessment & Plan  · Routine neuro checks and delirium precautions    Diabetes mellitus ([facility] [address] )  Assessment & Plan  Lab Results   Component Value Date    HGBA1C 5 0 02/08/2022       No results for input(s): POCGLU in the last 72 hours  Blood Sugar Average: Last 72 hrs:  · Now on systemic steroids  · Start SSI q6H    History of prolonged Q-T interval on ECG  Assessment & Plan  · Start telemetry monitoring    Chronic renal insufficiency, stage III (moderate) (HCC)  Assessment & Plan  Lab Results   Component Value Date    EGFR 41 01/01/2023    EGFR 67 05/17/2022    EGFR 58 04/11/2020    CREATININE 1 51 (H) 01/01/2023    CREATININE 1 01 05/17/2022    CREATININE 1 10 (H) 01/30/2021   · TIERNEY on CKD, Cr 1 51  · Given 1 5 L Nss in ED  · Start isolyte 75 ml/hr  · Trend I/o and renal function     Down syndrome, unspecified  Assessment & Plan  · Holding home meds while nPO    Obstructive sleep apnea syndrome  Assessment & Plan  · Wears pediatric CPAP at home with nasal mask  E pressure 8  · Will continue HS    * Acute on chronic respiratory failure with hypoxia (HCC)  Assessment & Plan  · Wears 2L nc at home PRN and CPAP hs  · Started with cough at group home 2 days PTA   Noted by mom today to be feverish, not herself and with low sats at home  · Imaging in the ED showing multilobular PNA  · trialed on 7L NC and spo2 87%, changed to FM and sats 89-90%  · Continue supplemental O2 and wean for sat > 88%  · Xopenex/atrovent Q6h  · Empiric abx and f/u cultures  · Low threshold to intubate if decompensates       -------------------------------------------------------------------------------------------------------------  Chief Complaint: hypoxia    History of Present Illness     Chela Petersen is a 56 y o  female who presents with c/o of hypoxic today at [facility]  Pt with Hx of downs syndrome, intellectual disability, complex partial epilepsy, hypothyroid, DM, long Q-T syndrome  Lives at group home but mother takes her home for extended periods  Group home told mother that pt has had cough since Friday (2 days pta), mother brought pt home today and felt she wasn't acting like herself and seems sleepy  She checked her SpO2 and it was low so she took her to ED  In ED, imaging showing b/l multilobular pna  Labs showing TIERNEY, PCT 1 06  Flu/covid/rsv negative  BCs obtained and started on azithromycin and rocephin  Also given 1 5L Nss and 125 mg solumedrol  Pt lethargic in ED with GCS 9 and spo2 87% on 7L NC  Admit to SD  History obtained from chart review and mother  -------------------------------------------------------------------------------------------------------------  Dispo: Admit to Stepdown Level 1    Code Status: Level 1 - Full Code  --------------------------------------------------------------------------------------------------------------  Review of Systems   Unable to perform ROS: Mental status change         Physical Exam  Vitals and nursing note reviewed  Constitutional:       Appearance: She is obese  She is ill-appearing and toxic-appearing  HENT:      Head: Normocephalic and atraumatic  Nose: Nose normal       Mouth/Throat:      Mouth: Mucous membranes are moist       Pharynx: Oropharynx is clear     Eyes:      Conjunctiva/sclera: Conjunctivae normal    Cardiovascular:      Rate and Rhythm: Normal rate and regular rhythm  Pulses: Normal pulses  Heart sounds: Normal heart sounds  Pulmonary:      Comments: 7 L NC with Spo2 87%  LS Rhonchi b/l  Abdominal:      General: Bowel sounds are normal  There is no distension  Palpations: Abdomen is soft  Tenderness: There is no abdominal tenderness  Musculoskeletal:         General: Normal range of motion  Cervical back: Normal range of motion and neck supple  Right lower leg: No edema  Left lower leg: No edema  Skin:     General: Skin is warm and dry  Neurological:      Comments: GCS 9 (E3, V1, m5)  Withdraws to physical stimuli in all extremities     --------------------------------------------------------------------------------------------------------------  Vitals:   Vitals:    01/01/23 1615 01/01/23 1645 01/01/23 1650 01/01/23 1745   BP: 92/57 96/54  98/56   BP Location: Left arm Left arm  Left arm   Pulse: 96 88  86   Resp: (!) 26 (!) 26  (!) 26   Temp:       TempSrc:       SpO2: 97% 97% 92% (!) 89%     Temp  Min: 100 9 °F (38 3 °C)  Max: 100 9 °F (38 3 °C)        There is no height or weight on file to calculate BMI      Laboratory and Diagnostics:  Results from last 7 days   Lab Units 01/01/23  1408   WBC Thousand/uL 5 55   HEMOGLOBIN g/dL 12 5   HEMATOCRIT % 39 2   PLATELETS Thousands/uL 158   NEUTROS PCT % 58   MONOS PCT % 10     Results from last 7 days   Lab Units 01/01/23  1408   SODIUM mmol/L 139   POTASSIUM mmol/L 4 2   CHLORIDE mmol/L 102   CO2 mmol/L 32   ANION GAP mmol/L 5   BUN mg/dL 27*   CREATININE mg/dL 1 51*   CALCIUM mg/dL 7 8*   GLUCOSE RANDOM mg/dL 177*   ALT U/L 27   AST U/L 52*   ALK PHOS U/L 60   ALBUMIN g/dL 2 5*   TOTAL BILIRUBIN mg/dL 0 25          Results from last 7 days   Lab Units 01/01/23  1424   INR  1 16   PTT seconds 34          Results from last 7 days   Lab Units 01/01/23  1424   LACTIC ACID mmol/L 1 9     ABG:    VBG:  Results from last 7 days   Lab Units 01/01/23  1408   PH ALESIA  7 363   PCO2 ALESIA mm Hg 53 3*   PO2 ALESIA mm Hg 24 6*   HCO3 ALESIA mmol/L 29 6   BASE EXC ALESIA mmol/L 3 1     Results from last 7 days   Lab Units 01/01/23  1424   PROCALCITONIN ng/ml 1 06*       Micro:          Imaging: I have personally reviewed pertinent reports          Historical Information   Past Medical History:   Diagnosis Date   • Asthma    • Complex partial epilepsy ([facility] [address] )    • COVID 02/2021   • [facility]    • Disease of thyroid gland    • Down syndrome    • Heart murmur    • Hydradenitis    • Hyperlipidemia    • Long Q-T syndrome    • Pneumonia     Last assessed 5/28/2014    • Psychiatric disorder     schizo   • Sleep apnea      Past Surgical History:   Procedure Laterality Date   • CARDIAC SURGERY      patent duct   • PATENT DUCTUS ARTERIOUS LIGATION       Social History   Social History     Substance and Sexual Activity   Alcohol Use Never     Social History     Substance and Sexual Activity   Drug Use Never     Social History     Tobacco Use   Smoking Status Never   Smokeless Tobacco Never     Exercise History: ambulates  Family History:   Family History   Adopted: Yes   Problem Relation Age of Onset   • No Known Problems Mother            Medications:  Current Facility-Administered Medications   Medication Dose Route Frequency   • [START ON 1/2/2023] azithromycin (ZITHROMAX) 500 mg in sodium chloride 0 9% 250mL IVPB 500 mg  500 mg Intravenous Q24H   • [START ON 1/2/2023] cefTRIAXone (ROCEPHIN) IVPB (premix in dextrose) 2,000 mg 50 mL  2,000 mg Intravenous Q24H   • heparin (porcine) subcutaneous injection 5,000 Units  5,000 Units Subcutaneous Q8H [address]   • insulin lispro (HumaLOG) 100 units/mL subcutaneous injection 1-6 Units  1-6 Units Subcutaneous Q6H [address]   • ipratropium (ATROVENT) 0 02 % inhalation solution 0 5 mg  0 5 mg Nebulization 4x Daily   • levalbuterol (XOPENEX) inhalation solution 1 25 mg  1 25 mg Nebulization Q6H    And   • sodium chloride 0 9 % inhalation solution 3 mL  3 mL Nebulization Q6H PRN   • methylPREDNISolone sodium succinate (Solu-MEDROL) injection 40 mg  40 mg Intravenous Q8H [facility]   • multi-electrolyte (PLASMALYTE-A/ISOLYTE-S PH 7 4) IV solution  75 mL/hr Intravenous Continuous     Home medications:  Prior to Admission Medications   Prescriptions Last Dose Informant Patient Reported? Taking?    ARIPiprazole (ABILIFY) 10 mg tablet   No No   Sig: Take 1 tablet (10 mg total) by mouth daily   Anti-Diarrheal 2 MG tablet   Yes No   Calcium Carbonate 1500 (600 Ca) MG TABS   No No   Sig: TAKE (1) TABLET BY MOUTH TWICE DAILY WITH MEALS   Eyelid Cleansers (OCUSOFT BABY EYELID & EYELASH EX)   Yes No   Sig: Inhale   Milk of Magnesia 400 MG/5ML oral suspension   Yes No   Mouthwashes (LISTERINE ANTISEPTIC ADVANCED MT)   Yes No   Sig: by Per J Tube route   Multiple Vitamins-Minerals (Centrum Silver Ultra Womens) TABS   No No   Sig: Take 1 tablet by mouth daily Centrum Silver Ultra Womens Oral Tablet  Refills: 0  Active   NON FORMULARY   Yes No   SILTUSSIN DM ALCOHOL FREE 100-10 MG/5ML oral syrup   Yes No   Spacer/Aero Chamber Mouthpiece (SPACER DEVICE) for metered dose inhaler   No No   Sig: For use with metered dose inhaler   VITAMIN D, ERGOCALCIFEROL, PO   Yes No   Sig: Take 1 25 mg by mouth once a week Administer on sunday     acetaminophen (TYLENOL) 325 mg tablet   Yes No   albuterol (PROVENTIL HFA,VENTOLIN HFA) 90 mcg/act inhaler   No No   Sig: Inhale 2 puffs every 4 (four) hours as needed for wheezing   atorvastatin (LIPITOR) 10 mg tablet   No No   Sig: Take 1 tablet (10 mg total) by mouth daily At 8pm   bacitracin topical ointment 500 units/g topical ointment   Yes No   calcium carbonate (OS-ESTEPHANIA) 600 MG tablet   No No   Sig: Take 1 tablet (600 mg total) by mouth 2 (two) times a day with meals   clindamycin (CLINDAGEL) 1 % gel   Yes No   Sig: Apply topically 2 (two) times a day Apply to L axilla   clotrimazole 1 % external solution   No No   Sig: Apply 1 application topically 2 (two) times a day for 14 days divalproex sodium (DEPAKOTE ER) 500 mg 24 hr tablet   No No   Sig: TAKE 3 TABLETS (1500MG) BY MOUTH AT 8AM   ergocalciferol (VITAMIN D2) 50,000 units   Yes No   Sig: every 14 (fourteen) days   fluticasone (FLONASE) 50 mcg/act nasal spray   No No   Sig: USE 1 SPRAY IN EACH NOSTRIL DAILY   hydrocortisone 1 % cream   No No   Sig: Apply topically 4 (four) times a day as needed (apply small amount to eyelid 3 times a day) May apply to dryness of both upper eyelids   Patient not taking: Reported on 9/14/2022   ketorolac (ACULAR) 0 5 % ophthalmic solution   No No   Sig: Administer 1 drop to both eyes 4 (four) times a day for 7 days   Patient not taking: Reported on 2/8/2022    levocetirizine (XYZAL) 5 MG tablet   No No   Sig: TAKE 1 TABLET BY MOUTH ONCE DAILY AT 8PM   levothyroxine 75 mcg tablet   No No   Sig: Take 1 tablet (75 mcg total) by mouth daily in the early morning   ofloxacin (OCUFLOX) 0 3 % ophthalmic solution   Yes No   Sig: INSTILL 2 DROPS INTO RIGHT EYE EVERY 4 HOURS FOR 7 DAYS   valACYclovir (VALTREX) 1,000 mg tablet   Yes No   Sig: Take 1,000 mg by mouth Three times a day      Facility-Administered Medications: None     Allergies: Allergies   Allergen Reactions   • Avandia [Rosiglitazone]      CHF   • Nsaids Other (See Comments)     unknown       ------------------------------------------------------------------------------------------------------------  Advance Directive and Living Will: Yes    Power of :    POLST:    ------------------------------------------------------------------------------------------------------------  Anticipated Length of Stay is > 2 midnights    Care Time Delivered:   Upon my evaluation, this patient had a high probability of imminent or life-threatening deterioration due to A/C resp failure, which required my direct attention, intervention, and personal management    I have personally provided 60 minutes (1700 to 1800) of critical care time, exclusive of procedures, teaching, family meetings, and any prior time recorded by providers other than myself  SESAR Dowling        Portions of the record may have been created with voice recognition software  Occasional wrong word or "sound a like" substitutions may have occurred due to the inherent limitations of voice recognition software    Read the chart carefully and recognize, using context, where substitutions have occurred

## 2023-01-01 NOTE — ASSESSMENT & PLAN NOTE
Lab Results   Component Value Date    HGBA1C 5 0 02/08/2022       No results for input(s): POCGLU in the last 72 hours      Blood Sugar Average: Last 72 hrs:  · Now on systemic steroids  · Start SSI q6H

## 2023-01-01 NOTE — ED PROVIDER NOTES
History  Chief Complaint   Patient presents with   • Shortness of Breath     Patient brought by mother, resides in group home  Cough since Friday, worsening SOB and lethargy  Room air sat in triage was 79%     HPI   Patient is a 31-year-old female history of Down syndrome, COPD/asthma with no home oxygen requirement presenting for evaluation of 3 days of rhinorrhea, headache, myalgia, cough, 1 day of fevers, shortness of breath, new oxygen requirement  Patient's mother states that she visited her at the group home today, states that she looked ill  Patient states that she was able to take her to [facility] and that she was able to walk around with some dyspnea  Patient denies any wheezing, has had a nonproductive cough, additionally complains of some myalgias  Patient denies nausea, vomiting, states some dysuria and per patient's mother has had foul-smelling urine  Patient denies diarrhea or constipation  Prior to Admission Medications   Prescriptions Last Dose Informant Patient Reported? Taking?    ARIPiprazole (ABILIFY) 10 mg tablet   No No   Sig: Take 1 tablet (10 mg total) by mouth daily   Anti-Diarrheal 2 MG tablet   Yes No   Calcium Carbonate 1500 (600 Ca) MG TABS   No No   Sig: TAKE (1) TABLET BY MOUTH TWICE DAILY WITH MEALS   Eyelid Cleansers (OCUSOFT BABY EYELID & EYELASH EX)   Yes No   Sig: Inhale   Milk of Magnesia 400 MG/5ML oral suspension   Yes No   Mouthwashes (LISTERINE ANTISEPTIC ADVANCED MT)   Yes No   Sig: by Per J Tube route   Multiple Vitamins-Minerals (Centrum Silver Ultra Womens) TABS   No No   Sig: Take 1 tablet by mouth daily Centrum Silver Ultra Womens Oral Tablet  Refills: 0  Active   NON FORMULARY   Yes No   SILTUSSIN DM ALCOHOL FREE 100-10 MG/5ML oral syrup   Yes No   Spacer/Aero Chamber Mouthpiece (SPACER DEVICE) for metered dose inhaler   No No   Sig: For use with metered dose inhaler   VITAMIN D, ERGOCALCIFEROL, PO   Yes No   Sig: Take 1 25 mg by mouth once a week Administer on sunday     acetaminophen (TYLENOL) 325 mg tablet   Yes No   albuterol (PROVENTIL HFA,VENTOLIN HFA) 90 mcg/act inhaler   No No   Sig: Inhale 2 puffs every 4 (four) hours as needed for wheezing   atorvastatin (LIPITOR) 10 mg tablet   No No   Sig: Take 1 tablet (10 mg total) by mouth daily At 8pm   bacitracin topical ointment 500 units/g topical ointment   Yes No   calcium carbonate (OS-ESTEPHANIA) 600 MG tablet   No No   Sig: Take 1 tablet (600 mg total) by mouth 2 (two) times a day with meals   clindamycin (CLINDAGEL) 1 % gel   Yes No   Sig: Apply topically 2 (two) times a day Apply to L axilla   clotrimazole 1 % external solution   No No   Sig: Apply 1 application topically 2 (two) times a day for 14 days   divalproex sodium (DEPAKOTE ER) 500 mg 24 hr tablet   No No   Sig: TAKE 3 TABLETS (1500MG) BY MOUTH AT 8AM   ergocalciferol (VITAMIN D2) 50,000 units   Yes No   Sig: every 14 (fourteen) days   fluticasone (FLONASE) 50 mcg/act nasal spray   No No   Sig: USE 1 SPRAY IN EACH NOSTRIL DAILY   hydrocortisone 1 % cream   No No   Sig: Apply topically 4 (four) times a day as needed (apply small amount to eyelid 3 times a day) May apply to dryness of both upper eyelids   Patient not taking: Reported on 9/14/2022   ketorolac (ACULAR) 0 5 % ophthalmic solution   No No   Sig: Administer 1 drop to both eyes 4 (four) times a day for 7 days   Patient not taking: Reported on 2/8/2022    levocetirizine (XYZAL) 5 MG tablet   No No   Sig: TAKE 1 TABLET BY MOUTH ONCE DAILY AT 8PM   levothyroxine 75 mcg tablet   No No   Sig: Take 1 tablet (75 mcg total) by mouth daily in the early morning   ofloxacin (OCUFLOX) 0 3 % ophthalmic solution   Yes No   Sig: INSTILL 2 DROPS INTO RIGHT EYE EVERY 4 HOURS FOR 7 DAYS   valACYclovir (VALTREX) 1,000 mg tablet   Yes No   Sig: Take 1,000 mg by mouth Three times a day      Facility-Administered Medications: None       Past Medical History:   Diagnosis Date   • Asthma    • Complex partial epilepsy ([facility] [address] )    • COVID 02/2021   • Diabetes mellitus ([facility] [address] )    • Disease of thyroid gland    • Down syndrome    • Heart murmur    • Hydradenitis    • Hyperlipidemia    • Long Q-T syndrome    • Pneumonia     Last assessed 5/28/2014    • Psychiatric disorder     schizo   • Sleep apnea        Past Surgical History:   Procedure Laterality Date   • CARDIAC SURGERY      patent duct   • PATENT DUCTUS ARTERIOUS LIGATION         Family History   Adopted: Yes   Problem Relation Age of Onset   • No Known Problems Mother      I have reviewed and agree with the history as documented  E-Cigarette/Vaping   • E-Cigarette Use Never User      E-Cigarette/Vaping Substances     Social History     Tobacco Use   • Smoking status: Never   • Smokeless tobacco: Never   Vaping Use   • Vaping Use: Never used   Substance Use Topics   • Alcohol use: Never   • Drug use: Never       Review of Systems   Constitutional: Positive for fever  Negative for chills and fatigue  HENT: Negative for sore throat  Eyes: Negative for visual disturbance  Respiratory: Positive for cough and shortness of breath  Negative for chest tightness  Cardiovascular: Negative for chest pain  Gastrointestinal: Positive for abdominal pain  Negative for abdominal distention, constipation, diarrhea, nausea and vomiting  Endocrine: Negative for polydipsia and polyuria  Genitourinary: Negative for dysuria and hematuria  Musculoskeletal: Positive for myalgias  Negative for arthralgias  Skin: Negative for color change and rash  Neurological: Positive for headaches  Negative for dizziness  Psychiatric/Behavioral: Negative for confusion  All other systems reviewed and are negative  Physical Exam  Physical Exam  Vitals reviewed  Constitutional:       General: She is in acute distress  Appearance: She is ill-appearing  She is not diaphoretic  Comments: Patient ill-appearing, moderately distressed   HENT:      Head: Normocephalic and atraumatic  Comments: Dry mucous membranes     Right Ear: External ear normal       Left Ear: External ear normal       Nose: Nose normal       Mouth/Throat:      Pharynx: No oropharyngeal exudate  Eyes:      Pupils: Pupils are equal, round, and reactive to light  Cardiovascular:      Rate and Rhythm: Normal rate and regular rhythm  Heart sounds: Normal heart sounds  No murmur heard  No friction rub  No gallop  Comments: Regular rate and rhythm, no murmurs rubs or gallops  Extremities warm and well-perfused  Pulmonary:      Effort: Pulmonary effort is normal  No respiratory distress  Breath sounds: Normal breath sounds  No wheezing  Comments: Patient tachypneic into the mid 20s  Panting with her mouth open with some accessory muscle use  Diffuse rhonchi on auscultation  Sats dropping into the 70s on room air  Maintaining sats in the mid 90s on a nonrebreather mask  Chest:      Chest wall: No tenderness  Abdominal:      General: Bowel sounds are normal  There is no distension  Palpations: Abdomen is soft  There is no mass  Tenderness: There is abdominal tenderness  There is no guarding  Comments: Generalized abdominal tenderness without focality  No rigidity, rebound, guarding  Musculoskeletal:         General: No deformity  Normal range of motion  Cervical back: Normal range of motion  Comments: No lower extremity swelling, erythema, or calf tenderness  Lymphadenopathy:      Cervical: No cervical adenopathy  Skin:     General: Skin is warm and dry  Capillary Refill: Capillary refill takes less than 2 seconds  Neurological:      Mental Status: She is alert and oriented to person, place, and time  Comments: Awake, interactive, able to answer in several word answers  At baseline mental status per mother who is at the bedside           Vital Signs  ED Triage Vitals   Temperature Pulse Respirations Blood Pressure SpO2   01/01/23 1359 01/01/23 1352 01/01/23 1352 01/01/23 1353 01/01/23 1352   (!) 100 9 °F (38 3 °C) 79 (!) 24 (!) 89/42 (!) 80 %      Temp Source Heart Rate Source Patient Position - Orthostatic VS BP Location FiO2 (%)   01/01/23 1359 01/01/23 1352 01/01/23 1600 01/01/23 1600 --   Tympanic Monitor Lying Left arm       Pain Score       --                  Vitals:    01/01/23 1815 01/01/23 1830 01/01/23 1845 01/01/23 1915   BP: 92/55 97/52 96/52 93/55   Pulse: 82 82 80 78   Patient Position - Orthostatic VS: Lying Lying Lying Lying         Visual Acuity      ED Medications  Medications   multi-electrolyte (PLASMALYTE-A/ISOLYTE-S PH 7 4) IV solution (75 mL/hr Intravenous New Bag 1/1/23 1845)   heparin (porcine) subcutaneous injection 5,000 Units (5,000 Units Subcutaneous Given 1/1/23 1846)   methylPREDNISolone sodium succinate (Solu-MEDROL) injection 40 mg (has no administration in time range)   azithromycin (ZITHROMAX) 500 mg in sodium chloride 0 9% 250mL IVPB 500 mg (has no administration in time range)   cefTRIAXone (ROCEPHIN) IVPB (premix in dextrose) 2,000 mg 50 mL (has no administration in time range)   insulin lispro (HumaLOG) 100 units/mL subcutaneous injection 1-6 Units (has no administration in time range)   levalbuterol (XOPENEX) inhalation solution 1 25 mg (has no administration in time range)     And   sodium chloride 0 9 % inhalation solution 3 mL (has no administration in time range)   ipratropium (ATROVENT) 0 02 % inhalation solution 0 5 mg (has no administration in time range)   valproate (DEPACON) 500 mg in sodium chloride 0 9 % 50 mL IVPB (has no administration in time range)   methylPREDNISolone sodium succinate (Solu-MEDROL) injection 125 mg (125 mg Intravenous Given 1/1/23 1420)   albuterol inhalation solution 10 mg (10 mg Nebulization Given 1/1/23 1422)     And   ipratropium (ATROVENT) 0 02 % inhalation solution 1 mg (1 mg Nebulization Given 1/1/23 1422)     And   sodium chloride 0 9 % inhalation solution 3 mL (3 mL Nebulization Given 1/1/23 1423)   cefTRIAXone (ROCEPHIN) IVPB (premix in dextrose) 1,000 mg 50 mL (0 mg Intravenous Stopped 1/1/23 1536)   azithromycin (ZITHROMAX) 500 mg in sodium chloride 0 9% 250mL IVPB 500 mg (0 mg Intravenous Stopped 1/1/23 1751)   sodium chloride 0 9 % bolus 1,000 mL (0 mL Intravenous Stopped 1/1/23 1536)   iohexol (OMNIPAQUE) 350 MG/ML injection (SINGLE-DOSE) 100 mL (100 mL Intravenous Given 1/1/23 1603)   sodium chloride 0 9 % bolus 500 mL (0 mL Intravenous Stopped 1/1/23 1754)       Diagnostic Studies  Results Reviewed     Procedure Component Value Units Date/Time    Blood gas, arterial [[id number]]  (Abnormal) Collected: 01/01/23 1843    Lab Status: Final result Specimen: Blood, Arterial from Radial, Left Updated: 01/01/23 1849     pH, Arterial 7 340     PH ART TC 7 340     pCO2, Arterial 49 4 mm Hg      PCO2 (TC) Arterial 49 4 mm Hg      pO2, Arterial 65 8 mm Hg      PO2 (TC) Arterial 65 8 mm Hg      HCO3, Arterial 26 1 mmol/L      Base Excess, Arterial -0 2 mmol/L      O2 Content, Arterial 16 1 mL/dL      O2 HGB,Arterial  91 2 %      SOURCE Radial, Left     JUS TEST Yes     Temperature 98 6 Degrees Fehrenheit      Other FIO2 12L Simple mask %     Fingerstick Glucose (POCT) [[id number]]  (Abnormal) Collected: 01/01/23 1839    Lab Status: Final result Updated: 01/01/23 1840     POC Glucose 333 mg/dl     Legionella antigen, urine [[id number]] Collected: 01/01/23 1446    Lab Status: In process Specimen: Urine, Clean Catch Updated: 01/01/23 1755    Strep Pneumoniae, Urine [[id number]] Collected: 01/01/23 1446    Lab Status:  In process Specimen: Urine, Clean Catch Updated: 01/01/23 1754    Valproic acid level, total [[id number]]     Lab Status: No result Specimen: Blood     POCT pregnancy, urine [[id number]]  (Normal) Resulted: 01/01/23 1511    Lab Status: Final result Updated: 01/01/23 1512     EXT Preg Test, Ur Negative     Control Valid    Procalcitonin [[id number]]  (Abnormal) Collected: 01/01/23 1426 Lab Status: Final result Specimen: Blood from Arm, Right Updated: 01/01/23 1511     Procalcitonin 1 06 ng/ml     Lactic acid [[id number]]  (Normal) Collected: 01/01/23 1424    Lab Status: Final result Specimen: Blood from Arm, Right Updated: 01/01/23 1504     LACTIC ACID 1 9 mmol/L     Narrative:      Result may be elevated if tourniquet was used during collection  COVID/FLU/RSV [[id number]]  (Normal) Collected: 01/01/23 1408    Lab Status: Final result Specimen: Nares from Nose Updated: 01/01/23 1504     SARS-CoV-2 Negative     INFLUENZA A PCR Negative     INFLUENZA B PCR Negative     RSV PCR Negative    Narrative:      FOR PEDIATRIC PATIENTS - copy/paste COVID Guidelines URL to browser: [email or web address]  [email or web address]    SARS-CoV-2 assay is a Nucleic Acid Amplification assay intended for the  qualitative detection of nucleic acid from SARS-CoV-2 in nasopharyngeal  swabs  Results are for the presumptive identification of SARS-CoV-2 RNA  Positive results are indicative of infection with SARS-CoV-2, the virus  causing COVID-19, but do not rule out bacterial infection or co-infection  with other viruses  Laboratories within the [place] and its  territories are required to report all positive results to the appropriate  public health authorities  Negative results do not preclude SARS-CoV-2  infection and should not be used as the sole basis for treatment or other  patient management decisions  Negative results must be combined with  clinical observations, patient history, and epidemiological information  This test has not been FDA cleared or approved  This test has been authorized by FDA under an Emergency Use Authorization  (EUA)   This test is only authorized for the duration of time the  declaration that circumstances exist justifying the authorization of the  emergency use of an in vitro diagnostic tests for detection of SARS-CoV-2  virus and/or diagnosis of COVID-19 infection under section 564(b)(1) of  the Act, 21 U  S C  135KPM-1(Z)(0), unless the authorization is terminated  or revoked sooner  The test has been validated but independent review by FDA  and CLIA is pending  Test performed using [employer]: This RT-PCR assay targets N2,  a region unique to SARS-CoV-2  A conserved region in the E-gene was chosen  for pan-Sarbecovirus detection which includes SARS-CoV-2  According to CMS-2020-01-R, this platform meets the definition of high-throughput technology  POCT pregnancy, urine [[id number]]     Lab Status: No result     APTT [[id number]]  (Normal) Collected: 01/01/23 1424    Lab Status: Final result Specimen: Blood from Arm, Right Updated: 01/01/23 1457     PTT 34 seconds     Protime-INR [[id number]]  (Abnormal) Collected: 01/01/23 1424    Lab Status: Final result Specimen: Blood from Arm, Right Updated: 01/01/23 1457     Protime 14 9 seconds      INR 1 16    UA w Reflex to Microscopic w Reflex to Culture [[id number]] Collected: 01/01/23 1446    Lab Status: Final result Specimen: Urine, Clean Catch Updated: 01/01/23 1455     Color, UA Yellow     Clarity, UA Cloudy     Specific Gravity, UA >=1 030     pH, UA 6 0     Leukocytes, UA Negative     Nitrite, UA Negative     Protein, UA Negative mg/dl      Glucose, UA Negative mg/dl      Ketones, UA Negative mg/dl      Urobilinogen, UA 0 2 E U /dl      Bilirubin, UA Negative     Occult Blood, UA Negative    Blood culture #2 [[id number]] Collected: 01/01/23 1425    Lab Status: In process Specimen: Blood from Arm, Left Updated: 01/01/23 1440    Blood culture #1 [[id number]] Collected: 01/01/23 1410    Lab Status:  In process Specimen: Blood from Arm, Right Updated: 01/01/23 1440    Comprehensive metabolic panel [[id number]]  (Abnormal) Collected: 01/01/23 1408    Lab Status: Final result Specimen: Blood from Arm, Right Updated: 01/01/23 1431     Sodium 139 mmol/L      Potassium 4 2 mmol/L      Chloride 102 mmol/L CO2 32 mmol/L      ANION GAP 5 mmol/L      BUN 27 mg/dL      Creatinine 1 51 mg/dL      Glucose 177 mg/dL      Calcium 7 8 mg/dL      Corrected Calcium 9 0 mg/dL      AST 52 U/L      ALT 27 U/L      Alkaline Phosphatase 60 U/L      Total Protein 6 4 g/dL      Albumin 2 5 g/dL      Total Bilirubin 0 25 mg/dL      eGFR 41 ml/min/1 73sq m     Narrative:      National Kidney Disease Foundation guidelines for Chronic Kidney Disease (CKD):   •  Stage 1 with normal or high GFR (GFR > 90 mL/min/1 73 square meters)  •  Stage 2 Mild CKD (GFR = 60-89 mL/min/1 73 square meters)  •  Stage 3A Moderate CKD (GFR = 45-59 mL/min/1 73 square meters)  •  Stage 3B Moderate CKD (GFR = 30-44 mL/min/1 73 square meters)  •  Stage 4 Severe CKD (GFR = 15-29 mL/min/1 73 square meters)  •  Stage 5 End Stage CKD (GFR <15 mL/min/1 73 square meters)  Note: GFR calculation is accurate only with a steady state creatinine    Blood gas, venous [[id number]]  (Abnormal) Collected: 01/01/23 1408    Lab Status: Final result Specimen: Blood from Arm, Right Updated: 01/01/23 1419     pH, Chance 7 363     pCO2, Chance 53 3 mm Hg      pO2, Chance 24 6 mm Hg      HCO3, Chance 29 6 mmol/L      Base Excess, Chance 3 1 mmol/L      O2 Content, Chance 7 7 ml/dL      O2 HGB, VENOUS 40 6 %     CBC and differential [[id number]]  (Abnormal) Collected: 01/01/23 1408    Lab Status: Final result Specimen: Blood from Arm, Right Updated: 01/01/23 1415     WBC 5 55 Thousand/uL      RBC 3 84 Million/uL      Hemoglobin 12 5 g/dL      Hematocrit 39 2 %       fL      MCH 32 6 pg      MCHC 31 9 g/dL      RDW 14 5 %      MPV 9 3 fL      Platelets 405 Thousands/uL      nRBC 0 /100 WBCs      Neutrophils Relative 58 %      Immat GRANS % 1 %      Lymphocytes Relative 31 %      Monocytes Relative 10 %      Eosinophils Relative 0 %      Basophils Relative 0 %      Neutrophils Absolute 3 20 Thousands/µL      Immature Grans Absolute 0 04 Thousand/uL      Lymphocytes Absolute 1 74 Thousands/µL Monocytes Absolute 0 55 Thousand/µL      Eosinophils Absolute 0 00 Thousand/µL      Basophils Absolute 0 02 Thousands/µL                  CT chest abdomen pelvis w contrast   Final Result by Wayne Khanna MD (01/01 4037)      1  Multifocal bilateral airspace disease in the lungs, most suggestive of pneumonia  Viral pneumonia should be included in the differential    2  Air within the esophagus may be related to reflux  3  Small bilateral effusions   4  No acute inflammatory changes in the abdomen or pelvis   5  There is trace free fluid in the pelvis            Workstation performed: [id number]         XR chest 1 view portable    (Results Pending)              Procedures  CriticalCare Time  Performed by: Clarissa Moreno MD  Authorized by: Clarissa Moreno MD     Critical care provider statement:     Critical care time (minutes):  45    Critical care was necessary to treat or prevent imminent or life-threatening deterioration of the following conditions:  Sepsis    Critical care was time spent personally by me on the following activities:  Blood draw for specimens, obtaining history from patient or surrogate, ordering and performing treatments and interventions, ordering and review of laboratory studies, ordering and review of radiographic studies, development of treatment plan with patient or surrogate, re-evaluation of patient's condition, review of old charts, examination of patient and evaluation of patient's response to treatment             ED Course                                             Medical Decision Making  Fever, cough, shortness of breath concerning for pneumonia  Patient ill-appearing with significant new oxygen requirement, diffuse rhonchi on auscultation  Plan to treat empirically with Rocephin, azithromycin for community-acquired pneumonia, IV fluids for hypotension, infectious labs including CBC, CMP, blood cultures, lactate, procalcitonin    Imaging with CT chest abdomen pelvis to evaluate for occult cause of sepsis given patient's somewhat limited verbal capacity, additionally COVID, flu, RSV swab to screen for potential viral cause of symptoms, treatment with heart neb, Solu-Medrol given patient's history of asthma and COPD to optimize from a pulmonary standpoint however patient without significant component of bronchospasm on auscultation  Patient with chest x-ray and CT consistent with a multifocal pneumonia  Patient placed on mid flow nasal cannula, 7 L, maintaining sats while awake however dropping sats when asleep consistent with prior history of obstructive sleep apnea  Patient admitted to family medicine service for further evaluation and management  Disposition  Final diagnoses:   Multifocal pneumonia     Time reflects when diagnosis was documented in both MDM as applicable and the Disposition within this note     Time User Action Codes Description Comment    1/1/2023  4:52 PM dArien Damico Add [J18 [id number]] Multifocal pneumonia       ED Disposition     ED Disposition   Admit    Condition   Stable    Date/Time   Sun Jan 1, 2023  4:52 PM    Comment   Case was discussed with family medicine and the patient's admission status was agreed to be Admission Status: inpatient status to the service of Dr Maria Dolores Giordano   Follow-up Information    None         Patient's Medications   Discharge Prescriptions    No medications on file       No discharge procedures on file      PDMP Review     None          ED Provider  Electronically Signed by           Alisha Levy MD  01/01/23 4876

## 2023-01-01 NOTE — ED NOTES
Marcela Yeung, made aware of pt's O2 sat of 89%  She let me know that the pt's O2 sat goal is 88%       Daniel Jones, PATRICIA  01/01/23 2450

## 2023-01-01 NOTE — ASSESSMENT & PLAN NOTE
Lab Results   Component Value Date    EGFR 41 01/01/2023    EGFR 67 05/17/2022    EGFR 58 04/11/2020    CREATININE 1 51 (H) 01/01/2023    CREATININE 1 01 05/17/2022    CREATININE 1 10 (H) 01/30/2021   · TIERNEY on CKD, Cr 1 51  · Given 1 5 L Nss in ED  · Start isolyte 75 ml/hr  · Trend I/o and renal function

## 2023-01-01 NOTE — ASSESSMENT & PLAN NOTE
· Secondary to multilobular PNA  · Given azithromycin and rocephin in ED, will continue  · Flu/rsv/covid neg  · F/U BCs  · U/A negative UTI  · Check strep/legionella  · PCT 1 06  · Trend Fever curve and WBC

## 2023-01-02 PROBLEM — Z91.09 ALLERGY TO ENVIRONMENTAL FACTORS: Status: ACTIVE | Noted: 2023-01-02

## 2023-01-02 PROBLEM — G40.209 NONINTRACTABLE EPILEPSY WITH COMPLEX PARTIAL SEIZURES (HCC): Chronic | Status: ACTIVE | Noted: 2023-01-02

## 2023-01-02 LAB
ALBUMIN SERPL BCP-MCNC: 2.3 G/DL (ref 3.5–5)
ALP SERPL-CCNC: 42 U/L (ref 46–116)
ALT SERPL W P-5'-P-CCNC: 19 U/L (ref 12–78)
ANION GAP SERPL CALCULATED.3IONS-SCNC: 9 MMOL/L (ref 4–13)
AST SERPL W P-5'-P-CCNC: 46 U/L (ref 5–45)
ATRIAL RATE: 78 BPM
BASOPHILS # BLD AUTO: 0 THOUSANDS/ÂΜL (ref 0–0.1)
BASOPHILS NFR BLD AUTO: 0 % (ref 0–1)
BILIRUB SERPL-MCNC: 0.16 MG/DL (ref 0.2–1)
BUN SERPL-MCNC: 17 MG/DL (ref 5–25)
CALCIUM ALBUM COR SERPL-MCNC: 8.3 MG/DL (ref 8.3–10.1)
CALCIUM SERPL-MCNC: 6.9 MG/DL (ref 8.3–10.1)
CHLORIDE SERPL-SCNC: 107 MMOL/L (ref 96–108)
CO2 SERPL-SCNC: 27 MMOL/L (ref 21–32)
CREAT SERPL-MCNC: 1.15 MG/DL (ref 0.6–1.3)
EOSINOPHIL # BLD AUTO: 0 THOUSAND/ÂΜL (ref 0–0.61)
EOSINOPHIL NFR BLD AUTO: 0 % (ref 0–6)
ERYTHROCYTE [DISTWIDTH] IN BLOOD BY AUTOMATED COUNT: 14.4 % (ref 11.6–15.1)
GFR SERPL CREATININE-BSD FRML MDRD: 57 ML/MIN/1.73SQ M
GLUCOSE SERPL-MCNC: 159 MG/DL (ref 65–140)
GLUCOSE SERPL-MCNC: 216 MG/DL (ref 65–140)
GLUCOSE SERPL-MCNC: 227 MG/DL (ref 65–140)
GLUCOSE SERPL-MCNC: 247 MG/DL (ref 65–140)
GLUCOSE SERPL-MCNC: 366 MG/DL (ref 65–140)
HCT VFR BLD AUTO: 35.4 % (ref 34.8–46.1)
HGB BLD-MCNC: 11.3 G/DL (ref 11.5–15.4)
IMM GRANULOCYTES # BLD AUTO: 0.07 THOUSAND/UL (ref 0–0.2)
IMM GRANULOCYTES NFR BLD AUTO: 1 % (ref 0–2)
L PNEUMO1 AG UR QL IA.RAPID: NEGATIVE
LYMPHOCYTES # BLD AUTO: 1.21 THOUSANDS/ÂΜL (ref 0.6–4.47)
LYMPHOCYTES NFR BLD AUTO: 16 % (ref 14–44)
MAGNESIUM SERPL-MCNC: 2.3 MG/DL (ref 1.6–2.6)
MCH RBC QN AUTO: 32.6 PG (ref 26.8–34.3)
MCHC RBC AUTO-ENTMCNC: 31.9 G/DL (ref 31.4–37.4)
MCV RBC AUTO: 102 FL (ref 82–98)
MONOCYTES # BLD AUTO: 0.34 THOUSAND/ÂΜL (ref 0.17–1.22)
MONOCYTES NFR BLD AUTO: 5 % (ref 4–12)
NEUTROPHILS # BLD AUTO: 5.75 THOUSANDS/ÂΜL (ref 1.85–7.62)
NEUTS SEG NFR BLD AUTO: 78 % (ref 43–75)
NRBC BLD AUTO-RTO: 0 /100 WBCS
P AXIS: 52 DEGREES
PHOSPHATE SERPL-MCNC: 2.5 MG/DL (ref 2.7–4.5)
PLATELET # BLD AUTO: 112 THOUSANDS/UL (ref 149–390)
PMV BLD AUTO: 9.3 FL (ref 8.9–12.7)
POTASSIUM SERPL-SCNC: 4.5 MMOL/L (ref 3.5–5.3)
PR INTERVAL: 146 MS
PROCALCITONIN SERPL-MCNC: 0.82 NG/ML
PROT SERPL-MCNC: 5.6 G/DL (ref 6.4–8.4)
QRS AXIS: 42 DEGREES
QRSD INTERVAL: 72 MS
QT INTERVAL: 386 MS
QTC INTERVAL: 440 MS
RBC # BLD AUTO: 3.47 MILLION/UL (ref 3.81–5.12)
S PNEUM AG UR QL: NEGATIVE
SODIUM SERPL-SCNC: 143 MMOL/L (ref 135–147)
T WAVE AXIS: 9 DEGREES
VENTRICULAR RATE: 78 BPM
WBC # BLD AUTO: 7.37 THOUSAND/UL (ref 4.31–10.16)

## 2023-01-02 RX ORDER — FLUTICASONE PROPIONATE 50 MCG
1 SPRAY, SUSPENSION (ML) NASAL DAILY
Status: DISCONTINUED | OUTPATIENT
Start: 2023-01-02 | End: 2023-01-11 | Stop reason: HOSPADM

## 2023-01-02 RX ORDER — INSULIN LISPRO 100 [IU]/ML
1-5 INJECTION, SOLUTION INTRAVENOUS; SUBCUTANEOUS
Status: DISCONTINUED | OUTPATIENT
Start: 2023-01-02 | End: 2023-01-11 | Stop reason: HOSPADM

## 2023-01-02 RX ORDER — LEVOTHYROXINE SODIUM 0.07 MG/1
75 TABLET ORAL
Status: DISCONTINUED | OUTPATIENT
Start: 2023-01-02 | End: 2023-01-11 | Stop reason: HOSPADM

## 2023-01-02 RX ORDER — LORATADINE 10 MG/1
5 TABLET ORAL DAILY
Status: DISCONTINUED | OUTPATIENT
Start: 2023-01-02 | End: 2023-01-11 | Stop reason: HOSPADM

## 2023-01-02 RX ORDER — INSULIN LISPRO 100 [IU]/ML
1-6 INJECTION, SOLUTION INTRAVENOUS; SUBCUTANEOUS
Status: DISCONTINUED | OUTPATIENT
Start: 2023-01-02 | End: 2023-01-02

## 2023-01-02 RX ORDER — ARIPIPRAZOLE 10 MG/1
10 TABLET ORAL DAILY
Status: DISCONTINUED | OUTPATIENT
Start: 2023-01-02 | End: 2023-01-11 | Stop reason: HOSPADM

## 2023-01-02 RX ORDER — DIVALPROEX SODIUM 500 MG/1
1500 TABLET, EXTENDED RELEASE ORAL DAILY
Status: DISCONTINUED | OUTPATIENT
Start: 2023-01-03 | End: 2023-01-11 | Stop reason: HOSPADM

## 2023-01-02 RX ORDER — CALCIUM CARBONATE 500(1250)
1 TABLET ORAL 2 TIMES DAILY WITH MEALS
Status: DISCONTINUED | OUTPATIENT
Start: 2023-01-02 | End: 2023-01-11 | Stop reason: HOSPADM

## 2023-01-02 RX ORDER — INSULIN LISPRO 100 [IU]/ML
2-12 INJECTION, SOLUTION INTRAVENOUS; SUBCUTANEOUS EVERY 6 HOURS SCHEDULED
Status: DISCONTINUED | OUTPATIENT
Start: 2023-01-02 | End: 2023-01-02

## 2023-01-02 RX ORDER — INSULIN LISPRO 100 [IU]/ML
1-5 INJECTION, SOLUTION INTRAVENOUS; SUBCUTANEOUS
Status: DISCONTINUED | OUTPATIENT
Start: 2023-01-02 | End: 2023-01-02

## 2023-01-02 RX ORDER — ATORVASTATIN CALCIUM 10 MG/1
10 TABLET, FILM COATED ORAL
Status: DISCONTINUED | OUTPATIENT
Start: 2023-01-02 | End: 2023-01-11 | Stop reason: HOSPADM

## 2023-01-02 RX ORDER — INSULIN LISPRO 100 [IU]/ML
1-6 INJECTION, SOLUTION INTRAVENOUS; SUBCUTANEOUS EVERY 6 HOURS SCHEDULED
Status: DISCONTINUED | OUTPATIENT
Start: 2023-01-02 | End: 2023-01-02

## 2023-01-02 RX ADMIN — AZITHROMYCIN 500 MG: 500 INJECTION, POWDER, LYOPHILIZED, FOR SOLUTION INTRAVENOUS at 16:33

## 2023-01-02 RX ADMIN — ARIPIPRAZOLE 10 MG: 5 TABLET ORAL at 12:47

## 2023-01-02 RX ADMIN — LORATADINE 5 MG: 10 TABLET ORAL at 12:47

## 2023-01-02 RX ADMIN — HEPARIN SODIUM 5000 UNITS: 5000 INJECTION INTRAVENOUS; SUBCUTANEOUS at 05:05

## 2023-01-02 RX ADMIN — LEVOTHYROXINE SODIUM 75 MCG: 75 TABLET ORAL at 12:47

## 2023-01-02 RX ADMIN — IPRATROPIUM BROMIDE 0.5 MG: 0.5 SOLUTION RESPIRATORY (INHALATION) at 13:54

## 2023-01-02 RX ADMIN — SODIUM CHLORIDE 500 MG: 9 INJECTION, SOLUTION INTRAVENOUS at 21:32

## 2023-01-02 RX ADMIN — IPRATROPIUM BROMIDE 0.5 MG: 0.5 SOLUTION RESPIRATORY (INHALATION) at 08:12

## 2023-01-02 RX ADMIN — LEVALBUTEROL HYDROCHLORIDE 1.25 MG: 1.25 SOLUTION, CONCENTRATE RESPIRATORY (INHALATION) at 08:12

## 2023-01-02 RX ADMIN — CEFTRIAXONE 2000 MG: 2 INJECTION, SOLUTION INTRAVENOUS at 15:01

## 2023-01-02 RX ADMIN — LEVALBUTEROL HYDROCHLORIDE 1.25 MG: 1.25 SOLUTION, CONCENTRATE RESPIRATORY (INHALATION) at 19:59

## 2023-01-02 RX ADMIN — CALCIUM 1 TABLET: 500 TABLET ORAL at 16:33

## 2023-01-02 RX ADMIN — ISODIUM CHLORIDE 3 ML: 0.03 SOLUTION RESPIRATORY (INHALATION) at 02:54

## 2023-01-02 RX ADMIN — SODIUM CHLORIDE, SODIUM GLUCONATE, SODIUM ACETATE, POTASSIUM CHLORIDE, MAGNESIUM CHLORIDE, SODIUM PHOSPHATE, DIBASIC, AND POTASSIUM PHOSPHATE 75 ML/HR: .53; .5; .37; .037; .03; .012; .00082 INJECTION, SOLUTION INTRAVENOUS at 05:03

## 2023-01-02 RX ADMIN — LEVALBUTEROL HYDROCHLORIDE 1.25 MG: 1.25 SOLUTION, CONCENTRATE RESPIRATORY (INHALATION) at 13:54

## 2023-01-02 RX ADMIN — HEPARIN SODIUM 5000 UNITS: 5000 INJECTION INTRAVENOUS; SUBCUTANEOUS at 21:35

## 2023-01-02 RX ADMIN — INSULIN LISPRO 3 UNITS: 100 INJECTION, SOLUTION INTRAVENOUS; SUBCUTANEOUS at 06:18

## 2023-01-02 RX ADMIN — INSULIN LISPRO 1 UNITS: 100 INJECTION, SOLUTION INTRAVENOUS; SUBCUTANEOUS at 12:04

## 2023-01-02 RX ADMIN — LEVALBUTEROL HYDROCHLORIDE 1.25 MG: 1.25 SOLUTION, CONCENTRATE RESPIRATORY (INHALATION) at 02:54

## 2023-01-02 RX ADMIN — SODIUM CHLORIDE 500 MG: 9 INJECTION, SOLUTION INTRAVENOUS at 05:05

## 2023-01-02 RX ADMIN — INSULIN LISPRO 4 UNITS: 100 INJECTION, SOLUTION INTRAVENOUS; SUBCUTANEOUS at 21:38

## 2023-01-02 RX ADMIN — SODIUM CHLORIDE, SODIUM GLUCONATE, SODIUM ACETATE, POTASSIUM CHLORIDE, MAGNESIUM CHLORIDE, SODIUM PHOSPHATE, DIBASIC, AND POTASSIUM PHOSPHATE 75 ML/HR: .53; .5; .37; .037; .03; .012; .00082 INJECTION, SOLUTION INTRAVENOUS at 17:50

## 2023-01-02 RX ADMIN — IPRATROPIUM BROMIDE 0.5 MG: 0.5 SOLUTION RESPIRATORY (INHALATION) at 19:59

## 2023-01-02 RX ADMIN — ATORVASTATIN CALCIUM 10 MG: 10 TABLET, FILM COATED ORAL at 16:33

## 2023-01-02 RX ADMIN — SODIUM CHLORIDE 500 MG: 9 INJECTION, SOLUTION INTRAVENOUS at 15:01

## 2023-01-02 RX ADMIN — HEPARIN SODIUM 5000 UNITS: 5000 INJECTION INTRAVENOUS; SUBCUTANEOUS at 15:00

## 2023-01-02 RX ADMIN — INSULIN LISPRO 2 UNITS: 100 INJECTION, SOLUTION INTRAVENOUS; SUBCUTANEOUS at 16:37

## 2023-01-02 RX ADMIN — FLUTICASONE PROPIONATE 1 SPRAY: 50 SPRAY, METERED NASAL at 15:10

## 2023-01-02 RX ADMIN — METHYLPREDNISOLONE SODIUM SUCCINATE 40 MG: 40 INJECTION, POWDER, FOR SOLUTION INTRAMUSCULAR; INTRAVENOUS at 05:03

## 2023-01-02 RX ADMIN — INSULIN LISPRO 4 UNITS: 100 INJECTION, SOLUTION INTRAVENOUS; SUBCUTANEOUS at 08:41

## 2023-01-02 NOTE — ASSESSMENT & PLAN NOTE
Lab Results   Component Value Date    EGFR 81 01/06/2023    EGFR 84 01/05/2023    EGFR 73 01/04/2023    CREATININE 0 86 01/06/2023    CREATININE 0 83 01/05/2023    CREATININE 0 93 01/04/2023   • Resolved  • TIERNEY on CKD, Cr 1 51  • Given 1 5 L Nss in ED  • Continue isolyte 75 ml/hr  • Cr improved, 0 86    Trend I/o and renal function

## 2023-01-02 NOTE — PROGRESS NOTES
Nathalie U  67   Progress Note - Rony Petersen 1975, 56 y o  female MRN: [id number]  Unit/Bed#: ICU 09 Encounter: [id number]  Primary Care Provider: Tejal Mcdowell DO   Date and time admitted to hospital: 1/1/2023  1:46 PM    * Acute on chronic respiratory failure with hypoxia ([facility] [address] )  Assessment & Plan  · Wears 2L nc at home PRN and CPAP hs  · Started with cough at group home 2 days PTA  Noted by mom today to be feverish, not herself and with low sats at home  · Imaging in the ED showing multilobular PNA  · trialed on 7L NC and spo2 87%, changed to FM and sats 89-90%  · Pt wears CPAP HS at home, wore CPAP 12 45% overnight  · Plan to wean to NC today  · Continue supplemental O2 and wean for sat > 88%  · Xopenex/atrovent Q6h  · Continue Azithromycin and rocephin D2      Severe sepsis ([facility] [address] )  Assessment & Plan  · Secondary to multilobular PNA  · Given azithromycin and rocephin in ED, will continue  · Abx D 2  · Flu/rsv/covid neg, Strep/legionella Neg  · F/U BCs  · U/A negative UTI  · PCT 1 06, F/U this AM's  · Trend Fever curve and WBC, no fevers overnight    Diabetes mellitus [facility])  Assessment & Plan  Lab Results   Component Value Date    HGBA1C 5 0 02/08/2022       Recent Labs     01/01/23  1839 01/01/23  2105 01/01/23  2324   POCGLU 333* 333* 327*       Blood Sugar Average: Last 72 hrs:  · (P) 331Now on systemic steroids  · Continue SSI q6H while NPO  · Uncontrolled   Increasing to Alg 4    TIERNEY on Chronic renal insufficiency, stage III (moderate) [facility])  Assessment & Plan  Lab Results   Component Value Date    EGFR 57 01/02/2023    EGFR 41 01/01/2023    EGFR 67 05/17/2022    CREATININE 1 15 01/02/2023    CREATININE 1 51 (H) 01/01/2023    CREATININE 1 01 05/17/2022   · TIERNEY on CKD, Cr 1 51  · Given 1 5 L Nss in ED  · Cont isolyte 75 ml/hr while NPO  · Cr improving, 1 15 today  · Trend I/o and renal function     Hypothyroidism  Assessment & Plan  · Holding home medications due to NPO  · Consider resuming today if more awake later this AM    History of prolonged Q-T interval on ECG  Assessment & Plan  · Continue  telemetry monitoring    Obstructive sleep apnea syndrome  Assessment & Plan  · Wears pediatric CPAP at home with nasal mask  E pressure 8  · Will continue HS, wore CPAP 12 45% overnight    Mild intellectual disability  Assessment & Plan  · Routine neuro checks and delirium precautions    Down syndrome, unspecified  Assessment & Plan  · Holding home meds while nPO    Nonintractable epilepsy with complex partial seizures ([address] )  Assessment & Plan  · No Sz activity this Admission  · Continue home depakote in IV until able to take PO  · Valporic acid level 68 this Admission       ----------------------------------------------------------------------------------------  HPI/24hr events: Admitted with Acute resp failure yesterday due to multilobular PNA  Pt with GCS 9 in Ed and sats 87% on 7L NC  Overnight wore CPAP, which she uses HS at home  MS improve this AM      Patient appropriate for transfer out of the ICU today?: Patient does not meet criteria for ICU Follow-up Clinic; referral has not been made     Disposition: Transfer to Med Surg with Telemetry   Code Status: Level 1 - Full Code  ---------------------------------------------------------------------------------------  SUBJECTIVE  Denies pain on exam    Review of Systems   All other systems reviewed and are negative       ---------------------------------------------------------------------------------------  OBJECTIVE    Vitals   Vitals:    23 0500 23 0541 23 0600 23 0700   BP: (!) 92/46  (!) 88/53 102/51   BP Location:    Left arm   Pulse: 64  65 (!) 49   Resp: 18  16 (!) 11   Temp:    (!) 96 8 °F (36 °C)   TempSrc:    Temporal   SpO2: 98%  95% 97%   Weight:  81 2 kg (179 lb 0 2 oz)     Height:         Temp (24hrs), Av 6 °F (36 4 °C), Min:96 6 °F (35 9 °C), Max:100 9 °F (38 3 °C)  Current: Temperature: Ana Mar ) 96 8 °F (36 °C)          Respiratory:  SpO2: SpO2: 97 %  CPAP 12 FiO2 45%    Physical Exam  Vitals and nursing note reviewed  Constitutional:       General: She is not in acute distress  Appearance: She is obese  She is ill-appearing  She is not toxic-appearing  Comments: sleeping but awakens easily to voice    HENT:      Head: Normocephalic and atraumatic  Nose: Nose normal       Mouth/Throat:      Mouth: Mucous membranes are moist       Pharynx: Oropharynx is clear  Eyes:      Extraocular Movements: Extraocular movements intact  Conjunctiva/sclera: Conjunctivae normal       Pupils: Pupils are equal, round, and reactive to light  Cardiovascular:      Rate and Rhythm: Regular rhythm  Bradycardia present  Pulses: Normal pulses  Heart sounds: Normal heart sounds  Pulmonary:      Effort: No respiratory distress  Comments: CPAP 12 45% with SpO2 96%  LS coarse b/l  Abdominal:      General: Bowel sounds are normal  There is no distension  Palpations: Abdomen is soft  Tenderness: There is no abdominal tenderness  Musculoskeletal:         General: Normal range of motion  Cervical back: Normal range of motion and neck supple  Right lower leg: No edema  Left lower leg: No edema  Skin:     General: Skin is warm and dry  Neurological:      General: No focal deficit present  Mental Status: Mental status is at baseline  Cranial Nerves: No cranial nerve deficit  Sensory: No sensory deficit           Laboratory and Diagnostics:  Results from last 7 days   Lab Units 01/02/23  0437 01/01/23  1408   WBC Thousand/uL 7 37 5 55   HEMOGLOBIN g/dL 11 3* 12 5   HEMATOCRIT % 35 4 39 2   PLATELETS Thousands/uL 112* 158   NEUTROS PCT % 78* 58   MONOS PCT % 5 10     Results from last 7 days   Lab Units 01/02/23  0437 01/01/23  1408   SODIUM mmol/L 143 139   POTASSIUM mmol/L 4 5 4 2   CHLORIDE mmol/L 107 102   CO2 mmol/L 27 32   ANION GAP mmol/L 9 5   BUN mg/dL 17 27*   CREATININE mg/dL 1 15 1 51*   CALCIUM mg/dL 6 9* 7 8*   GLUCOSE RANDOM mg/dL 247* 177*   ALT U/L 19 27   AST U/L 46* 52*   ALK PHOS U/L 42* 60   ALBUMIN g/dL 2 3* 2 5*   TOTAL BILIRUBIN mg/dL 0 16* 0 25     Results from last 7 days   Lab Units 01/02/23  0437   MAGNESIUM mg/dL 2 3   PHOSPHORUS mg/dL 2 5*      Results from last 7 days   Lab Units 01/01/23  1424   INR  1 16   PTT seconds 34          Results from last 7 days   Lab Units 01/01/23  1424   LACTIC ACID mmol/L 1 9     ABG:  Results from last 7 days   Lab Units 01/01/23  1843   PH ART  7 340*   PCO2 ART mm Hg 49 4*   PO2 ART mm Hg 65 8*   HCO3 ART mmol/L 26 1   BASE EXC ART mmol/L -0 2   ABG SOURCE  Radial, Left     VBG:  Results from last 7 days   Lab Units 01/01/23  1843 01/01/23  1408   PH ALESIA   --  7 363   PCO2 ALESIA mm Hg  --  53 3*   PO2 ALESIA mm Hg  --  24 6*   HCO3 ALESIA mmol/L  --  29 6   BASE EXC ALESIA mmol/L  --  3 1   ABG SOURCE  Radial, Left  --      Results from last 7 days   Lab Units 01/01/23  1424   PROCALCITONIN ng/ml 1 06*       Micro  Results from last 7 days   Lab Units 01/01/23  1446 01/01/23  1425 01/01/23  1410   BLOOD CULTURE   --  Received in Microbiology Lab  Culture in Progress  Received in Microbiology Lab  Culture in Progress  LEGIONELLA URINARY ANTIGEN  Negative  --   --    STREP PNEUMONIAE ANTIGEN, URINE  Negative  --   --          Imaging: I have personally reviewed pertinent reports  and I have personally reviewed pertinent films in PACS    Intake and Output  I/O       12/31 0701  01/01 0700 01/01 0701 01/02 0700 01/02 0701 01/03 0700    I V  (mL/kg)  850 (10 5)     IV Piggyback  2400     Total Intake(mL/kg)  3250 (40)     Urine (mL/kg/hr)  550     Total Output  550     Net  +2700                  Height and Weights   Height: 4' 8" (142 2 cm)     Body mass index is 40 13 kg/m²    Weight (last 2 days)     Date/Time Weight    01/02/23 0541 81 2 (179 01)    01/01/23 2048 80 9 (178 35)            Nutrition       Diet Orders   (From admission, onward)             Start     Ordered    01/01/23 1744  Diet NPO  Diet effective now        References:    Nutrtion Support Algorithm Enteral vs  Parenteral   Question Answer Comment   Diet Type NPO    RD to adjust diet per protocol?  Yes        01/01/23 1744                  Active Medications  Scheduled Meds:  Current Facility-Administered Medications   Medication Dose Route Frequency Provider Last Rate   • azithromycin  500 mg Intravenous Q24H SESAR Haywood     • cefTRIAXone  2,000 mg Intravenous Q24H SESAR Haywood     • heparin (porcine)  5,000 Units Subcutaneous On license of [facility] SESAR Kim     • insulin lispro  2-12 Units Subcutaneous Q6H [address] [facility] SESAR Kim     • ipratropium  0 5 mg Nebulization 4x Daily [facility]     • levalbuterol  1 25 mg Nebulization Q6H SESAR Haywood      And   • sodium chloride  3 mL Nebulization Q6H PRN SESAR Haywood     • methylPREDNISolone sodium succinate  40 mg Intravenous [facility] SESAR Haywood     • multi-electrolyte  75 mL/hr Intravenous Continuous SESAR Thornton 75 mL/hr (01/02/23 0503)   • valproate sodium  500 mg Intravenous Q8H [address] SESAR Masters Stopped (01/02/23 5643)     Continuous Infusions:  multi-electrolyte, 75 mL/hr, Last Rate: 75 mL/hr (01/02/23 0503)      PRN Meds:   sodium chloride, 3 mL, Q6H PRN        Invasive Devices Review  Invasive Devices     Peripheral Intravenous Line  Duration           Peripheral IV 01/01/23 Left;Proximal;Ventral (anterior) Forearm <1 day    Peripheral IV 01/01/23 Right Antecubital <1 day          Drain  Duration           External Urinary Catheter <1 day                Rationale for remaining devices: NA  ---------------------------------------------------------------------------------------  Advance Directive and Living Will: Yes    Power of :    POLST: ---------------------------------------------------------------------------------------  Care Time Delivered:   No Critical Care time spent        [facility]      Portions of the record may have been created with voice recognition software  Occasional wrong word or "sound a like" substitutions may have occurred due to the inherent limitations of voice recognition software    Read the chart carefully and recognize, using context, where substitutions have occurred

## 2023-01-02 NOTE — ASSESSMENT & PLAN NOTE
Lab Results   Component Value Date    EGFR 57 01/02/2023    EGFR 41 01/01/2023    EGFR 67 05/17/2022    CREATININE 1 15 01/02/2023    CREATININE 1 51 (H) 01/01/2023    CREATININE 1 01 05/17/2022   · TIERNEY on CKD, Cr 1 51  · Given 1 5 L Nss in ED  · Cont isolyte 75 ml/hr while NPO  · Cr improving, 1 15 today  · Trend I/o and renal function

## 2023-01-02 NOTE — ASSESSMENT & PLAN NOTE
CT C/A/P: Multifocal bilateral airspace disease in the lungs, likely pneumonia    Strep legionella urine neg  Procal 1 6, LA WNL, WBC 8 76  -Repeat CXR:Findings consistent with mild CHF, improved since 1/1/2023 with partial clearing of bilateral pulmonary airspace opacities, which most likely represented pulmonary edema     -On Ceftriaxone 2g (Day 10), complete 10 days per pulm, can transition to oral if DC earlier  - Azithro 500mg IV completed (5 days)    -monitor vitals

## 2023-01-02 NOTE — ASSESSMENT & PLAN NOTE
• No Sz activity this Admission  • Valporic acid level 68 this Admission   • Continued home Depakote PO 1500mg daily today

## 2023-01-02 NOTE — ASSESSMENT & PLAN NOTE
· Wears 2L nc at home PRN and CPAP hs  · Started with cough at group home 2 days PTA  Noted by mom today to be feverish, not herself and with low sats at home  · Imaging in the ED showing multilobular PNA  · trialed on 7L NC and spo2 87%, changed to FM and sats 89-90%  · Pt wears CPAP HS at home, wore CPAP 12 45% overnight    · Plan to wean to NC today  · Continue supplemental O2 and wean for sat > 88%  · Xopenex/atrovent Q6h  · Continue Azithromycin and rocephin D2

## 2023-01-02 NOTE — QUICK NOTE
Patient seen and evaluated by Critical Care today and deemed to be appropriate for transfer to Med-Surg   Spoke to Dr Dunia Delgadillo from [facility] to accept patient transfer  Major changes to treatment plan from the day of transfer include MS much improved today  Wore her CPAP HS and weaned to simple mask 5L  Started diet and resumed home meds  D2 of Abx for b/l mulitlobular PNA  Critical care can be contacted via [employer] with any questions or concerns

## 2023-01-02 NOTE — ASSESSMENT & PLAN NOTE
No oxygen requirement at home  Imaging in the ED showing multilobular PNA  Pt wears CPAP HS at home  Repeat CXR: Findings consistent with mild CHF, improved since 1/1/2023 with partial clearing of bilateral pulmonary airspace opacities, which most likely represented pulmonary edema  During trial of no supplemental oxygen with CPAP, O2 decreased to 87% and started on 2 L --> O2 inc to 95%  Pt continued no oxygen trial again at 1 am to 6 am and tolerated well without oxygen       • Xopenex/atrovent Q6h  • IV lasix 20 mg was given once  • Azithromycin 500mg IV completed (5 days)  • Continue rocephin 2g IV (Day 10) to 10 days per pulm  • CPAP 12 28% at night  • CXR PA/Lateral  • Current O2 requirement: RA
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
## 2023-01-02 NOTE — ASSESSMENT & PLAN NOTE
• Secondary to multilobular PNA  • Flu/rsv/covid neg, Strep/legionella Neg  • BCx NG 72hrs  • U/A negative UTI  • Procal 1 06 > 0 82 > 0 20  • WBC 6 71    - Azithromycin 500mg IV completed (5 days)   - continue rocephin 2g IV daily (day 10)  - Trend Fever curve and WBC, no fevers overnight

## 2023-01-02 NOTE — ASSESSMENT & PLAN NOTE
· Wears pediatric CPAP at home with nasal mask   E pressure 8  · Will continue HS, wore CPAP 12 45% overnight

## 2023-01-02 NOTE — ASSESSMENT & PLAN NOTE
· Secondary to multilobular PNA  · Given azithromycin and rocephin in ED, will continue     · Abx D 2  · Flu/rsv/covid neg, Strep/legionella Neg  · F/U BCs  · U/A negative UTI  · PCT 1 06, F/U this AM's  · Trend Fever curve and WBC, no fevers overnight Normal vision: sees adequately in most situations; can see medication labels, newsprint

## 2023-01-02 NOTE — ASSESSMENT & PLAN NOTE
• Wears pediatric CPAP at home with nasal mask   E pressure 8  • Will continue HS CPAP 12 28%  Pt had O2 sat around 80's with home CPAP machine on 1/3, so it was switched back to hospital machine tolerating well   -needs new machine at home    -Trial of no supplemental oxygen with CPAP tonight as per Pulmonology

## 2023-01-02 NOTE — ASSESSMENT & PLAN NOTE
· No Sz activity this Admission  · Continue home depakote in IV until able to take PO  · Valporic acid level 68 this Admission

## 2023-01-02 NOTE — ASSESSMENT & PLAN NOTE
Lab Results   Component Value Date    HGBA1C 5 0 02/08/2022       Recent Labs     01/05/23  1610 01/05/23 2051 01/06/23  0734 01/06/23  1103   POCGLU 189* 207* 155* 237*       Blood Sugar Average: Last 72 hrs:  (P) 618 [id number]   • Lispro mealtime 2 unit  • ISS

## 2023-01-02 NOTE — ASSESSMENT & PLAN NOTE
Lab Results   Component Value Date    HGBA1C 5 0 02/08/2022       Recent Labs     01/01/23  1839 01/01/23  2105 01/01/23  2324   POCGLU 333* 333* 327*       Blood Sugar Average: Last 72 hrs:  · (P) 331Now on systemic steroids  · Continue SSI q6H while NPO  · Uncontrolled   Increasing to Alg 4

## 2023-01-03 LAB
ANION GAP SERPL CALCULATED.3IONS-SCNC: 6 MMOL/L (ref 4–13)
ATRIAL RATE: 60 BPM
BUN SERPL-MCNC: 18 MG/DL (ref 5–25)
CALCIUM SERPL-MCNC: 7.1 MG/DL (ref 8.3–10.1)
CHLORIDE SERPL-SCNC: 109 MMOL/L (ref 96–108)
CO2 SERPL-SCNC: 32 MMOL/L (ref 21–32)
CREAT SERPL-MCNC: 0.91 MG/DL (ref 0.6–1.3)
ERYTHROCYTE [DISTWIDTH] IN BLOOD BY AUTOMATED COUNT: 14.3 % (ref 11.6–15.1)
GFR SERPL CREATININE-BSD FRML MDRD: 75 ML/MIN/1.73SQ M
GLUCOSE SERPL-MCNC: 153 MG/DL (ref 65–140)
GLUCOSE SERPL-MCNC: 167 MG/DL (ref 65–140)
GLUCOSE SERPL-MCNC: 168 MG/DL (ref 65–140)
GLUCOSE SERPL-MCNC: 176 MG/DL (ref 65–140)
GLUCOSE SERPL-MCNC: 184 MG/DL (ref 65–140)
GLUCOSE SERPL-MCNC: 184 MG/DL (ref 65–140)
HCT VFR BLD AUTO: 35 % (ref 34.8–46.1)
HGB BLD-MCNC: 11.3 G/DL (ref 11.5–15.4)
MAGNESIUM SERPL-MCNC: 2.6 MG/DL (ref 1.6–2.6)
MCH RBC QN AUTO: 33 PG (ref 26.8–34.3)
MCHC RBC AUTO-ENTMCNC: 32.3 G/DL (ref 31.4–37.4)
MCV RBC AUTO: 102 FL (ref 82–98)
MRSA NOSE QL CULT: ABNORMAL
MRSA NOSE QL CULT: ABNORMAL
P AXIS: 62 DEGREES
PHOSPHATE SERPL-MCNC: 2.2 MG/DL (ref 2.7–4.5)
PLATELET # BLD AUTO: 128 THOUSANDS/UL (ref 149–390)
PMV BLD AUTO: 10.1 FL (ref 8.9–12.7)
POTASSIUM SERPL-SCNC: 4.4 MMOL/L (ref 3.5–5.3)
PR INTERVAL: 134 MS
QRS AXIS: 78 DEGREES
QRSD INTERVAL: 82 MS
QT INTERVAL: 464 MS
QTC INTERVAL: 464 MS
RBC # BLD AUTO: 3.42 MILLION/UL (ref 3.81–5.12)
SODIUM SERPL-SCNC: 147 MMOL/L (ref 135–147)
T WAVE AXIS: 35 DEGREES
VENTRICULAR RATE: 60 BPM
WBC # BLD AUTO: 15.82 THOUSAND/UL (ref 4.31–10.16)

## 2023-01-03 RX ADMIN — SODIUM CHLORIDE, SODIUM GLUCONATE, SODIUM ACETATE, POTASSIUM CHLORIDE, MAGNESIUM CHLORIDE, SODIUM PHOSPHATE, DIBASIC, AND POTASSIUM PHOSPHATE 75 ML/HR: .53; .5; .37; .037; .03; .012; .00082 INJECTION, SOLUTION INTRAVENOUS at 19:29

## 2023-01-03 RX ADMIN — HEPARIN SODIUM 5000 UNITS: 5000 INJECTION INTRAVENOUS; SUBCUTANEOUS at 14:19

## 2023-01-03 RX ADMIN — SODIUM CHLORIDE, SODIUM GLUCONATE, SODIUM ACETATE, POTASSIUM CHLORIDE, MAGNESIUM CHLORIDE, SODIUM PHOSPHATE, DIBASIC, AND POTASSIUM PHOSPHATE 75 ML/HR: .53; .5; .37; .037; .03; .012; .00082 INJECTION, SOLUTION INTRAVENOUS at 06:05

## 2023-01-03 RX ADMIN — HEPARIN SODIUM 5000 UNITS: 5000 INJECTION INTRAVENOUS; SUBCUTANEOUS at 05:56

## 2023-01-03 RX ADMIN — DIVALPROEX SODIUM 1500 MG: 500 TABLET, FILM COATED, EXTENDED RELEASE ORAL at 09:07

## 2023-01-03 RX ADMIN — IPRATROPIUM BROMIDE 0.5 MG: 0.5 SOLUTION RESPIRATORY (INHALATION) at 20:20

## 2023-01-03 RX ADMIN — CALCIUM 1 TABLET: 500 TABLET ORAL at 09:04

## 2023-01-03 RX ADMIN — INSULIN LISPRO 1 UNITS: 100 INJECTION, SOLUTION INTRAVENOUS; SUBCUTANEOUS at 06:33

## 2023-01-03 RX ADMIN — CALCIUM 1 TABLET: 500 TABLET ORAL at 16:40

## 2023-01-03 RX ADMIN — LEVALBUTEROL HYDROCHLORIDE 1.25 MG: 1.25 SOLUTION, CONCENTRATE RESPIRATORY (INHALATION) at 20:20

## 2023-01-03 RX ADMIN — IPRATROPIUM BROMIDE 0.5 MG: 0.5 SOLUTION RESPIRATORY (INHALATION) at 07:45

## 2023-01-03 RX ADMIN — FLUTICASONE PROPIONATE 1 SPRAY: 50 SPRAY, METERED NASAL at 09:07

## 2023-01-03 RX ADMIN — LEVALBUTEROL HYDROCHLORIDE 1.25 MG: 1.25 SOLUTION, CONCENTRATE RESPIRATORY (INHALATION) at 07:45

## 2023-01-03 RX ADMIN — INSULIN LISPRO 1 UNITS: 100 INJECTION, SOLUTION INTRAVENOUS; SUBCUTANEOUS at 12:11

## 2023-01-03 RX ADMIN — ARIPIPRAZOLE 10 MG: 5 TABLET ORAL at 09:04

## 2023-01-03 RX ADMIN — IPRATROPIUM BROMIDE 0.5 MG: 0.5 SOLUTION RESPIRATORY (INHALATION) at 02:58

## 2023-01-03 RX ADMIN — LORATADINE 5 MG: 10 TABLET ORAL at 09:04

## 2023-01-03 RX ADMIN — CEFTRIAXONE 2000 MG: 2 INJECTION, SOLUTION INTRAVENOUS at 16:38

## 2023-01-03 RX ADMIN — LEVALBUTEROL HYDROCHLORIDE 1.25 MG: 1.25 SOLUTION, CONCENTRATE RESPIRATORY (INHALATION) at 02:58

## 2023-01-03 RX ADMIN — IPRATROPIUM BROMIDE 0.5 MG: 0.5 SOLUTION RESPIRATORY (INHALATION) at 13:27

## 2023-01-03 RX ADMIN — AZITHROMYCIN 500 MG: 500 INJECTION, POWDER, LYOPHILIZED, FOR SOLUTION INTRAVENOUS at 17:08

## 2023-01-03 RX ADMIN — ATORVASTATIN CALCIUM 10 MG: 10 TABLET, FILM COATED ORAL at 16:40

## 2023-01-03 RX ADMIN — LEVALBUTEROL HYDROCHLORIDE 1.25 MG: 1.25 SOLUTION, CONCENTRATE RESPIRATORY (INHALATION) at 13:27

## 2023-01-03 RX ADMIN — INSULIN LISPRO 1 UNITS: 100 INJECTION, SOLUTION INTRAVENOUS; SUBCUTANEOUS at 16:40

## 2023-01-03 RX ADMIN — SODIUM PHOSPHATE, MONOBASIC, MONOHYDRATE 9 MMOL: 276; 142 INJECTION, SOLUTION INTRAVENOUS at 11:23

## 2023-01-03 RX ADMIN — LEVOTHYROXINE SODIUM 75 MCG: 75 TABLET ORAL at 05:56

## 2023-01-03 NOTE — PLAN OF CARE
Plan of care cont          Problem: RESPIRATORY - ADULT  Goal: Achieves optimal ventilation and oxygenation  Description: INTERVENTIONS:  - Assess for changes in respiratory status  - Assess for changes in mentation and behavior  - Position to facilitate oxygenation and minimize respiratory effort  - Oxygen administered by appropriate delivery if ordered  - Initiate smoking cessation education as indicated  - Encourage broncho-pulmonary hygiene including cough, deep breathe, Incentive Spirometry  - Assess the need for suctioning and aspirate as needed  - Assess and instruct to report SOB or any respiratory difficulty  - Respiratory Therapy support as indicated  Outcome: Progressing     Problem: Prexisting or High Potential for Compromised Skin Integrity  Goal: Skin integrity is maintained or improved  Description: INTERVENTIONS:  - Identify patients at risk for skin breakdown  - Assess and monitor skin integrity  - Assess and monitor nutrition and hydration status  - Monitor labs   - Assess for incontinence   - Turn and reposition patient  - Assist with mobility/ambulation  - Relieve pressure over bony prominences  - Avoid friction and shearing  - Provide appropriate hygiene as needed including keeping skin clean and dry  - Evaluate need for skin moisturizer/barrier cream  - Collaborate with interdisciplinary team   - Patient/family teaching  - Consider wound care consult   Outcome: Progressing     Problem: GENITOURINARY - ADULT  Goal: Maintains or returns to baseline urinary function  Description: INTERVENTIONS:  - Assess urinary function  - Encourage oral fluids to ensure adequate hydration if ordered  - Administer IV fluids as ordered to ensure adequate hydration  - Administer ordered medications as needed  - Offer frequent toileting  - Follow urinary retention protocol if ordered  Outcome: Progressing  Goal: Absence of urinary retention  Description: INTERVENTIONS:  - Assess patient’s ability to void and empty bladder  - Monitor I/O  - Bladder scan as needed  - Discuss with physician/AP medications to alleviate retention as needed  - Discuss catheterization for long term situations as appropriate  Outcome: Progressing     Problem: METABOLIC, FLUID AND ELECTROLYTES - ADULT  Goal: Electrolytes maintained within normal limits  Description: INTERVENTIONS:  - Monitor labs and assess patient for signs and symptoms of electrolyte imbalances  - Administer electrolyte replacement as ordered  - Monitor response to electrolyte replacements, including repeat lab results as appropriate  - Instruct patient on fluid and nutrition as appropriate  Outcome: Progressing  Goal: Fluid balance maintained  Description: INTERVENTIONS:  - Monitor labs   - Monitor I/O and WT  - Instruct patient on fluid and nutrition as appropriate  - Assess for signs & symptoms of volume excess or deficit  Outcome: Progressing  Goal: Glucose maintained within target range  Description: INTERVENTIONS:  - Monitor Blood Glucose as ordered  - Assess for signs and symptoms of hyperglycemia and hypoglycemia  - Administer ordered medications to maintain glucose within target range  - Assess nutritional intake and initiate nutrition service referral as needed  Outcome: Progressing

## 2023-01-03 NOTE — PROGRESS NOTES
Progress Note - Pulmonary   Cassidy Petersen 56 y o  female MRN: [id number]  Unit/Bed#: ICU 09 Encounter: [id number]    Assessment:  Multifocal pneumonia  Urine for strep pneumonia Legionella antigen negative  Although white blood cell count 15 8 patient appears to be clinically improving  Received dose of methylprednisolone yesterday which may have resulted in this  Mild intermittent asthma without exacerbation  Moderate ROBERTO  Patient has her auto CPAP which is set at 8 to 14 cm water and uses 2 L of oxygen at bedtime with it  Have instructed her therapist to use 4 L for now because of her pneumonia  Down syndrome with mild intellectual disability  Obesity    Plan:  I spoke with patient's mother and updated her on patient's condition  Continue IV ceftriaxone and azithromycin  She will use her auto CPAP set at 8 to 14 cm of water with 4 L of oxygen at bedtime      Subjective:   Has some cough  Not having difficulty breathing    Objective:     Vitals: Blood pressure 101/50, pulse 66, temperature (!) 96 3 °F (35 7 °C), temperature source Temporal, resp  rate 14, height 4' 8" (1 422 m), weight 85 2 kg (187 lb 13 3 oz), SpO2 95 %, not currently breastfeeding  ,Body mass index is 42 11 kg/m²  Intake/Output Summary (Last 24 hours) at 1/3/2023 1752  Last data filed at 1/3/2023 1700  Gross per 24 hour   Intake 2405 ml   Output 2335 ml   Net 70 ml       Physical Exam: Physical Exam  Vitals reviewed  Constitutional:       General: She is not in acute distress  Appearance: Normal appearance  She is well-developed  She is obese  Comments: On 2 L/min nasal cannula oxygen O2 saturation is 92%  Patient is awake and cooperative   HENT:      Head: Normocephalic  Right Ear: External ear normal       Left Ear: External ear normal       Nose: Nose normal       Mouth/Throat:      Mouth: Mucous membranes are moist       Pharynx: Oropharynx is clear  No oropharyngeal exudate     Eyes:      Conjunctiva/sclera: Conjunctivae normal       Pupils: Pupils are equal, round, and reactive to light  Neck:      Vascular: No JVD  Trachea: No tracheal deviation  Cardiovascular:      Rate and Rhythm: Normal rate and regular rhythm  Heart sounds: Normal heart sounds  Pulmonary:      Effort: Pulmonary effort is normal       Comments: Clear anteriorly  Few inspiratory crackles left lower lobe otherwise clear  Abdominal:      General: There is no distension  Palpations: Abdomen is soft  Tenderness: There is no abdominal tenderness  There is no guarding  Musculoskeletal:      Cervical back: Neck supple  Comments: No edema   Lymphadenopathy:      Cervical: No cervical adenopathy  Skin:     General: Skin is warm and dry  Findings: No rash  Neurological:      Mental Status: She is alert and oriented to person, place, and time  Psychiatric:         Behavior: Behavior normal          Thought Content: Thought content normal           Labs: I have personally reviewed pertinent lab results  , ABG:   Lab Results   Component Value Date    PHART 7 340 (L) 01/01/2023    OVP3TMB 49 4 (H) 01/01/2023    PO2ART 65 8 (L) 01/01/2023    JBN2GKO 26 1 01/01/2023    BEART -0 2 01/01/2023    SOURCE Radial, Left 01/01/2023   , BNP: No results found for: BNP, CBC:   Lab Results   Component Value Date    WBC 15 82 (H) 01/03/2023    HGB 11 3 (L) 01/03/2023    HCT 35 0 01/03/2023     (H) 01/03/2023     (L) 01/03/2023    ADJUSTEDWBC 9 00 02/08/2016    MCH 33 0 01/03/2023    MCHC 32 3 01/03/2023    RDW 14 3 01/03/2023    MPV 10 1 01/03/2023    NRBC 0 01/02/2023   , CMP:   Lab Results   Component Value Date     01/08/2016    K 4 4 01/03/2023    K 4 3 01/30/2021     (H) 01/03/2023     01/30/2021    CO2 32 01/03/2023    CO2 28 01/30/2021    ANIONGAP 15 0 01/08/2016    BUN 18 01/03/2023    BUN 15 01/30/2021    CREATININE 0 91 01/03/2023    CREATININE 1 1 01/08/2016    GLUCOSE 116 (H) 01/08/2016 CALCIUM 7 1 (L) 01/03/2023    CALCIUM 8 5 01/08/2016    AST 46 (H) 01/02/2023    AST 21 01/08/2016    ALT 19 01/02/2023    ALT 28 01/08/2016    ALKPHOS 42 (L) 01/02/2023    ALKPHOS 106 01/08/2016    PROT 7 2 01/08/2016    BILITOT 0 3 01/08/2016    EGFR 75 01/03/2023   , PT/INR:   Lab Results   Component Value Date    INR 1 16 01/01/2023    INR 1 00 01/08/2016   , Troponin: No results found for: TROPONIN    Imaging and other studies: I have personally reviewed pertinent reports     and I have personally reviewed pertinent films in PACS
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
## 2023-01-03 NOTE — PROGRESS NOTES
Daily Progress Note - [address] Gentle 56 y o  female MRN: [id number]  Unit/Bed#: ICU 09 Encounter: [id number]  Admitting Physician: Andrew Pryor MD  PCP: Chacha Smith DO  Date of Admission:  1/1/2023  1:46 PM    Summary:     IVFs: multi-electrolyte, Last Rate: 75 mL/hr (01/03/23 6993)      Diet: Diet David/CHO Controlled; Consistent Carbohydrate Diet Level 1 (4 carb servings/60 grams CHO/meal)  VTE:  Heparin   Mechanical prophylaxis in place  Patient Centered Rounds: I have performed bedside rounds with nursing staff today    Specialists/Other Care Team Provider: ICU    LOS: 2 day(s)  Status: Inpatient   Disposition: The patient will continue to require additional inpatient hospital stay due to respiratory distress requring 5L O2 and multifocal pneumonia requiring IV ABx  Code Status: Level 1 - Full Code    Assessment and Plan    Allergy to environmental factors  Assessment & Plan  Continue Loratadine 5mg (substitue for home levocetirizine 5mg) and home Flonase     Nonintractable epilepsy with complex partial seizures ([facility] )  Assessment & Plan  • No Sz activity this Admission  • Valporic acid level 68 this Admission   • Continued home depakote as IV  • Switched to Depakote PO 1500mg daily today    Severe sepsis ([facility] )  Assessment & Plan  • Secondary to multilobular PNA  • Flu/rsv/covid neg, Strep/legionella Neg  • BCx NG 24hrs  • U/A negative UTI  • Procal 1 06 > 0 82  • WBC 15 82 today up from 7 37 yesterday    - continue Continue Azithromycin 500mg IV and rocephin 2g IV daily (day 2)  -Trend Fever curve and WBC, no fevers overnight    Hyperlipidemia  Assessment & Plan  Continue home Lipitor 10 mg daily    Hypothyroidism  Assessment & Plan  Continue home Levothyroxine 75 mcg    Mild intellectual disability  Assessment & Plan  • Routine neuro checks and delirium precautions    Diabetes mellitus ([facility] )  Assessment & Plan  Lab Results   Component Value Date    HGBA1C 5 0 02/08/2022       Recent Labs     01/02/23  1201 01/02/23  1636 01/02/23 2057 01/03/23  0624   POCGLU 159* 227* 366* 153*       Blood Sugar Average: Last 72 hrs:  (P) 264 25   • ISS    History of prolonged Q-T interval on ECG  Assessment & Plan  • Continue  telemetry monitoring    Multifocal pneumonia  Assessment & Plan  CT C/A/P: Multifocal bilateral airspace disease in the lungs, likely pneumonia  Strep legionella urine neg  Procal 1 6, LA WNL    -On Ceftriaxone 2g and Azithro 500mg IV daily  -monitor vitals    Obstructive sleep apnea syndrome  Assessment & Plan  • Wears pediatric CPAP at home with nasal mask  E pressure 8  • Will continue HS, wore CPAP 12 45% overnight    * Acute on chronic respiratory failure with hypoxia (HCC)  Assessment & Plan  Wears 2L nc at home PRN and CPAP hs  Imaging in the ED showing multilobular PNA  trialed on 7L NC and spo2 87%, changed to FM and sats 89-90%  Pt wears CPAP HS at home, wore CPAP 12 45% overnight    • Continue supplemental O2 and wean for sat > 88%  • Xopenex/atrovent Q6h  • Continue Azithromycin 500mg IV and rocephin 2g IV daily  (day 2)  • CPAP at night    TIERNEY on Chronic renal insufficiency, stage III (moderate) (HCC)-resolved as of 1/3/2023  Assessment & Plan  Lab Results   Component Value Date    EGFR 75 01/03/2023    EGFR 57 01/02/2023    EGFR 41 01/01/2023    CREATININE 0 91 01/03/2023    CREATININE 1 15 01/02/2023    CREATININE 1 51 (H) 01/01/2023   • Resolved  • TIERNEY on CKD, Cr 1 51  • Given 1 5 L Nss in ED  • Continue isolyte 75 ml/hr  • Cr improved, 0 91 today    Trend I/o and renal function         Subjective:   Pt was seen and examined at the bedside, NAD  Pt still has SOB when she moves  Pt did not have any other acute complaints  Pt had issue overnight that CPAP mask was too big  Pt is now on smaller CPAP mask tolerating well  Review of Systems   Constitutional: Negative for chills and fever  HENT: Negative for ear pain and sore throat      Eyes: Negative for pain and visual disturbance  Respiratory: Positive for shortness of breath  Negative for cough  Cardiovascular: Negative for chest pain and palpitations  Gastrointestinal: Negative for abdominal pain, constipation, diarrhea, nausea and vomiting  Genitourinary: Negative for dysuria and hematuria  Musculoskeletal: Negative for arthralgias, back pain, neck pain and neck stiffness  Skin: Negative for color change and rash  Neurological: Negative for dizziness, weakness and headaches  Psychiatric/Behavioral: Negative for agitation and confusion  The patient is not nervous/anxious  All other systems reviewed and are negative  Objective     Vitals:   Temp (24hrs), Av 6 °F (35 9 °C), Min:96 3 °F (35 7 °C), Max:97 °F (36 1 °C)    Temp:  [96 3 °F (35 7 °C)-97 °F (36 1 °C)] 96 6 °F (35 9 °C)  HR:  [56-75] 66  Resp:  [12-23] 14  BP: ()/(50-64) 101/50  SpO2:  [87 %-97 %] 93 %  Body mass index is 42 11 kg/m²  Input and Output Summary (last 24 hours): Intake/Output Summary (Last 24 hours) at 1/3/2023 1324  Last data filed at 1/3/2023 1254  Gross per 24 hour   Intake 2405 ml   Output 1560 ml   Net 845 ml       Physical Exam:   Physical Exam  Vitals reviewed  Constitutional:       General: She is not in acute distress  Appearance: Normal appearance  She is obese  She is not ill-appearing  HENT:      Head: Normocephalic and atraumatic  Right Ear: External ear normal       Left Ear: External ear normal       Nose: Nose normal  No congestion or rhinorrhea  Mouth/Throat:      Mouth: Mucous membranes are moist    Eyes:      Extraocular Movements: Extraocular movements intact  Conjunctiva/sclera: Conjunctivae normal    Cardiovascular:      Rate and Rhythm: Normal rate and regular rhythm  Pulses: Normal pulses  Heart sounds: Normal heart sounds  No murmur heard  No gallop     Pulmonary:      Effort: Pulmonary effort is normal  No respiratory distress  Breath sounds: No stridor  Rales (bilateral) present  No wheezing or rhonchi  Chest:      Chest wall: No tenderness  Abdominal:      General: Abdomen is flat  Bowel sounds are normal  There is no distension  Palpations: Abdomen is soft  Tenderness: There is no abdominal tenderness  There is no guarding  Musculoskeletal:         General: No swelling, tenderness or deformity  Normal range of motion  Cervical back: Normal range of motion and neck supple  Right lower leg: No edema  Left lower leg: No edema  Skin:     General: Skin is warm and dry  Capillary Refill: Capillary refill takes less than 2 seconds  Findings: No bruising, erythema, lesion or rash  Neurological:      General: No focal deficit present  Mental Status: She is alert  Mental status is at baseline  She is disoriented  Comments: Down syndrome and intellectual disability    Psychiatric:         Mood and Affect: Mood normal          Behavior: Behavior normal          Thought Content: Thought content normal            Additional Data:     Labs:  Results from last 7 days   Lab Units 01/03/23  0518 01/02/23  0437   WBC Thousand/uL 15 82* 7 37   HEMOGLOBIN g/dL 11 3* 11 3*   HEMATOCRIT % 35 0 35 4   PLATELETS Thousands/uL 128* 112*   NEUTROS PCT %  --  78*   LYMPHS PCT %  --  16   MONOS PCT %  --  5   EOS PCT %  --  0     Results from last 7 days   Lab Units 01/03/23  0518 01/02/23  0437   POTASSIUM mmol/L 4 4 4 5   CHLORIDE mmol/L 109* 107   CO2 mmol/L 32 27   BUN mg/dL 18 17   CREATININE mg/dL 0 91 1 15   CALCIUM mg/dL 7 1* 6 9*   ALK PHOS U/L  --  42*   ALT U/L  --  19   AST U/L  --  46*     Results from last 7 days   Lab Units 01/01/23  1424   INR  1 16     Results from last 7 days   Lab Units 01/03/23  1120 01/03/23  0624 01/02/23  2057 01/02/23  1636 01/02/23  1201   POC GLUCOSE mg/dl 184* 153* 366* 227* 159*           * I Have Reviewed All Lab Data Listed Above    * Additional Pertinent Lab Tests Reviewed: All Labs Within Last 24 Hours Reviewed    Imaging:    Imaging Reports Reviewed Today Include: CT chest: Multifocal bilateral airspace disease in the lungs, most suggestive of pneumonia  Imaging Personally Reviewed by Myself Includes: none    Recent Cultures (last 7 days):     Results from last 7 days   Lab Units 01/01/23  1446 01/01/23  1425 01/01/23  1410   BLOOD CULTURE   --  No Growth at 24 hrs  No Growth at 24 hrs     LEGIONELLA URINARY ANTIGEN  Negative  --   --          Active Medications  Scheduled Meds:  Current Facility-Administered Medications   Medication Dose Route Frequency Provider Last Rate   • ARIPiprazole  10 mg Oral Daily SESAR Flores     • atorvastatin  10 mg Oral Daily With Dinner SESAR Flores     • azithromycin  500 mg Intravenous Q24H SESAR Flores     • calcium carbonate  1 tablet Oral BID With Meals SESAR Flores     • cefTRIAXone  2,000 mg Intravenous Q24H SESAR Mathias Stopped (01/02/23 1531)   • divalproex sodium  1,500 mg Oral Daily SESAR Flores     • fluticasone  1 spray Each Nare Daily SESAR Flores     • heparin (porcine)  5,000 Units Subcutaneous [facility] SESAR Mathias     • insulin lispro  1-5 Units Subcutaneous 4x Daily (AC & HS) Anita Rosa MD     • ipratropium  0 5 mg Nebulization Q6H Lotus Hays MD     • levalbuterol  1 25 mg Nebulization Q6H SESAR Flores      And   • sodium chloride  3 mL Nebulization Q6H PRN SESAR Flores     • levothyroxine  75 mcg Oral Early Morning SESAR Flores     • loratadine  5 mg Oral Daily SESAR Flores     • multi-electrolyte  75 mL/hr Intravenous Continuous SESAR Mathias 75 mL/hr (01/03/23 5924)     Continuous Infusions:  multi-electrolyte, 75 mL/hr, Last Rate: 75 mL/hr (01/03/23 0605)      PRN Meds:   sodium chloride, 3 mL, Q6H PRN                Patient Information Sharing: With the consent of Chela [place] , their loved ones were notified today by inpatient team of the patient’s condition and current plan  All questions answered  Time Spent for Care: 30 minutes  More than 50% of total time spent on counseling and coordination of care as described above      Zollie Severance, MD  01/03/23  1:24 PM

## 2023-01-04 LAB
ANION GAP SERPL CALCULATED.3IONS-SCNC: 4 MMOL/L (ref 4–13)
ANISOCYTOSIS BLD QL SMEAR: PRESENT
BASOPHILS # BLD MANUAL: 0 THOUSAND/UL (ref 0–0.1)
BASOPHILS NFR MAR MANUAL: 0 % (ref 0–1)
BUN SERPL-MCNC: 18 MG/DL (ref 5–25)
CALCIUM SERPL-MCNC: 7.4 MG/DL (ref 8.3–10.1)
CHLORIDE SERPL-SCNC: 108 MMOL/L (ref 96–108)
CO2 SERPL-SCNC: 33 MMOL/L (ref 21–32)
CREAT SERPL-MCNC: 0.93 MG/DL (ref 0.6–1.3)
EOSINOPHIL # BLD MANUAL: 0 THOUSAND/UL (ref 0–0.4)
EOSINOPHIL NFR BLD MANUAL: 0 % (ref 0–6)
ERYTHROCYTE [DISTWIDTH] IN BLOOD BY AUTOMATED COUNT: 14.6 % (ref 11.6–15.1)
GFR SERPL CREATININE-BSD FRML MDRD: 73 ML/MIN/1.73SQ M
GLUCOSE SERPL-MCNC: 145 MG/DL (ref 65–140)
GLUCOSE SERPL-MCNC: 152 MG/DL (ref 65–140)
GLUCOSE SERPL-MCNC: 181 MG/DL (ref 65–140)
GLUCOSE SERPL-MCNC: 200 MG/DL (ref 65–140)
GLUCOSE SERPL-MCNC: 209 MG/DL (ref 65–140)
HCT VFR BLD AUTO: 36.6 % (ref 34.8–46.1)
HGB BLD-MCNC: 11.5 G/DL (ref 11.5–15.4)
LYMPHOCYTES # BLD AUTO: 2.72 THOUSAND/UL (ref 0.6–4.47)
LYMPHOCYTES # BLD AUTO: 31 % (ref 14–44)
MACROCYTES BLD QL AUTO: PRESENT
MCH RBC QN AUTO: 32.9 PG (ref 26.8–34.3)
MCHC RBC AUTO-ENTMCNC: 31.4 G/DL (ref 31.4–37.4)
MCV RBC AUTO: 105 FL (ref 82–98)
MONOCYTES # BLD AUTO: 0.7 THOUSAND/UL (ref 0–1.22)
MONOCYTES NFR BLD: 8 % (ref 4–12)
NEUTROPHILS # BLD MANUAL: 5.34 THOUSAND/UL (ref 1.85–7.62)
NEUTS BAND NFR BLD MANUAL: 19 % (ref 0–8)
NEUTS SEG NFR BLD AUTO: 42 % (ref 43–75)
PLATELET # BLD AUTO: 139 THOUSANDS/UL (ref 149–390)
PLATELET BLD QL SMEAR: ABNORMAL
PMV BLD AUTO: 10.4 FL (ref 8.9–12.7)
POLYCHROMASIA BLD QL SMEAR: PRESENT
POTASSIUM SERPL-SCNC: 4.5 MMOL/L (ref 3.5–5.3)
RBC # BLD AUTO: 3.5 MILLION/UL (ref 3.81–5.12)
RBC MORPH BLD: PRESENT
SODIUM SERPL-SCNC: 145 MMOL/L (ref 135–147)
WBC # BLD AUTO: 8.76 THOUSAND/UL (ref 4.31–10.16)

## 2023-01-04 RX ADMIN — FLUTICASONE PROPIONATE 1 SPRAY: 50 SPRAY, METERED NASAL at 09:40

## 2023-01-04 RX ADMIN — HEPARIN SODIUM 5000 UNITS: 5000 INJECTION INTRAVENOUS; SUBCUTANEOUS at 06:00

## 2023-01-04 RX ADMIN — LEVOTHYROXINE SODIUM 75 MCG: 75 TABLET ORAL at 08:25

## 2023-01-04 RX ADMIN — IPRATROPIUM BROMIDE 0.5 MG: 0.5 SOLUTION RESPIRATORY (INHALATION) at 19:48

## 2023-01-04 RX ADMIN — SODIUM CHLORIDE, SODIUM GLUCONATE, SODIUM ACETATE, POTASSIUM CHLORIDE, MAGNESIUM CHLORIDE, SODIUM PHOSPHATE, DIBASIC, AND POTASSIUM PHOSPHATE 75 ML/HR: .53; .5; .37; .037; .03; .012; .00082 INJECTION, SOLUTION INTRAVENOUS at 09:37

## 2023-01-04 RX ADMIN — ATORVASTATIN CALCIUM 10 MG: 10 TABLET, FILM COATED ORAL at 15:36

## 2023-01-04 RX ADMIN — LORATADINE 5 MG: 10 TABLET ORAL at 08:25

## 2023-01-04 RX ADMIN — CALCIUM 1 TABLET: 500 TABLET ORAL at 08:25

## 2023-01-04 RX ADMIN — INSULIN LISPRO 1 UNITS: 100 INJECTION, SOLUTION INTRAVENOUS; SUBCUTANEOUS at 22:12

## 2023-01-04 RX ADMIN — LEVOTHYROXINE SODIUM 75 MCG: 75 TABLET ORAL at 06:00

## 2023-01-04 RX ADMIN — AZITHROMYCIN 500 MG: 500 INJECTION, POWDER, LYOPHILIZED, FOR SOLUTION INTRAVENOUS at 15:25

## 2023-01-04 RX ADMIN — CEFTRIAXONE 2000 MG: 2 INJECTION, SOLUTION INTRAVENOUS at 15:25

## 2023-01-04 RX ADMIN — HEPARIN SODIUM 5000 UNITS: 5000 INJECTION INTRAVENOUS; SUBCUTANEOUS at 08:26

## 2023-01-04 RX ADMIN — CALCIUM 1 TABLET: 500 TABLET ORAL at 07:30

## 2023-01-04 RX ADMIN — DIVALPROEX SODIUM 1500 MG: 500 TABLET, FILM COATED, EXTENDED RELEASE ORAL at 08:25

## 2023-01-04 RX ADMIN — HEPARIN SODIUM 5000 UNITS: 5000 INJECTION INTRAVENOUS; SUBCUTANEOUS at 15:26

## 2023-01-04 RX ADMIN — INSULIN LISPRO 1 UNITS: 100 INJECTION, SOLUTION INTRAVENOUS; SUBCUTANEOUS at 16:34

## 2023-01-04 RX ADMIN — LEVALBUTEROL HYDROCHLORIDE 1.25 MG: 1.25 SOLUTION, CONCENTRATE RESPIRATORY (INHALATION) at 19:48

## 2023-01-04 RX ADMIN — IPRATROPIUM BROMIDE 0.5 MG: 0.5 SOLUTION RESPIRATORY (INHALATION) at 07:44

## 2023-01-04 RX ADMIN — ARIPIPRAZOLE 10 MG: 5 TABLET ORAL at 08:26

## 2023-01-04 RX ADMIN — INSULIN LISPRO 1 UNITS: 100 INJECTION, SOLUTION INTRAVENOUS; SUBCUTANEOUS at 11:49

## 2023-01-04 RX ADMIN — LEVALBUTEROL HYDROCHLORIDE 1.25 MG: 1.25 SOLUTION, CONCENTRATE RESPIRATORY (INHALATION) at 07:44

## 2023-01-04 RX ADMIN — HEPARIN SODIUM 5000 UNITS: 5000 INJECTION INTRAVENOUS; SUBCUTANEOUS at 22:07

## 2023-01-04 RX ADMIN — CALCIUM 1 TABLET: 500 TABLET ORAL at 15:36

## 2023-01-04 RX ADMIN — IPRATROPIUM BROMIDE 0.5 MG: 0.5 SOLUTION RESPIRATORY (INHALATION) at 13:19

## 2023-01-04 RX ADMIN — LEVALBUTEROL HYDROCHLORIDE 1.25 MG: 1.25 SOLUTION, CONCENTRATE RESPIRATORY (INHALATION) at 13:19

## 2023-01-04 NOTE — PLAN OF CARE
Problem: RESPIRATORY - ADULT  Goal: Achieves optimal ventilation and oxygenation  Description: INTERVENTIONS:  - Assess for changes in respiratory status  - Assess for changes in mentation and behavior  - Position to facilitate oxygenation and minimize respiratory effort  - Oxygen administered by appropriate delivery if ordered  - Initiate smoking cessation education as indicated  - Encourage broncho-pulmonary hygiene including cough, deep breathe, Incentive Spirometry  - Assess the need for suctioning and aspirate as needed  - Assess and instruct to report SOB or any respiratory difficulty  - Respiratory Therapy support as indicated  Outcome: Progressing     Problem: Prexisting or High Potential for Compromised Skin Integrity  Goal: Skin integrity is maintained or improved  Description: INTERVENTIONS:  - Identify patients at risk for skin breakdown  - Assess and monitor skin integrity  - Assess and monitor nutrition and hydration status  - Monitor labs   - Assess for incontinence   - Turn and reposition patient  - Assist with mobility/ambulation  - Relieve pressure over bony prominences  - Avoid friction and shearing  - Provide appropriate hygiene as needed including keeping skin clean and dry  - Evaluate need for skin moisturizer/barrier cream  - Collaborate with interdisciplinary team   - Patient/family teaching  - Consider wound care consult   Outcome: Progressing     Problem: MOBILITY - ADULT  Goal: Maintain or return to baseline ADL function  Description: INTERVENTIONS:  -  Assess patient's ability to carry out ADLs; assess patient's baseline for ADL function and identify physical deficits which impact ability to perform ADLs (bathing, care of mouth/teeth, toileting, grooming, dressing, etc )  - Assess/evaluate cause of self-care deficits   - Assess range of motion  - Assess patient's mobility; develop plan if impaired  - Assess patient's need for assistive devices and provide as appropriate  - Encourage maximum independence but intervene and supervise when necessary  - Involve family in performance of ADLs  - Assess for home care needs following discharge   - Consider OT consult to assist with ADL evaluation and planning for discharge  - Provide patient education as appropriate  Outcome: Progressing  Goal: Maintains/Returns to pre admission functional level  Description: INTERVENTIONS:  - Perform BMAT or MOVE assessment daily    - Set and communicate daily mobility goal to care team and patient/family/caregiver  - Collaborate with rehabilitation services on mobility goals if consulted  - Perform Range of Motion 3 times a day  - Reposition patient every 2 hours    - Dangle patient 3 times a day  - Stand patient 3 times a day  - Ambulate patient 3 times a day  - Out of bed to chair 3 times a day   - Out of bed for meals 3 times a day  - Out of bed for toileting  - Record patient progress and toleration of activity level   Outcome: Progressing     Problem: GENITOURINARY - ADULT  Goal: Maintains or returns to baseline urinary function  Description: INTERVENTIONS:  - Assess urinary function  - Encourage oral fluids to ensure adequate hydration if ordered  - Administer IV fluids as ordered to ensure adequate hydration  - Administer ordered medications as needed  - Offer frequent toileting  - Follow urinary retention protocol if ordered  Outcome: Progressing  Goal: Absence of urinary retention  Description: INTERVENTIONS:  - Assess patient’s ability to void and empty bladder  - Monitor I/O  - Bladder scan as needed  - Discuss with physician/AP medications to alleviate retention as needed  - Discuss catheterization for long term situations as appropriate  Outcome: Progressing     Problem: METABOLIC, FLUID AND ELECTROLYTES - ADULT  Goal: Electrolytes maintained within normal limits  Description: INTERVENTIONS:  - Monitor labs and assess patient for signs and symptoms of electrolyte imbalances  - Administer electrolyte replacement as ordered  - Monitor response to electrolyte replacements, including repeat lab results as appropriate  - Instruct patient on fluid and nutrition as appropriate  Outcome: Progressing  Goal: Fluid balance maintained  Description: INTERVENTIONS:  - Monitor labs   - Monitor I/O and WT  - Instruct patient on fluid and nutrition as appropriate  - Assess for signs & symptoms of volume excess or deficit  Outcome: Progressing  Goal: Glucose maintained within target range  Description: INTERVENTIONS:  - Monitor Blood Glucose as ordered  - Assess for signs and symptoms of hyperglycemia and hypoglycemia  - Administer ordered medications to maintain glucose within target range  - Assess nutritional intake and initiate nutrition service referral as needed  Outcome: Progressing     Problem: SKIN/TISSUE INTEGRITY - ADULT  Goal: Skin Integrity remains intact(Skin Breakdown Prevention)  Description: Assess:  -Perform Stefan assessment every shift  -Clean and moisturize skin every shift  -Inspect skin when repositioning, toileting, and assisting with ADLS  -Assess under medical devices such as IV sites every shift  -Assess extremities for adequate circulation and sensation     Bed Management:  -Have minimal linens on bed & keep smooth, unwrinkled  -Change linens as needed when moist or perspiring  -Avoid sitting or lying in one position for more than 2 hours while in bed  -Keep HOB at 30 degrees     Toileting:  -Offer bedside commode  -Assess for incontinence every shift  -Use incontinent care products after each incontinent episode such as barrier creams    Activity:  -Mobilize patient 3 times a day  -Encourage activity and walks on unit  -Encourage or provide ROM exercises   -Turn and reposition patient every 2 Hours  -Use appropriate equipment to lift or move patient in bed  -Instruct/ Assist with weight shifting every 2hrs when out of bed in chair  -Consider limitation of chair time 2 hour intervals    Skin Care:  -Avoid use of baby powder, tape, friction and shearing, hot water or constrictive clothing  -Relieve pressure over bony prominences using waffle cushion  -Do not massage red bony areas    Next Steps:  -Teach patient strategies to minimize risks such as repositioning   -Consider consults to  interdisciplinary teams such as PT  Outcome: Progressing     Problem: MUSCULOSKELETAL - ADULT  Goal: Maintain or return mobility to safest level of function  Description: INTERVENTIONS:  - Assess patient's ability to carry out ADLs; assess patient's baseline for ADL function and identify physical deficits which impact ability to perform ADLs (bathing, care of mouth/teeth, toileting, grooming, dressing, etc )  - Assess/evaluate cause of self-care deficits   - Assess range of motion  - Assess patient's mobility  - Assess patient's need for assistive devices and provide as appropriate  - Encourage maximum independence but intervene and supervise when necessary  - Involve family in performance of ADLs  - Assess for home care needs following discharge   - Consider OT consult to assist with ADL evaluation and planning for discharge  - Provide patient education as appropriate  Outcome: Progressing  Goal: Maintain proper alignment of affected body part  Description: INTERVENTIONS:  - Support, maintain and protect limb and body alignment  - Provide patient/ family with appropriate education  Outcome: Progressing     Problem: Potential for Falls  Goal: Patient will remain free of falls  Description: INTERVENTIONS:  - Educate patient/family on patient safety including physical limitations  - Instruct patient to call for assistance with activity   - Consult OT/PT to assist with strengthening/mobility   - Keep Call bell within reach  - Keep bed low and locked with side rails adjusted as appropriate  - Keep care items and personal belongings within reach  - Initiate and maintain comfort rounds  - Make Fall Risk Sign visible to staff  - Offer Toileting every 2 Hours, in advance of need  - Initiate/Maintain bed alarm  - Obtain necessary fall risk management equipment: yellow socks  - Apply yellow socks and bracelet for high fall risk patients  - Consider moving patient to room near nurses station  Outcome: Progressing     Problem: Nutrition/Hydration-ADULT  Goal: Nutrient/Hydration intake appropriate for improving, restoring or maintaining nutritional needs  Description: Monitor and assess patient's nutrition/hydration status for malnutrition  Collaborate with interdisciplinary team and initiate plan and interventions as ordered  Monitor patient's weight and dietary intake as ordered or per policy  Utilize nutrition screening tool and intervene as necessary  Determine patient's food preferences and provide high-protein, high-caloric foods as appropriate       INTERVENTIONS:  - Monitor oral intake, urinary output, labs, and treatment plans  - Assess nutrition and hydration status and recommend course of action  - Evaluate amount of meals eaten  - Assist patient with eating if necessary   - Allow adequate time for meals  - Recommend/ encourage appropriate diets, oral nutritional supplements, and vitamin/mineral supplements  - Order, calculate, and assess calorie counts as needed  - Recommend, monitor, and adjust tube feedings and TPN/PPN based on assessed needs  - Assess need for intravenous fluids  - Provide specific nutrition/hydration education as appropriate  - Include patient/family/caregiver in decisions related to nutrition  Outcome: Progressing

## 2023-01-04 NOTE — PROGRESS NOTES
Daily Progress Note - [address] KALLIE Gentle 56 y o  female MRN: [id number]  Unit/Bed#: [address] [place] [address] Encounter: [id number]  Admitting Physician: Sherman Tovar MD  PCP: Neha Ross DO  Date of Admission:  1/1/2023  1:46 PM    Summary:     IVFs: multi-electrolyte, Last Rate: 75 mL/hr (01/03/23 1929)      Diet: Diet David/CHO Controlled; Consistent Carbohydrate Diet Level 1 (4 carb servings/60 grams CHO/meal)  VTE:  Heparin   Mechanical prophylaxis in place  Patient Centered Rounds: I have performed bedside rounds with nursing staff today    Specialists/Other Care Team Provider: Pulmo    LOS: 3 day(s)  Status: Inpatient   Disposition: The patient will continue to require additional inpatient hospital stay due to multifocal Pneumonia requring IV ABX  Code Status: Level 1 - Full Code    Assessment and Plan    Allergy to environmental factors  Assessment & Plan  Continue Loratadine 5mg (substitue for home levocetirizine 5mg) and home Flonase     Nonintractable epilepsy with complex partial seizures ([facility] [address] )  Assessment & Plan  • No Sz activity this Admission  • Valporic acid level 68 this Admission   • Continued home Depakote PO 1500mg daily today    Severe sepsis ([facility] [address] )  Assessment & Plan  • Secondary to multilobular PNA  • Flu/rsv/covid neg, Strep/legionella Neg  • BCx NG 24hrs  • U/A negative UTI  • Procal 1 06 > 0 82  • WBC 8 76 today down from 15 82 yesterday    - continue Continue Azithromycin 500mg IV and rocephin 2g IV daily (day 2)  - Trend Fever curve and WBC, no fevers overnight    Hyperlipidemia  Assessment & Plan  Continue home Lipitor 10 mg daily    Hypothyroidism  Assessment & Plan  Continue home Levothyroxine 75 mcg    Mild intellectual disability  Assessment & Plan  • Routine neuro checks and delirium precautions    Diabetes mellitus [facility])  Assessment & Plan  Lab Results   Component Value Date    HGBA1C 5 0 02/08/2022       Recent Labs     01/03/23  1600 01/03/23  1638 01/03/23  2046 01/04/23  0724   POCGLU 176* 167* 168* 145*       Blood Sugar Average: Last 72 hrs:  (P) 227 [id number]   • ISS    History of prolonged Q-T interval on ECG  Assessment & Plan  • Continue  telemetry monitoring    Multifocal pneumonia  Assessment & Plan  CT C/A/P: Multifocal bilateral airspace disease in the lungs, likely pneumonia  Strep legionella urine neg  Procal 1 6, LA WNL, WBC 8 76    -On Ceftriaxone 2g and Azithro 500mg IV daily  -monitor vitals    Obstructive sleep apnea syndrome  Assessment & Plan  • Wears pediatric CPAP at home with nasal mask  E pressure 8  • Will continue HS, wore CPAP 8-14 45% overnight    Pt had O2 sat around 80's with home CPAP machine last night so it was switched back to hospital machine  * Acute on chronic respiratory failure with hypoxia (HCC)  Assessment & Plan  Wears 2L nc at home PRN and CPAP hs  Imaging in the ED showing multilobular PNA  trialed on 7L NC and spo2 87%, changed to FM and sats 89-90%  Pt wears CPAP HS at home, wore CPAP 12 45% overnight    • Continue supplemental O2 and wean for sat > 88%  • Xopenex/atrovent Q6h  • Continue Azithromycin 500mg IV and rocephin 2g IV daily  (day 2)  • CPAP at night    TIERNEY on Chronic renal insufficiency, stage III (moderate) (formerly [facility])-resolved as of 1/3/2023  Assessment & Plan  Lab Results   Component Value Date    EGFR 73 01/04/2023    EGFR 75 01/03/2023    EGFR 57 01/02/2023    CREATININE 0 93 01/04/2023    CREATININE 0 91 01/03/2023    CREATININE 1 15 01/02/2023   • Resolved  • TIERNEY on CKD, Cr 1 51  • Given 1 5 L Nss in ED  • Continue isolyte 75 ml/hr  • Cr improved, 0 93 today    Trend I/o and renal function         Subjective:   Pt was seen and examined at the bedside, NAD  Pt did not have any acute complaints and Pt states that she is breathing better  Pt had O2 sat around 80's with home CPAP machine last night so it was switched back to hospital machine      Review of Systems   Constitutional: Negative for chills and fever  HENT: Negative for ear pain and sore throat  Eyes: Negative for pain and visual disturbance  Respiratory: Negative for cough and shortness of breath  Cardiovascular: Negative for chest pain and palpitations  Gastrointestinal: Negative for abdominal pain, constipation, diarrhea, nausea and vomiting  Genitourinary: Negative for dysuria and hematuria  Musculoskeletal: Negative for arthralgias, back pain, neck pain and neck stiffness  Skin: Negative for color change and rash  Neurological: Negative for dizziness, weakness and headaches  Psychiatric/Behavioral: Negative for agitation and confusion  The patient is not nervous/anxious  All other systems reviewed and are negative  Objective     Vitals:   Temp (24hrs), Av 2 °F (36 2 °C), Min:96 3 °F (35 7 °C), Max:98 1 °F (36 7 °C)    Temp:  [96 3 °F (35 7 °C)-98 1 °F (36 7 °C)] 98 1 °F (36 7 °C)  HR:  [55-76] 55  Resp:  [13-22] 18  BP: ()/(50-59) 111/58  SpO2:  [91 %-95 %] 91 %  Body mass index is 42 11 kg/m²  Input and Output Summary (last 24 hours): Intake/Output Summary (Last 24 hours) at 2023 0908  Last data filed at 1/3/2023 2000  Gross per 24 hour   Intake 2190 ml   Output 1225 ml   Net 965 ml       Physical Exam:   Physical Exam  Vitals reviewed  Constitutional:       General: She is not in acute distress  Appearance: Normal appearance  She is obese  She is not ill-appearing  HENT:      Head: Normocephalic and atraumatic  Right Ear: External ear normal       Left Ear: External ear normal       Nose: Nose normal  No congestion or rhinorrhea  Mouth/Throat:      Mouth: Mucous membranes are moist    Eyes:      Extraocular Movements: Extraocular movements intact  Conjunctiva/sclera: Conjunctivae normal    Cardiovascular:      Rate and Rhythm: Normal rate and regular rhythm  Pulses: Normal pulses  Heart sounds: Normal heart sounds  No murmur heard      No gallop  Pulmonary:      Effort: Pulmonary effort is normal  No respiratory distress  Breath sounds: Normal breath sounds  No stridor  No wheezing, rhonchi or rales  Chest:      Chest wall: No tenderness  Abdominal:      General: Abdomen is flat  Bowel sounds are normal  There is no distension  Palpations: Abdomen is soft  Tenderness: There is no abdominal tenderness  There is no guarding  Musculoskeletal:         General: No swelling, tenderness or deformity  Normal range of motion  Cervical back: Normal range of motion and neck supple  Right lower leg: No edema  Left lower leg: No edema  Skin:     General: Skin is warm and dry  Capillary Refill: Capillary refill takes less than 2 seconds  Findings: No bruising, erythema, lesion or rash  Neurological:      General: No focal deficit present  Mental Status: She is alert  Mental status is at baseline  She is disoriented  Psychiatric:         Mood and Affect: Mood normal          Behavior: Behavior normal          Thought Content: Thought content normal          Cognition and Memory: Cognition is impaired (down syndrome)  Additional Data:     Labs:  Results from last 7 days   Lab Units 01/04/23 0815 01/03/23 0518 01/02/23  0437   WBC Thousand/uL 8 76   < > 7 37   HEMOGLOBIN g/dL 11 5   < > 11 3*   HEMATOCRIT % 36 6   < > 35 4   PLATELETS Thousands/uL 139*   < > 112*   NEUTROS PCT %  --   --  78*   LYMPHS PCT %  --   --  16   MONOS PCT %  --   --  5   EOS PCT %  --   --  0    < > = values in this interval not displayed       Results from last 7 days   Lab Units 01/04/23 0815 01/03/23 0518 01/02/23  0437   POTASSIUM mmol/L 4 5   < > 4 5   CHLORIDE mmol/L 108   < > 107   CO2 mmol/L 33*   < > 27   BUN mg/dL 18   < > 17   CREATININE mg/dL 0 93   < > 1 15   CALCIUM mg/dL 7 4*   < > 6 9*   ALK PHOS U/L  --   --  42*   ALT U/L  --   --  19   AST U/L  --   --  46*    < > = values in this interval not displayed  Results from last 7 days   Lab Units 01/01/23  1424   INR  1 16     Results from last 7 days   Lab Units 01/04/23  0724 01/03/23  2046 01/03/23  1638 01/03/23  1600 01/03/23  1120   POC GLUCOSE mg/dl 145* 168* 167* 176* 184*           * I Have Reviewed All Lab Data Listed Above  * Additional Pertinent Lab Tests Reviewed: All Labs Within Last 24 Hours Reviewed    Imaging:    Imaging Reports Reviewed Today Include: none  Imaging Personally Reviewed by Myself Includes:  none    Recent Cultures (last 7 days):     Results from last 7 days   Lab Units 01/01/23  1446 01/01/23  1425 01/01/23  1410   BLOOD CULTURE   --  No Growth at 48 hrs  No Growth at 48 hrs     LEGIONELLA URINARY ANTIGEN  Negative  --   --          Active Medications  Scheduled Meds:  Current Facility-Administered Medications   Medication Dose Route Frequency Provider Last Rate   • ARIPiprazole  10 mg Oral Daily Blanca Castaneda MD     • atorvastatin  10 mg Oral Daily With Timo Fernandez MD     • azithromycin  500 mg Intravenous Q24H Blanca Castaneda MD Stopped (01/03/23 1808)   • calcium carbonate  1 tablet Oral BID With Meals Blanca Castaneda MD     • cefTRIAXone  2,000 mg Intravenous Q24H Blanca Castaneda MD Stopped (01/03/23 1708)   • divalproex sodium  1,500 mg Oral Daily Blanca Castaneda MD     • fluticasone  1 spray Each Nare Daily Blanca Castaneda MD     • heparin (porcine)  5,000 Units Subcutaneous Q8H [address] Blanca Castaneda MD     • insulin lispro  1-5 Units Subcutaneous 4x Daily (AC & HS) Blanca Castaneda MD     • ipratropium  0 5 mg Nebulization Q6H Blanca Castaneda MD     • levalbuterol  1 25 mg Nebulization Q6H Blanca Castaneda MD      And   • sodium chloride  3 mL Nebulization Q6H PRN Blanca Castaneda MD     • levothyroxine  75 mcg Oral Early Morning Blanca Castaneda MD     • loratadine  5 mg Oral Daily Blanca Castaneda MD     • multi-electrolyte  75 mL/hr Intravenous Continuous Blanca Castaneda MD 75 mL/hr (01/03/23 1929)     Continuous Infusions:  multi-electrolyte, 75 mL/hr, Last Rate: 75 mL/hr (01/03/23 1929)      PRN Meds:   sodium chloride, 3 mL, Q6H PRN                Patient Information Sharing: With the consent of Chela Petersen , their loved ones were notified today by inpatient team of the patient’s condition and current plan  All questions answered  Time Spent for Care: 30 minutes  More than 50% of total time spent on counseling and coordination of care as described above      Amado Arana MD  01/04/23  9:08 AM

## 2023-01-04 NOTE — PHYSICAL THERAPY NOTE
PHYSICAL THERAPY EVALUATION/TREATMENT       01/04/23 0925   PT Last Visit   PT Visit Date 01/04/23   Note Type   Note type Evaluation   Pain Assessment   Pain Assessment Tool 0-10   Pain Score No Pain   Patient's Stated Pain Goal No pain   Multiple Pain Sites No   Home Living   Type of Home Group Home   Additional Comments Per chart review patient resides in group home, requires assist with ADLs/IADLs, IND with functional mobility (does not appear to use device PTA)   Prior Function   Level of [place] Independent with functional mobility; Needs assistance with ADLs; Needs assistance with IADLS   Lives With Family  (per chart review with family during the day, lives at group home)   Receives Help From Family   IADLs Family/Friend/Other provides transportation   Vocational On disability   General   Additional Pertinent History Pt is a 56year-old female who was admitted to the hospital on 1/1/23 due to progressive SOB  Family/Caregiver Present No   Cognition   Arousal/Participation Cooperative   Orientation Level Oriented to person;Oriented to place; Appropriate for developmental age   Following Commands Follows one step commands with increased time or repetition   Subjective   Subjective "I need to go to the bathroom"   RLE Assessment   RLE Assessment WFL   LLE Assessment   LLE Assessment WFL   Bed Mobility   Rolling R 5  Supervision   Additional items Bedrails   Rolling L 5  Supervision   Additional items Bedrails   Supine to Sit 4  Minimal assistance   Sit to Supine 5  Supervision   Additional items Bedrails; Increased time required   Transfers   Sit to Stand 5  Supervision   Additional items Verbal cues   Stand to Sit 5  Supervision   Additional items Verbal cues   Toilet transfer 5  Supervision   Additional items Verbal cues   Ambulation/Elevation   Gait pattern Wide TY; Short stride  (Mild forward flexed posture, shuffling steps)   Gait Assistance 5  Supervision   Additional items Verbal cues Assistive Device None   Distance 15 feet to bedside chair   Stair Management Assistance Not tested   Balance   Static Sitting Good   Dynamic Sitting Fair +   Static Standing Fair   Dynamic Standing Fair   Ambulatory Fair -   Activity Tolerance   Activity Tolerance Patient tolerated treatment well;Patient limited by fatigue   Nurse Made Aware yes   Assessment   Prognosis Good   Problem List Decreased endurance; Impaired balance;Decreased mobility; Decreased cognition; Impaired judgement;Decreased safety awareness   Assessment Patient seen for Physical Therapy evaluation  Patient admitted with Acute on chronic respiratory failure with hypoxia ([address] )  Comorbidities affecting patient's physical performance include: diabetes and hypothyroid, Mild intellectual disability, down syndrome  Personal factors affecting patient at time of initial evaluation include: inability to navigate community distances, decreased cognition, inability to perform ADLS, inability to perform IADLS  and inability to live alone  Prior to admission, patient was independent with functional mobility without assistive device, requiring assist for ADLS, requiring assist for IADLS, ambulating household distance and on disability  Please find objective findings from Physical Therapy assessment regarding body systems outlined above with impairments and limitations including weakness, impaired balance, decreased endurance, gait deviations, decreased activity tolerance, decreased functional mobility tolerance, decreased safety awareness, impaired judgement, fall risk and decreased cognition  The Barthel Index was used as a functional outcome tool presenting with a score of Barthel Index Score: 60 today indicating moderate limitations of functional mobility and ADLS  Patient's clinical presentation is currently evolving as seen in patient's presentation of increased fall risk and decreased endurance   Pt would benefit from continued Physical Therapy treatment to address deficits as defined above and maximize level of functional mobility  As demonstrated by objective findings, the assigned level of complexity for this evaluation is moderate  The patient's AM-PAC Basic Mobility Inpatient Short Form Raw Score is 18  A Raw score of greater than 16 suggests the patient may benefit from discharge to home  Please also refer to the recommendation of the Physical Therapist for safe discharge planning  Goals   Patient Goals to see my parents   STG Expiration Date 01/11/23   Short Term Goal #1 Pt will perform bed mobility IND ; Pt will improve standing balance to fair + x 5 minutes to decrease risk of falls ; Pt will perform toilet transfer IND   Short Term Goal #2 Pt will ambulate x 150 feet IND with no device in order to safely navigate home setting   LTG Expiration Date 01/18/23   Long Term Goal #1 Pt will improve standing balance to Good x 5 minutes to decrease risk of falls ; Pt will perform bed/toilet transfers IND   Long Term Goal #2 Pt will ambulate x 300 feet IND in order to safely navigate home environment   Plan   Treatment/Interventions ADL retraining;Functional transfer training;LE strengthening/ROM; Endurance training; Therapeutic exercise; Bed mobility;Gait training;Spoke to nursing   PT Frequency 3-5x/wk   Recommendation   PT Discharge Recommendation   (Return to group home + family assist)   [address] Mobility Inpatient   Turning in Bed Without Bedrails 4   Lying on Back to Sitting on Edge of Flat Bed 3   Moving Bed to Chair 3   Standing Up From Chair 4   Walk in Room 3   Climb 3-5 Stairs 1   Basic Mobility Inpatient Raw Score 18   Basic Mobility Standardized Score 41 05   Highest Level Of Mobility   JH-HLM Goal 6: Walk 10 steps or more   JH-HLM Achieved 6: Walk 10 steps or more   Barthel Index   Feeding 10   Bathing 0   Grooming Score 0   Dressing Score 5   Bladder Score 10   Bowels Score 10   Toilet Use Score 5   Transfers (Bed/Chair) Score 10 Mobility (Level Surface) Score 10   Stairs Score 0   Barthel Index Score 60   Additional Treatment Session   Start Time 0915   End Time 1354   Treatment Assessment S: "I can sit in the chair" O/A: Pt agreeable to PT session this AM  Pt requesting assist to bathroom - able to transfer onto bedside commode with Supervision  Requires assist for pericare, patient reports she is unable to perform herself  Pt able to transfer to bedside chair with supervision, repositioned for comfort with all needs within reach  P: pt will benefit from skilled PT to address deficits to maximize IND for safe d/c   Equipment Use commode   End of Consult   Patient Position at End of Consult Bedside chair;Bed/Chair alarm activated; All needs within reach   [employer] License Number  [employer] [id number]

## 2023-01-04 NOTE — RESPIRATORY THERAPY NOTE
Had to put patient on V60 as she was desatting into the 80s on her own machine, even with 5l bled in
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
## 2023-01-05 ENCOUNTER — APPOINTMENT (INPATIENT)
Dept: RADIOLOGY | Facility: HOSPITAL | Age: 48
End: 2023-01-05

## 2023-01-05 LAB
ANION GAP SERPL CALCULATED.3IONS-SCNC: 4 MMOL/L (ref 4–13)
BUN SERPL-MCNC: 13 MG/DL (ref 5–25)
CALCIUM SERPL-MCNC: 7.8 MG/DL (ref 8.3–10.1)
CHLORIDE SERPL-SCNC: 104 MMOL/L (ref 96–108)
CO2 SERPL-SCNC: 35 MMOL/L (ref 21–32)
CREAT SERPL-MCNC: 0.83 MG/DL (ref 0.6–1.3)
ERYTHROCYTE [DISTWIDTH] IN BLOOD BY AUTOMATED COUNT: 14 % (ref 11.6–15.1)
FOLATE SERPL-MCNC: 17.3 NG/ML (ref 3.1–17.5)
GFR SERPL CREATININE-BSD FRML MDRD: 84 ML/MIN/1.73SQ M
GLUCOSE SERPL-MCNC: 109 MG/DL (ref 65–140)
GLUCOSE SERPL-MCNC: 140 MG/DL (ref 65–140)
GLUCOSE SERPL-MCNC: 189 MG/DL (ref 65–140)
GLUCOSE SERPL-MCNC: 197 MG/DL (ref 65–140)
GLUCOSE SERPL-MCNC: 207 MG/DL (ref 65–140)
HCT VFR BLD AUTO: 36.5 % (ref 34.8–46.1)
HGB BLD-MCNC: 11.6 G/DL (ref 11.5–15.4)
MCH RBC QN AUTO: 32.4 PG (ref 26.8–34.3)
MCHC RBC AUTO-ENTMCNC: 31.8 G/DL (ref 31.4–37.4)
MCV RBC AUTO: 102 FL (ref 82–98)
NRBC BLD AUTO-RTO: 0 /100 WBCS
PLATELET # BLD AUTO: 178 THOUSANDS/UL (ref 149–390)
PMV BLD AUTO: 10.2 FL (ref 8.9–12.7)
POTASSIUM SERPL-SCNC: 4.4 MMOL/L (ref 3.5–5.3)
RBC # BLD AUTO: 3.58 MILLION/UL (ref 3.81–5.12)
SODIUM SERPL-SCNC: 143 MMOL/L (ref 135–147)
VIT B12 SERPL-MCNC: 591 PG/ML (ref 100–900)
WBC # BLD AUTO: 6.71 THOUSAND/UL (ref 4.31–10.16)

## 2023-01-05 RX ORDER — ECHINACEA PURPUREA EXTRACT 125 MG
1 TABLET ORAL
Status: DISCONTINUED | OUTPATIENT
Start: 2023-01-05 | End: 2023-01-11 | Stop reason: HOSPADM

## 2023-01-05 RX ORDER — ACETAMINOPHEN 325 MG/1
650 TABLET ORAL EVERY 6 HOURS PRN
Status: DISCONTINUED | OUTPATIENT
Start: 2023-01-05 | End: 2023-01-11

## 2023-01-05 RX ADMIN — IPRATROPIUM BROMIDE 0.5 MG: 0.5 SOLUTION RESPIRATORY (INHALATION) at 01:37

## 2023-01-05 RX ADMIN — SALINE NASAL SPRAY 1 SPRAY: 1.5 SOLUTION NASAL at 13:19

## 2023-01-05 RX ADMIN — IPRATROPIUM BROMIDE 0.5 MG: 0.5 SOLUTION RESPIRATORY (INHALATION) at 14:03

## 2023-01-05 RX ADMIN — DIVALPROEX SODIUM 1500 MG: 500 TABLET, FILM COATED, EXTENDED RELEASE ORAL at 09:00

## 2023-01-05 RX ADMIN — ATORVASTATIN CALCIUM 10 MG: 10 TABLET, FILM COATED ORAL at 15:44

## 2023-01-05 RX ADMIN — IPRATROPIUM BROMIDE 0.5 MG: 0.5 SOLUTION RESPIRATORY (INHALATION) at 07:23

## 2023-01-05 RX ADMIN — HEPARIN SODIUM 5000 UNITS: 5000 INJECTION INTRAVENOUS; SUBCUTANEOUS at 22:03

## 2023-01-05 RX ADMIN — IPRATROPIUM BROMIDE 0.5 MG: 0.5 SOLUTION RESPIRATORY (INHALATION) at 20:51

## 2023-01-05 RX ADMIN — CEFTRIAXONE 2000 MG: 2 INJECTION, SOLUTION INTRAVENOUS at 16:32

## 2023-01-05 RX ADMIN — ARIPIPRAZOLE 10 MG: 5 TABLET ORAL at 09:00

## 2023-01-05 RX ADMIN — SODIUM CHLORIDE, SODIUM GLUCONATE, SODIUM ACETATE, POTASSIUM CHLORIDE, MAGNESIUM CHLORIDE, SODIUM PHOSPHATE, DIBASIC, AND POTASSIUM PHOSPHATE 75 ML/HR: .53; .5; .37; .037; .03; .012; .00082 INJECTION, SOLUTION INTRAVENOUS at 13:32

## 2023-01-05 RX ADMIN — LEVOTHYROXINE SODIUM 75 MCG: 75 TABLET ORAL at 05:34

## 2023-01-05 RX ADMIN — AZITHROMYCIN 500 MG: 500 INJECTION, POWDER, LYOPHILIZED, FOR SOLUTION INTRAVENOUS at 15:16

## 2023-01-05 RX ADMIN — LEVALBUTEROL HYDROCHLORIDE 1.25 MG: 1.25 SOLUTION, CONCENTRATE RESPIRATORY (INHALATION) at 01:37

## 2023-01-05 RX ADMIN — LEVALBUTEROL HYDROCHLORIDE 1.25 MG: 1.25 SOLUTION, CONCENTRATE RESPIRATORY (INHALATION) at 07:23

## 2023-01-05 RX ADMIN — INSULIN LISPRO 1 UNITS: 100 INJECTION, SOLUTION INTRAVENOUS; SUBCUTANEOUS at 11:44

## 2023-01-05 RX ADMIN — HEPARIN SODIUM 5000 UNITS: 5000 INJECTION INTRAVENOUS; SUBCUTANEOUS at 13:34

## 2023-01-05 RX ADMIN — INSULIN LISPRO 1 UNITS: 100 INJECTION, SOLUTION INTRAVENOUS; SUBCUTANEOUS at 16:33

## 2023-01-05 RX ADMIN — LORATADINE 5 MG: 10 TABLET ORAL at 09:00

## 2023-01-05 RX ADMIN — CALCIUM 1 TABLET: 500 TABLET ORAL at 15:43

## 2023-01-05 RX ADMIN — FLUTICASONE PROPIONATE 1 SPRAY: 50 SPRAY, METERED NASAL at 09:01

## 2023-01-05 RX ADMIN — CALCIUM 1 TABLET: 500 TABLET ORAL at 09:03

## 2023-01-05 RX ADMIN — LEVALBUTEROL HYDROCHLORIDE 1.25 MG: 1.25 SOLUTION, CONCENTRATE RESPIRATORY (INHALATION) at 20:51

## 2023-01-05 RX ADMIN — INSULIN LISPRO 1 UNITS: 100 INJECTION, SOLUTION INTRAVENOUS; SUBCUTANEOUS at 22:03

## 2023-01-05 RX ADMIN — LEVALBUTEROL HYDROCHLORIDE 1.25 MG: 1.25 SOLUTION, CONCENTRATE RESPIRATORY (INHALATION) at 14:03

## 2023-01-05 RX ADMIN — HEPARIN SODIUM 5000 UNITS: 5000 INJECTION INTRAVENOUS; SUBCUTANEOUS at 05:34

## 2023-01-05 NOTE — PLAN OF CARE
Problem: RESPIRATORY - ADULT  Goal: Achieves optimal ventilation and oxygenation  Description: INTERVENTIONS:  - Assess for changes in respiratory status  - Assess for changes in mentation and behavior  - Position to facilitate oxygenation and minimize respiratory effort  - Oxygen administered by appropriate delivery if ordered  - Initiate smoking cessation education as indicated  - Encourage broncho-pulmonary hygiene including cough, deep breathe, Incentive Spirometry  - Assess the need for suctioning and aspirate as needed  - Assess and instruct to report SOB or any respiratory difficulty  - Respiratory Therapy support as indicated  Outcome: Progressing     Problem: Prexisting or High Potential for Compromised Skin Integrity  Goal: Skin integrity is maintained or improved  Description: INTERVENTIONS:  - Identify patients at risk for skin breakdown  - Assess and monitor skin integrity  - Assess and monitor nutrition and hydration status  - Monitor labs   - Assess for incontinence   - Turn and reposition patient  - Assist with mobility/ambulation  - Relieve pressure over bony prominences  - Avoid friction and shearing  - Provide appropriate hygiene as needed including keeping skin clean and dry  - Evaluate need for skin moisturizer/barrier cream  - Collaborate with interdisciplinary team   - Patient/family teaching  - Consider wound care consult   Outcome: Progressing     Problem: GENITOURINARY - ADULT  Goal: Maintains or returns to baseline urinary function  Description: INTERVENTIONS:  - Assess urinary function  - Encourage oral fluids to ensure adequate hydration if ordered  - Administer IV fluids as ordered to ensure adequate hydration  - Administer ordered medications as needed  - Offer frequent toileting  - Follow urinary retention protocol if ordered  Outcome: Progressing     Problem: Nutrition/Hydration-ADULT  Goal: Nutrient/Hydration intake appropriate for improving, restoring or maintaining nutritional needs  Description: Monitor and assess patient's nutrition/hydration status for malnutrition  Collaborate with interdisciplinary team and initiate plan and interventions as ordered  Monitor patient's weight and dietary intake as ordered or per policy  Utilize nutrition screening tool and intervene as necessary  Determine patient's food preferences and provide high-protein, high-caloric foods as appropriate       INTERVENTIONS:  - Monitor oral intake, urinary output, labs, and treatment plans  - Assess nutrition and hydration status and recommend course of action  - Evaluate amount of meals eaten  - Assist patient with eating if necessary   - Allow adequate time for meals  - Recommend/ encourage appropriate diets, oral nutritional supplements, and vitamin/mineral supplements  - Order, calculate, and assess calorie counts as needed  - Recommend, monitor, and adjust tube feedings and TPN/PPN based on assessed needs  - Assess need for intravenous fluids  - Provide specific nutrition/hydration education as appropriate  - Include patient/family/caregiver in decisions related to nutrition  Outcome: Progressing

## 2023-01-05 NOTE — PHYSICAL THERAPY NOTE
PT TREATMENT     01/05/23 1500   Note Type   Note Type Treatment   Pain Assessment   Pain Assessment Tool 0-10   Pain Score No Pain   Restrictions/Precautions   Other Precautions Fall Risk;O2;Bed Alarm; Chair Alarm   General   Chart Reviewed Yes   Cognition   Arousal/Participation Cooperative   Following Commands Follows one step commands without difficulty   Subjective   Subjective 'I would like to walk"   Bed Mobility   Supine to Sit 5  Supervision   Sit to Supine 5  Supervision   Transfers   Sit to Stand 5  Supervision   Stand to Sit 5  Supervision   Stand pivot  To chair and commode 5  Supervision  (Assist to wipe)   Ambulation/Elevation   Gait pattern Wide TY   Gait Assistance 5  Supervision   Assistive Device   (5L02)   Distance 150 feet   Balance   Static Sitting Good   Static Standing Fair +   Activity Tolerance   Activity Tolerance Patient tolerated treatment well   Assessment   Prognosis Good   Problem List Decreased endurance;Decreased mobility   Assessment Pt demonstrates good improvement in endurance today as pt was able to ambulate in the hallway with a steady gait x 150 feet  Unable to obtain Sp02 reading on [employer] but pt was not SOB with activity  The patient's AM-PAC Basic Mobility Inpatient Short Form Raw Score is 18  A Raw score of greater than 16 suggests the patient may benefit from discharge to home  Plan   Treatment/Interventions LE strengthening/ROM; Therapeutic exercise; Endurance training   Progress Progressing toward goals   PT Frequency Other (Comment)  (5x/w)   Recommendation   PT Discharge Recommendation No rehabilitation needs   AM-PAC Basic Mobility Inpatient   Turning in Flat Bed Without Bedrails 4   Lying on Back to Sitting on Edge of Flat Bed Without Bedrails 4   Moving Bed to Chair 4   Standing Up From Chair Using Arms 4   Walk in Room 3   Climb 3-5 Stairs With Railing 3   Basic Mobility Inpatient Raw Score 22   Basic Mobility Standardized Score 47 4   Highest Level Of Mobility   JH-HLM Goal 7: Walk 25 feet or more   JH-HLM Achieved 7: Walk 25 feet or more   End of Consult   Patient Position at End of Consult All needs within reach;Bed/Chair alarm activated; Bedside chair   Licensure   [place] License Number  [facility] [id number]

## 2023-01-05 NOTE — PROGRESS NOTES
Progress Note - Pulmonary   Mg Petersen 56 y o  female MRN: [id number]  Unit/Bed#: 2 [place] [address] Encounter: [id number]    Assessment:  Multifocal pneumonia improving  Mild intermittent asthma without exacerbation  Moderate ROBERTO with nocturnal hypoxemia  Patient normally on auto CPAP set at 8 to 14 cm of water with 2 L of oxygen  Here using CPAP 12 with 40% oxygen to keep sat greater than 90%  Has mild daytime hypoxemia due to pneumonia  Down syndrome with mild intellectual disability  Obesity    Plan:  On day #4 of azithromycin and ceftriaxone  Continue IV antibiotic therapy  Check chest x-ray tomorrow  Continue CPAP of 12 with 40% oxygen at bedtime      Subjective:   Has some cough  Not short of breath    Objective:     Vitals: Blood pressure 117/67, pulse 57, temperature 97 9 °F (36 6 °C), temperature source Oral, resp  rate 20, height 4' 8" (1 422 m), weight 85 2 kg (187 lb 13 3 oz), SpO2 92 %, not currently breastfeeding  ,Body mass index is 42 11 kg/m²  Intake/Output Summary (Last 24 hours) at 1/4/2023 2134  Last data filed at 1/4/2023 1201  Gross per 24 hour   Intake --   Output 1650 ml   Net -1650 ml       Physical Exam: Physical Exam  Vitals reviewed  Constitutional:       General: She is not in acute distress  Appearance: Normal appearance  She is well-developed  Comments: On 5 L/min nasal cannula oxygen O2 saturation is 93%   HENT:      Head: Normocephalic  Right Ear: External ear normal       Left Ear: External ear normal       Nose: Nose normal       Mouth/Throat:      Mouth: Mucous membranes are moist       Pharynx: Oropharynx is clear  No oropharyngeal exudate  Eyes:      Conjunctiva/sclera: Conjunctivae normal       Pupils: Pupils are equal, round, and reactive to light  Neck:      Vascular: No JVD  Trachea: No tracheal deviation  Cardiovascular:      Rate and Rhythm: Normal rate and regular rhythm  Heart sounds: Normal heart sounds     Pulmonary:      Effort: Pulmonary effort is normal       Comments: Lung sounds are clear  Abdominal:      General: There is no distension  Palpations: Abdomen is soft  Tenderness: There is no abdominal tenderness  There is no guarding  Musculoskeletal:      Cervical back: Neck supple  Comments: No edema   Lymphadenopathy:      Cervical: No cervical adenopathy  Skin:     General: Skin is warm and dry  Findings: No rash  Neurological:      General: No focal deficit present  Mental Status: She is alert and oriented to person, place, and time  Psychiatric:         Behavior: Behavior normal          Thought Content: Thought content normal           Labs: I have personally reviewed pertinent lab results  , ABG:   Lab Results   Component Value Date    PHART 7 340 (L) 01/01/2023    UYH1APK 49 4 (H) 01/01/2023    PO2ART 65 8 (L) 01/01/2023    NCU1QEG 26 1 01/01/2023    BEART -0 2 01/01/2023    SOURCE Radial, Left 01/01/2023   , BNP: No results found for: BNP, CBC:   Lab Results   Component Value Date    WBC 8 76 01/04/2023    HGB 11 5 01/04/2023    HCT 36 6 01/04/2023     (H) 01/04/2023     (L) 01/04/2023    ADJUSTEDWBC 9 00 02/08/2016    MCH 32 9 01/04/2023    MCHC 31 4 01/04/2023    RDW 14 6 01/04/2023    MPV 10 4 01/04/2023    NRBC 0 01/02/2023   , CMP:   Lab Results   Component Value Date     01/08/2016    K 4 5 01/04/2023    K 4 3 01/30/2021     01/04/2023     01/30/2021    CO2 33 (H) 01/04/2023    CO2 28 01/30/2021    ANIONGAP 15 0 01/08/2016    BUN 18 01/04/2023    BUN 15 01/30/2021    CREATININE 0 93 01/04/2023    CREATININE 1 1 01/08/2016    GLUCOSE 116 (H) 01/08/2016    CALCIUM 7 4 (L) 01/04/2023    CALCIUM 8 5 01/08/2016    AST 46 (H) 01/02/2023    AST 21 01/08/2016    ALT 19 01/02/2023    ALT 28 01/08/2016    ALKPHOS 42 (L) 01/02/2023    ALKPHOS 106 01/08/2016    PROT 7 2 01/08/2016    BILITOT 0 3 01/08/2016    EGFR 73 01/04/2023   , PT/INR:   Lab Results   Component Value Date    INR 1 16 01/01/2023    INR 1 00 01/08/2016   , Troponin: No results found for: TROPONIN    Imaging and other studies: I have personally reviewed pertinent reports     and I have personally reviewed pertinent films in PACS

## 2023-01-05 NOTE — PROGRESS NOTES
Daily Progress Note - [address] KALLIE Gentle 56 y o  female MRN: [id number]  Unit/Bed#: [address] [place] [address] Encounter: [id number]  Admitting Physician: Martha Spears MD  PCP: Carloz Horton DO  Date of Admission:  1/1/2023  1:46 PM    Summary:     IVFs: multi-electrolyte, Last Rate: 75 mL/hr (01/04/23 0875)      Diet: Diet David/CHO Controlled; Consistent Carbohydrate Diet Level 1 (4 carb servings/60 grams CHO/meal)  VTE:  Heparin   Mechanical prophylaxis in place  Patient Centered Rounds: I have performed bedside rounds with nursing staff today  Specialists/Other Care Team Provider: Pulmo    LOS: 4 day(s)  Status: Inpatient   Disposition: The patient will continue to require additional inpatient hospital stay due to mulitifocal pneumonia requreing IV antibiotic  Code Status: Level 1 - Full Code    Assessment and Plan    Multifocal pneumonia  Assessment & Plan  CT C/A/P: Multifocal bilateral airspace disease in the lungs, likely pneumonia    Strep legionella urine neg  Procal 1 6, LA WNL, WBC 8 76    -On Ceftriaxone 2g and Azithro 500mg IV daily  -Repeat CXR tomorrow  -monitor vitals    * Acute on chronic respiratory failure with hypoxia (HCC)  Assessment & Plan  Wears 2L nc at home PRN and CPAP hs  Imaging in the ED showing multilobular PNA  trialed on 7L NC and spo2 87%, changed to FM and sats 89-90%  Pt wears CPAP HS at home, wore CPAP 12 40% overnight    • Continue supplemental O2 and wean for sat > 88%  • Xopenex/atrovent Q6h  • Continue Azithromycin 500mg IV and rocephin 2g IV daily  (day 5)  • Repeat CXR for comparison  • CPAP at night    Severe sepsis [facility])  Assessment & Plan  • Secondary to multilobular PNA  • Flu/rsv/covid neg, Strep/legionella Neg  • BCx NG 72hrs  • U/A negative UTI  • Procal 1 06 > 0 82  • WBC 6 71 today down from 8 76 yesterday    - continue Continue Azithromycin 500mg IV and rocephin 2g IV daily (day 5)  - Trend Fever curve and WBC, no fevers overnight    Allergy to environmental factors  Assessment & Plan  Continue Loratadine 5mg (substitue for home levocetirizine 5mg) and home Flonase     Nonintractable epilepsy with complex partial seizures ([address] )  Assessment & Plan  • No Sz activity this Admission  • Valporic acid level 68 this Admission   • Continued home Depakote PO 1500mg daily today    Hyperlipidemia  Assessment & Plan  Continue home Lipitor 10 mg daily    Hypothyroidism  Assessment & Plan  Continue home Levothyroxine 75 mcg    Mild intellectual disability  Assessment & Plan  • Routine neuro checks and delirium precautions    Diabetes mellitus [facility])  Assessment & Plan  Lab Results   Component Value Date    HGBA1C 5 0 02/08/2022       Recent Labs     01/04/23  1129 01/04/23  1606 01/04/23  2209 01/05/23  0726   POCGLU 200* 181* 209* 140       Blood Sugar Average: Last 72 hrs:  (P) 734 [id number]   • ISS    History of prolonged Q-T interval on ECG  Assessment & Plan  • Continue  telemetry monitoring    Obstructive sleep apnea syndrome  Assessment & Plan  • Wears pediatric CPAP at home with nasal mask  E pressure 8  • Will continue HS, wore CPAP 12 40% overnight    Pt had O2 sat around 80's with home CPAP machine on 1/3, so it was switched back to hospital machine tolerating well  TIERNEY on Chronic renal insufficiency, stage III (moderate) (HCC)-resolved as of 1/3/2023  Assessment & Plan  Lab Results   Component Value Date    EGFR 84 01/05/2023    EGFR 73 01/04/2023    EGFR 75 01/03/2023    CREATININE 0 83 01/05/2023    CREATININE 0 93 01/04/2023    CREATININE 0 91 01/03/2023   • Resolved  • TIERNEY on CKD, Cr 1 51  • Given 1 5 L Nss in ED  • Continue isolyte 75 ml/hr  • Cr improved, 0 83 today    Trend I/o and renal function         Subjective:   Pt was seen and examined at the bedside, NAD  Pt does not have any acute complains  Pt is still using Hospital CPAP tolerating well  Review of Systems   Constitutional: Negative for chills and fever  HENT: Negative for ear pain and sore throat  Eyes: Negative for pain and visual disturbance  Respiratory: Negative for cough and shortness of breath  Cardiovascular: Negative for chest pain and palpitations  Gastrointestinal: Negative for abdominal pain, constipation, diarrhea, nausea and vomiting  Genitourinary: Negative for dysuria and hematuria  Musculoskeletal: Negative for arthralgias, back pain, neck pain and neck stiffness  Skin: Negative for color change and rash  Neurological: Negative for dizziness, weakness and headaches  Psychiatric/Behavioral: Negative for agitation and confusion  The patient is not nervous/anxious  All other systems reviewed and are negative  Objective     Vitals:   Temp (24hrs), Av 8 °F (36 6 °C), Min:97 4 °F (36 3 °C), Max:98 2 °F (36 8 °C)    Temp:  [97 4 °F (36 3 °C)-98 2 °F (36 8 °C)] 97 4 °F (36 3 °C)  HR:  [50-57] 54  Resp:  [12-20] 16  BP: (117-126)/(50-67) 117/64  SpO2:  [92 %-97 %] 95 %  Body mass index is 42 11 kg/m²  Input and Output Summary (last 24 hours): Intake/Output Summary (Last 24 hours) at 2023 0832  Last data filed at 2023 1201  Gross per 24 hour   Intake --   Output 1250 ml   Net -1250 ml       Physical Exam:   Physical Exam  Vitals reviewed  Constitutional:       General: She is not in acute distress  Appearance: Normal appearance  She is not ill-appearing  HENT:      Head: Normocephalic and atraumatic  Right Ear: External ear normal       Left Ear: External ear normal       Nose: Nose normal  No congestion or rhinorrhea  Mouth/Throat:      Mouth: Mucous membranes are moist    Eyes:      Extraocular Movements: Extraocular movements intact  Conjunctiva/sclera: Conjunctivae normal    Cardiovascular:      Rate and Rhythm: Normal rate and regular rhythm  Pulses: Normal pulses  Heart sounds: Normal heart sounds  No murmur heard  No gallop     Pulmonary:      Effort: Pulmonary effort is normal  No respiratory distress  Breath sounds: Normal breath sounds  No stridor  No wheezing, rhonchi or rales  Chest:      Chest wall: No tenderness  Abdominal:      General: Abdomen is flat  Bowel sounds are normal  There is no distension  Palpations: Abdomen is soft  Tenderness: There is no abdominal tenderness  There is no guarding  Musculoskeletal:         General: No swelling, tenderness or deformity  Normal range of motion  Cervical back: Normal range of motion and neck supple  Right lower leg: No edema  Left lower leg: No edema  Skin:     General: Skin is warm and dry  Capillary Refill: Capillary refill takes less than 2 seconds  Findings: No bruising, erythema, lesion or rash  Neurological:      General: No focal deficit present  Mental Status: She is alert and oriented to person, place, and time  Psychiatric:         Mood and Affect: Mood normal          Behavior: Behavior normal          Thought Content: Thought content normal            Additional Data:     Labs:  Results from last 7 days   Lab Units 01/05/23 0558 01/04/23  0815 01/03/23 0518 01/02/23  0437   WBC Thousand/uL 6 71 8 76   < > 7 37   HEMOGLOBIN g/dL 11 6 11 5   < > 11 3*   HEMATOCRIT % 36 5 36 6   < > 35 4   PLATELETS Thousands/uL 178 139*   < > 112*   NEUTROS PCT %  --   --   --  78*   LYMPHS PCT %  --   --   --  16   LYMPHO PCT %  --  31  --   --    MONOS PCT %  --   --   --  5   MONO PCT %  --  8  --   --    EOS PCT %  --  0  --  0    < > = values in this interval not displayed  Results from last 7 days   Lab Units 01/05/23 0558 01/03/23 0518 01/02/23  0437   POTASSIUM mmol/L 4 4   < > 4 5   CHLORIDE mmol/L 104   < > 107   CO2 mmol/L 35*   < > 27   BUN mg/dL 13   < > 17   CREATININE mg/dL 0 83   < > 1 15   CALCIUM mg/dL 7 8*   < > 6 9*   ALK PHOS U/L  --   --  42*   ALT U/L  --   --  19   AST U/L  --   --  46*    < > = values in this interval not displayed  Results from last 7 days   Lab Units 01/01/23  1424   INR  1 16     Results from last 7 days   Lab Units 01/05/23  0726 01/04/23  2209 01/04/23  1606 01/04/23  1129 01/04/23  0724   POC GLUCOSE mg/dl 140 209* 181* 200* 145*           * I Have Reviewed All Lab Data Listed Above  * Additional Pertinent Lab Tests Reviewed: All Labs Within Last 24 Hours Reviewed    Imaging:    Imaging Reports Reviewed Today Include: none  Imaging Personally Reviewed by Myself Includes:  none    Recent Cultures (last 7 days):     Results from last 7 days   Lab Units 01/01/23  1446 01/01/23  1425 01/01/23  1410   BLOOD CULTURE   --  No Growth at 72 hrs  No Growth at 72 hrs     LEGIONELLA URINARY ANTIGEN  Negative  --   --          Active Medications  Scheduled Meds:  Current Facility-Administered Medications   Medication Dose Route Frequency Provider Last Rate   • ARIPiprazole  10 mg Oral Daily Arnold Hernández MD     • atorvastatin  10 mg Oral Daily With Patria Sharpe MD     • azithromycin  500 mg Intravenous Q24H Arnold Hernández MD 0 mg (01/03/23 1808)   • calcium carbonate  1 tablet Oral BID With Meals Arnold Hernández MD     • cefTRIAXone  2,000 mg Intravenous Q24H Arnold Hernández MD 2,000 mg (01/04/23 1525)   • divalproex sodium  1,500 mg Oral Daily Arnold Hernández MD     • fluticasone  1 spray Each Nare Daily Arnold Hernández MD     • heparin (porcine)  5,000 Units Subcutaneous Q8H [facility] Arnold Hernández MD     • insulin lispro  1-5 Units Subcutaneous 4x Daily (AC & HS) Arnold Hernández MD     • ipratropium  0 5 mg Nebulization Q6H Arnold Hernández MD     • levalbuterol  1 25 mg Nebulization Q6H Arnold Hernández MD      And   • sodium chloride  3 mL Nebulization Q6H PRN Arnold Hernández MD     • levothyroxine  75 mcg Oral Early Morning Arnlod Hernández MD     • loratadine  5 mg Oral Daily Arnold Hernández MD     • multi-electrolyte  75 mL/hr Intravenous Continuous Arnold Hernández MD 75 mL/hr (01/04/23 7662)     Continuous Infusions:  multi-electrolyte, 75 mL/hr, Last Rate: 75 mL/hr (01/04/23 1762)      PRN Meds:   sodium chloride, 3 mL, Q6H PRN           Patient Information Sharing: With the consent of Chela KALLIE Petersen , their loved ones were notified today by inpatient team of the patient’s condition and current plan  All questions answered  Time Spent for Care: 30 minutes  More than 50% of total time spent on counseling and coordination of care as described above      Leidy Muniz MD  01/05/23  8:32 AM

## 2023-01-06 ENCOUNTER — APPOINTMENT (INPATIENT)
Dept: NON INVASIVE DIAGNOSTICS | Facility: HOSPITAL | Age: 48
End: 2023-01-06

## 2023-01-06 PROBLEM — A41.9 SEVERE SEPSIS (HCC): Status: RESOLVED | Noted: 2023-01-01 | Resolved: 2023-01-06

## 2023-01-06 PROBLEM — R65.20 SEVERE SEPSIS (HCC): Status: RESOLVED | Noted: 2023-01-01 | Resolved: 2023-01-06

## 2023-01-06 LAB
ANION GAP SERPL CALCULATED.3IONS-SCNC: 5 MMOL/L (ref 4–13)
BACTERIA BLD CULT: NORMAL
BACTERIA BLD CULT: NORMAL
BASOPHILS # BLD MANUAL: 0 THOUSAND/UL (ref 0–0.1)
BASOPHILS NFR MAR MANUAL: 0 % (ref 0–1)
BUN SERPL-MCNC: 16 MG/DL (ref 5–25)
CALCIUM SERPL-MCNC: 8 MG/DL (ref 8.3–10.1)
CHLORIDE SERPL-SCNC: 104 MMOL/L (ref 96–108)
CO2 SERPL-SCNC: 32 MMOL/L (ref 21–32)
CREAT SERPL-MCNC: 0.86 MG/DL (ref 0.6–1.3)
EOSINOPHIL # BLD MANUAL: 0.05 THOUSAND/UL (ref 0–0.4)
EOSINOPHIL NFR BLD MANUAL: 1 % (ref 0–6)
ERYTHROCYTE [DISTWIDTH] IN BLOOD BY AUTOMATED COUNT: 14.2 % (ref 11.6–15.1)
EST. AVERAGE GLUCOSE BLD GHB EST-MCNC: 134 MG/DL
GFR SERPL CREATININE-BSD FRML MDRD: 81 ML/MIN/1.73SQ M
GLUCOSE SERPL-MCNC: 101 MG/DL (ref 65–140)
GLUCOSE SERPL-MCNC: 155 MG/DL (ref 65–140)
GLUCOSE SERPL-MCNC: 176 MG/DL (ref 65–140)
GLUCOSE SERPL-MCNC: 183 MG/DL (ref 65–140)
GLUCOSE SERPL-MCNC: 237 MG/DL (ref 65–140)
HBA1C MFR BLD: 6.3 %
HCT VFR BLD AUTO: 34.5 % (ref 34.8–46.1)
HGB BLD-MCNC: 11.3 G/DL (ref 11.5–15.4)
LYMPHOCYTES # BLD AUTO: 1.62 THOUSAND/UL (ref 0.6–4.47)
LYMPHOCYTES # BLD AUTO: 30 % (ref 14–44)
MACROCYTES BLD QL AUTO: PRESENT
MCH RBC QN AUTO: 33.4 PG (ref 26.8–34.3)
MCHC RBC AUTO-ENTMCNC: 32.8 G/DL (ref 31.4–37.4)
MCV RBC AUTO: 102 FL (ref 82–98)
METAMYELOCYTES NFR BLD MANUAL: 4 % (ref 0–1)
MONOCYTES # BLD AUTO: 0.27 THOUSAND/UL (ref 0–1.22)
MONOCYTES NFR BLD: 5 % (ref 4–12)
MYELOCYTES NFR BLD MANUAL: 2 % (ref 0–1)
NEUTROPHILS # BLD MANUAL: 2.96 THOUSAND/UL (ref 1.85–7.62)
NEUTS BAND NFR BLD MANUAL: 8 % (ref 0–8)
NEUTS SEG NFR BLD AUTO: 47 % (ref 43–75)
NRBC BLD AUTO-RTO: 1 /100 WBC (ref 0–2)
PLATELET # BLD AUTO: 191 THOUSANDS/UL (ref 149–390)
PLATELET BLD QL SMEAR: ADEQUATE
PMV BLD AUTO: 10.4 FL (ref 8.9–12.7)
POLYCHROMASIA BLD QL SMEAR: PRESENT
POTASSIUM SERPL-SCNC: 4.6 MMOL/L (ref 3.5–5.3)
PROCALCITONIN SERPL-MCNC: 0.2 NG/ML
RBC # BLD AUTO: 3.38 MILLION/UL (ref 3.81–5.12)
RBC MORPH BLD: PRESENT
SODIUM SERPL-SCNC: 141 MMOL/L (ref 135–147)
VARIANT LYMPHS # BLD AUTO: 3 %
WBC # BLD AUTO: 5.39 THOUSAND/UL (ref 4.31–10.16)

## 2023-01-06 RX ORDER — FUROSEMIDE 10 MG/ML
20 INJECTION INTRAMUSCULAR; INTRAVENOUS ONCE
Status: COMPLETED | OUTPATIENT
Start: 2023-01-06 | End: 2023-01-06

## 2023-01-06 RX ORDER — INSULIN LISPRO 100 [IU]/ML
2 INJECTION, SOLUTION INTRAVENOUS; SUBCUTANEOUS
Status: DISCONTINUED | OUTPATIENT
Start: 2023-01-06 | End: 2023-01-11 | Stop reason: HOSPADM

## 2023-01-06 RX ADMIN — INSULIN LISPRO 2 UNITS: 100 INJECTION, SOLUTION INTRAVENOUS; SUBCUTANEOUS at 11:53

## 2023-01-06 RX ADMIN — DIVALPROEX SODIUM 1500 MG: 500 TABLET, FILM COATED, EXTENDED RELEASE ORAL at 09:37

## 2023-01-06 RX ADMIN — LORATADINE 5 MG: 10 TABLET ORAL at 09:37

## 2023-01-06 RX ADMIN — HEPARIN SODIUM 5000 UNITS: 5000 INJECTION INTRAVENOUS; SUBCUTANEOUS at 05:44

## 2023-01-06 RX ADMIN — IPRATROPIUM BROMIDE 0.5 MG: 0.5 SOLUTION RESPIRATORY (INHALATION) at 13:06

## 2023-01-06 RX ADMIN — LEVALBUTEROL HYDROCHLORIDE 1.25 MG: 1.25 SOLUTION, CONCENTRATE RESPIRATORY (INHALATION) at 07:42

## 2023-01-06 RX ADMIN — FUROSEMIDE 20 MG: 10 INJECTION, SOLUTION INTRAMUSCULAR; INTRAVENOUS at 15:29

## 2023-01-06 RX ADMIN — HEPARIN SODIUM 5000 UNITS: 5000 INJECTION INTRAVENOUS; SUBCUTANEOUS at 15:29

## 2023-01-06 RX ADMIN — INSULIN LISPRO 2 UNITS: 100 INJECTION, SOLUTION INTRAVENOUS; SUBCUTANEOUS at 16:53

## 2023-01-06 RX ADMIN — LEVOTHYROXINE SODIUM 75 MCG: 75 TABLET ORAL at 05:44

## 2023-01-06 RX ADMIN — FLUTICASONE PROPIONATE 1 SPRAY: 50 SPRAY, METERED NASAL at 09:38

## 2023-01-06 RX ADMIN — INSULIN LISPRO 1 UNITS: 100 INJECTION, SOLUTION INTRAVENOUS; SUBCUTANEOUS at 21:12

## 2023-01-06 RX ADMIN — CALCIUM 1 TABLET: 500 TABLET ORAL at 16:43

## 2023-01-06 RX ADMIN — LEVALBUTEROL HYDROCHLORIDE 1.25 MG: 1.25 SOLUTION, CONCENTRATE RESPIRATORY (INHALATION) at 13:06

## 2023-01-06 RX ADMIN — IPRATROPIUM BROMIDE 0.5 MG: 0.5 SOLUTION RESPIRATORY (INHALATION) at 02:04

## 2023-01-06 RX ADMIN — CALCIUM 1 TABLET: 500 TABLET ORAL at 07:30

## 2023-01-06 RX ADMIN — ATORVASTATIN CALCIUM 10 MG: 10 TABLET, FILM COATED ORAL at 16:43

## 2023-01-06 RX ADMIN — IPRATROPIUM BROMIDE 0.5 MG: 0.5 SOLUTION RESPIRATORY (INHALATION) at 07:42

## 2023-01-06 RX ADMIN — CEFTRIAXONE 2000 MG: 2 INJECTION, SOLUTION INTRAVENOUS at 16:43

## 2023-01-06 RX ADMIN — ARIPIPRAZOLE 10 MG: 5 TABLET ORAL at 09:37

## 2023-01-06 RX ADMIN — HEPARIN SODIUM 5000 UNITS: 5000 INJECTION INTRAVENOUS; SUBCUTANEOUS at 21:07

## 2023-01-06 RX ADMIN — IPRATROPIUM BROMIDE 0.5 MG: 0.5 SOLUTION RESPIRATORY (INHALATION) at 19:58

## 2023-01-06 RX ADMIN — MUPIROCIN 1 APPLICATION: 20 OINTMENT TOPICAL at 21:08

## 2023-01-06 RX ADMIN — LEVALBUTEROL HYDROCHLORIDE 1.25 MG: 1.25 SOLUTION, CONCENTRATE RESPIRATORY (INHALATION) at 19:58

## 2023-01-06 RX ADMIN — LEVALBUTEROL HYDROCHLORIDE 1.25 MG: 1.25 SOLUTION, CONCENTRATE RESPIRATORY (INHALATION) at 02:04

## 2023-01-06 RX ADMIN — INSULIN LISPRO 1 UNITS: 100 INJECTION, SOLUTION INTRAVENOUS; SUBCUTANEOUS at 07:00

## 2023-01-06 NOTE — PROGRESS NOTES
Daily Progress Note - [address] J Gentle 56 y o  female MRN: [id number]  Unit/Bed#: [address] [place] [address] Encounter: [id number]  Admitting Physician: Ania Villagomez MD  PCP: Efrem aMrks DO  Date of Admission:  1/1/2023  1:46 PM    Summary:     IVFs: multi-electrolyte, Last Rate: 75 mL/hr (01/05/23 1332)      Diet: Diet David/CHO Controlled; Consistent Carbohydrate Diet Level 1 (4 carb servings/60 grams CHO/meal)  VTE:  Heparin   Mechanical prophylaxis in place  Patient Centered Rounds: I have performed bedside rounds with nursing staff today  Specialists/Other Care Team Provider: Pulmo    LOS: 5 day(s)  Status: Inpatient   Disposition: The patient will continue to require additional inpatient hospital stay due to Mulitifocal pneumoia requring high oxygen and IV antibiotics  Code Status: Level 1 - Full Code    Assessment and Plan    * Acute on chronic respiratory failure with hypoxia (HCC)  Assessment & Plan  No oxygen requirement at home  Imaging in the ED showing multilobular PNA  Pt wears CPAP HS at home  Repeat CXR: Findings consistent with mild CHF, improved since 1/1/2023 with partial clearing of bilateral pulmonary airspace opacities, which most likely represented pulmonary edema  • Continue supplemental O2 and wean for sat > 88%  • Xopenex/atrovent Q6h  • IV lasix 20 mg once  • Azithromycin 500mg IV completed (5 days) and continue rocephin 2g IV daily (day 6)  • CPAP 12 40% at night    Multifocal pneumonia  Assessment & Plan  CT C/A/P: Multifocal bilateral airspace disease in the lungs, likely pneumonia    Strep legionella urine neg  Procal 1 6, LA WNL, WBC 8 76    -On Ceftriaxone 2g  - Azithro 500mg IV completed (5 days)  -Repeat CXR pending  -monitor vitals    Allergy to environmental factors  Assessment & Plan  Continue Loratadine 5mg (substitue for home levocetirizine 5mg) and home Flonase     Nonintractable epilepsy with complex partial seizures [facility])  Assessment & Plan  • No Sz activity this Admission  • Valporic acid level 68 this Admission   • Continued home Depakote PO 1500mg daily today    Hyperlipidemia  Assessment & Plan  Continue home Lipitor 10 mg daily    Hypothyroidism  Assessment & Plan  Continue home Levothyroxine 75 mcg    Mild intellectual disability  Assessment & Plan  • Routine neuro checks and delirium precautions    Diabetes mellitus [facility])  Assessment & Plan  Lab Results   Component Value Date    HGBA1C 5 0 02/08/2022       Recent Labs     01/05/23  1610 01/05/23  2051 01/06/23  0734 01/06/23  1103   POCGLU 189* 207* 155* 237*       Blood Sugar Average: Last 72 hrs:  (P) 996 [id number]   • Lispro mealtime 2 unit  • ISS    History of prolonged Q-T interval on ECG  Assessment & Plan  • Continue  telemetry monitoring    Obstructive sleep apnea syndrome  Assessment & Plan  • Wears pediatric CPAP at home with nasal mask   E pressure 8  • Will continue HS, wore CPAP 12 40% overnight    Pt had O2 sat around 80's with home CPAP machine on 1/3, so it was switched back to hospital machine tolerating well   -needs new machine at home    Severe sepsis (HCC)-resolved as of 1/6/2023  Assessment & Plan  • Secondary to multilobular PNA  • Flu/rsv/covid neg, Strep/legionella Neg  • BCx NG 72hrs  • U/A negative UTI  • Procal 1 06 > 0 82 > 0 20  • WBC 6 71    - Azithromycin 500mg IV completed (5 days)   - continue rocephin 2g IV daily (day 6)  - Trend Fever curve and WBC, no fevers overnight    TIERNEY on Chronic renal insufficiency, stage III (moderate) (HCC)-resolved as of 1/3/2023  Assessment & Plan  Lab Results   Component Value Date    EGFR 81 01/06/2023    EGFR 84 01/05/2023    EGFR 73 01/04/2023    CREATININE 0 86 01/06/2023    CREATININE 0 83 01/05/2023    CREATININE 0 93 01/04/2023   • Resolved  • TIERNEY on CKD, Cr 1 51  • Given 1 5 L Nss in ED  • Continue isolyte 75 ml/hr  • Cr improved, 0 86 today    Trend I/o and renal function         Subjective: Pt was seen and examined at the bedside, NAD  Pt does not have any acute complaints and she states that she is breathing better  Pediatric nasals cannular does not work very well for her, switching back to face mask  Review of Systems   Constitutional: Negative for chills and fever  HENT: Negative for ear pain and sore throat  Eyes: Negative for pain and visual disturbance  Respiratory: Negative for cough and shortness of breath  Cardiovascular: Negative for chest pain and palpitations  Gastrointestinal: Negative for abdominal pain, constipation, diarrhea, nausea and vomiting  Genitourinary: Negative for dysuria and hematuria  Musculoskeletal: Negative for arthralgias, back pain, neck pain and neck stiffness  Skin: Negative for color change and rash  Neurological: Negative for dizziness, weakness and headaches  Psychiatric/Behavioral: Negative for agitation and confusion  The patient is not nervous/anxious  All other systems reviewed and are negative  Objective     Vitals:   Temp (24hrs), Av 9 °F (36 6 °C), Min:97 6 °F (36 4 °C), Max:98 °F (36 7 °C)    Temp:  [97 6 °F (36 4 °C)-98 °F (36 7 °C)] 98 °F (36 7 °C)  HR:  [55-58] 58  Resp:  [14-20] 20  BP: ()/(60-70) 97/65  SpO2:  [92 %-95 %] 93 %  Body mass index is 41 92 kg/m²  Input and Output Summary (last 24 hours): Intake/Output Summary (Last 24 hours) at 2023 1603  Last data filed at 2023 0331  Gross per 24 hour   Intake 240 ml   Output 350 ml   Net -110 ml       Physical Exam:   Physical Exam  Vitals reviewed  Constitutional:       General: She is not in acute distress  Appearance: Normal appearance  She is obese  She is not ill-appearing  HENT:      Head: Normocephalic and atraumatic  Right Ear: External ear normal       Left Ear: External ear normal       Nose: Nose normal  No congestion or rhinorrhea        Mouth/Throat:      Mouth: Mucous membranes are moist    Eyes:      Extraocular Movements: Extraocular movements intact  Conjunctiva/sclera: Conjunctivae normal    Cardiovascular:      Rate and Rhythm: Normal rate and regular rhythm  Pulses: Normal pulses  Heart sounds: Normal heart sounds  No murmur heard  No gallop  Pulmonary:      Effort: Pulmonary effort is normal  No respiratory distress  Breath sounds: Normal breath sounds  No stridor  No wheezing, rhonchi or rales  Chest:      Chest wall: No tenderness  Abdominal:      General: Abdomen is flat  Bowel sounds are normal  There is no distension  Palpations: Abdomen is soft  Tenderness: There is no abdominal tenderness  There is no guarding  Musculoskeletal:         General: No swelling, tenderness or deformity  Normal range of motion  Cervical back: Normal range of motion and neck supple  Right lower leg: No edema  Left lower leg: No edema  Skin:     General: Skin is warm and dry  Capillary Refill: Capillary refill takes less than 2 seconds  Findings: No bruising, erythema, lesion or rash  Neurological:      General: No focal deficit present  Mental Status: She is alert and oriented to person, place, and time  Psychiatric:         Mood and Affect: Mood normal          Behavior: Behavior normal          Thought Content: Thought content normal            Additional Data:     Labs:  Results from last 7 days   Lab Units 01/06/23 0555 01/03/23 0518 01/02/23  0437   WBC Thousand/uL 5 39   < > 7 37   HEMOGLOBIN g/dL 11 3*   < > 11 3*   HEMATOCRIT % 34 5*   < > 35 4   PLATELETS Thousands/uL 191   < > 112*   NEUTROS PCT %  --   --  78*   LYMPHS PCT %  --   --  16   LYMPHO PCT % 30   < >  --    MONOS PCT %  --   --  5   MONO PCT % 5   < >  --    EOS PCT % 1   < > 0    < > = values in this interval not displayed       Results from last 7 days   Lab Units 01/06/23 0555 01/03/23 0518 01/02/23  0437   POTASSIUM mmol/L 4 6   < > 4 5   CHLORIDE mmol/L 104   < > 107   CO2 mmol/L 32   < > 27   BUN mg/dL 16   < > 17   CREATININE mg/dL 0 86   < > 1 15   CALCIUM mg/dL 8 0*   < > 6 9*   ALK PHOS U/L  --   --  42*   ALT U/L  --   --  19   AST U/L  --   --  46*    < > = values in this interval not displayed  Results from last 7 days   Lab Units 01/01/23  1424   INR  1 16     Results from last 7 days   Lab Units 01/06/23  1103 01/06/23  0734 01/05/23  2051 01/05/23  1610 01/05/23  1048   POC GLUCOSE mg/dl 237* 155* 207* 189* 197*           * I Have Reviewed All Lab Data Listed Above  * Additional Pertinent Lab Tests Reviewed: All Labs Within Last 24 Hours Reviewed    Imaging:    Imaging Reports Reviewed Today Include: none  Imaging Personally Reviewed by Myself Includes: CXR    Recent Cultures (last 7 days):     Results from last 7 days   Lab Units 01/01/23  1446 01/01/23  1425 01/01/23  1410   BLOOD CULTURE   --  No Growth After 4 Days  No Growth After 4 Days     LEGIONELLA URINARY ANTIGEN  Negative  --   --          Active Medications  Scheduled Meds:  Current Facility-Administered Medications   Medication Dose Route Frequency Provider Last Rate   • acetaminophen  650 mg Oral Q6H PRN Tiffanie Dorado MD     • ARIPiprazole  10 mg Oral Daily Rishi Akins MD     • atorvastatin  10 mg Oral Daily With Dotty Bradley MD     • calcium carbonate  1 tablet Oral BID With Meals Rishi Akins MD     • cefTRIAXone  2,000 mg Intravenous Q24H Rishi Akins MD 2,000 mg (01/05/23 1632)   • divalproex sodium  1,500 mg Oral Daily Rishi Akins MD     • fluticasone  1 spray Each Nare Daily Rishi Akins MD     • heparin (porcine)  5,000 Units Subcutaneous Q8H [facility] Rishi Akins MD     • insulin lispro  1-5 Units Subcutaneous 4x Daily (AC & HS) Rishi Akins MD     • insulin lispro  2 Units Subcutaneous TID With Meals Rishi Akins MD     • ipratropium  0 5 mg Nebulization Q6H Rishi Akins MD     • levalbuterol  1 25 mg Nebulization Q6H Rishi Akins MD      And   • sodium chloride  3 mL Nebulization Q6H PRN Natividad Cheryle Reed MD     • levothyroxine  75 mcg Oral Early Morning Yeimy Rice MD     • loratadine  5 mg Oral Daily Yeimy Rice MD     • multi-electrolyte  75 mL/hr Intravenous Continuous Yeimy Rice MD 75 mL/hr (01/05/23 1332)   • sodium chloride  1 spray Each Nare Q1H PRN Yeimy Rice MD       Continuous Infusions:  multi-electrolyte, 75 mL/hr, Last Rate: 75 mL/hr (01/05/23 1332)      PRN Meds:   acetaminophen, 650 mg, Q6H PRN  sodium chloride, 1 spray, Q1H PRN  sodium chloride, 3 mL, Q6H PRN                Patient Information Sharing: With the consent of Chela KALLIE Petersen , their loved ones were notified today by inpatient team of the patient’s condition and current plan  All questions answered  Time Spent for Care: 30 minutes  More than 50% of total time spent on counseling and coordination of care as described above      Yeimy Rice MD  01/06/23  4:03 PM

## 2023-01-06 NOTE — PROGRESS NOTES
Progress Note - Pulmonary   Dotty Petersen 56 y o  female MRN: [id number]  Unit/Bed#: 2 [place] [address] Encounter: [id number]    Assessment:  Multifocal pneumonia  Clinically improved  Portable chest x-ray done today was reviewed by me  This shows persistent mild airspace disease in both lower lobes  Persistent daytime hypoxemic due to pneumonia  Patient normally not on oxygen during the day  Obstructive sleep apnea with hypoxemia  Some of this hypoxemia related to alveolar hypoventilation from obesity    Plan:  Still needs 5 L of oxygen to maintain O2 saturation  Continue IV ceftriaxone 7 days  Has had 5 days of azithromycin  Continue CPAP 12 with 40% oxygen at nighttime  Recheck procalcitonin level in a m  Subjective:   Has some cough but no shortness of breath      Objective:     Vitals: Blood pressure 127/70, pulse 56, temperature 97 6 °F (36 4 °C), temperature source Axillary, resp  rate 18, height 4' 8" (1 422 m), weight 85 2 kg (187 lb 13 3 oz), SpO2 92 %, not currently breastfeeding  ,Body mass index is 42 11 kg/m²  Intake/Output Summary (Last 24 hours) at 1/5/2023 2042  Last data filed at 1/5/2023 1655  Gross per 24 hour   Intake 250 ml   Output 400 ml   Net -150 ml       Physical Exam: Physical Exam  Vitals reviewed  Constitutional:       General: She is not in acute distress  Appearance: Normal appearance  She is well-developed  She is obese  Comments: On 5 L/min nasal cannula oxygen O2 saturation 91% sitting in chair at bedside   HENT:      Head: Normocephalic  Nose: Nose normal       Mouth/Throat:      Pharynx: No oropharyngeal exudate  Eyes:      Conjunctiva/sclera: Conjunctivae normal       Pupils: Pupils are equal, round, and reactive to light  Neck:      Vascular: No JVD  Trachea: No tracheal deviation  Cardiovascular:      Rate and Rhythm: Normal rate and regular rhythm  Heart sounds: Normal heart sounds     Pulmonary:      Effort: Pulmonary effort is normal       Comments: Lung sounds are clear  Abdominal:      General: There is no distension  Palpations: Abdomen is soft  Tenderness: There is no abdominal tenderness  There is no guarding  Musculoskeletal:      Cervical back: Neck supple  Comments: No edema, cyanosis or clubbing   Lymphadenopathy:      Cervical: No cervical adenopathy  Skin:     General: Skin is warm and dry  Findings: No rash  Neurological:      General: No focal deficit present  Mental Status: She is alert and oriented to person, place, and time  Psychiatric:         Mood and Affect: Mood normal          Behavior: Behavior normal          Thought Content: Thought content normal           Labs: I have personally reviewed pertinent lab results  , ABG:   Lab Results   Component Value Date    PHART 7 340 (L) 01/01/2023    BBM1XCH 49 4 (H) 01/01/2023    PO2ART 65 8 (L) 01/01/2023    PEQ2ZKH 26 1 01/01/2023    BEART -0 2 01/01/2023    SOURCE Radial, Left 01/01/2023   , BNP: No results found for: BNP, CBC:   Lab Results   Component Value Date    WBC 6 71 01/05/2023    HGB 11 6 01/05/2023    HCT 36 5 01/05/2023     (H) 01/05/2023     01/05/2023    ADJUSTEDWBC 9 00 02/08/2016    MCH 32 4 01/05/2023    MCHC 31 8 01/05/2023    RDW 14 0 01/05/2023    MPV 10 2 01/05/2023    NRBC 0 01/05/2023   , CMP:   Lab Results   Component Value Date     01/08/2016    K 4 4 01/05/2023    K 4 3 01/30/2021     01/05/2023     01/30/2021    CO2 35 (H) 01/05/2023    CO2 28 01/30/2021    ANIONGAP 15 0 01/08/2016    BUN 13 01/05/2023    BUN 15 01/30/2021    CREATININE 0 83 01/05/2023    CREATININE 1 1 01/08/2016    GLUCOSE 116 (H) 01/08/2016    CALCIUM 7 8 (L) 01/05/2023    CALCIUM 8 5 01/08/2016    AST 46 (H) 01/02/2023    AST 21 01/08/2016    ALT 19 01/02/2023    ALT 28 01/08/2016    ALKPHOS 42 (L) 01/02/2023    ALKPHOS 106 01/08/2016    PROT 7 2 01/08/2016    BILITOT 0 3 01/08/2016    EGFR 84 01/05/2023   , PT/INR:   Lab Results   Component Value Date    INR 1 16 01/01/2023    INR 1 00 01/08/2016   , Troponin: No results found for: TROPONIN    Imaging and other studies: I have personally reviewed pertinent reports     and I have personally reviewed pertinent films in PACS

## 2023-01-06 NOTE — PLAN OF CARE
Problem: RESPIRATORY - ADULT  Goal: Achieves optimal ventilation and oxygenation  Description: INTERVENTIONS:  - Assess for changes in respiratory status  - Assess for changes in mentation and behavior  - Position to facilitate oxygenation and minimize respiratory effort  - Oxygen administered by appropriate delivery if ordered  - Initiate smoking cessation education as indicated  - Encourage broncho-pulmonary hygiene including cough, deep breathe, Incentive Spirometry  - Assess the need for suctioning and aspirate as needed  - Assess and instruct to report SOB or any respiratory difficulty  - Respiratory Therapy support as indicated  Outcome: Progressing     Problem: Prexisting or High Potential for Compromised Skin Integrity  Goal: Skin integrity is maintained or improved  Description: INTERVENTIONS:  - Identify patients at risk for skin breakdown  - Assess and monitor skin integrity  - Assess and monitor nutrition and hydration status  - Monitor labs   - Assess for incontinence   - Turn and reposition patient  - Assist with mobility/ambulation  - Relieve pressure over bony prominences  - Avoid friction and shearing  - Provide appropriate hygiene as needed including keeping skin clean and dry  - Evaluate need for skin moisturizer/barrier cream  - Collaborate with interdisciplinary team   - Patient/family teaching  - Consider wound care consult   Outcome: Progressing     Problem: MOBILITY - ADULT  Goal: Maintain or return to baseline ADL function  Description: INTERVENTIONS:  -  Assess patient's ability to carry out ADLs; assess patient's baseline for ADL function and identify physical deficits which impact ability to perform ADLs (bathing, care of mouth/teeth, toileting, grooming, dressing, etc )  - Assess/evaluate cause of self-care deficits   - Assess range of motion  - Assess patient's mobility; develop plan if impaired  - Assess patient's need for assistive devices and provide as appropriate  - Encourage maximum independence but intervene and supervise when necessary  - Involve family in performance of ADLs  - Assess for home care needs following discharge   - Consider OT consult to assist with ADL evaluation and planning for discharge  - Provide patient education as appropriate  Outcome: Progressing  Goal: Maintains/Returns to pre admission functional level  Description: INTERVENTIONS:  - Perform BMAT or MOVE assessment daily    - Set and communicate daily mobility goal to care team and patient/family/caregiver  - Collaborate with rehabilitation services on mobility goals if consulted  - Perform Range of Motion 2 times a day  - Reposition patient every 2 hours    - Dangle patient 2 times a day  - Stand patient 2 times a day  - Ambulate patient 2 times a day  - Out of bed to chair 2 times a day   - Out of bed for meals 2 times a day  - Out of bed for toileting  - Record patient progress and toleration of activity level   Outcome: Progressing     Problem: GENITOURINARY - ADULT  Goal: Maintains or returns to baseline urinary function  Description: INTERVENTIONS:  - Assess urinary function  - Encourage oral fluids to ensure adequate hydration if ordered  - Administer IV fluids as ordered to ensure adequate hydration  - Administer ordered medications as needed  - Offer frequent toileting  - Follow urinary retention protocol if ordered  Outcome: Progressing  Goal: Absence of urinary retention  Description: INTERVENTIONS:  - Assess patient’s ability to void and empty bladder  - Monitor I/O  - Bladder scan as needed  - Discuss with physician/AP medications to alleviate retention as needed  - Discuss catheterization for long term situations as appropriate  Outcome: Progressing     Problem: METABOLIC, FLUID AND ELECTROLYTES - ADULT  Goal: Electrolytes maintained within normal limits  Description: INTERVENTIONS:  - Monitor labs and assess patient for signs and symptoms of electrolyte imbalances  - Administer electrolyte replacement as ordered  - Monitor response to electrolyte replacements, including repeat lab results as appropriate  - Instruct patient on fluid and nutrition as appropriate  Outcome: Progressing  Goal: Fluid balance maintained  Description: INTERVENTIONS:  - Monitor labs   - Monitor I/O and WT  - Instruct patient on fluid and nutrition as appropriate  - Assess for signs & symptoms of volume excess or deficit  Outcome: Progressing  Goal: Glucose maintained within target range  Description: INTERVENTIONS:  - Monitor Blood Glucose as ordered  - Assess for signs and symptoms of hyperglycemia and hypoglycemia  - Administer ordered medications to maintain glucose within target range  - Assess nutritional intake and initiate nutrition service referral as needed  Outcome: Progressing     Problem: SKIN/TISSUE INTEGRITY - ADULT  Goal: Skin Integrity remains intact(Skin Breakdown Prevention)  Description: Assess:  -Perform Stefan assessment every shift   -Clean and moisturize skin every shift   -Inspect skin when repositioning, toileting, and assisting with ADLS  -Assess under medical devices such as nc, jaquez every shift    -Assess extremities for adequate circulation and sensation     Bed Management:  -Have minimal linens on bed & keep smooth, unwrinkled  -Change linens as needed when moist or perspiring  -Avoid sitting or lying in one position for more than 1 hours while in bed  -Keep HOB at 30degrees     Toileting:  -Offer bedside commode  -Assess for incontinence every shift  -Use incontinent care products after each incontinent episode such as moisturizing barrier     Activity:  -Mobilize patient 2 times a day  -Encourage activity and walks on unit  -Encourage or provide ROM exercises   -Turn and reposition patient every 2 Hours  -Use appropriate equipment to lift or move patient in bed  -Instruct/ Assist with weight shifting every 60 min  when out of bed in chair  -Consider limitation of chair time 1 hour intervals    Skin Care:  -Avoid use of baby powder, tape, friction and shearing, hot water or constrictive clothing  -Relieve pressure over bony prominences using EHOB cushion   -Do not massage red bony areas    Next Steps:  -Teach patient strategies to minimize risks such as weight shifting   -Consider consults to  interdisciplinary teams such as wound team   Outcome: Progressing     Problem: MUSCULOSKELETAL - ADULT  Goal: Maintain or return mobility to safest level of function  Description: INTERVENTIONS:  - Assess patient's ability to carry out ADLs; assess patient's baseline for ADL function and identify physical deficits which impact ability to perform ADLs (bathing, care of mouth/teeth, toileting, grooming, dressing, etc )  - Assess/evaluate cause of self-care deficits   - Assess range of motion  - Assess patient's mobility  - Assess patient's need for assistive devices and provide as appropriate  - Encourage maximum independence but intervene and supervise when necessary  - Involve family in performance of ADLs  - Assess for home care needs following discharge   - Consider OT consult to assist with ADL evaluation and planning for discharge  - Provide patient education as appropriate  Outcome: Progressing  Goal: Maintain proper alignment of affected body part  Description: INTERVENTIONS:  - Support, maintain and protect limb and body alignment  - Provide patient/ family with appropriate education  Outcome: Progressing     Problem: Potential for Falls  Goal: Patient will remain free of falls  Description: INTERVENTIONS:  - Educate patient/family on patient safety including physical limitations  - Instruct patient to call for assistance with activity   - Consult OT/PT to assist with strengthening/mobility   - Keep Call bell within reach  - Keep bed low and locked with side rails adjusted as appropriate  - Keep care items and personal belongings within reach  - Initiate and maintain comfort rounds  - Make Fall Risk Sign visible to staff  - Offer Toileting every 2 Hours, in advance of need  - Initiate/Maintain  bed alarm  - Obtain necessary fall risk management equipment: call bell   - Apply yellow socks and bracelet for high fall risk patients  - Consider moving patient to room near nurses station  Outcome: Progressing

## 2023-01-07 LAB
ANION GAP SERPL CALCULATED.3IONS-SCNC: 5 MMOL/L (ref 4–13)
AORTIC ROOT: 2.9 CM
APICAL FOUR CHAMBER EJECTION FRACTION: 59 %
AV LVOT PEAK GRADIENT: 6 MMHG
AV PEAK GRADIENT: 9 MMHG
BUN SERPL-MCNC: 16 MG/DL (ref 5–25)
CALCIUM SERPL-MCNC: 8.7 MG/DL (ref 8.3–10.1)
CHLORIDE SERPL-SCNC: 104 MMOL/L (ref 96–108)
CO2 SERPL-SCNC: 32 MMOL/L (ref 21–32)
CREAT SERPL-MCNC: 0.94 MG/DL (ref 0.6–1.3)
DOP CALC LVOT AREA: 3.14 CM2
DOP CALC LVOT DIAMETER: 2 CM
DOP CALC MV VTI: 40.35 CM
E WAVE DECELERATION TIME: 276 MS
ERYTHROCYTE [DISTWIDTH] IN BLOOD BY AUTOMATED COUNT: 14.2 % (ref 11.6–15.1)
FRACTIONAL SHORTENING: 32 % (ref 28–44)
GFR SERPL CREATININE-BSD FRML MDRD: 72 ML/MIN/1.73SQ M
GLOBAL LONGITUIDAL STRAIN: -22 %
GLUCOSE SERPL-MCNC: 137 MG/DL (ref 65–140)
GLUCOSE SERPL-MCNC: 142 MG/DL (ref 65–140)
GLUCOSE SERPL-MCNC: 146 MG/DL (ref 65–140)
GLUCOSE SERPL-MCNC: 150 MG/DL (ref 65–140)
GLUCOSE SERPL-MCNC: 213 MG/DL (ref 65–140)
HCT VFR BLD AUTO: 37.2 % (ref 34.8–46.1)
HGB BLD-MCNC: 11.8 G/DL (ref 11.5–15.4)
INTERVENTRICULAR SEPTUM IN DIASTOLE (PARASTERNAL SHORT AXIS VIEW): 0.9 CM
INTERVENTRICULAR SEPTUM: 0.9 CM (ref 0.6–1.1)
LAAS-AP2: 15.7 CM2
LAAS-AP4: 15 CM2
LEFT ATRIUM AREA SYSTOLE SINGLE PLANE A4C: 13.3 CM2
LEFT ATRIUM SIZE: 2.6 CM
LEFT INTERNAL DIMENSION IN SYSTOLE: 2.3 CM (ref 2.1–4)
LEFT VENTRICLE DIASTOLIC VOLUME (MOD BIPLANE): 55 ML
LEFT VENTRICLE SYSTOLIC VOLUME (MOD BIPLANE): 23 ML
LEFT VENTRICULAR INTERNAL DIMENSION IN DIASTOLE: 3.4 CM (ref 3.5–6)
LEFT VENTRICULAR POSTERIOR WALL IN END DIASTOLE: 0.9 CM
LEFT VENTRICULAR STROKE VOLUME: 30 ML
LV EF: 58 %
LVSV (TEICH): 30 ML
MCH RBC QN AUTO: 32.4 PG (ref 26.8–34.3)
MCHC RBC AUTO-ENTMCNC: 31.7 G/DL (ref 31.4–37.4)
MCV RBC AUTO: 102 FL (ref 82–98)
MV E'TISSUE VEL-LAT: 14 CM/S
MV E'TISSUE VEL-SEP: 10 CM/S
MV MEAN GRADIENT: 2 MMHG
MV PEAK A VEL: 1.05 M/S
MV PEAK E VEL: 98 CM/S
MV PEAK GRADIENT: 10 MMHG
MV STENOSIS PRESSURE HALF TIME: 80 MS
MV VALVE AREA P 1/2 METHOD: 2.75 CM2
PLATELET # BLD AUTO: 260 THOUSANDS/UL (ref 149–390)
PMV BLD AUTO: 10 FL (ref 8.9–12.7)
POTASSIUM SERPL-SCNC: 4.8 MMOL/L (ref 3.5–5.3)
PV PEAK GRADIENT: 6 MMHG
RA PRESSURE ESTIMATED: 8 MMHG
RBC # BLD AUTO: 3.64 MILLION/UL (ref 3.81–5.12)
RIGHT ATRIUM AREA SYSTOLE A4C: 15 CM2
RIGHT VENTRICLE ID DIMENSION: 2.7 CM
RV PSP: 32 MMHG
SL CV LEFT ATRIUM LENGTH A2C: 5.2 CM
SL CV LV EF: 58
SL CV PED ECHO LEFT VENTRICLE DIASTOLIC VOLUME (MOD BIPLANE) 2D: 47 ML
SL CV PED ECHO LEFT VENTRICLE SYSTOLIC VOLUME (MOD BIPLANE) 2D: 18 ML
SODIUM SERPL-SCNC: 141 MMOL/L (ref 135–147)
TR MAX PG: 24 MMHG
TR PEAK VELOCITY: 2.5 M/S
TRICUSPID VALVE PEAK REGURGITATION VELOCITY: 2.47 M/S
WBC # BLD AUTO: 5.72 THOUSAND/UL (ref 4.31–10.16)

## 2023-01-07 RX ADMIN — DIVALPROEX SODIUM 1500 MG: 500 TABLET, FILM COATED, EXTENDED RELEASE ORAL at 08:22

## 2023-01-07 RX ADMIN — CALCIUM 1 TABLET: 500 TABLET ORAL at 08:02

## 2023-01-07 RX ADMIN — INSULIN LISPRO 2 UNITS: 100 INJECTION, SOLUTION INTRAVENOUS; SUBCUTANEOUS at 08:04

## 2023-01-07 RX ADMIN — LEVOTHYROXINE SODIUM 75 MCG: 75 TABLET ORAL at 05:29

## 2023-01-07 RX ADMIN — LEVALBUTEROL HYDROCHLORIDE 1.25 MG: 1.25 SOLUTION, CONCENTRATE RESPIRATORY (INHALATION) at 08:34

## 2023-01-07 RX ADMIN — IPRATROPIUM BROMIDE 0.5 MG: 0.5 SOLUTION RESPIRATORY (INHALATION) at 08:35

## 2023-01-07 RX ADMIN — MUPIROCIN 1 APPLICATION: 20 OINTMENT TOPICAL at 08:24

## 2023-01-07 RX ADMIN — LEVALBUTEROL HYDROCHLORIDE 1.25 MG: 1.25 SOLUTION, CONCENTRATE RESPIRATORY (INHALATION) at 14:34

## 2023-01-07 RX ADMIN — LEVALBUTEROL HYDROCHLORIDE 1.25 MG: 1.25 SOLUTION, CONCENTRATE RESPIRATORY (INHALATION) at 01:16

## 2023-01-07 RX ADMIN — IPRATROPIUM BROMIDE 0.5 MG: 0.5 SOLUTION RESPIRATORY (INHALATION) at 14:34

## 2023-01-07 RX ADMIN — ATORVASTATIN CALCIUM 10 MG: 10 TABLET, FILM COATED ORAL at 16:09

## 2023-01-07 RX ADMIN — LORATADINE 5 MG: 10 TABLET ORAL at 08:22

## 2023-01-07 RX ADMIN — CALCIUM 1 TABLET: 500 TABLET ORAL at 16:09

## 2023-01-07 RX ADMIN — INSULIN LISPRO 1 UNITS: 100 INJECTION, SOLUTION INTRAVENOUS; SUBCUTANEOUS at 08:02

## 2023-01-07 RX ADMIN — MUPIROCIN 1 APPLICATION: 20 OINTMENT TOPICAL at 21:59

## 2023-01-07 RX ADMIN — ARIPIPRAZOLE 10 MG: 5 TABLET ORAL at 08:22

## 2023-01-07 RX ADMIN — INSULIN LISPRO 2 UNITS: 100 INJECTION, SOLUTION INTRAVENOUS; SUBCUTANEOUS at 23:00

## 2023-01-07 RX ADMIN — LEVALBUTEROL HYDROCHLORIDE 1.25 MG: 1.25 SOLUTION, CONCENTRATE RESPIRATORY (INHALATION) at 19:41

## 2023-01-07 RX ADMIN — HEPARIN SODIUM 5000 UNITS: 5000 INJECTION INTRAVENOUS; SUBCUTANEOUS at 05:29

## 2023-01-07 RX ADMIN — HEPARIN SODIUM 5000 UNITS: 5000 INJECTION INTRAVENOUS; SUBCUTANEOUS at 21:59

## 2023-01-07 RX ADMIN — CEFTRIAXONE 2000 MG: 2 INJECTION, SOLUTION INTRAVENOUS at 16:09

## 2023-01-07 RX ADMIN — IPRATROPIUM BROMIDE 0.5 MG: 0.5 SOLUTION RESPIRATORY (INHALATION) at 19:41

## 2023-01-07 RX ADMIN — HEPARIN SODIUM 5000 UNITS: 5000 INJECTION INTRAVENOUS; SUBCUTANEOUS at 14:12

## 2023-01-07 RX ADMIN — INSULIN LISPRO 2 UNITS: 100 INJECTION, SOLUTION INTRAVENOUS; SUBCUTANEOUS at 16:26

## 2023-01-07 RX ADMIN — INSULIN LISPRO 2 UNITS: 100 INJECTION, SOLUTION INTRAVENOUS; SUBCUTANEOUS at 11:41

## 2023-01-07 RX ADMIN — FLUTICASONE PROPIONATE 1 SPRAY: 50 SPRAY, METERED NASAL at 08:24

## 2023-01-07 RX ADMIN — IPRATROPIUM BROMIDE 0.5 MG: 0.5 SOLUTION RESPIRATORY (INHALATION) at 01:16

## 2023-01-07 NOTE — PROGRESS NOTES
Daily Progress Note - [address] Gentle 56 y o  female MRN: [id number]  Unit/Bed#: 2 [place] [address] Encounter: [id number]  Admitting Physician: Aggie Arias MD   PCP: Chacha Smith DO  Date of Admission:  1/1/2023  1:46 PM    Assessment and Plan    * Acute on chronic respiratory failure with hypoxia [facility])  Assessment & Plan  No oxygen requirement at home  Imaging in the ED showing multilobular PNA  Pt wears CPAP HS at home  Repeat CXR: Findings consistent with mild CHF, improved since 1/1/2023 with partial clearing of bilateral pulmonary airspace opacities, which most likely represented pulmonary edema      • Continue supplemental O2 and wean for sat > 88%  • Xopenex/atrovent Q6h  • IV lasix 20 mg was given once  • Azithromycin 500mg IV completed (5 days) and continue rocephin 2g IV one more day per pulm (Day 7),   • CPAP 12 40% at night  • Current O2 requirement: 5 L O2, continue to wean O2 as tolerated    Allergy to environmental factors  Assessment & Plan  Continue Loratadine 5mg (substitue for home levocetirizine 5mg) and home Flonase     Nonintractable epilepsy with complex partial seizures ([address] )  Assessment & Plan  • No Sz activity this Admission  • Valporic acid level 68 this Admission   • Continued home Depakote PO 1500mg daily today    Hyperlipidemia  Assessment & Plan  Continue home Lipitor 10 mg daily    Hypothyroidism  Assessment & Plan  Continue home Levothyroxine 75 mcg    Mild intellectual disability  Assessment & Plan  • Routine neuro checks and delirium precautions    Diabetes mellitus [facility])  Assessment & Plan  Lab Results   Component Value Date    HGBA1C 5 0 02/08/2022       Recent Labs     01/05/23  1610 01/05/23  2051 01/06/23  0734 01/06/23  1103   POCGLU 189* 207* 155* 237*       Blood Sugar Average: Last 72 hrs:  (P) 797 [id number]   • Lispro mealtime 2 unit  • ISS    History of prolonged Q-T interval on ECG  Assessment & Plan  • Continue telemetry monitoring    Multifocal pneumonia  Assessment & Plan  CT C/A/P: Multifocal bilateral airspace disease in the lungs, likely pneumonia  Strep legionella urine neg  Procal 1 6, LA WNL, WBC 8 76    -On Ceftriaxone 2g  - Azithro 500mg IV completed (5 days)  -Repeat CXR pending  -monitor vitals    Obstructive sleep apnea syndrome  Assessment & Plan  • Wears pediatric CPAP at home with nasal mask  E pressure 8  • Will continue HS, wore CPAP 12 40% overnight    Pt had O2 sat around 80's with home CPAP machine on 1/3, so it was switched back to hospital machine tolerating well   -needs new machine at home    Severe sepsis (HCC)-resolved as of 1/6/2023  Assessment & Plan  • Secondary to multilobular PNA  • Flu/rsv/covid neg, Strep/legionella Neg  • BCx NG 72hrs  • U/A negative UTI  • Procal 1 06 > 0 82 > 0 20  • WBC 6 71    - Azithromycin 500mg IV completed (5 days)   - continue rocephin 2g IV daily (day 6)  - Trend Fever curve and WBC, no fevers overnight    TIERNEY on Chronic renal insufficiency, stage III (moderate) (HCC)-resolved as of 1/3/2023  Assessment & Plan  Lab Results   Component Value Date    EGFR 81 01/06/2023    EGFR 84 01/05/2023    EGFR 73 01/04/2023    CREATININE 0 86 01/06/2023    CREATININE 0 83 01/05/2023    CREATININE 0 93 01/04/2023   • Resolved  • TIERNEY on CKD, Cr 1 51  • Given 1 5 L Nss in ED  • Continue isolyte 75 ml/hr  • Cr improved, 0 86 today    Trend I/o and renal function       VTE Pharmacologic Prophylaxis:   Pharmacologic: Heparin  Mechanical VTE Prophylaxis in Place: Yes    Patient Centered Rounds: I have performed bedside rounds with nursing staff today  Education and Discussions with Family / Patient: Will update loved ones of condition and plan    Time Spent for Care: 30 minutes  More than 50% of total time spent on counseling and coordination of care as described above  Current Length of Stay: 6 day(s)    Current Patient Status: Inpatient   Certification Statement:  The patient will continue to require additional inpatient hospital stay due to acute on chronic respiratory failure, pneuonia    Discharge Plan: Home    Code Status: Level 1 - Full Code    Subjective:   Patient seen and examined at bedside  History limited, history of Down syndrome  Patient provides simple yes or no responses  No significant distress, denies pain, lying with full facemask on     Objective     Objective:   Vitals:   Temp (24hrs), Av 4 °F (36 3 °C), Min:96 2 °F (35 7 °C), Max:98 2 °F (36 8 °C)    Temp:  [96 2 °F (35 7 °C)-98 2 °F (36 8 °C)] 96 2 °F (35 7 °C)  HR:  [57-77] 57  Resp:  [20] 20  BP: ()/(43-65) 117/57  SpO2:  [93 %-96 %] 95 %  Body mass index is 41 92 kg/m²  Input and Output Summary (last 24 hours): Intake/Output Summary (Last 24 hours) at 2023 0827  Last data filed at 2023 0548  Gross per 24 hour   Intake 1220 ml   Output 3890 ml   Net -2670 ml       Physical Exam:   Physical Exam  Vitals reviewed  Constitutional:       Appearance: She is obese  HENT:      Mouth/Throat:      Mouth: Mucous membranes are moist    Cardiovascular:      Rate and Rhythm: Normal rate and regular rhythm  Pulses: Normal pulses  Heart sounds: Normal heart sounds  No murmur heard  Pulmonary:      Effort: No respiratory distress  Breath sounds: No wheezing  Abdominal:      Palpations: Abdomen is soft  Tenderness: There is no abdominal tenderness  Musculoskeletal:         General: No swelling  Skin:     General: Skin is warm and dry  Neurological:      Mental Status: She is alert        Comments: Alert and giving simple yes/no responses with full facemask         Additional Data:     Labs:  Results from last 7 days   Lab Units 23  0540 23  0555 23  0518 23  0437   WBC Thousand/uL 5 72 5 39   < > 7 37   HEMOGLOBIN g/dL 11 8 11 3*   < > 11 3*   HEMATOCRIT % 37 2 34 5*   < > 35 4   PLATELETS Thousands/uL 260 191   < > 112*   NEUTROS PCT % --   --   --  78*   LYMPHS PCT %  --   --   --  16   LYMPHO PCT %  --  30   < >  --    MONOS PCT %  --   --   --  5   MONO PCT %  --  5   < >  --    EOS PCT %  --  1   < > 0    < > = values in this interval not displayed  Results from last 7 days   Lab Units 01/07/23  0540 01/03/23  0518 01/02/23  0437   POTASSIUM mmol/L 4 8   < > 4 5   CHLORIDE mmol/L 104   < > 107   CO2 mmol/L 32   < > 27   BUN mg/dL 16   < > 17   CREATININE mg/dL 0 94   < > 1 15   CALCIUM mg/dL 8 7   < > 6 9*   ALK PHOS U/L  --   --  42*   ALT U/L  --   --  19   AST U/L  --   --  46*    < > = values in this interval not displayed  Results from last 7 days   Lab Units 01/01/23  1424   INR  1 16     Results from last 7 days   Lab Units 01/07/23  0714 01/06/23  2047 01/06/23  1603 01/06/23  1103 01/06/23  0734   POC GLUCOSE mg/dl 150* 176* 101 237* 155*     Results from last 7 days   Lab Units 01/04/23  0815   HEMOGLOBIN A1C % 6 3*       * I Have Reviewed All Lab Data Listed Above  * Additional Pertinent Lab Tests Reviewed: [address] Admission Reviewed    Imaging:    Imaging Reports Reviewed Today Include: CT A/P    Recent Cultures (last 7 days):     Results from last 7 days   Lab Units 01/01/23  1446 01/01/23  1425 01/01/23  1410   BLOOD CULTURE   --  No Growth After 5 Days  No Growth After 5 Days     LEGIONELLA URINARY ANTIGEN  Negative  --   --        Last 24 Hours Medication List:   Current Facility-Administered Medications   Medication Dose Route Frequency Provider Last Rate   • acetaminophen  650 mg Oral Q6H PRN Deepak Wakefield MD     • ARIPiprazole  10 mg Oral Daily Vanna Reis MD     • atorvastatin  10 mg Oral Daily With Earline Craig MD     • calcium carbonate  1 tablet Oral BID With Meals Vanna Reis MD     • cefTRIAXone  2,000 mg Intravenous Q24H Vanna Reis MD 2,000 mg (01/06/23 1643)   • divalproex sodium  1,500 mg Oral Daily Vanna Reis MD     • fluticasone  1 spray Each Nare Daily Vanna Reis, MD     • heparin (porcine)  5,000 Units Subcutaneous Q8H [address] Mark Schrader MD     • insulin lispro  1-5 Units Subcutaneous 4x Daily (AC & HS) Mark Schrader MD     • insulin lispro  2 Units Subcutaneous TID With Meals Mark Schrader MD     • ipratropium  0 5 mg Nebulization Q6H Mark Schrader MD     • levalbuterol  1 25 mg Nebulization Q6H Mark Schrader MD      And   • sodium chloride  3 mL Nebulization Q6H PRN Mark Schrader MD     • levothyroxine  75 mcg Oral Early Morning Mark Schrader MD     • loratadine  5 mg Oral Daily Mark Schrader MD     • mupirocin   Nasal Q12H [address] Adrian Palmer MD     • sodium chloride  1 spray Each Nare Q1H PRN Mark Schrader MD               ** Please Note: Dictation voice to text software may have been used in the creation of this document   Dion Worley MD  01/07/23  8:27 AM

## 2023-01-07 NOTE — PLAN OF CARE
Problem: RESPIRATORY - ADULT  Goal: Achieves optimal ventilation and oxygenation  Description: INTERVENTIONS:  - Assess for changes in respiratory status  - Assess for changes in mentation and behavior  - Position to facilitate oxygenation and minimize respiratory effort  - Oxygen administered by appropriate delivery if ordered  - Initiate smoking cessation education as indicated  - Encourage broncho-pulmonary hygiene including cough, deep breathe, Incentive Spirometry  - Assess the need for suctioning and aspirate as needed  - Assess and instruct to report SOB or any respiratory difficulty  - Respiratory Therapy support as indicated  Outcome: Progressing     Problem: Prexisting or High Potential for Compromised Skin Integrity  Goal: Skin integrity is maintained or improved  Description: INTERVENTIONS:  - Identify patients at risk for skin breakdown  - Assess and monitor skin integrity  - Assess and monitor nutrition and hydration status  - Monitor labs   - Assess for incontinence   - Turn and reposition patient  - Assist with mobility/ambulation  - Relieve pressure over bony prominences  - Avoid friction and shearing  - Provide appropriate hygiene as needed including keeping skin clean and dry  - Evaluate need for skin moisturizer/barrier cream  - Collaborate with interdisciplinary team   - Patient/family teaching  - Consider wound care consult   Outcome: Progressing     Problem: MOBILITY - ADULT  Goal: Maintain or return to baseline ADL function  Description: INTERVENTIONS:  -  Assess patient's ability to carry out ADLs; assess patient's baseline for ADL function and identify physical deficits which impact ability to perform ADLs (bathing, care of mouth/teeth, toileting, grooming, dressing, etc )  - Assess/evaluate cause of self-care deficits   - Assess range of motion  - Assess patient's mobility; develop plan if impaired  - Assess patient's need for assistive devices and provide as appropriate  - Encourage maximum independence but intervene and supervise when necessary  - Involve family in performance of ADLs  - Assess for home care needs following discharge   - Consider OT consult to assist with ADL evaluation and planning for discharge  - Provide patient education as appropriate  Outcome: Progressing  Goal: Maintains/Returns to pre admission functional level  Description: INTERVENTIONS:  - Perform BMAT or MOVE assessment daily    - Set and communicate daily mobility goal to care team and patient/family/caregiver  - Collaborate with rehabilitation services on mobility goals if consulted  - Perform Range of Motion 2 times a day  - Reposition patient every 2 hours    - Dangle patient 2 times a day  - Stand patient 2 times a day  - Ambulate patient 2 times a day  - Out of bed to chair 2 times a day   - Out of bed for meals 2 times a day  - Out of bed for toileting  - Record patient progress and toleration of activity level   Outcome: Progressing     Problem: GENITOURINARY - ADULT  Goal: Maintains or returns to baseline urinary function  Description: INTERVENTIONS:  - Assess urinary function  - Encourage oral fluids to ensure adequate hydration if ordered  - Administer IV fluids as ordered to ensure adequate hydration  - Administer ordered medications as needed  - Offer frequent toileting  - Follow urinary retention protocol if ordered  Outcome: Progressing  Goal: Absence of urinary retention  Description: INTERVENTIONS:  - Assess patient’s ability to void and empty bladder  - Monitor I/O  - Bladder scan as needed  - Discuss with physician/AP medications to alleviate retention as needed  - Discuss catheterization for long term situations as appropriate  Outcome: Progressing     Problem: METABOLIC, FLUID AND ELECTROLYTES - ADULT  Goal: Electrolytes maintained within normal limits  Description: INTERVENTIONS:  - Monitor labs and assess patient for signs and symptoms of electrolyte imbalances  - Administer electrolyte replacement as ordered  - Monitor response to electrolyte replacements, including repeat lab results as appropriate  - Instruct patient on fluid and nutrition as appropriate  Outcome: Progressing  Goal: Fluid balance maintained  Description: INTERVENTIONS:  - Monitor labs   - Monitor I/O and WT  - Instruct patient on fluid and nutrition as appropriate  - Assess for signs & symptoms of volume excess or deficit  Outcome: Progressing  Goal: Glucose maintained within target range  Description: INTERVENTIONS:  - Monitor Blood Glucose as ordered  - Assess for signs and symptoms of hyperglycemia and hypoglycemia  - Administer ordered medications to maintain glucose within target range  - Assess nutritional intake and initiate nutrition service referral as needed  Outcome: Progressing     Problem: SKIN/TISSUE INTEGRITY - ADULT  Goal: Skin Integrity remains intact(Skin Breakdown Prevention)  Description: Assess:  -Perform Stefan assessment every shift  -Clean and moisturize skin every shift  -Inspect skin when repositioning, toileting, and assisting with ADLS  -Assess extremities for adequate circulation and sensation     Bed Management:  -Have minimal linens on bed & keep smooth, unwrinkled  -Change linens as needed when moist or perspiring  -Avoid sitting or lying in one position for more than 2 hours while in bed  -Keep HOB at 30 degrees     Toileting:  -Offer bedside commode    Activity:  -Mobilize patient 2 times a day  -Encourage activity and walks on unit  -Encourage or provide ROM exercises   -Turn and reposition patient every 2 Hours  -Use appropriate equipment to lift or move patient in bed  -Instruct/ Assist with weight shifting every 2 hours when out of bed in chair  -Consider limitation of chair time 2 hour intervals  Outcome: Progressing     Problem: MUSCULOSKELETAL - ADULT  Goal: Maintain or return mobility to safest level of function  Description: INTERVENTIONS:  - Assess patient's ability to carry out ADLs; assess patient's baseline for ADL function and identify physical deficits which impact ability to perform ADLs (bathing, care of mouth/teeth, toileting, grooming, dressing, etc )  - Assess/evaluate cause of self-care deficits   - Assess range of motion  - Assess patient's mobility  - Assess patient's need for assistive devices and provide as appropriate  - Encourage maximum independence but intervene and supervise when necessary  - Involve family in performance of ADLs  - Assess for home care needs following discharge   - Consider OT consult to assist with ADL evaluation and planning for discharge  - Provide patient education as appropriate  Outcome: Progressing  Goal: Maintain proper alignment of affected body part  Description: INTERVENTIONS:  - Support, maintain and protect limb and body alignment  - Provide patient/ family with appropriate education  Outcome: Progressing     Problem: Potential for Falls  Goal: Patient will remain free of falls  Description: INTERVENTIONS:  - Educate patient/family on patient safety including physical limitations  - Instruct patient to call for assistance with activity   - Consult OT/PT to assist with strengthening/mobility   - Keep Call bell within reach  - Keep bed low and locked with side rails adjusted as appropriate  - Keep care items and personal belongings within reach  - Initiate and maintain comfort rounds  - Make Fall Risk Sign visible to staff  - Offer Toileting every 2 Hours, in advance of need  - Initiate/Maintain bed alarm  - Obtain necessary fall risk management equipment: commode  - Apply yellow socks and bracelet for high fall risk patients  - Consider moving patient to room near nurses station  Outcome: Progressing

## 2023-01-07 NOTE — PLAN OF CARE
Problem: RESPIRATORY - ADULT  Goal: Achieves optimal ventilation and oxygenation  Description: INTERVENTIONS:  - Assess for changes in respiratory status  - Assess for changes in mentation and behavior  - Position to facilitate oxygenation and minimize respiratory effort  - Oxygen administered by appropriate delivery if ordered  - Initiate smoking cessation education as indicated  - Encourage broncho-pulmonary hygiene including cough, deep breathe, Incentive Spirometry  - Assess the need for suctioning and aspirate as needed  - Assess and instruct to report SOB or any respiratory difficulty  - Respiratory Therapy support as indicated  Outcome: Progressing     Problem: MOBILITY - ADULT  Goal: Maintain or return to baseline ADL function  Description: INTERVENTIONS:  -  Assess patient's ability to carry out ADLs; assess patient's baseline for ADL function and identify physical deficits which impact ability to perform ADLs (bathing, care of mouth/teeth, toileting, grooming, dressing, etc )  - Assess/evaluate cause of self-care deficits   - Assess range of motion  - Assess patient's mobility; develop plan if impaired  - Assess patient's need for assistive devices and provide as appropriate  - Encourage maximum independence but intervene and supervise when necessary  - Involve family in performance of ADLs  - Assess for home care needs following discharge   - Consider OT consult to assist with ADL evaluation and planning for discharge  - Provide patient education as appropriate  Outcome: Progressing     Problem: GENITOURINARY - ADULT  Goal: Maintains or returns to baseline urinary function  Description: INTERVENTIONS:  - Assess urinary function  - Encourage oral fluids to ensure adequate hydration if ordered  - Administer IV fluids as ordered to ensure adequate hydration  - Administer ordered medications as needed  - Offer frequent toileting  - Follow urinary retention protocol if ordered  Outcome: Progressing Problem: METABOLIC, FLUID AND ELECTROLYTES - ADULT  Goal: Electrolytes maintained within normal limits  Description: INTERVENTIONS:  - Monitor labs and assess patient for signs and symptoms of electrolyte imbalances  - Administer electrolyte replacement as ordered  - Monitor response to electrolyte replacements, including repeat lab results as appropriate  - Instruct patient on fluid and nutrition as appropriate  Outcome: Progressing     Problem: METABOLIC, FLUID AND ELECTROLYTES - ADULT  Goal: Fluid balance maintained  Description: INTERVENTIONS:  - Monitor labs   - Monitor I/O and WT  - Instruct patient on fluid and nutrition as appropriate  - Assess for signs & symptoms of volume excess or deficit  Outcome: Progressing     Problem: METABOLIC, FLUID AND ELECTROLYTES - ADULT  Goal: Glucose maintained within target range  Description: INTERVENTIONS:  - Monitor Blood Glucose as ordered  - Assess for signs and symptoms of hyperglycemia and hypoglycemia  - Administer ordered medications to maintain glucose within target range  - Assess nutritional intake and initiate nutrition service referral as needed  Outcome: Progressing

## 2023-01-07 NOTE — PROGRESS NOTES
Progress Note - Pulmonary   Pacheco Petersen 56 y o  female MRN: [id number]  Unit/Bed#: 2 [place] [address] Encounter: [id number]    Assessment:  Multifocal pneumonia  Chest x-ray with some mild improvement in bilateral lower lobe alveolar infiltrates  Has slight crowding of pulmonary vessels but this could be due to poor inspiration  Patient clinically improving  Persistent daytime hypoxemia due to pneumonia  Obstructive sleep apnea with hypoxemia  Some his hypoxemia is related to alveolar hypoventilation from obesity    Plan:  I discussed treatment plan with primary team   Patient will continue with IV ceftriaxone day #6 serum procalcitonin has improved and now is 0 2  Continue ceftriaxone least 1 more day  Did complete 5 days of azithromycin  She has been using CPAP of 1240% oxygen at nighttime  Still needing supplemental oxygen  Echocardiogram will be done to assess for any LV dysfunction or pulmonary artery hypertension plan was to give 1 dose of furosemide 20 mg IV to see if there is any improvement in oxygenation  Subjective:   No complaints    Objective:     Vitals: Blood pressure (!) 100/43, pulse 77, temperature 98 2 °F (36 8 °C), resp  rate 20, height 4' 8" (1 422 m), weight 84 8 kg (187 lb), SpO2 94 %, not currently breastfeeding  ,Body mass index is 41 92 kg/m²  Intake/Output Summary (Last 24 hours) at 1/6/2023 2112  Last data filed at 1/6/2023 2004  Gross per 24 hour   Intake 1220 ml   Output 3440 ml   Net -2220 ml       Physical Exam: Physical Exam  Vitals reviewed  Constitutional:       General: She is not in acute distress  Appearance: Normal appearance  She is well-developed  She is obese  Comments: On 5 L/min nc O2 saturation 92%   HENT:      Head: Normocephalic  Right Ear: External ear normal       Left Ear: External ear normal       Nose: Nose normal       Mouth/Throat:      Mouth: Mucous membranes are moist       Pharynx: Oropharynx is clear  No oropharyngeal exudate  Eyes:      Conjunctiva/sclera: Conjunctivae normal       Pupils: Pupils are equal, round, and reactive to light  Neck:      Vascular: No JVD  Trachea: No tracheal deviation  Cardiovascular:      Rate and Rhythm: Normal rate and regular rhythm  Heart sounds: Normal heart sounds  Pulmonary:      Effort: Pulmonary effort is normal       Comments: Lung sounds are clear  No wheezes, crackles or rhonchi  Abdominal:      General: There is no distension  Palpations: Abdomen is soft  Tenderness: There is no abdominal tenderness  There is no guarding  Musculoskeletal:      Cervical back: Neck supple  Comments: No edema   Lymphadenopathy:      Cervical: No cervical adenopathy  Skin:     General: Skin is warm and dry  Findings: No rash  Neurological:      General: No focal deficit present  Mental Status: She is alert and oriented to person, place, and time  Psychiatric:         Behavior: Behavior normal          Thought Content: Thought content normal           Labs: I have personally reviewed pertinent lab results  , ABG:   Lab Results   Component Value Date    PHART 7 340 (L) 01/01/2023    KKX5MSQ 49 4 (H) 01/01/2023    PO2ART 65 8 (L) 01/01/2023    ZYR1ECE 26 1 01/01/2023    BEART -0 2 01/01/2023    SOURCE Radial, Left 01/01/2023   , BNP: No results found for: BNP, CBC:   Lab Results   Component Value Date    WBC 5 39 01/06/2023    HGB 11 3 (L) 01/06/2023    HCT 34 5 (L) 01/06/2023     (H) 01/06/2023     01/06/2023    ADJUSTEDWBC 9 00 02/08/2016    MCH 33 4 01/06/2023    MCHC 32 8 01/06/2023    RDW 14 2 01/06/2023    MPV 10 4 01/06/2023    NRBC 1 01/06/2023   , CMP:   Lab Results   Component Value Date     01/08/2016    K 4 6 01/06/2023    K 4 3 01/30/2021     01/06/2023     01/30/2021    CO2 32 01/06/2023    CO2 28 01/30/2021    ANIONGAP 15 0 01/08/2016    BUN 16 01/06/2023    BUN 15 01/30/2021    CREATININE 0 86 01/06/2023    CREATININE 1 1 01/08/2016    GLUCOSE 116 (H) 01/08/2016    CALCIUM 8 0 (L) 01/06/2023    CALCIUM 8 5 01/08/2016    AST 46 (H) 01/02/2023    AST 21 01/08/2016    ALT 19 01/02/2023    ALT 28 01/08/2016    ALKPHOS 42 (L) 01/02/2023    ALKPHOS 106 01/08/2016    PROT 7 2 01/08/2016    BILITOT 0 3 01/08/2016    EGFR 81 01/06/2023   , PT/INR:   Lab Results   Component Value Date    INR 1 16 01/01/2023    INR 1 00 01/08/2016   , Troponin: No results found for: TROPONIN    Imaging and other studies: I have personally reviewed pertinent reports     and I have personally reviewed pertinent films in PACS

## 2023-01-08 LAB
ALBUMIN SERPL BCP-MCNC: 2.2 G/DL (ref 3.5–5)
ALP SERPL-CCNC: 61 U/L (ref 46–116)
ALT SERPL W P-5'-P-CCNC: 61 U/L (ref 12–78)
ANION GAP SERPL CALCULATED.3IONS-SCNC: 8 MMOL/L (ref 4–13)
AST SERPL W P-5'-P-CCNC: 49 U/L (ref 5–45)
BILIRUB SERPL-MCNC: 0.23 MG/DL (ref 0.2–1)
BUN SERPL-MCNC: 16 MG/DL (ref 5–25)
CALCIUM ALBUM COR SERPL-MCNC: 10.4 MG/DL (ref 8.3–10.1)
CALCIUM SERPL-MCNC: 9 MG/DL (ref 8.3–10.1)
CHLORIDE SERPL-SCNC: 101 MMOL/L (ref 96–108)
CO2 SERPL-SCNC: 32 MMOL/L (ref 21–32)
CREAT SERPL-MCNC: 0.97 MG/DL (ref 0.6–1.3)
ERYTHROCYTE [DISTWIDTH] IN BLOOD BY AUTOMATED COUNT: 14.4 % (ref 11.6–15.1)
GFR SERPL CREATININE-BSD FRML MDRD: 70 ML/MIN/1.73SQ M
GLUCOSE SERPL-MCNC: 111 MG/DL (ref 65–140)
GLUCOSE SERPL-MCNC: 133 MG/DL (ref 65–140)
GLUCOSE SERPL-MCNC: 151 MG/DL (ref 65–140)
GLUCOSE SERPL-MCNC: 167 MG/DL (ref 65–140)
GLUCOSE SERPL-MCNC: 192 MG/DL (ref 65–140)
GLUCOSE SERPL-MCNC: 195 MG/DL (ref 65–140)
HCT VFR BLD AUTO: 37.5 % (ref 34.8–46.1)
HGB BLD-MCNC: 11.9 G/DL (ref 11.5–15.4)
MCH RBC QN AUTO: 32.2 PG (ref 26.8–34.3)
MCHC RBC AUTO-ENTMCNC: 31.7 G/DL (ref 31.4–37.4)
MCV RBC AUTO: 101 FL (ref 82–98)
NRBC BLD AUTO-RTO: 0 /100 WBCS
PLATELET # BLD AUTO: 293 THOUSANDS/UL (ref 149–390)
PMV BLD AUTO: 9.8 FL (ref 8.9–12.7)
POTASSIUM SERPL-SCNC: 4.5 MMOL/L (ref 3.5–5.3)
PROT SERPL-MCNC: 6 G/DL (ref 6.4–8.4)
RBC # BLD AUTO: 3.7 MILLION/UL (ref 3.81–5.12)
SODIUM SERPL-SCNC: 141 MMOL/L (ref 135–147)
WBC # BLD AUTO: 5.33 THOUSAND/UL (ref 4.31–10.16)

## 2023-01-08 RX ADMIN — MUPIROCIN 1 APPLICATION: 20 OINTMENT TOPICAL at 09:27

## 2023-01-08 RX ADMIN — IPRATROPIUM BROMIDE 0.5 MG: 0.5 SOLUTION RESPIRATORY (INHALATION) at 15:40

## 2023-01-08 RX ADMIN — LEVALBUTEROL HYDROCHLORIDE 1.25 MG: 1.25 SOLUTION, CONCENTRATE RESPIRATORY (INHALATION) at 02:37

## 2023-01-08 RX ADMIN — INSULIN LISPRO 1 UNITS: 100 INJECTION, SOLUTION INTRAVENOUS; SUBCUTANEOUS at 12:06

## 2023-01-08 RX ADMIN — LEVALBUTEROL HYDROCHLORIDE 1.25 MG: 1.25 SOLUTION, CONCENTRATE RESPIRATORY (INHALATION) at 08:20

## 2023-01-08 RX ADMIN — LEVALBUTEROL HYDROCHLORIDE 1.25 MG: 1.25 SOLUTION, CONCENTRATE RESPIRATORY (INHALATION) at 15:41

## 2023-01-08 RX ADMIN — MUPIROCIN 1 APPLICATION: 20 OINTMENT TOPICAL at 22:54

## 2023-01-08 RX ADMIN — CEFTRIAXONE 2000 MG: 2 INJECTION, SOLUTION INTRAVENOUS at 16:23

## 2023-01-08 RX ADMIN — HEPARIN SODIUM 5000 UNITS: 5000 INJECTION INTRAVENOUS; SUBCUTANEOUS at 22:54

## 2023-01-08 RX ADMIN — IPRATROPIUM BROMIDE 0.5 MG: 0.5 SOLUTION RESPIRATORY (INHALATION) at 08:20

## 2023-01-08 RX ADMIN — INSULIN LISPRO 1 UNITS: 100 INJECTION, SOLUTION INTRAVENOUS; SUBCUTANEOUS at 23:00

## 2023-01-08 RX ADMIN — LEVOTHYROXINE SODIUM 75 MCG: 75 TABLET ORAL at 06:31

## 2023-01-08 RX ADMIN — FLUTICASONE PROPIONATE 1 SPRAY: 50 SPRAY, METERED NASAL at 09:26

## 2023-01-08 RX ADMIN — INSULIN LISPRO 1 UNITS: 100 INJECTION, SOLUTION INTRAVENOUS; SUBCUTANEOUS at 16:22

## 2023-01-08 RX ADMIN — LEVALBUTEROL HYDROCHLORIDE 1.25 MG: 1.25 SOLUTION, CONCENTRATE RESPIRATORY (INHALATION) at 19:42

## 2023-01-08 RX ADMIN — INSULIN LISPRO 2 UNITS: 100 INJECTION, SOLUTION INTRAVENOUS; SUBCUTANEOUS at 12:05

## 2023-01-08 RX ADMIN — CALCIUM 1 TABLET: 500 TABLET ORAL at 09:26

## 2023-01-08 RX ADMIN — HEPARIN SODIUM 5000 UNITS: 5000 INJECTION INTRAVENOUS; SUBCUTANEOUS at 06:31

## 2023-01-08 RX ADMIN — ARIPIPRAZOLE 10 MG: 5 TABLET ORAL at 09:26

## 2023-01-08 RX ADMIN — ATORVASTATIN CALCIUM 10 MG: 10 TABLET, FILM COATED ORAL at 16:24

## 2023-01-08 RX ADMIN — IPRATROPIUM BROMIDE 0.5 MG: 0.5 SOLUTION RESPIRATORY (INHALATION) at 19:42

## 2023-01-08 RX ADMIN — DIVALPROEX SODIUM 1500 MG: 500 TABLET, FILM COATED, EXTENDED RELEASE ORAL at 09:25

## 2023-01-08 RX ADMIN — CALCIUM 1 TABLET: 500 TABLET ORAL at 16:21

## 2023-01-08 RX ADMIN — INSULIN LISPRO 2 UNITS: 100 INJECTION, SOLUTION INTRAVENOUS; SUBCUTANEOUS at 08:30

## 2023-01-08 RX ADMIN — IPRATROPIUM BROMIDE 0.5 MG: 0.5 SOLUTION RESPIRATORY (INHALATION) at 02:37

## 2023-01-08 RX ADMIN — LORATADINE 5 MG: 10 TABLET ORAL at 09:26

## 2023-01-08 RX ADMIN — INSULIN LISPRO 2 UNITS: 100 INJECTION, SOLUTION INTRAVENOUS; SUBCUTANEOUS at 16:21

## 2023-01-08 NOTE — PROGRESS NOTES
Progress Note - Pulmonary   Cate Petersen 56 y o  female MRN: [id number]  Unit/Bed#: 2 [place] [address] Encounter: [id number]    Assessment:  Multifocal pneumonia - clinically better  Persistent daytime hypoxemia may be related to pneumonia with some alveolar hypoventialtion due to obesity  ROBERTO - uses CPAP at home  Mother states she does not have oxygen at home with her CPAP  Was scheduled for CPAP retitration study 1/6 but this was cancelled due to her hospitalization  Obesity  Mild intermittent asthma without exacerbation    Plan:  IV Ceftriaxone Day #8  Continue up to 10 days  Incentive spirometry  Using CPAP 12 here  Mother states patient needs full face mask as nasal one she uses at group home not fitting well  She is involved in outpatient weight loss program as per mother  Check O2 sat recording tonight with patient on CPAP 12  Without oxygen  Normally she is on auto-CPAP 8 to 14 cm H2O at home    Subjective:   Patient feels better    Objective:     Vitals: Blood pressure 107/59, pulse 62, temperature 97 6 °F (36 4 °C), resp  rate 18, height 4' 8" (1 422 m), weight 84 8 kg (187 lb), SpO2 95 %, not currently breastfeeding  ,Body mass index is 41 92 kg/m²  Intake/Output Summary (Last 24 hours) at 1/8/2023 1616  Last data filed at 1/8/2023 0550  Gross per 24 hour   Intake 250 ml   Output 1915 ml   Net -1665 ml       Physical Exam: Physical Exam  Vitals reviewed  Constitutional:       General: She is not in acute distress  Appearance: Normal appearance  She is well-developed  She is obese  Comments: On 3 l/m nc - 96%   HENT:      Head: Normocephalic  Right Ear: External ear normal       Left Ear: External ear normal       Nose: Nose normal       Mouth/Throat:      Pharynx: No oropharyngeal exudate  Eyes:      Conjunctiva/sclera: Conjunctivae normal       Pupils: Pupils are equal, round, and reactive to light  Neck:      Vascular: No JVD  Trachea: No tracheal deviation  Cardiovascular:      Rate and Rhythm: Normal rate and regular rhythm  Heart sounds: Normal heart sounds  Pulmonary:      Effort: Pulmonary effort is normal       Comments: Lung sounds are clear  Abdominal:      General: There is no distension  Palpations: Abdomen is soft  Tenderness: There is no abdominal tenderness  There is no guarding  Musculoskeletal:      Cervical back: Neck supple  Comments: No edema   Lymphadenopathy:      Cervical: No cervical adenopathy  Skin:     General: Skin is warm and dry  Findings: No rash  Neurological:      General: No focal deficit present  Mental Status: She is alert and oriented to person, place, and time  Psychiatric:         Behavior: Behavior normal          Thought Content: Thought content normal           Labs: I have personally reviewed pertinent lab results  , ABG:   Lab Results   Component Value Date    PHART 7 340 (L) 01/01/2023    [id number] 49 4 (H) 01/01/2023    PO2ART 65 8 (L) 01/01/2023    [id number] 26 1 01/01/2023    BEART -0 2 01/01/2023    SOURCE Radial, Left 01/01/2023   , BNP: No results found for: BNP, CBC:   Lab Results   Component Value Date    WBC 5 33 01/08/2023    HGB 11 9 01/08/2023    HCT 37 5 01/08/2023     (H) 01/08/2023     01/08/2023    ADJUSTEDWBC 9 00 02/08/2016    MCH 32 2 01/08/2023    MCHC 31 7 01/08/2023    RDW 14 4 01/08/2023    MPV 9 8 01/08/2023    NRBC 0 01/08/2023   , CMP:   Lab Results   Component Value Date     01/08/2016    K 4 5 01/08/2023    K 4 3 01/30/2021     01/08/2023     01/30/2021    CO2 32 01/08/2023    CO2 28 01/30/2021    ANIONGAP 15 0 01/08/2016    BUN 16 01/08/2023    BUN 15 01/30/2021    CREATININE 0 97 01/08/2023    CREATININE 1 1 01/08/2016    GLUCOSE 116 (H) 01/08/2016    CALCIUM 9 0 01/08/2023    CALCIUM 8 5 01/08/2016    AST 49 (H) 01/08/2023    AST 21 01/08/2016    ALT 61 01/08/2023    ALT 28 01/08/2016    ALKPHOS 61 01/08/2023    ALKPHOS 106 01/08/2016    PROT 7 2 01/08/2016    BILITOT 0 3 01/08/2016    EGFR 70 01/08/2023   , PT/INR:   Lab Results   Component Value Date    INR 1 16 01/01/2023    INR 1 00 01/08/2016   , Troponin: No results found for: TROPONIN    Imaging and other studies: I have personally reviewed pertinent reports     and I have personally reviewed pertinent films in PACS

## 2023-01-08 NOTE — PLAN OF CARE
Problem: RESPIRATORY - ADULT  Goal: Achieves optimal ventilation and oxygenation  Description: INTERVENTIONS:  - Assess for changes in respiratory status  - Assess for changes in mentation and behavior  - Position to facilitate oxygenation and minimize respiratory effort  - Oxygen administered by appropriate delivery if ordered  - Initiate smoking cessation education as indicated  - Encourage broncho-pulmonary hygiene including cough, deep breathe, Incentive Spirometry  - Assess the need for suctioning and aspirate as needed  - Assess and instruct to report SOB or any respiratory difficulty  - Respiratory Therapy support as indicated  Outcome: Progressing     Problem: Prexisting or High Potential for Compromised Skin Integrity  Goal: Skin integrity is maintained or improved  Description: INTERVENTIONS:  - Identify patients at risk for skin breakdown  - Assess and monitor skin integrity  - Assess and monitor nutrition and hydration status  - Monitor labs   - Assess for incontinence   - Turn and reposition patient  - Assist with mobility/ambulation  - Relieve pressure over bony prominences  - Avoid friction and shearing  - Provide appropriate hygiene as needed including keeping skin clean and dry  - Evaluate need for skin moisturizer/barrier cream  - Collaborate with interdisciplinary team   - Patient/family teaching  - Consider wound care consult   Outcome: Progressing     Problem: MOBILITY - ADULT  Goal: Maintain or return to baseline ADL function  Description: INTERVENTIONS:  -  Assess patient's ability to carry out ADLs; assess patient's baseline for ADL function and identify physical deficits which impact ability to perform ADLs (bathing, care of mouth/teeth, toileting, grooming, dressing, etc )  - Assess/evaluate cause of self-care deficits   - Assess range of motion  - Assess patient's mobility; develop plan if impaired  - Assess patient's need for assistive devices and provide as appropriate  - Encourage maximum independence but intervene and supervise when necessary  - Involve family in performance of ADLs  - Assess for home care needs following discharge   - Consider OT consult to assist with ADL evaluation and planning for discharge  - Provide patient education as appropriate  Outcome: Progressing  Goal: Maintains/Returns to pre admission functional level  Description: INTERVENTIONS:  - Perform BMAT or MOVE assessment daily    - Set and communicate daily mobility goal to care team and patient/family/caregiver  - Collaborate with rehabilitation services on mobility goals if consulted  - Perform Range of Motion 2 times a day  - Reposition patient every 2 hours    - Dangle patient 2 times a day  - Stand patient 2 times a day  - Ambulate patient 2 times a day  - Out of bed to chair 2 times a day   - Out of bed for meals 2 times a day  - Out of bed for toileting  - Record patient progress and toleration of activity level   Outcome: Progressing     Problem: GENITOURINARY - ADULT  Goal: Maintains or returns to baseline urinary function  Description: INTERVENTIONS:  - Assess urinary function  - Encourage oral fluids to ensure adequate hydration if ordered  - Administer IV fluids as ordered to ensure adequate hydration  - Administer ordered medications as needed  - Offer frequent toileting  - Follow urinary retention protocol if ordered  Outcome: Progressing  Goal: Absence of urinary retention  Description: INTERVENTIONS:  - Assess patient’s ability to void and empty bladder  - Monitor I/O  - Bladder scan as needed  - Discuss with physician/AP medications to alleviate retention as needed  - Discuss catheterization for long term situations as appropriate  Outcome: Progressing     Problem: METABOLIC, FLUID AND ELECTROLYTES - ADULT  Goal: Electrolytes maintained within normal limits  Description: INTERVENTIONS:  - Monitor labs and assess patient for signs and symptoms of electrolyte imbalances  - Administer electrolyte replacement as ordered  - Monitor response to electrolyte replacements, including repeat lab results as appropriate  - Instruct patient on fluid and nutrition as appropriate  Outcome: Progressing  Goal: Fluid balance maintained  Description: INTERVENTIONS:  - Monitor labs   - Monitor I/O and WT  - Instruct patient on fluid and nutrition as appropriate  - Assess for signs & symptoms of volume excess or deficit  Outcome: Progressing  Goal: Glucose maintained within target range  Description: INTERVENTIONS:  - Monitor Blood Glucose as ordered  - Assess for signs and symptoms of hyperglycemia and hypoglycemia  - Administer ordered medications to maintain glucose within target range  - Assess nutritional intake and initiate nutrition service referral as needed  Outcome: Progressing     Problem: MUSCULOSKELETAL - ADULT  Goal: Maintain or return mobility to safest level of function  Description: INTERVENTIONS:  - Assess patient's ability to carry out ADLs; assess patient's baseline for ADL function and identify physical deficits which impact ability to perform ADLs (bathing, care of mouth/teeth, toileting, grooming, dressing, etc )  - Assess/evaluate cause of self-care deficits   - Assess range of motion  - Assess patient's mobility  - Assess patient's need for assistive devices and provide as appropriate  - Encourage maximum independence but intervene and supervise when necessary  - Involve family in performance of ADLs  - Assess for home care needs following discharge   - Consider OT consult to assist with ADL evaluation and planning for discharge  - Provide patient education as appropriate  Outcome: Progressing  Goal: Maintain proper alignment of affected body part  Description: INTERVENTIONS:  - Support, maintain and protect limb and body alignment  - Provide patient/ family with appropriate education  Outcome: Progressing     Problem: Potential for Falls  Goal: Patient will remain free of falls  Description: INTERVENTIONS:  - Educate patient/family on patient safety including physical limitations  - Instruct patient to call for assistance with activity   - Consult OT/PT to assist with strengthening/mobility   - Keep Call bell within reach  - Keep bed low and locked with side rails adjusted as appropriate  - Keep care items and personal belongings within reach  - Initiate and maintain comfort rounds  - Make Fall Risk Sign visible to staff  - Offer Toileting every 2 Hours, in advance of need  - Initiate/Maintain bed/chair alarm    - Apply yellow socks and bracelet for high fall risk patients  - Consider moving patient to room near nurses station  Outcome: Progressing

## 2023-01-08 NOTE — PROGRESS NOTES
Progress Note - Pulmonary   Ur[place] Pat Petersen 56 y o  female MRN: [id number]  Unit/Bed#: [address] [address] Encounter: [id number]    Assessment:  Multifocal pneumonia  Day # 7 of IV ceftriaxone  Serum procalcitonin yesterday decreased to normal  Persistent daytime hypoxemia due to pneumonia also probably due to some hypoventilation from obesity  Obstructive sleep apnea with hypoxemia  She is CPAP at home with 2 L of oxygen  Echocardiogram done shows normal LV systolic function and no evidence of any pulmonary hypertension  Mild intermittent asthma without exacerbation    Plan:  Continue IV ceftriaxone  May need to consider 10 days total of antibiotic therapy  Has completed 5 days of azithromycin  Incentive spirometry  Still needing daytime oxygen    Subjective:   Patient not having shortness of breath    Objective:     Vitals: Blood pressure 140/96, pulse 69, temperature (!) 97 1 °F (36 2 °C), resp  rate 20, height 4' 8" (1 422 m), weight 84 8 kg (187 lb), SpO2 91 %, not currently breastfeeding  ,Body mass index is 41 92 kg/m²  Intake/Output Summary (Last 24 hours) at 1/7/2023 2003  Last data filed at 1/7/2023 1700  Gross per 24 hour   Intake --   Output 3475 ml   Net -3475 ml       Physical Exam: Physical Exam  Vitals reviewed  Constitutional:       General: She is not in acute distress  Appearance: Normal appearance  She is well-developed  She is obese  Comments: On full facemask with 5 L/min oxygen duration 93%   HENT:      Head: Normocephalic  Right Ear: External ear normal       Left Ear: External ear normal       Nose: Nose normal       Mouth/Throat:      Mouth: Mucous membranes are moist       Pharynx: Oropharynx is clear  No oropharyngeal exudate  Eyes:      Conjunctiva/sclera: Conjunctivae normal       Pupils: Pupils are equal, round, and reactive to light  Neck:      Vascular: No JVD  Trachea: No tracheal deviation     Cardiovascular:      Rate and Rhythm: Normal rate and regular rhythm  Heart sounds: Normal heart sounds  Pulmonary:      Effort: Pulmonary effort is normal       Comments: Lung sounds clear  Abdominal:      General: There is no distension  Palpations: Abdomen is soft  Tenderness: There is no abdominal tenderness  There is no guarding  Musculoskeletal:      Cervical back: Neck supple  Comments: No edema, cyanosis or clubbing   Lymphadenopathy:      Cervical: No cervical adenopathy  Skin:     General: Skin is warm and dry  Findings: No rash  Neurological:      General: No focal deficit present  Mental Status: She is alert and oriented to person, place, and time  Psychiatric:         Behavior: Behavior normal          Thought Content: Thought content normal           Labs: I have personally reviewed pertinent lab results  , ABG:   Lab Results   Component Value Date    PHART 7 340 (L) 01/01/2023    LMP8NKB 49 4 (H) 01/01/2023    PO2ART 65 8 (L) 01/01/2023    JOV4YIQ 26 1 01/01/2023    BEART -0 2 01/01/2023    SOURCE Radial, Left 01/01/2023   , BNP: No results found for: BNP, CBC:   Lab Results   Component Value Date    WBC 5 72 01/07/2023    HGB 11 8 01/07/2023    HCT 37 2 01/07/2023     (H) 01/07/2023     01/07/2023    ADJUSTEDWBC 9 00 02/08/2016    MCH 32 4 01/07/2023    MCHC 31 7 01/07/2023    RDW 14 2 01/07/2023    MPV 10 0 01/07/2023    NRBC 1 01/06/2023   , CMP:   Lab Results   Component Value Date     01/08/2016    K 4 8 01/07/2023    K 4 3 01/30/2021     01/07/2023     01/30/2021    CO2 32 01/07/2023    CO2 28 01/30/2021    ANIONGAP 15 0 01/08/2016    BUN 16 01/07/2023    BUN 15 01/30/2021    CREATININE 0 94 01/07/2023    CREATININE 1 1 01/08/2016    GLUCOSE 116 (H) 01/08/2016    CALCIUM 8 7 01/07/2023    CALCIUM 8 5 01/08/2016    AST 46 (H) 01/02/2023    AST 21 01/08/2016    ALT 19 01/02/2023    ALT 28 01/08/2016    ALKPHOS 42 (L) 01/02/2023    ALKPHOS 106 01/08/2016    PROT 7 2 01/08/2016    BILITOT 0 3 01/08/2016    EGFR 72 01/07/2023   , PT/INR:   Lab Results   Component Value Date    INR 1 16 01/01/2023    INR 1 00 01/08/2016   , Troponin: No results found for: TROPONIN    Imaging and other studies: I have personally reviewed pertinent reports     and I have personally reviewed pertinent films in PACS

## 2023-01-08 NOTE — PLAN OF CARE
Problem: RESPIRATORY - ADULT  Goal: Achieves optimal ventilation and oxygenation  Description: INTERVENTIONS:  - Assess for changes in respiratory status  - Assess for changes in mentation and behavior  - Position to facilitate oxygenation and minimize respiratory effort  - Oxygen administered by appropriate delivery if ordered  - Initiate smoking cessation education as indicated  - Encourage broncho-pulmonary hygiene including cough, deep breathe, Incentive Spirometry  - Assess the need for suctioning and aspirate as needed  - Assess and instruct to report SOB or any respiratory difficulty  - Respiratory Therapy support as indicated  Outcome: Progressing     Problem: GENITOURINARY - ADULT  Goal: Maintains or returns to baseline urinary function  Description: INTERVENTIONS:  - Assess urinary function  - Encourage oral fluids to ensure adequate hydration if ordered  - Administer IV fluids as ordered to ensure adequate hydration  - Administer ordered medications as needed  - Offer frequent toileting  - Follow urinary retention protocol if ordered  Outcome: Progressing

## 2023-01-08 NOTE — PROGRESS NOTES
Daily Progress Note - [address] KALLIE Gentle 56 y o  female MRN: [id number]  Unit/Bed#: [address] [place] [address] Encounter: [id number]  Admitting Physician: Carmelina Umana MD  PCP: Meghna Marcelo DO  Date of Admission:  1/1/2023  1:46 PM    Summary:     IVFs:    Diet: Diet David/CHO Controlled; Consistent Carbohydrate Diet Level 1 (4 carb servings/60 grams CHO/meal)  VTE:  Heparin   Mechanical prophylaxis in place  Patient Centered Rounds: I have performed bedside rounds with nursing staff today  Specialists/Other Care Team Provider: Pulmo    LOS: 7 day(s)  Status: Inpatient   Disposition: The patient will continue to require additional inpatient hospital stay due to Multifocal pneumonia requiring supplemental oxygen and IV antibiotic  Code Status: Level 1 - Full Code    Assessment and Plan    * Acute on chronic respiratory failure with hypoxia (HCC)  Assessment & Plan  No oxygen requirement at home  Imaging in the ED showing multilobular PNA  Pt wears CPAP HS at home  Repeat CXR: Findings consistent with mild CHF, improved since 1/1/2023 with partial clearing of bilateral pulmonary airspace opacities, which most likely represented pulmonary edema  • Continue supplemental O2 and wean for sat > 88%  • Xopenex/atrovent Q6h  • IV lasix 20 mg was given once  • Azithromycin 500mg IV completed (5 days)  • Continue rocephin 2g IV (Day 8) to 10 days per pulm  • CPAP 12 40% at night normally, Trial of no supplemental oxygen with CPAP tonight  • Current O2 requirement: 2 L O2, continue to wean O2 as tolerated    Multifocal pneumonia  Assessment & Plan  CT C/A/P: Multifocal bilateral airspace disease in the lungs, likely pneumonia    Strep legionella urine neg  Procal 1 6, LA WNL, WBC 8 76  -Repeat CXR:Findings consistent with mild CHF, improved since 1/1/2023 with partial clearing of bilateral pulmonary airspace opacities, which most likely represented pulmonary edema     -On Ceftriaxone 2g (Day 8), complete 10 days per pulm, can transition to oral if DC earlier  - Azithro 500mg IV completed (5 days)    -monitor vitals    Allergy to environmental factors  Assessment & Plan  Continue Loratadine 5mg (substitue for home levocetirizine 5mg) and home Flonase     Nonintractable epilepsy with complex partial seizures ([address] )  Assessment & Plan  • No Sz activity this Admission  • Valporic acid level 68 this Admission   • Continued home Depakote PO 1500mg daily today    Hyperlipidemia  Assessment & Plan  Continue home Lipitor 10 mg daily    Hypothyroidism  Assessment & Plan  Continue home Levothyroxine 75 mcg    Mild intellectual disability  Assessment & Plan  • Routine neuro checks and delirium precautions    Diabetes mellitus [facility])  Assessment & Plan  Lab Results   Component Value Date    HGBA1C 5 0 02/08/2022       Recent Labs     01/05/23  1610 01/05/23  2051 01/06/23  0734 01/06/23  1103   POCGLU 189* 207* 155* 237*       Blood Sugar Average: Last 72 hrs:  (P) 130 [id number]   • Lispro mealtime 2 unit  • ISS    History of prolonged Q-T interval on ECG  Assessment & Plan  • Continue  telemetry monitoring    Obstructive sleep apnea syndrome  Assessment & Plan  • Wears pediatric CPAP at home with nasal mask   E pressure 8  • Will continue HS CPAP 12 40%  Pt had O2 sat around 80's with home CPAP machine on 1/3, so it was switched back to hospital machine tolerating well   -needs new machine at home    -Trial of no supplemental oxygen with CPAP tonight as per Pulmonology    Severe sepsis (HCC)-resolved as of 1/6/2023  Assessment & Plan  • Secondary to multilobular PNA  • Flu/rsv/covid neg, Strep/legionella Neg  • BCx NG 72hrs  • U/A negative UTI  • Procal 1 06 > 0 82 > 0 20  • WBC 6 71    - Azithromycin 500mg IV completed (5 days)   - continue rocephin 2g IV daily (day 8)  - Trend Fever curve and WBC, no fevers overnight    TIERNEY on Chronic renal insufficiency, stage III (moderate) (HCC)-resolved as of 1/3/2023  Assessment & Plan  Lab Results   Component Value Date    EGFR 81 2023    EGFR 84 2023    EGFR 73 2023    CREATININE 0 86 2023    CREATININE 0 83 2023    CREATININE 0 93 2023   • Resolved  • TIERNEY on CKD, Cr 1 51  • Given 1 5 L Nss in ED  • Continue isolyte 75 ml/hr  • Cr improved, 0 86    Trend I/o and renal function         Subjective:   Pt was seen and examined at the bedside, NAD  Pt had no acute complaints and Pt states that she is breathing better  Review of Systems   Constitutional: Negative for chills and fever  HENT: Negative for ear pain and sore throat  Eyes: Negative for pain and visual disturbance  Respiratory: Negative for cough and shortness of breath  Cardiovascular: Negative for chest pain and palpitations  Gastrointestinal: Negative for abdominal pain, constipation, diarrhea, nausea and vomiting  Genitourinary: Negative for dysuria and hematuria  Musculoskeletal: Negative for arthralgias, back pain, neck pain and neck stiffness  Skin: Negative for color change and rash  Neurological: Negative for dizziness, weakness and headaches  Psychiatric/Behavioral: Negative for agitation and confusion  The patient is not nervous/anxious  All other systems reviewed and are negative  Objective     Vitals:   Temp (24hrs), Av °F (36 1 °C), Min:96 9 °F (36 1 °C), Max:97 1 °F (36 2 °C)    Temp:  [96 9 °F (36 1 °C)-97 1 °F (36 2 °C)] 97 1 °F (36 2 °C)  HR:  [68-79] 68  Resp:  [18-20] 18  BP: ()/(57-96) 112/68  SpO2:  [91 %-100 %] 95 %  Body mass index is 41 92 kg/m²  Input and Output Summary (last 24 hours): Intake/Output Summary (Last 24 hours) at 2023 1513  Last data filed at 2023 0550  Gross per 24 hour   Intake 250 ml   Output 1915 ml   Net -1665 ml       Physical Exam:   Physical Exam  Vitals reviewed  Constitutional:       General: She is not in acute distress  Appearance: Normal appearance   She is obese  She is not ill-appearing  HENT:      Head: Normocephalic and atraumatic  Right Ear: External ear normal       Left Ear: External ear normal       Nose: Nose normal  No congestion or rhinorrhea  Mouth/Throat:      Mouth: Mucous membranes are moist    Eyes:      Extraocular Movements: Extraocular movements intact  Conjunctiva/sclera: Conjunctivae normal    Cardiovascular:      Rate and Rhythm: Normal rate and regular rhythm  Pulses: Normal pulses  Heart sounds: Normal heart sounds  No murmur heard  No gallop  Pulmonary:      Effort: Pulmonary effort is normal  No respiratory distress  Breath sounds: Normal breath sounds  No stridor  No wheezing, rhonchi or rales  Chest:      Chest wall: No tenderness  Abdominal:      General: Abdomen is flat  Bowel sounds are normal  There is no distension  Palpations: Abdomen is soft  Tenderness: There is no abdominal tenderness  There is no guarding  Musculoskeletal:         General: No swelling, tenderness or deformity  Normal range of motion  Cervical back: Normal range of motion and neck supple  Right lower leg: No edema  Left lower leg: No edema  Skin:     General: Skin is warm and dry  Capillary Refill: Capillary refill takes less than 2 seconds  Findings: No bruising, erythema, lesion or rash  Neurological:      General: No focal deficit present  Mental Status: She is alert and oriented to person, place, and time  Mental status is at baseline  Comments: Intellectual disability with down syndrome   Psychiatric:         Mood and Affect: Mood normal          Behavior: Behavior normal          Thought Content:  Thought content normal            Additional Data:     Labs:  Results from last 7 days   Lab Units 01/08/23  0615 01/07/23  0540 01/06/23  0555 01/03/23  0518 01/02/23  0437   WBC Thousand/uL 5 33   < > 5 39   < > 7 37   HEMOGLOBIN g/dL 11 9   < > 11 3*   < > 11 3*   HEMATOCRIT % 37 5   < > 34 5*   < > 35 4   PLATELETS Thousands/uL 293   < > 191   < > 112*   NEUTROS PCT %  --   --   --   --  78*   LYMPHS PCT %  --   --   --   --  16   LYMPHO PCT %  --   --  30   < >  --    MONOS PCT %  --   --   --   --  5   MONO PCT %  --   --  5   < >  --    EOS PCT %  --   --  1   < > 0    < > = values in this interval not displayed  Results from last 7 days   Lab Units 01/08/23  0615   POTASSIUM mmol/L 4 5   CHLORIDE mmol/L 101   CO2 mmol/L 32   BUN mg/dL 16   CREATININE mg/dL 0 97   CALCIUM mg/dL 9 0   ALK PHOS U/L 61   ALT U/L 61   AST U/L 49*         Results from last 7 days   Lab Units 01/08/23  1123 01/08/23  0709 01/07/23  2048 01/07/23  1609 01/07/23  1116   POC GLUCOSE mg/dl 151* 133 213* 137 142*     Results from last 7 days   Lab Units 01/04/23  0815   HEMOGLOBIN A1C % 6 3*       * I Have Reviewed All Lab Data Listed Above  * Additional Pertinent Lab Tests Reviewed: All Labs Within Last 24 Hours Reviewed    Imaging:    Imaging Reports Reviewed Today Include: none  Imaging Personally Reviewed by Myself Includes:  none    Recent Cultures (last 7 days):              Active Medications  Scheduled Meds:  Current Facility-Administered Medications   Medication Dose Route Frequency Provider Last Rate   • acetaminophen  650 mg Oral Q6H PRN Soren Alexander MD     • ARIPiprazole  10 mg Oral Daily Anita Rosa MD     • atorvastatin  10 mg Oral Daily With Mario Porras MD     • calcium carbonate  1 tablet Oral BID With Meals Anita Rosa MD     • cefTRIAXone  2,000 mg Intravenous Q24H Anita Rosa MD 2,000 mg (01/07/23 1609)   • divalproex sodium  1,500 mg Oral Daily Anita Rosa MD     • fluticasone  1 spray Each Nare Daily Anita Rosa MD     • heparin (porcine)  5,000 Units Subcutaneous Q8H [facility] Anita Rosa MD     • insulin lispro  1-5 Units Subcutaneous 4x Daily (AC & HS) Anita Rosa MD     • insulin lispro  2 Units Subcutaneous TID With Meals Anita Rosa MD     • ipratropium  0 5 mg Nebulization Q6H Sherman Tovar MD     • levalbuterol  1 25 mg Nebulization Q6H Sherman Tovar MD      And   • sodium chloride  3 mL Nebulization Q6H PRN Sherman Tovar MD     • levothyroxine  75 mcg Oral Early Morning Sherman Tovar MD     • loratadine  5 mg Oral Daily Sherman Tovar MD     • mupirocin   Nasal Q12H [facility] Judith Barbosa MD     • sodium chloride  1 spray Each Nare Q1H PRN Sherman Tovar MD       Continuous Infusions:     PRN Meds:   acetaminophen, 650 mg, Q6H PRN  sodium chloride, 1 spray, Q1H PRN  sodium chloride, 3 mL, Q6H PRN                Patient Information Sharing: With the consent of Chela Petersen , their loved ones were notified today by inpatient team of the patient’s condition and current plan  All questions answered  Time Spent for Care: 30 minutes  More than 50% of total time spent on counseling and coordination of care as described above      Sherman Tovar MD  01/08/23  3:13 PM

## 2023-01-09 LAB
ALBUMIN SERPL BCP-MCNC: 2.4 G/DL (ref 3.5–5)
ALP SERPL-CCNC: 63 U/L (ref 46–116)
ALT SERPL W P-5'-P-CCNC: 63 U/L (ref 12–78)
ANION GAP SERPL CALCULATED.3IONS-SCNC: 6 MMOL/L (ref 4–13)
AST SERPL W P-5'-P-CCNC: 48 U/L (ref 5–45)
BASOPHILS # BLD MANUAL: 0 THOUSAND/UL (ref 0–0.1)
BASOPHILS NFR MAR MANUAL: 0 % (ref 0–1)
BILIRUB SERPL-MCNC: 0.39 MG/DL (ref 0.2–1)
BUN SERPL-MCNC: 24 MG/DL (ref 5–25)
CALCIUM ALBUM COR SERPL-MCNC: 10.3 MG/DL (ref 8.3–10.1)
CALCIUM SERPL-MCNC: 9 MG/DL (ref 8.3–10.1)
CHLORIDE SERPL-SCNC: 102 MMOL/L (ref 96–108)
CO2 SERPL-SCNC: 32 MMOL/L (ref 21–32)
CREAT SERPL-MCNC: 1.16 MG/DL (ref 0.6–1.3)
EOSINOPHIL # BLD MANUAL: 0.06 THOUSAND/UL (ref 0–0.4)
EOSINOPHIL NFR BLD MANUAL: 1 % (ref 0–6)
ERYTHROCYTE [DISTWIDTH] IN BLOOD BY AUTOMATED COUNT: 14.5 % (ref 11.6–15.1)
GFR SERPL CREATININE-BSD FRML MDRD: 56 ML/MIN/1.73SQ M
GLUCOSE SERPL-MCNC: 136 MG/DL (ref 65–140)
GLUCOSE SERPL-MCNC: 146 MG/DL (ref 65–140)
GLUCOSE SERPL-MCNC: 164 MG/DL (ref 65–140)
GLUCOSE SERPL-MCNC: 171 MG/DL (ref 65–140)
GLUCOSE SERPL-MCNC: 220 MG/DL (ref 65–140)
HCT VFR BLD AUTO: 37 % (ref 34.8–46.1)
HGB BLD-MCNC: 11.9 G/DL (ref 11.5–15.4)
LG PLATELETS BLD QL SMEAR: PRESENT
LYMPHOCYTES # BLD AUTO: 1.48 THOUSAND/UL (ref 0.6–4.47)
LYMPHOCYTES # BLD AUTO: 25 % (ref 14–44)
MCH RBC QN AUTO: 32.7 PG (ref 26.8–34.3)
MCHC RBC AUTO-ENTMCNC: 32.2 G/DL (ref 31.4–37.4)
MCV RBC AUTO: 102 FL (ref 82–98)
MONOCYTES # BLD AUTO: 1.36 THOUSAND/UL (ref 0–1.22)
MONOCYTES NFR BLD: 23 % (ref 4–12)
MYELOCYTES NFR BLD MANUAL: 6 % (ref 0–1)
NEUTROPHILS # BLD MANUAL: 2.55 THOUSAND/UL (ref 1.85–7.62)
NEUTS BAND NFR BLD MANUAL: 13 % (ref 0–8)
NEUTS SEG NFR BLD AUTO: 30 % (ref 43–75)
PLATELET # BLD AUTO: 296 THOUSANDS/UL (ref 149–390)
PLATELET BLD QL SMEAR: ADEQUATE
PMV BLD AUTO: 9.8 FL (ref 8.9–12.7)
POTASSIUM SERPL-SCNC: 4.5 MMOL/L (ref 3.5–5.3)
PROMYELOCYTES NFR BLD MANUAL: 1 % (ref 0–0)
PROT SERPL-MCNC: 6.2 G/DL (ref 6.4–8.4)
RBC # BLD AUTO: 3.64 MILLION/UL (ref 3.81–5.12)
RBC MORPH BLD: NORMAL
SODIUM SERPL-SCNC: 140 MMOL/L (ref 135–147)
VARIANT LYMPHS # BLD AUTO: 1 %
WBC # BLD AUTO: 5.92 THOUSAND/UL (ref 4.31–10.16)

## 2023-01-09 RX ADMIN — INSULIN LISPRO 2 UNITS: 100 INJECTION, SOLUTION INTRAVENOUS; SUBCUTANEOUS at 16:28

## 2023-01-09 RX ADMIN — IPRATROPIUM BROMIDE 0.5 MG: 0.5 SOLUTION RESPIRATORY (INHALATION) at 20:14

## 2023-01-09 RX ADMIN — LEVALBUTEROL HYDROCHLORIDE 1.25 MG: 1.25 SOLUTION, CONCENTRATE RESPIRATORY (INHALATION) at 20:14

## 2023-01-09 RX ADMIN — MUPIROCIN 1 APPLICATION: 20 OINTMENT TOPICAL at 09:03

## 2023-01-09 RX ADMIN — IPRATROPIUM BROMIDE 0.5 MG: 0.5 SOLUTION RESPIRATORY (INHALATION) at 08:20

## 2023-01-09 RX ADMIN — LEVALBUTEROL HYDROCHLORIDE 1.25 MG: 1.25 SOLUTION, CONCENTRATE RESPIRATORY (INHALATION) at 08:20

## 2023-01-09 RX ADMIN — HEPARIN SODIUM 5000 UNITS: 5000 INJECTION INTRAVENOUS; SUBCUTANEOUS at 21:46

## 2023-01-09 RX ADMIN — LEVOTHYROXINE SODIUM 75 MCG: 75 TABLET ORAL at 06:06

## 2023-01-09 RX ADMIN — FLUTICASONE PROPIONATE 1 SPRAY: 50 SPRAY, METERED NASAL at 08:59

## 2023-01-09 RX ADMIN — CALCIUM 1 TABLET: 500 TABLET ORAL at 16:36

## 2023-01-09 RX ADMIN — IPRATROPIUM BROMIDE 0.5 MG: 0.5 SOLUTION RESPIRATORY (INHALATION) at 01:12

## 2023-01-09 RX ADMIN — IPRATROPIUM BROMIDE 0.5 MG: 0.5 SOLUTION RESPIRATORY (INHALATION) at 13:31

## 2023-01-09 RX ADMIN — CALCIUM 1 TABLET: 500 TABLET ORAL at 08:59

## 2023-01-09 RX ADMIN — MUPIROCIN 1 APPLICATION: 20 OINTMENT TOPICAL at 20:32

## 2023-01-09 RX ADMIN — ATORVASTATIN CALCIUM 10 MG: 10 TABLET, FILM COATED ORAL at 16:39

## 2023-01-09 RX ADMIN — INSULIN LISPRO 1 UNITS: 100 INJECTION, SOLUTION INTRAVENOUS; SUBCUTANEOUS at 12:11

## 2023-01-09 RX ADMIN — CEFTRIAXONE 2000 MG: 2 INJECTION, SOLUTION INTRAVENOUS at 16:31

## 2023-01-09 RX ADMIN — DIVALPROEX SODIUM 1500 MG: 500 TABLET, FILM COATED, EXTENDED RELEASE ORAL at 08:59

## 2023-01-09 RX ADMIN — LEVALBUTEROL HYDROCHLORIDE 1.25 MG: 1.25 SOLUTION, CONCENTRATE RESPIRATORY (INHALATION) at 13:31

## 2023-01-09 RX ADMIN — INSULIN LISPRO 2 UNITS: 100 INJECTION, SOLUTION INTRAVENOUS; SUBCUTANEOUS at 12:11

## 2023-01-09 RX ADMIN — HEPARIN SODIUM 5000 UNITS: 5000 INJECTION INTRAVENOUS; SUBCUTANEOUS at 14:33

## 2023-01-09 RX ADMIN — INSULIN LISPRO 2 UNITS: 100 INJECTION, SOLUTION INTRAVENOUS; SUBCUTANEOUS at 08:30

## 2023-01-09 RX ADMIN — ARIPIPRAZOLE 10 MG: 5 TABLET ORAL at 08:59

## 2023-01-09 RX ADMIN — HEPARIN SODIUM 5000 UNITS: 5000 INJECTION INTRAVENOUS; SUBCUTANEOUS at 06:06

## 2023-01-09 RX ADMIN — LEVALBUTEROL HYDROCHLORIDE 1.25 MG: 1.25 SOLUTION, CONCENTRATE RESPIRATORY (INHALATION) at 01:12

## 2023-01-09 RX ADMIN — LORATADINE 5 MG: 10 TABLET ORAL at 08:59

## 2023-01-09 RX ADMIN — INSULIN LISPRO 1 UNITS: 100 INJECTION, SOLUTION INTRAVENOUS; SUBCUTANEOUS at 21:47

## 2023-01-09 NOTE — PROGRESS NOTES
[address] [facility] 66 [address] 83 [facility] Practice Progress Note - Michi Petersen 56 y o  female MRN: [id number]    Unit/Bed#: 2 [place] [address] Encounter: [id number]      Assessment/Plan:  * Acute on chronic respiratory failure with hypoxia (HCC)  Assessment & Plan  No oxygen requirement at home  Imaging in the ED showing multilobular PNA  Pt wears CPAP HS at home  Repeat CXR: Findings consistent with mild CHF, improved since 1/1/2023 with partial clearing of bilateral pulmonary airspace opacities, which most likely represented pulmonary edema  During trial of no supplemental oxygen with CPAP last night, O2 decreased to 87% and started on 2 L --> O2 inc to 95%    • Continue supplemental O2 and wean for sat > 88%  • Xopenex/atrovent Q6h  • IV lasix 20 mg was given once  • Azithromycin 500mg IV completed (5 days)  • Continue rocephin 2g IV (Day 8) to 10 days per pulm  • CPAP 12 40% at night normally  • Current O2 requirement: 2 L O2, continue to wean O2 as tolerated    Multifocal pneumonia  Assessment & Plan  CT C/A/P: Multifocal bilateral airspace disease in the lungs, likely pneumonia  Strep legionella urine neg  Procal 1 6, LA WNL, WBC 8 76  -Repeat CXR:Findings consistent with mild CHF, improved since 1/1/2023 with partial clearing of bilateral pulmonary airspace opacities, which most likely represented pulmonary edema     -On Ceftriaxone 2g (Day 9), complete 10 days per pulm, can transition to oral if DC earlier  - Azithro 500mg IV completed (5 days)    -monitor vitals    Obstructive sleep apnea syndrome  Assessment & Plan  • Wears pediatric CPAP at home with nasal mask   E pressure 8  • Will continue HS CPAP 12 40%  Pt had O2 sat around 80's with home CPAP machine on 1/3, so it was switched back to hospital machine tolerating well   -needs new machine at home    -Trial of no supplemental oxygen with CPAP tonight as per Pulmonology    Allergy to environmental factors  Assessment & Plan  Continue Loratadine 5mg (substitue for home levocetirizine 5mg) and home Flonase     Nonintractable epilepsy with complex partial seizures ([address] )  Assessment & Plan  • No Sz activity this Admission  • Valporic acid level 68 this Admission   • Continued home Depakote PO 1500mg daily today    Hyperlipidemia  Assessment & Plan  Continue home Lipitor 10 mg daily    Hypothyroidism  Assessment & Plan  Continue home Levothyroxine 75 mcg    Mild intellectual disability  Assessment & Plan  • Routine neuro checks and delirium precautions    Diabetes mellitus [facility])  Assessment & Plan  Lab Results   Component Value Date    HGBA1C 5 0 02/08/2022       Recent Labs     01/05/23  1610 01/05/23  2051 01/06/23  0734 01/06/23  1103   POCGLU 189* 207* 155* 237*       Blood Sugar Average: Last 72 hrs:  (P) 528 [id number]   • Lispro mealtime 2 unit  • ISS    History of prolonged Q-T interval on ECG  Assessment & Plan  • Continue  telemetry monitoring    Down syndrome, unspecified  Assessment & Plan  Patient lives in a group home, mother's POA    Severe sepsis (HCC)-resolved as of 1/6/2023  Assessment & Plan  • Secondary to multilobular PNA  • Flu/rsv/covid neg, Strep/legionella Neg  • BCx NG 72hrs  • U/A negative UTI  • Procal 1 06 > 0 82 > 0 20  • WBC 6 71    - Azithromycin 500mg IV completed (5 days)   - continue rocephin 2g IV daily (day 9)  - Trend Fever curve and WBC, no fevers overnight    TIERNEY on Chronic renal insufficiency, stage III (moderate) (HCC)-resolved as of 1/3/2023  Assessment & Plan  Lab Results   Component Value Date    EGFR 81 01/06/2023    EGFR 84 01/05/2023    EGFR 73 01/04/2023    CREATININE 0 86 01/06/2023    CREATININE 0 83 01/05/2023    CREATININE 0 93 01/04/2023   • Resolved  • TIERNEY on CKD, Cr 1 51  • Given 1 5 L Nss in ED  • Continue isolyte 75 ml/hr  • Cr improved, 0 86    Trend I/o and renal function     Subjective:   Pt seen and examined at bedside  Pt is sitting in chair at bedside with NC and resting comfortably   Denies any acute complaints at this time  Objective:     Vitals: Blood pressure 101/58, pulse 66, temperature 98 1 °F (36 7 °C), resp  rate 16, height 4' 8" (1 422 m), weight 84 8 kg (187 lb), SpO2 95 %, not currently breastfeeding  ,Body mass index is 41 92 kg/m²  Wt Readings from Last 3 Encounters:   01/06/23 84 8 kg (187 lb)   09/14/22 82 1 kg (181 lb)   07/19/22 73 9 kg (163 lb)       Intake/Output Summary (Last 24 hours) at 1/9/2023 0042  Last data filed at 1/8/2023 2012  Gross per 24 hour   Intake --   Output 490 ml   Net -490 ml       Physical Exam  Constitutional:       Appearance: Normal appearance  HENT:      Head: Normocephalic and atraumatic  Mouth/Throat:      Mouth: Mucous membranes are moist    Eyes:      General:         Right eye: No discharge  Left eye: No discharge  Conjunctiva/sclera: Conjunctivae normal    Cardiovascular:      Rate and Rhythm: Normal rate and regular rhythm  Pulses: Normal pulses  Heart sounds: Normal heart sounds  Pulmonary:      Effort: Pulmonary effort is normal  No respiratory distress  Breath sounds: Normal breath sounds  Abdominal:      General: Bowel sounds are normal       Palpations: Abdomen is soft  Tenderness: There is no abdominal tenderness  Musculoskeletal:      Cervical back: Neck supple  Right lower leg: No edema  Left lower leg: No edema  Skin:     General: Skin is warm and dry  Capillary Refill: Capillary refill takes less than 2 seconds  Neurological:      Mental Status: She is alert             Recent Results (from the past 24 hour(s))   CBC and differential    Collection Time: 01/08/23  6:15 AM   Result Value Ref Range    WBC 5 33 4 31 - 10 16 Thousand/uL    RBC 3 70 (L) 3 81 - 5 12 Million/uL    Hemoglobin 11 9 11 5 - 15 4 g/dL    Hematocrit 37 5 34 8 - 46 1 %     (H) 82 - 98 fL    MCH 32 2 26 8 - 34 3 pg    MCHC 31 7 31 4 - 37 4 g/dL    RDW 14 4 11 6 - 15 1 %    MPV 9 8 8 9 - 12 7 fL    Platelets 589 587 - 160 Thousands/uL    nRBC 0 /100 WBCs   Comprehensive metabolic panel    Collection Time: 01/08/23  6:15 AM   Result Value Ref Range    Sodium 141 135 - 147 mmol/L    Potassium 4 5 3 5 - 5 3 mmol/L    Chloride 101 96 - 108 mmol/L    CO2 32 21 - 32 mmol/L    ANION GAP 8 4 - 13 mmol/L    BUN 16 5 - 25 mg/dL    Creatinine 0 97 0 60 - 1 30 mg/dL    Glucose 111 65 - 140 mg/dL    Calcium 9 0 8 3 - 10 1 mg/dL    Corrected Calcium 10 4 (H) 8 3 - 10 1 mg/dL    AST 49 (H) 5 - 45 U/L    ALT 61 12 - 78 U/L    Alkaline Phosphatase 61 46 - 116 U/L    Total Protein 6 0 (L) 6 4 - 8 4 g/dL    Albumin 2 2 (L) 3 5 - 5 0 g/dL    Total Bilirubin 0 23 0 20 - 1 00 mg/dL    eGFR 70 ml/min/1 73sq m   Fingerstick Glucose (POCT)    Collection Time: 01/08/23  7:09 AM   Result Value Ref Range    POC Glucose 133 65 - 140 mg/dl   Fingerstick Glucose (POCT)    Collection Time: 01/08/23 11:23 AM   Result Value Ref Range    POC Glucose 151 (H) 65 - 140 mg/dl   Fingerstick Glucose (POCT)    Collection Time: 01/08/23  4:07 PM   Result Value Ref Range    POC Glucose 167 (H) 65 - 140 mg/dl   Fingerstick Glucose (POCT)    Collection Time: 01/08/23  7:48 PM   Result Value Ref Range    POC Glucose 195 (H) 65 - 140 mg/dl   Fingerstick Glucose (POCT)    Collection Time: 01/08/23  8:47 PM   Result Value Ref Range    POC Glucose 192 (H) 65 - 140 mg/dl       Current Facility-Administered Medications   Medication Dose Route Frequency Provider Last Rate Last Admin   • acetaminophen (TYLENOL) tablet 650 mg  650 mg Oral Q6H PRN Regina Agarwal MD       • ARIPiprazole (ABILIFY) tablet 10 mg  10 mg Oral Daily Sherman Tovar MD   10 mg at 01/08/23 4325   • atorvastatin (LIPITOR) tablet 10 mg  10 mg Oral Daily With Katelyn Enriquez MD   10 mg at 01/08/23 1624   • calcium carbonate (OYSTER SHELL,OSCAL) 500 mg tablet 1 tablet  1 tablet Oral BID With Meals Sherman Tovar MD   1 tablet at 01/08/23 1628   • cefTRIAXone (ROCEPHIN) IVPB (premix in dextrose) 2,000 mg 50 mL  2,000 mg Intravenous Q24H Severiano Phillip  mL/hr at 01/08/23 1623 2,000 mg at 01/08/23 1623   • divalproex sodium (DEPAKOTE ER) 24 hr tablet 1,500 mg  1,500 mg Oral Daily Severiano Phillip MD   1,500 mg at 01/08/23 0925   • fluticasone (FLONASE) 50 mcg/act nasal spray 1 spray  1 spray Each Nare Daily Severiano Phillip MD   1 spray at 01/08/23 0926   • heparin (porcine) subcutaneous injection 5,000 Units  5,000 Units Subcutaneous [facility] Severiano Phillip MD   5,000 Units at 01/08/23 2254   • insulin lispro (HumaLOG) 100 units/mL subcutaneous injection 1-5 Units  1-5 Units Subcutaneous 4x Daily (AC & HS) Severiano Phillip MD   1 Units at 01/08/23 2300   • insulin lispro (HumaLOG) 100 units/mL subcutaneous injection 2 Units  2 Units Subcutaneous TID With Meals Severiano Phillip MD   2 Units at 01/08/23 1621   • ipratropium (ATROVENT) 0 02 % inhalation solution 0 5 mg  0 5 mg Nebulization Q6H Severiano Phillip MD   0 5 mg at 01/08/23 1942   • levalbuterol (Gala Sleek) inhalation solution 1 25 mg  1 25 mg Nebulization Q6H Severiano Phillip MD   1 25 mg at 01/08/23 1942    And   • sodium chloride 0 9 % inhalation solution 3 mL  3 mL Nebulization Q6H PRN Severiano Phillip MD   3 mL at 01/02/23 0254   • levothyroxine tablet 75 mcg  75 mcg Oral Early Morning Severiano Phillip MD   75 mcg at 01/08/23 0631   • loratadine (CLARITIN) tablet 5 mg  5 mg Oral Daily Severiano Phillip MD   5 mg at 01/08/23 0926   • mupirocin (BACTROBAN) 2 % nasal ointment   Nasal Q12H 304 Adriel Maradiaga MD   1 application at 53/71/77 2254   • sodium chloride (OCEAN) 0 65 % nasal spray 1 spray  1 spray Each Nare Q1H PRN Severiano Phillip MD   1 spray at 01/05/23 1319       Invasive Devices     Peripheral Intravenous Line  Duration           Peripheral IV 01/04/23 Distal;Dorsal (posterior); Left Forearm 4 days                Lab, Imaging and other studies: I have personally reviewed pertinent reports      VTE Pharmacologic Prophylaxis: Heparin  VTE Mechanical Prophylaxis: sequential compression Lenard Huffman MD

## 2023-01-09 NOTE — PHYSICAL THERAPY NOTE
PT TREATMENT       01/09/23 1445   PT Last Visit   PT Visit Date 01/09/23   Note Type   Note Type Treatment   Pain Assessment   Pain Assessment Tool 0-10   Pain Score No Pain   Patient's Stated Pain Goal No pain   Hospital Pain Intervention(s) Rest;Repositioned   Multiple Pain Sites No   Restrictions/Precautions   Other Precautions Fall Risk   General   Chart Reviewed Yes   Family/Caregiver Present No   Cognition   Arousal/Participation Cooperative   Orientation Level Oriented to person;Oriented to time;Oriented to place   Following Commands Follows one step commands without difficulty   Subjective   Subjective "I would like to walk  I need to pee"   Bed Mobility   Rolling R 5  Supervision   Additional items Bedrails   Rolling L 5  Supervision   Additional items Bedrails   Supine to Sit 5  Supervision   Additional items Bedrails   Sit to Supine 5  Supervision   Additional items Bedrails   Transfers   Sit to Stand 5  Supervision   Additional items Verbal cues   Stand to Sit 5  Supervision   Additional items Verbal cues   Stand pivot 5  Supervision   Additional items Verbal cues   Toilet transfer 5  Supervision   Additional items Verbal cues   Ambulation/Elevation   Gait pattern Wide TY;Step through pattern; Foward flexed   Gait Assistance 5  Supervision   Additional items Verbal cues   Assistive Device   (none)   Distance 100 feet + 125 feet   Stair Management Assistance Not tested   Balance   Static Sitting Good   Dynamic Sitting Fair +   Static Standing Fair +   Dynamic Standing Fair   Ambulatory Fair   Activity Tolerance   Activity Tolerance Patient tolerated treatment well;Patient limited by fatigue   Nurse Made Aware yes   Assessment   Prognosis Good   Problem List Decreased endurance;Decreased mobility; Impaired balance;Decreased safety awareness   Assessment Pt agreeable to PT session this afternoon   Pt reports needing to urinate - able to transfer to bedside commode with Supervision - requires Min A for pericare  Pt able to progress ambulation 100 feet + 125 feet with seated rest break between trials - O2 doffedd between 88-97% throughout session with mild SOB after ambulation - quickly improves once sitting in bedside chair  The patient's AM-PAC Basic Mobility Inpatient Short Form Raw Score is 22  A Raw score of greater than 16 suggests the patient may benefit from discharge to home  Please also refer to the recommendation of the Physical Therapist for safe discharge planning  Goals   Patient Goals to go home, see friends   Plan   Treatment/Interventions Functional transfer training;LE strengthening/ROM; Therapeutic exercise; Endurance training;Gait training;Spoke to nursing   Progress Progressing toward goals   PT Frequency Other (Comment)  (5x/week)   Recommendation   PT Discharge Recommendation No rehabilitation needs  (return to group home + family assist when appropriate)   AM-PAC Basic Mobility Inpatient   Turning in Flat Bed Without Bedrails 4   Lying on Back to Sitting on Edge of Flat Bed Without Bedrails 4   Moving Bed to Chair 4   Standing Up From Chair Using Arms 4   Walk in Room 3   Climb 3-5 Stairs With Railing 3   Basic Mobility Inpatient Raw Score 22   Basic Mobility Standardized Score 47 4   Highest Level Of Mobility   JH-HLM Goal 7: Walk 25 feet or more   JH-HLM Achieved 7: Walk 25 feet or more   End of Consult   Patient Position at End of Consult Bedside chair; All needs within reach   [employer] License Number  [id number]

## 2023-01-09 NOTE — PROGRESS NOTES
Progress Note - Pulmonary   Omar Aureliano Gentle 56 y o  female MRN: [id number]  Unit/Bed#: 2 [place] [address] Encounter: [id number]    Assessment and Plan:    1  Multifocal pneumonia: Multifocal pneumonia currently on treatment with ceftriaxone  Pneumococcal and Legionella antigens negative so far  Blood cultures negative  2   Obstructive sleep apnea: Has been on CPAP therapy 8 to 14 cm of water  Currently on CPAP 12 cm  Overnight oximetry done  Results pending at this time  Was scheduled for repeat sleep study which need to be rescheduled after discharge  3   Mild intermittent asthma: Currently on levalbuterol and ipratropium  Chest clear to auscultation  4   Acute hypoxic respiratory failure: Only on 2 L of nasal cannula oxygen  Continue to titrate off oxygen as tolerated  5   Severe obesity: Patient also has Down syndrome  Discussed with nursing staff  The patient was tried on CPAP 12 without oxygen last night  Thank you for allowing me to participate in the care of the patient  Chief Complaint:   Shortness of breath    Subjective:   Admitted history from the patient  Breathing better    Objective:     Vitals: Blood pressure 104/66, pulse 78, temperature 98 2 °F (36 8 °C), temperature source Axillary, resp  rate 17, height 4' 8" (1 422 m), weight 84 8 kg (187 lb), SpO2 92 %, not currently breastfeeding  ,Body mass index is 41 92 kg/m²  Intake/Output Summary (Last 24 hours) at 1/9/2023 1030  Last data filed at 1/9/2023 0901  Gross per 24 hour   Intake 60 ml   Output 1400 ml   Net -1340 ml       Invasive Devices     Peripheral Intravenous Line  Duration           Peripheral IV 01/09/23 Distal;Right;Ventral (anterior) Forearm <1 day                Physical Exam: Clinical examination, very obese  Hemodynamically stable  Afebrile  Comfortable on 2 L of nasal cannula oxygen  Saturating 95%  HEENT: No conjunctival pallor no cyanosis    Neck: No jugular venous distention no significant neck or supraclavicular adenopathy  Heart S1-S2 heard  Chest air entry present bilaterally  No significant crackles or rhonchi  Abdomen soft bowel sounds audible  Neuro awake alert  Extremities no edema or clubbing    Labs: I have personally reviewed pertinent lab results  No leukocytosis  Corrected calcium 10 3  AST 48  Bicarbonate 32  White cell count normal   Hemoglobin 11 9  Imaging and other studies: I have personally reviewed pertinent films in PACS  Multifocal pneumonia  Mild pulmonary vascular congestion

## 2023-01-09 NOTE — RESPIRATORY THERAPY NOTE
Overnight oximetry was initially started at 28% due to desaturations between 85-87%   At 0100,fio2 was turned down to 21% for the remainder of study ( 4 hrs on 21%) and tolerated well

## 2023-01-09 NOTE — PLAN OF CARE
Problem: RESPIRATORY - ADULT  Goal: Achieves optimal ventilation and oxygenation  Description: INTERVENTIONS:  - Assess for changes in respiratory status  - Assess for changes in mentation and behavior  - Position to facilitate oxygenation and minimize respiratory effort  - Oxygen administered by appropriate delivery if ordered  - Initiate smoking cessation education as indicated  - Encourage broncho-pulmonary hygiene including cough, deep breathe, Incentive Spirometry  - Assess the need for suctioning and aspirate as needed  - Assess and instruct to report SOB or any respiratory difficulty  - Respiratory Therapy support as indicated  Outcome: Progressing     Problem: Prexisting or High Potential for Compromised Skin Integrity  Goal: Skin integrity is maintained or improved  Description: INTERVENTIONS:  - Identify patients at risk for skin breakdown  - Assess and monitor skin integrity  - Assess and monitor nutrition and hydration status  - Monitor labs   - Assess for incontinence   - Turn and reposition patient  - Assist with mobility/ambulation  - Relieve pressure over bony prominences  - Avoid friction and shearing  - Provide appropriate hygiene as needed including keeping skin clean and dry  - Evaluate need for skin moisturizer/barrier cream  - Collaborate with interdisciplinary team   - Patient/family teaching  - Consider wound care consult   Outcome: Progressing     Problem: MOBILITY - ADULT  Goal: Maintain or return to baseline ADL function  Description: INTERVENTIONS:  -  Assess patient's ability to carry out ADLs; assess patient's baseline for ADL function and identify physical deficits which impact ability to perform ADLs (bathing, care of mouth/teeth, toileting, grooming, dressing, etc )  - Assess/evaluate cause of self-care deficits   - Assess range of motion  - Assess patient's mobility; develop plan if impaired  - Assess patient's need for assistive devices and provide as appropriate  - Encourage maximum independence but intervene and supervise when necessary  - Involve family in performance of ADLs  - Assess for home care needs following discharge   - Consider OT consult to assist with ADL evaluation and planning for discharge  - Provide patient education as appropriate  Outcome: Progressing  Goal: Maintains/Returns to pre admission functional level  Description: INTERVENTIONS:  - Perform BMAT or MOVE assessment daily    - Set and communicate daily mobility goal to care team and patient/family/caregiver  - Collaborate with rehabilitation services on mobility goals if consulted  - Perform Range of Motion 2 times a day  - Reposition patient every 2 hours    - Dangle patient 2 times a day  - Stand patient 2 times a day  - Ambulate patient 2 times a day  - Out of bed to chair 2 times a day   - Out of bed for meals 2 times a day  - Out of bed for toileting  - Record patient progress and toleration of activity level   Outcome: Progressing     Problem: GENITOURINARY - ADULT  Goal: Maintains or returns to baseline urinary function  Description: INTERVENTIONS:  - Assess urinary function  - Encourage oral fluids to ensure adequate hydration if ordered  - Administer IV fluids as ordered to ensure adequate hydration  - Administer ordered medications as needed  - Offer frequent toileting  - Follow urinary retention protocol if ordered  Outcome: Progressing  Goal: Absence of urinary retention  Description: INTERVENTIONS:  - Assess patient’s ability to void and empty bladder  - Monitor I/O  - Bladder scan as needed  - Discuss with physician/AP medications to alleviate retention as needed  - Discuss catheterization for long term situations as appropriate  Outcome: Progressing     Problem: METABOLIC, FLUID AND ELECTROLYTES - ADULT  Goal: Electrolytes maintained within normal limits  Description: INTERVENTIONS:  - Monitor labs and assess patient for signs and symptoms of electrolyte imbalances  - Administer electrolyte replacement as ordered  - Monitor response to electrolyte replacements, including repeat lab results as appropriate  - Instruct patient on fluid and nutrition as appropriate  Outcome: Progressing  Goal: Fluid balance maintained  Description: INTERVENTIONS:  - Monitor labs   - Monitor I/O and WT  - Instruct patient on fluid and nutrition as appropriate  - Assess for signs & symptoms of volume excess or deficit  Outcome: Progressing  Goal: Glucose maintained within target range  Description: INTERVENTIONS:  - Monitor Blood Glucose as ordered  - Assess for signs and symptoms of hyperglycemia and hypoglycemia  - Administer ordered medications to maintain glucose within target range  - Assess nutritional intake and initiate nutrition service referral as needed  Outcome: Progressing     Problem: SKIN/TISSUE INTEGRITY - ADULT  Goal: Skin Integrity remains intact(Skin Breakdown Prevention)  Description: Assess:  -Perform Stefan assessment every shift  -Clean and moisturize skin every shift  -Inspect skin when repositioning, toileting, and assisting with ADLS  -Assess under medical devices such as nasal canula every shift    -Assess extremities for adequate circulation and sensation     Bed Management:  -Have minimal linens on bed & keep smooth, unwrinkled  -Change linens as needed when moist or perspiring  -Avoid sitting or lying in one position for more than 2 hours while in bed  -Keep HOB at 45 degrees     Toileting:  -Offer bedside commode  -Assess for incontinence every 2 hours       Activity:  -Mobilize patient 3 times a day  -Encourage activity and walks on unit  -Encourage or provide ROM exercises   -Turn and reposition patient every 2 Hours  -Use appropriate equipment to lift or move patient in bed  -Instruct/ Assist with weight shifting every 15 minutes when out of bed in chair  -Consider limitation of chair time 4 hour intervals    Skin Care:  -Avoid use of baby powder, tape, friction and shearing, hot water or constrictive clothing  -Relieve pressure over bony prominences using chair cushion  -Do not massage red bony areas    Next Steps:  -Teach patient strategies to minimize risks such as weight shifting   -Consider consults to  interdisciplinary teams such as PT/OT  Outcome: Progressing     Problem: MUSCULOSKELETAL - ADULT  Goal: Maintain or return mobility to safest level of function  Description: INTERVENTIONS:  - Assess patient's ability to carry out ADLs; assess patient's baseline for ADL function and identify physical deficits which impact ability to perform ADLs (bathing, care of mouth/teeth, toileting, grooming, dressing, etc )  - Assess/evaluate cause of self-care deficits   - Assess range of motion  - Assess patient's mobility  - Assess patient's need for assistive devices and provide as appropriate  - Encourage maximum independence but intervene and supervise when necessary  - Involve family in performance of ADLs  - Assess for home care needs following discharge   - Consider OT consult to assist with ADL evaluation and planning for discharge  - Provide patient education as appropriate  Outcome: Progressing  Goal: Maintain proper alignment of affected body part  Description: INTERVENTIONS:  - Support, maintain and protect limb and body alignment  - Provide patient/ family with appropriate education  Outcome: Progressing

## 2023-01-09 NOTE — PLAN OF CARE
Problem: PHYSICAL THERAPY ADULT  Goal: Performs mobility at highest level of function for planned discharge setting  See evaluation for individualized goals  Description: Treatment/Interventions: Functional transfer training, LE strengthening/ROM, Therapeutic exercise, Endurance training, Gait training, Spoke to nursing          See flowsheet documentation for full assessment, interventions and recommendations  Outcome: Progressing  Note: Prognosis: Good  Problem List: Decreased endurance, Decreased mobility, Impaired balance, Decreased safety awareness  Assessment: Pt agreeable to PT session this afternoon  Pt reports needing to urinate - able to transfer to bedside commode with Supervision - requires Min A for pericare  Pt able to progress ambulation 100 feet + 125 feet with seated rest break between trials - O2 doffedd between 88-97% throughout session with mild SOB after ambulation - quickly improves once sitting in bedside chair        PT Discharge Recommendation: No rehabilitation needs (return to group home + family assist when appropriate)    See flowsheet documentation for full assessment

## 2023-01-10 ENCOUNTER — APPOINTMENT (INPATIENT)
Dept: RADIOLOGY | Facility: HOSPITAL | Age: 48
End: 2023-01-10

## 2023-01-10 LAB
ANION GAP SERPL CALCULATED.3IONS-SCNC: 8 MMOL/L (ref 4–13)
BUN SERPL-MCNC: 26 MG/DL (ref 5–25)
CALCIUM SERPL-MCNC: 8.7 MG/DL (ref 8.3–10.1)
CHLORIDE SERPL-SCNC: 104 MMOL/L (ref 96–108)
CO2 SERPL-SCNC: 29 MMOL/L (ref 21–32)
CREAT SERPL-MCNC: 1.18 MG/DL (ref 0.6–1.3)
DME PARACHUTE DELIVERY DATE ACTUAL: NORMAL
DME PARACHUTE DELIVERY DATE EXPECTED: NORMAL
DME PARACHUTE DELIVERY DATE REQUESTED: NORMAL
DME PARACHUTE DELIVERY DATE REQUESTED: NORMAL
DME PARACHUTE DELIVERY NOTE: NORMAL
DME PARACHUTE ITEM DESCRIPTION: NORMAL
DME PARACHUTE ORDER STATUS: NORMAL
DME PARACHUTE ORDER STATUS: NORMAL
DME PARACHUTE SUPPLIER NAME: NORMAL
DME PARACHUTE SUPPLIER NAME: NORMAL
DME PARACHUTE SUPPLIER PHONE: NORMAL
DME PARACHUTE SUPPLIER PHONE: NORMAL
ERYTHROCYTE [DISTWIDTH] IN BLOOD BY AUTOMATED COUNT: 14.4 % (ref 11.6–15.1)
GFR SERPL CREATININE-BSD FRML MDRD: 55 ML/MIN/1.73SQ M
GLUCOSE SERPL-MCNC: 145 MG/DL (ref 65–140)
GLUCOSE SERPL-MCNC: 158 MG/DL (ref 65–140)
GLUCOSE SERPL-MCNC: 169 MG/DL (ref 65–140)
GLUCOSE SERPL-MCNC: 175 MG/DL (ref 65–140)
GLUCOSE SERPL-MCNC: 220 MG/DL (ref 65–140)
HCT VFR BLD AUTO: 38 % (ref 34.8–46.1)
HGB BLD-MCNC: 12.2 G/DL (ref 11.5–15.4)
MCH RBC QN AUTO: 33.1 PG (ref 26.8–34.3)
MCHC RBC AUTO-ENTMCNC: 32.1 G/DL (ref 31.4–37.4)
MCV RBC AUTO: 103 FL (ref 82–98)
PLATELET # BLD AUTO: 295 THOUSANDS/UL (ref 149–390)
PMV BLD AUTO: 10.1 FL (ref 8.9–12.7)
POTASSIUM SERPL-SCNC: 4.5 MMOL/L (ref 3.5–5.3)
RBC # BLD AUTO: 3.69 MILLION/UL (ref 3.81–5.12)
SODIUM SERPL-SCNC: 141 MMOL/L (ref 135–147)
WBC # BLD AUTO: 4.63 THOUSAND/UL (ref 4.31–10.16)

## 2023-01-10 RX ADMIN — CALCIUM 1 TABLET: 500 TABLET ORAL at 07:52

## 2023-01-10 RX ADMIN — LEVALBUTEROL HYDROCHLORIDE 1.25 MG: 1.25 SOLUTION, CONCENTRATE RESPIRATORY (INHALATION) at 19:12

## 2023-01-10 RX ADMIN — CALCIUM 1 TABLET: 500 TABLET ORAL at 16:22

## 2023-01-10 RX ADMIN — LEVOTHYROXINE SODIUM 75 MCG: 75 TABLET ORAL at 05:32

## 2023-01-10 RX ADMIN — INSULIN LISPRO 2 UNITS: 100 INJECTION, SOLUTION INTRAVENOUS; SUBCUTANEOUS at 21:18

## 2023-01-10 RX ADMIN — INSULIN LISPRO 1 UNITS: 100 INJECTION, SOLUTION INTRAVENOUS; SUBCUTANEOUS at 12:17

## 2023-01-10 RX ADMIN — INSULIN LISPRO 2 UNITS: 100 INJECTION, SOLUTION INTRAVENOUS; SUBCUTANEOUS at 07:51

## 2023-01-10 RX ADMIN — MUPIROCIN 1 APPLICATION: 20 OINTMENT TOPICAL at 08:01

## 2023-01-10 RX ADMIN — ATORVASTATIN CALCIUM 10 MG: 10 TABLET, FILM COATED ORAL at 16:21

## 2023-01-10 RX ADMIN — LEVALBUTEROL HYDROCHLORIDE 1.25 MG: 1.25 SOLUTION, CONCENTRATE RESPIRATORY (INHALATION) at 02:44

## 2023-01-10 RX ADMIN — IPRATROPIUM BROMIDE 0.5 MG: 0.5 SOLUTION RESPIRATORY (INHALATION) at 07:12

## 2023-01-10 RX ADMIN — IPRATROPIUM BROMIDE 0.5 MG: 0.5 SOLUTION RESPIRATORY (INHALATION) at 02:44

## 2023-01-10 RX ADMIN — LORATADINE 5 MG: 10 TABLET ORAL at 08:01

## 2023-01-10 RX ADMIN — LEVALBUTEROL HYDROCHLORIDE 1.25 MG: 1.25 SOLUTION, CONCENTRATE RESPIRATORY (INHALATION) at 07:12

## 2023-01-10 RX ADMIN — INSULIN LISPRO 2 UNITS: 100 INJECTION, SOLUTION INTRAVENOUS; SUBCUTANEOUS at 12:17

## 2023-01-10 RX ADMIN — CEFTRIAXONE 2000 MG: 2 INJECTION, SOLUTION INTRAVENOUS at 16:24

## 2023-01-10 RX ADMIN — ACETAMINOPHEN 650 MG: 325 TABLET, FILM COATED ORAL at 15:17

## 2023-01-10 RX ADMIN — INSULIN LISPRO 1 UNITS: 100 INJECTION, SOLUTION INTRAVENOUS; SUBCUTANEOUS at 16:22

## 2023-01-10 RX ADMIN — ARIPIPRAZOLE 10 MG: 5 TABLET ORAL at 08:01

## 2023-01-10 RX ADMIN — HEPARIN SODIUM 5000 UNITS: 5000 INJECTION INTRAVENOUS; SUBCUTANEOUS at 21:18

## 2023-01-10 RX ADMIN — HEPARIN SODIUM 5000 UNITS: 5000 INJECTION INTRAVENOUS; SUBCUTANEOUS at 15:02

## 2023-01-10 RX ADMIN — HEPARIN SODIUM 5000 UNITS: 5000 INJECTION INTRAVENOUS; SUBCUTANEOUS at 05:32

## 2023-01-10 RX ADMIN — INSULIN LISPRO 2 UNITS: 100 INJECTION, SOLUTION INTRAVENOUS; SUBCUTANEOUS at 16:22

## 2023-01-10 RX ADMIN — IPRATROPIUM BROMIDE 0.5 MG: 0.5 SOLUTION RESPIRATORY (INHALATION) at 14:03

## 2023-01-10 RX ADMIN — LEVALBUTEROL HYDROCHLORIDE 1.25 MG: 1.25 SOLUTION, CONCENTRATE RESPIRATORY (INHALATION) at 14:03

## 2023-01-10 RX ADMIN — DIVALPROEX SODIUM 1500 MG: 500 TABLET, FILM COATED, EXTENDED RELEASE ORAL at 08:01

## 2023-01-10 RX ADMIN — FLUTICASONE PROPIONATE 1 SPRAY: 50 SPRAY, METERED NASAL at 08:01

## 2023-01-10 RX ADMIN — MUPIROCIN 1 APPLICATION: 20 OINTMENT TOPICAL at 21:18

## 2023-01-10 RX ADMIN — IPRATROPIUM BROMIDE 0.5 MG: 0.5 SOLUTION RESPIRATORY (INHALATION) at 19:12

## 2023-01-10 NOTE — RESPIRATORY THERAPY NOTE
Patient walked on room air and SpO2 was monitored  During her walk her Spo2 remained above 94% at all times, HR was at 100 during exertion
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
## 2023-01-10 NOTE — PLAN OF CARE
Problem: RESPIRATORY - ADULT  Goal: Achieves optimal ventilation and oxygenation  Description: INTERVENTIONS:  - Assess for changes in respiratory status  - Assess for changes in mentation and behavior  - Position to facilitate oxygenation and minimize respiratory effort  - Oxygen administered by appropriate delivery if ordered  - Initiate smoking cessation education as indicated  - Encourage broncho-pulmonary hygiene including cough, deep breathe, Incentive Spirometry  - Assess the need for suctioning and aspirate as needed  - Assess and instruct to report SOB or any respiratory difficulty  - Respiratory Therapy support as indicated  1/10/2023 0759 by Junior Ahumada, RN  Outcome: Progressing  1/10/2023 0759 by Junior Ahumada, RN  Outcome: Progressing     Problem: Prexisting or High Potential for Compromised Skin Integrity  Goal: Skin integrity is maintained or improved  Description: INTERVENTIONS:  - Identify patients at risk for skin breakdown  - Assess and monitor skin integrity  - Assess and monitor nutrition and hydration status  - Monitor labs   - Assess for incontinence   - Turn and reposition patient  - Assist with mobility/ambulation  - Relieve pressure over bony prominences  - Avoid friction and shearing  - Provide appropriate hygiene as needed including keeping skin clean and dry  - Evaluate need for skin moisturizer/barrier cream  - Collaborate with interdisciplinary team   - Patient/family teaching  - Consider wound care consult   1/10/2023 0759 by Junior Ahumada, RN  Outcome: Progressing  1/10/2023 0759 by Junior Ahumada, RN  Outcome: Progressing     Problem: MOBILITY - ADULT  Goal: Maintain or return to baseline ADL function  Description: INTERVENTIONS:  -  Assess patient's ability to carry out ADLs; assess patient's baseline for ADL function and identify physical deficits which impact ability to perform ADLs (bathing, care of mouth/teeth, toileting, grooming, dressing, etc )  - Assess/evaluate cause of self-care deficits   - Assess range of motion  - Assess patient's mobility; develop plan if impaired  - Assess patient's need for assistive devices and provide as appropriate  - Encourage maximum independence but intervene and supervise when necessary  - Involve family in performance of ADLs  - Assess for home care needs following discharge   - Consider OT consult to assist with ADL evaluation and planning for discharge  - Provide patient education as appropriate  1/10/2023 0759 by Shana Gallegos RN  Outcome: Progressing  1/10/2023 0759 by Shana Gallegos RN  Outcome: Progressing  Goal: Maintains/Returns to pre admission functional level  Description: INTERVENTIONS:  - Perform BMAT or MOVE assessment daily    - Set and communicate daily mobility goal to care team and patient/family/caregiver  - Collaborate with rehabilitation services on mobility goals if consulted  - Perform Range of Motion 2 times a day  - Reposition patient every 2 hours    - Dangle patient 2 times a day  - Stand patient 2 times a day  - Ambulate patient 2 times a day  - Out of bed to chair 2 times a day   - Out of bed for meals 2 times a day  - Out of bed for toileting  - Record patient progress and toleration of activity level   1/10/2023 0759 by Shana Gallegos RN  Outcome: Progressing  1/10/2023 0759 by Shana Gallegos RN  Outcome: Progressing     Problem: GENITOURINARY - ADULT  Goal: Maintains or returns to baseline urinary function  Description: INTERVENTIONS:  - Assess urinary function  - Encourage oral fluids to ensure adequate hydration if ordered  - Administer IV fluids as ordered to ensure adequate hydration  - Administer ordered medications as needed  - Offer frequent toileting  - Follow urinary retention protocol if ordered  1/10/2023 0759 by Shana Gallegos RN  Outcome: Progressing  1/10/2023 0759 by Shana Gallegos RN  Outcome: Progressing  Goal: Absence of urinary retention  Description: INTERVENTIONS:  - Assess patient’s ability to void and empty bladder  - Monitor I/O  - Bladder scan as needed  - Discuss with physician/AP medications to alleviate retention as needed  - Discuss catheterization for long term situations as appropriate  Outcome: Progressing     Problem: METABOLIC, FLUID AND ELECTROLYTES - ADULT  Goal: Electrolytes maintained within normal limits  Description: INTERVENTIONS:  - Monitor labs and assess patient for signs and symptoms of electrolyte imbalances  - Administer electrolyte replacement as ordered  - Monitor response to electrolyte replacements, including repeat lab results as appropriate  - Instruct patient on fluid and nutrition as appropriate  Outcome: Progressing  Goal: Fluid balance maintained  Description: INTERVENTIONS:  - Monitor labs   - Monitor I/O and WT  - Instruct patient on fluid and nutrition as appropriate  - Assess for signs & symptoms of volume excess or deficit  Outcome: Progressing  Goal: Glucose maintained within target range  Description: INTERVENTIONS:  - Monitor Blood Glucose as ordered  - Assess for signs and symptoms of hyperglycemia and hypoglycemia  - Administer ordered medications to maintain glucose within target range  - Assess nutritional intake and initiate nutrition service referral as needed  Outcome: Progressing     Problem: SKIN/TISSUE INTEGRITY - ADULT  Goal: Skin Integrity remains intact(Skin Breakdown Prevention)  Description: Assess:  -Inspect skin when repositioning, toileting, and assisting with ADLS    -Assess extremities for adequate circulation and sensation     Bed Management:  -Have minimal linens on bed & keep smooth, unwrinkled  -Change linens as needed when moist or perspiring       Toileting:  -Offer bedside commode    Activity:  -Encourage activity and walks on unit  -Encourage or provide ROM exercises   -Use appropriate equipment to lift or move patient in bed    Skin Care:  -Avoid use of baby powder, tape, friction and shearing, hot water or constrictive clothing  -Do not massage red bony areas    Next Steps:  Outcome: Progressing     Problem: MUSCULOSKELETAL - ADULT  Goal: Maintain or return mobility to safest level of function  Description: INTERVENTIONS:  - Assess patient's ability to carry out ADLs; assess patient's baseline for ADL function and identify physical deficits which impact ability to perform ADLs (bathing, care of mouth/teeth, toileting, grooming, dressing, etc )  - Assess/evaluate cause of self-care deficits   - Assess range of motion  - Assess patient's mobility  - Assess patient's need for assistive devices and provide as appropriate  - Encourage maximum independence but intervene and supervise when necessary  - Involve family in performance of ADLs  - Assess for home care needs following discharge   - Consider OT consult to assist with ADL evaluation and planning for discharge  - Provide patient education as appropriate  Outcome: Progressing  Goal: Maintain proper alignment of affected body part  Description: INTERVENTIONS:  - Support, maintain and protect limb and body alignment  - Provide patient/ family with appropriate education  Outcome: Progressing     Problem: Potential for Falls  Goal: Patient will remain free of falls  Description: INTERVENTIONS:  - Educate patient/family on patient safety including physical limitations  - Instruct patient to call for assistance with activity   - Consult OT/PT to assist with strengthening/mobility   - Keep Call bell within reach  - Keep bed low and locked with side rails adjusted as appropriate  - Keep care items and personal belongings within reach  - Initiate and maintain comfort rounds  - Make Fall Risk Sign visible to staff  - Offer Toileting every 2 Hours, in advance of need  - Initiate/Maintain bed/chair alarm    - Apply yellow socks and bracelet for high fall risk patients  - Consider moving patient to room near nurses station  Outcome: Progressing

## 2023-01-10 NOTE — PROGRESS NOTES
Two days of meals were recorded for calorie count and PT ate approximately 1368 kcals and 58 2 gms protein on first day and approximately 1917 kcals and 92 gms protein on 2nd day bringing the average intake of the two days to approximately 1643 kcals and 75 gms protein  Will d/c calorie count
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
## 2023-01-10 NOTE — PLAN OF CARE
Problem: RESPIRATORY - ADULT  Goal: Achieves optimal ventilation and oxygenation  Description: INTERVENTIONS:  - Assess for changes in respiratory status  - Assess for changes in mentation and behavior  - Position to facilitate oxygenation and minimize respiratory effort  - Oxygen administered by appropriate delivery if ordered  - Initiate smoking cessation education as indicated  - Encourage broncho-pulmonary hygiene including cough, deep breathe, Incentive Spirometry  - Assess the need for suctioning and aspirate as needed  - Assess and instruct to report SOB or any respiratory difficulty  - Respiratory Therapy support as indicated  Outcome: Progressing     Problem: Prexisting or High Potential for Compromised Skin Integrity  Goal: Skin integrity is maintained or improved  Description: INTERVENTIONS:  - Identify patients at risk for skin breakdown  - Assess and monitor skin integrity  - Assess and monitor nutrition and hydration status  - Monitor labs   - Assess for incontinence   - Turn and reposition patient  - Assist with mobility/ambulation  - Relieve pressure over bony prominences  - Avoid friction and shearing  - Provide appropriate hygiene as needed including keeping skin clean and dry  - Evaluate need for skin moisturizer/barrier cream  - Collaborate with interdisciplinary team   - Patient/family teaching  - Consider wound care consult   Outcome: Progressing     Problem: MOBILITY - ADULT  Goal: Maintain or return to baseline ADL function  Description: INTERVENTIONS:  -  Assess patient's ability to carry out ADLs; assess patient's baseline for ADL function and identify physical deficits which impact ability to perform ADLs (bathing, care of mouth/teeth, toileting, grooming, dressing, etc )  - Assess/evaluate cause of self-care deficits   - Assess range of motion  - Assess patient's mobility; develop plan if impaired  - Assess patient's need for assistive devices and provide as appropriate  - Encourage maximum independence but intervene and supervise when necessary  - Involve family in performance of ADLs  - Assess for home care needs following discharge   - Consider OT consult to assist with ADL evaluation and planning for discharge  - Provide patient education as appropriate  Outcome: Progressing  Goal: Maintains/Returns to pre admission functional level  Description: INTERVENTIONS:  - Perform BMAT or MOVE assessment daily    - Set and communicate daily mobility goal to care team and patient/family/caregiver  - Collaborate with rehabilitation services on mobility goals if consulted  - Perform Range of Motion 2 times a day  - Reposition patient every 2 hours    - Dangle patient 2 times a day  - Stand patient 2 times a day  - Ambulate patient 2 times a day  - Out of bed to chair 2 times a day   - Out of bed for meals 2 times a day  - Out of bed for toileting  - Record patient progress and toleration of activity level   Outcome: Progressing     Problem: GENITOURINARY - ADULT  Goal: Maintains or returns to baseline urinary function  Description: INTERVENTIONS:  - Assess urinary function  - Encourage oral fluids to ensure adequate hydration if ordered  - Administer IV fluids as ordered to ensure adequate hydration  - Administer ordered medications as needed  - Offer frequent toileting  - Follow urinary retention protocol if ordered  Outcome: Progressing  Goal: Absence of urinary retention  Description: INTERVENTIONS:  - Assess patient’s ability to void and empty bladder  - Monitor I/O  - Bladder scan as needed  - Discuss with physician/AP medications to alleviate retention as needed  - Discuss catheterization for long term situations as appropriate  Outcome: Progressing     Problem: SKIN/TISSUE INTEGRITY - ADULT  Goal: Skin Integrity remains intact(Skin Breakdown Prevention)  Description: Assess:  -Perform Stefan assessment every shift  -Clean and moisturize skin every day  -Inspect skin when repositioning, toileting, and assisting with ADLS  -Assess under medical devices such as call bell every shift  -Assess extremities for adequate circulation and sensation     Bed Management:  -Have minimal linens on bed & keep smooth, unwrinkled  -Change linens as needed when moist or perspiring  -Avoid sitting or lying in one position for more than 2 hours while in bed  -Keep HOB at 30degrees     Toileting:  -Offer bedside commode  -Assess for incontinence every shift  -Use incontinent care products after each incontinent episode such as skin barrier    Activity:  -Mobilize patient 3 times a day  -Encourage activity and walks on unit  -Encourage or provide ROM exercises   -Turn and reposition patient every 2 Hours  -Use appropriate equipment to lift or move patient in bed  -Instruct/ Assist with weight shifting every 2 when out of bed in chair  -Consider limitation of chair time 2 hour intervals    Skin Care:  -Avoid use of baby powder, tape, friction and shearing, hot water or constrictive clothing  -Relieve pressure over bony prominences using cushion  -Do not massage red bony areas    Next Steps:  -Teach patient strategies to minimize risks such as fall  -Consider consults to  interdisciplinary teams such as Ot, PT  Problem: MUSCULOSKELETAL - ADULT  Goal: Maintain or return mobility to safest level of function  Description: INTERVENTIONS:  - Assess patient's ability to carry out ADLs; assess patient's baseline for ADL function and identify physical deficits which impact ability to perform ADLs (bathing, care of mouth/teeth, toileting, grooming, dressing, etc )  - Assess/evaluate cause of self-care deficits   - Assess range of motion  - Assess patient's mobility  - Assess patient's need for assistive devices and provide as appropriate  - Encourage maximum independence but intervene and supervise when necessary  - Involve family in performance of ADLs  - Assess for home care needs following discharge   - Consider OT consult to assist with ADL evaluation and planning for discharge  - Provide patient education as appropriate  Outcome: Progressing  Goal: Maintain proper alignment of affected body part  Description: INTERVENTIONS:  - Support, maintain and protect limb and body alignment  - Provide patient/ family with appropriate education  Outcome: Progressing     Problem: Potential for Falls  Goal: Patient will remain free of falls  Description: INTERVENTIONS:  - Educate patient/family on patient safety including physical limitations  - Instruct patient to call for assistance with activity   - Consult OT/PT to assist with strengthening/mobility   - Keep Call bell within reach  - Keep bed low and locked with side rails adjusted as appropriate  - Keep care items and personal belongings within reach  - Initiate and maintain comfort rounds  - Make Fall Risk Sign visible to staff  - Offer Toileting every 2 Hours, in advance of need  - Initiate/Maintain bed/chair alarm    - Apply yellow socks and bracelet for high fall risk patients  - Consider moving patient to room near nurses station  Outcome: Progressing     Outcome: Progressing

## 2023-01-10 NOTE — DISCHARGE SUMMARY
Discharge Summary - [facility]     Patient Information: Madelyn Petersen 56 y o  female MRN: [id number]  Unit/Bed#: [address] [place] [address] Encounter: [id number]     Admitting Physician: Lorena Wright MD  Discharging Physician/Practitioner: Bailey Wynne MD   PCP: Heather Amor DO  Admission Date: 1/1/23  Admission Orders (From admission, onward)     Ordered        01/01/23 1653  INPATIENT ADMISSION  Once                      Discharge Date: 01/11/23      Reason for Admission: SOB (shortness of breath) [R06 02]  Multifocal pneumonia [J18 9]     Discharge Diagnoses:      Principal Problem (Resolved):    Acute on chronic respiratory failure with hypoxia ([facility] )  Active Problems:    Obstructive sleep apnea syndrome    Down syndrome, unspecified    History of prolonged Q-T interval on ECG    Diabetes mellitus ([facility] )    Mild intellectual disability    Hypothyroidism    Hyperlipidemia    Nonintractable epilepsy with complex partial seizures ([facility] [address] )    Allergy to environmental factors  Resolved Problems:    Multifocal pneumonia    TIERNEY on Chronic renal insufficiency, stage III (moderate) ([facility] [address] )    Severe sepsis ([facility] [address] )       Consultations During Hospital Stay:  ·       Pulmonology     Procedures Performed:   ·       None     Significant Findings / Test Results:   ·      1/9: WBC 5 92  1/8: WBC 5 33  1/7: WBC 5 72  1/6: WBC 5 39, Echo EF 89%, normal diastolic and systolic function  1/5: WBC 6 71, folate 17 3, B12 591  1/4: WBC 8 76,   1/3: WBC 15 82 (might be due to steroids)  MRSA Nares pos  1/2: WBC 7 37, procal 0 82   1/1: Procal 1 06, LA WNL, Strep legionella urine neg       1/1 CT C/A/P w contrast: Multifocal bilateral airspace disease in the lungs, likely pneumonia  Small bilateral effusions  Air in esophagus may be related to reflux  No acute inflammatory changes in the abdomen or pelvis   Trace free fluid in pelvis     Incidental Findings:   ·       None     Test Results Pending at Discharge (will require follow up):   ·       none     Outpatient Tests Requested:  ·       none     Outpatient follow-up Requested:  ·       pulmonology, PCP    Complications:   None    Things to address at first visit after hospitalization   How is your breathing? Any incidences of shortness of breath? Any cough or fever? Are you wearing CPAP every night? Any sick contact? Is repeat sleep study scheduled? Did you get a new CPAP machine? Hospital Course:     Prasanna Mireles is a 56 y o  female who presents with c/o of hypoxic today at mother's house  Pt with Hx of downs syndrome, intellectual disability, complex partial epilepsy, hypothyroid, DM, long Q-T syndrome  Lives at group home but mother takes her home for extended periods  Group home told mother that pt has had cough since Friday (2 days pta), mother brought pt home today and felt she wasn't acting like herself and seems sleepy  She checked her SpO2 and it was low so she took her to ED  In ED, imaging showing b/l multilobular pna  Labs showing TIERNEY, PCT 1 06  Flu/covid/rsv negative  BCs obtained and started on azithromycin and rocephin  Also given 1 5L Nss and 125 mg solumedrol  Pt lethargic in ED with GCS 9 and spo2 87% on 7L NC  Admit to [place]  Keyon Templeton NP)    Multifocal pneumonia  Patient with no home oxygen had increased O2 supplementation needs up to 7L upon admission to ICU  Treated with ceftriaxone for 10 days and azithromycin for 5 days, along with Atrovent and Xopenex every 6 hours  Symptoms improving and patient slowly weaned down and converted to room air satting well in 90s  Uses CPAP every night, attempted to use home CPAP but patient desatted so had to use hospital CPAP  Need for repeat sleep study after discharge  Discharged on CPAP with 2L oxygen at night but no oxygen requirement during the day    TIERNEY  Pt had TIERNEY with Cr 1 51  Pt baseline is 0 9-1 0  Pt's creatinine improved to baseline after IVF       All other chronic conditions managed during admission  Condition at Discharge: stable      Discharge Day Visit / Exam:      Vitals: Blood Pressure: 93/50 (01/11/23 0727)  Pulse: 65 (01/11/23 0727)  Temperature: 97 5 °F (36 4 °C) (01/11/23 0727)  Temp Source: Oral (01/11/23 0727)  Respirations: 17 (01/11/23 0727)  Height: 4' 8" (142 2 cm) (01/06/23 1326)  Weight - Scale: 84 8 kg (187 lb) (01/06/23 1326)  SpO2: 96 % (01/11/23 0727)  Exam:   Physical Exam  Vitals reviewed  Constitutional:       General: She is not in acute distress  Appearance: Normal appearance  She is obese  She is not ill-appearing  HENT:      Head: Normocephalic and atraumatic  Right Ear: External ear normal       Left Ear: External ear normal       Nose: Nose normal  No congestion or rhinorrhea  Mouth/Throat:      Mouth: Mucous membranes are moist    Eyes:      Extraocular Movements: Extraocular movements intact  Conjunctiva/sclera: Conjunctivae normal    Cardiovascular:      Rate and Rhythm: Normal rate and regular rhythm  Pulses: Normal pulses  Heart sounds: Normal heart sounds  No murmur heard  No gallop  Pulmonary:      Effort: Pulmonary effort is normal  No respiratory distress  Breath sounds: Normal breath sounds  No stridor  No wheezing, rhonchi or rales  Chest:      Chest wall: No tenderness  Abdominal:      General: Abdomen is flat  Bowel sounds are normal  There is no distension  Palpations: Abdomen is soft  Tenderness: There is no abdominal tenderness  There is no guarding  Musculoskeletal:         General: No swelling, tenderness or deformity  Normal range of motion  Cervical back: Normal range of motion and neck supple  Right lower leg: No edema  Left lower leg: No edema  Skin:     General: Skin is warm and dry  Capillary Refill: Capillary refill takes less than 2 seconds  Findings: No bruising, erythema, lesion or rash     Neurological:      General: No focal deficit present  Mental Status: She is alert and oriented to person, place, and time  Psychiatric:         Mood and Affect: Mood normal          Behavior: Behavior normal          Thought Content: Thought content normal           Discharge instructions/Information to patient and family:   See after visit summary for information provided to patient and family  Discharge Medications:     Medication List      START taking these medications    • benzonatate 100 mg capsule; Commonly known as: TESSALON PERLES; Take 1   capsule (100 mg total) by mouth 3 (three) times a day as needed for cough  • polyethylene glycol 17 g packet; Commonly known as: Marrian Peeling; Take 17 g   by mouth daily for 7 days Daily with breakfast Do not start before January 12, 2023 ; Start taking on: January 12, 2023  • sodium chloride 0 65 % nasal spray; Commonly known as: OCEAN; 1 spray   into each nostril every hour as needed for congestion     CHANGE how you take these medications    • acetaminophen 325 mg tablet; Commonly known as: TYLENOL; Take 1 tablet   (325 mg total) by mouth every 6 (six) hours as needed for mild pain,   headaches or fever; What changed: how much to take, how to take this, when   to take this, reasons to take this  • ARIPiprazole 10 mg tablet; Commonly known as: ABILIFY; Take 1 tablet (10   mg total) by mouth daily Daily at 8 pm; What changed: additional   instructions  • bacitracin topical ointment 500 units/g topical ointment; Apply 1 large   application topically 3 (three) times a day as needed for wound care; What   changed: how much to take, how to take this, when to take this, reasons to   take this  • calcium carbonate 600 MG tablet; Commonly known as: OS-ESTEPHANIA; Take 1   tablet (600 mg total) by mouth daily Daily with breakfast; What changed:   when to take this, additional instructions, Another medication with the   same name was removed   Continue taking this medication, and follow the   directions you see here  • Centrum Silver Ultra Womens Tabs; Take 1 tablet by mouth daily Centrum   Silver Ultra Womens Oral Tablet daily with breakfast; What changed:   additional instructions  • clindamycin 1 % gel; Commonly known as: CLINDAGEL; Apply topically 2   (two) times a day Apply to L axilla at 8 am / 8pm; What changed:   additional instructions  • clotrimazole 1 % external solution; Apply 1 application topically 2   (two) times a day for 14 days Affected area for fungal skin infection 8 am   / 8 pm; What changed: additional instructions  • divalproex sodium 500 mg 24 hr tablet; Commonly known as: DEPAKOTE ER;   Take 3 tablets (1,500 mg total) by mouth daily Daily 8 am; What changed:   See the new instructions  • ergocalciferol 50,000 units; Commonly known as: VITAMIN D2; Take 1   capsule (50,000 Units total) by mouth once a week Administer on sunday; What changed: medication strength, how much to take, Another medication   with the same name was removed  Continue taking this medication, and   follow the directions you see here  • fluticasone 50 mcg/act nasal spray; Commonly known as: FLONASE; 1 spray   into each nostril daily Daily 8 am; What changed: how to take this,   additional instructions  • levothyroxine 75 mcg tablet; Take 1 tablet (75 mcg total) by mouth daily   in the early morning At 8 am; What changed: additional instructions  • valACYclovir 1,000 mg tablet; Commonly known as: VALTREX; Take 1 tablet   (1,000 mg total) by mouth 3 (three) times a day for 7 days Only take if   you have active HSV flare up; What changed: when to take this, additional   instructions     CONTINUE taking these medications    • albuterol 90 mcg/act inhaler; Commonly known as: PROVENTIL HFA,VENTOLIN   HFA; Inhale 2 puffs every 4 (four) hours as needed for wheezing  • atorvastatin 10 mg tablet; Commonly known as: LIPITOR;  Take 1 tablet (10   mg total) by mouth daily At 8pm  • hydrocortisone 1 % cream; Apply topically 4 (four) times a day as needed   (apply small amount to eyelid 3 times a day) May apply to dryness of both   upper eyelids  • levocetirizine 5 MG tablet; Commonly known as: XYZAL; Take 1 tablet (5   mg total) by mouth daily At 8 PM  • LISTERINE ANTISEPTIC ADVANCED MT  • NON FORMULARY  • OCUSOFT BABY EYELID & EYELASH EX  • spacer device for metered dose inhaler; For use with metered dose   inhaler     STOP taking these medications    • Anti-Diarrheal 2 MG tablet; Generic drug: loperamide  • ketorolac 0 5 % ophthalmic solution; Commonly known as: ACULAR  • Milk of Magnesia 400 mg/5 mL oral suspension; Generic drug: magnesium   hydroxide  • ofloxacin 0 3 % ophthalmic solution; Commonly known as: OCUFLOX  • SILTUSSIN DM ALCOHOL FREE  mg/5 mL oral syrup; Generic drug:   dextromethorphan-guaiFENesin        Disposition:   Group Home / [place] [facility] home     Discharge Statement:  I spent 30 minutes discharging the patient  This time was spent on the day of discharge  I had direct contact with the patient on the day of discharge  Greater than 50% of the total time was spent examining patient, answering all patient questions, arranging and discussing plan of care with patient as well as directly providing post-discharge instructions  Additional time then spent on discharge activities       ** Please Note: This note has been constructed using a voice recognition system **     Bhavna Weir MD   01/11/23  12:35 PM

## 2023-01-10 NOTE — PROGRESS NOTES
Progress Note - Pulmonary   Tala Silva Gentle 56 y o  female MRN: [id number]  Unit/Bed#: [address] [address] Encounter: [id number]    Assessment and plan:    1  Multifocal pneumonia: Multifocal pneumonia currently on treatment with ceftriaxone  Pneumococcal and Legionella antigens negative so far  Blood cultures negative      2  Obstructive sleep apnea: She has been on CPAP therapy 8 to 14 cm of water  Currently on CPAP 12 cm  Overnight oximetry was done on room air  She desaturated to 88% or below only for 4 minutes which does not qualify her for oxygen supplementation  She does not need any oxygen supplementation with CPAP at this time  She was scheduled for repeat sleep study which need to be rescheduled after discharge      3   Mild intermittent asthma: Currently on levalbuterol and ipratropium  Chest clear to auscultation      4   Acute hypoxic respiratory failure: She was 2 L of nasal cannula oxygen before  Please she is saturating 93 to 94% on room air     We will do a 6-minute walk test to see whether she desaturates with exercise in that case she would need oxygen supplementation when she is discharged      5  Severe obesity: Patient also has Down syndrome      Spoke to the patient's mother at length  She would like the patient to have oxygen supplementation as she has been noting oxygen desaturation  This is likely from hypoventilation  He needs to continue on CPAP therapy  He does not need any oxygen supplementation with CPAP      Thank you for allowing me to participate in the care of the patient  The patient is to follow-up with Dr Raysa Ford after discharge  Chief Complaint:   Shortness of breath    Subjective:   Shortness of breath is better  Had an overnight oximetry which did not show any prolonged oxygen desaturation  He desaturated to 88% or below only for 4 minutes which does not qualify her for oxygen supplementation at night    Currently she is saturating 93 to 94% on room air at rest     Objective:     Vitals: Blood pressure 104/65, pulse 62, temperature 97 8 °F (36 6 °C), temperature source Axillary, resp  rate 16, height 4' 8" (1 422 m), weight 84 8 kg (187 lb), SpO2 97 %, not currently breastfeeding  ,Body mass index is 41 92 kg/m²  Intake/Output Summary (Last 24 hours) at 1/10/2023 1242  Last data filed at 1/9/2023 2315  Gross per 24 hour   Intake --   Output 800 ml   Net -800 ml       Invasive Devices     Peripheral Intravenous Line  Duration           Peripheral IV 01/09/23 Distal;Right;Ventral (anterior) Forearm 1 day                Physical Exam: On clinical examination, she is hemodynamically stable and afebrile  Comfortable on room air at rest   HEENT: No conjunctival pallor no cyanosis  Down syndrome features  Neck: No jugular venous distention  Heart S1-S2 heard  Chest air entry present bilaterally no significant crackles or rhonchi  Abdomen soft and nontender bowel sounds audible  Neuro awake alert  Extremities no clubbing no edema  Labs: I have personally reviewed pertinent lab results  White cell count 4 63 hemoglobin 12 2 glucose 158 BMP otherwise normal bicarbonate 29  Imaging and other studies: I have personally reviewed pertinent reports

## 2023-01-10 NOTE — PROGRESS NOTES
Daily Progress Note - [address] J Gentle 56 y o  female MRN: [id number]  Unit/Bed#: [address] [place] [address] Encounter: [id number]  Admitting Physician: Corry Adamson MD  PCP: Juan M Greco DO  Date of Admission:  1/1/2023  1:46 PM    Summary:     IVFs:    Diet: Diet David/CHO Controlled; Consistent Carbohydrate Diet Level 1 (4 carb servings/60 grams CHO/meal)  VTE:  Heparin   Mechanical prophylaxis in place  Patient Centered Rounds: I have performed bedside rounds with nursing staff today  Specialists/Other Care Team Provider: Pulmo    LOS: 9 day(s)  Status: Inpatient   Disposition: The patient will continue to require additional inpatient hospital stay due to oxygen requirment at night and dispo planning   Code Status: Level 1 - Full Code    Assessment and Plan    * Acute on chronic respiratory failure with hypoxia (HCC)  Assessment & Plan  No oxygen requirement at home  Imaging in the ED showing multilobular PNA  Pt wears CPAP HS at home  Repeat CXR: Findings consistent with mild CHF, improved since 1/1/2023 with partial clearing of bilateral pulmonary airspace opacities, which most likely represented pulmonary edema  During trial of no supplemental oxygen with CPAP, O2 decreased to 87% and started on 2 L --> O2 inc to 95%  Pt continued no oxygen trial again at 1 am to 6 am and tolerated well without oxygen  • Xopenex/atrovent Q6h  • IV lasix 20 mg was given once  • Azithromycin 500mg IV completed (5 days)  • Continue rocephin 2g IV (Day 10) to 10 days per pulm  • CPAP 12 28% at night  • CXR PA/Lateral  • Current O2 requirement: RA    Multifocal pneumonia  Assessment & Plan  CT C/A/P: Multifocal bilateral airspace disease in the lungs, likely pneumonia    Strep legionella urine neg  Procal 1 6, LA WNL, WBC 8 76  -Repeat CXR:Findings consistent with mild CHF, improved since 1/1/2023 with partial clearing of bilateral pulmonary airspace opacities, which most likely represented pulmonary edema     -On Ceftriaxone 2g (Day 10), complete 10 days per pulm, can transition to oral if DC earlier  - Azithro 500mg IV completed (5 days)    -monitor vitals    Allergy to environmental factors  Assessment & Plan  Continue Loratadine 5mg (substitue for home levocetirizine 5mg) and home Flonase     Nonintractable epilepsy with complex partial seizures ([address] )  Assessment & Plan  • No Sz activity this Admission  • Valporic acid level 68 this Admission   • Continued home Depakote PO 1500mg daily today    Hyperlipidemia  Assessment & Plan  Continue home Lipitor 10 mg daily    Hypothyroidism  Assessment & Plan  Continue home Levothyroxine 75 mcg    Mild intellectual disability  Assessment & Plan  • Routine neuro checks and delirium precautions    Diabetes mellitus [facility])  Assessment & Plan  Lab Results   Component Value Date    HGBA1C 5 0 02/08/2022       Recent Labs     01/05/23  1610 01/05/23  2051 01/06/23  0734 01/06/23  1103   POCGLU 189* 207* 155* 237*       Blood Sugar Average: Last 72 hrs:  (P) 956 [id number]   • Lispro mealtime 2 unit  • ISS    History of prolonged Q-T interval on ECG  Assessment & Plan  • Continue  telemetry monitoring    Down syndrome, unspecified  Assessment & Plan  Patient lives in a group home, mother's POA    Obstructive sleep apnea syndrome  Assessment & Plan  • Wears pediatric CPAP at home with nasal mask   E pressure 8  • Will continue HS CPAP 12 28%  Pt had O2 sat around 80's with home CPAP machine on 1/3, so it was switched back to hospital machine tolerating well   -needs new machine at home    -Trial of no supplemental oxygen with CPAP tonight as per Pulmonology    Severe sepsis (HCC)-resolved as of 1/6/2023  Assessment & Plan  • Secondary to multilobular PNA  • Flu/rsv/covid neg, Strep/legionella Neg  • BCx NG 72hrs  • U/A negative UTI  • Procal 1 06 > 0 82 > 0 20  • WBC 6 71    - Azithromycin 500mg IV completed (5 days)   - continue rocephin 2g IV daily (day 10)  - Trend Fever curve and WBC, no fevers overnight    TIERNEY on Chronic renal insufficiency, stage III (moderate) (HCC)-resolved as of 1/3/2023  Assessment & Plan  Lab Results   Component Value Date    EGFR 81 2023    EGFR 84 2023    EGFR 73 2023    CREATININE 0 86 2023    CREATININE 0 83 2023    CREATININE 0 93 2023   • Resolved  • TIERNEY on CKD, Cr 1 51  • Given 1 5 L Nss in ED  • Continue isolyte 75 ml/hr  • Cr improved, 0 86    Trend I/o and renal function         Subjective:   Pt was seen and examined at the bedside, NAD  Pt does not have any acute complaints  Pt required 2 L of oxygen with he CPAP last night  Pt's group home is okay with managing 2L oxygen at night  Review of Systems   Constitutional: Negative for chills and fever  HENT: Negative for ear pain and sore throat  Eyes: Negative for pain and visual disturbance  Respiratory: Negative for cough and shortness of breath  Cardiovascular: Negative for chest pain and palpitations  Gastrointestinal: Negative for abdominal pain, constipation, diarrhea, nausea and vomiting  Genitourinary: Negative for dysuria and hematuria  Musculoskeletal: Negative for arthralgias, back pain, neck pain and neck stiffness  Skin: Negative for color change and rash  Neurological: Negative for dizziness, weakness and headaches  Psychiatric/Behavioral: Negative for agitation and confusion  The patient is not nervous/anxious  All other systems reviewed and are negative  Objective     Vitals:   Temp (24hrs), Av 7 °F (36 5 °C), Min:97 1 °F (36 2 °C), Max:98 5 °F (36 9 °C)    Temp:  [97 1 °F (36 2 °C)-98 5 °F (36 9 °C)] 97 8 °F (36 6 °C)  HR:  [62-82] 62  Resp:  [13-18] 16  BP: ()/(50-71) 104/65  SpO2:  [87 %-98 %] 97 %  Body mass index is 41 92 kg/m²  Input and Output Summary (last 24 hours):        Intake/Output Summary (Last 24 hours) at 1/10/2023 1323  Last data filed at 2023 4508  Gross per 24 hour   Intake --   Output 200 ml   Net -200 ml       Physical Exam:   Physical Exam  Vitals reviewed  Constitutional:       General: She is not in acute distress  Appearance: Normal appearance  She is obese  She is not ill-appearing  HENT:      Head: Normocephalic and atraumatic  Right Ear: External ear normal       Left Ear: External ear normal       Nose: Congestion present  No rhinorrhea  Mouth/Throat:      Mouth: Mucous membranes are moist    Eyes:      Extraocular Movements: Extraocular movements intact  Conjunctiva/sclera: Conjunctivae normal    Cardiovascular:      Rate and Rhythm: Normal rate and regular rhythm  Pulses: Normal pulses  Heart sounds: Normal heart sounds  No murmur heard  No gallop  Pulmonary:      Effort: Pulmonary effort is normal  No respiratory distress  Breath sounds: Normal breath sounds  No stridor  No wheezing, rhonchi or rales  Chest:      Chest wall: No tenderness  Abdominal:      General: Abdomen is flat  Bowel sounds are normal  There is no distension  Palpations: Abdomen is soft  Tenderness: There is no abdominal tenderness  There is no guarding  Musculoskeletal:         General: No swelling, tenderness or deformity  Normal range of motion  Cervical back: Normal range of motion and neck supple  Right lower leg: No edema  Left lower leg: No edema  Skin:     General: Skin is warm and dry  Capillary Refill: Capillary refill takes less than 2 seconds  Findings: No bruising, erythema, lesion or rash  Neurological:      General: No focal deficit present  Mental Status: She is alert and oriented to person, place, and time  Psychiatric:         Mood and Affect: Mood normal          Behavior: Behavior normal          Thought Content:  Thought content normal            Additional Data:     Labs:  Results from last 7 days   Lab Units 01/10/23  0508 01/09/23  0605   WBC Thousand/uL 4 63 5 92 HEMOGLOBIN g/dL 12 2 11 9   HEMATOCRIT % 38 0 37 0   PLATELETS Thousands/uL 295 296   LYMPHO PCT %  --  25   MONO PCT %  --  23*   EOS PCT %  --  1     Results from last 7 days   Lab Units 01/10/23  0508 01/09/23  0605   POTASSIUM mmol/L 4 5 4 5   CHLORIDE mmol/L 104 102   CO2 mmol/L 29 32   BUN mg/dL 26* 24   CREATININE mg/dL 1 18 1 16   CALCIUM mg/dL 8 7 9 0   ALK PHOS U/L  --  63   ALT U/L  --  63   AST U/L  --  48*         Results from last 7 days   Lab Units 01/10/23  1104 01/10/23  0706 01/09/23  2050 01/09/23  1548 01/09/23  1112   POC GLUCOSE mg/dl 169* 145* 164* 220* 171*     Results from last 7 days   Lab Units 01/04/23  0815   HEMOGLOBIN A1C % 6 3*       * I Have Reviewed All Lab Data Listed Above  * Additional Pertinent Lab Tests Reviewed: All Labs Within Last 24 Hours Reviewed    Imaging:    Imaging Reports Reviewed Today Include: none  Imaging Personally Reviewed by Myself Includes:  none    Recent Cultures (last 7 days):              Active Medications  Scheduled Meds:  Current Facility-Administered Medications   Medication Dose Route Frequency Provider Last Rate   • acetaminophen  650 mg Oral Q6H PRN Isa Smith MD     • ARIPiprazole  10 mg Oral Daily Vanessa Anderson MD     • atorvastatin  10 mg Oral Daily With Vika Warner MD     • calcium carbonate  1 tablet Oral BID With Meals Vanessa Anderson MD     • cefTRIAXone  2,000 mg Intravenous Q24H Vanessa Anderson MD 2,000 mg (01/09/23 1631)   • divalproex sodium  1,500 mg Oral Daily Vanessa Anderson MD     • fluticasone  1 spray Each Nare Daily Vanessa Anderson MD     • heparin (porcine)  5,000 Units Subcutaneous Q8H [address] Vanessa Anderson MD     • insulin lispro  1-5 Units Subcutaneous 4x Daily (AC & HS) Vanessa Anderson MD     • insulin lispro  2 Units Subcutaneous TID With Meals Vanessa Anderson MD     • ipratropium  0 5 mg Nebulization Q6H Vanessa Anderson MD     • levalbuterol  1 25 mg Nebulization Q6H Vanessa Anderson MD      And   • sodium chloride  3 mL Nebulization Q6H PRN Rishi Akins MD     • levothyroxine  75 mcg Oral Early Morning Rishi Akins MD     • loratadine  5 mg Oral Daily Rishi Akins MD     • mupirocin   Nasal Q12H [facility] Tiffanie Dorado MD     • sodium chloride  1 spray Each Nare Q1H PRN Rishi Akins MD       Continuous Infusions:     PRN Meds:   acetaminophen, 650 mg, Q6H PRN  sodium chloride, 1 spray, Q1H PRN  sodium chloride, 3 mL, Q6H PRN                Patient Information Sharing: With the consent of Chela Petersen , their loved ones were notified today by inpatient team of the patient’s condition and current plan  All questions answered  Time Spent for Care: 30 minutes  More than 50% of total time spent on counseling and coordination of care as described above      Rishi Akins MD  01/10/23  1:23 PM

## 2023-01-11 ENCOUNTER — TELEPHONE (OUTPATIENT)
Dept: FAMILY MEDICINE CLINIC | Facility: CLINIC | Age: 48
End: 2023-01-11

## 2023-01-11 VITALS
DIASTOLIC BLOOD PRESSURE: 50 MMHG | OXYGEN SATURATION: 96 % | TEMPERATURE: 97.5 F | HEIGHT: 56 IN | HEART RATE: 65 BPM | WEIGHT: 187 LBS | BODY MASS INDEX: 42.07 KG/M2 | RESPIRATION RATE: 17 BRPM | SYSTOLIC BLOOD PRESSURE: 93 MMHG

## 2023-01-11 PROBLEM — J96.21 ACUTE ON CHRONIC RESPIRATORY FAILURE WITH HYPOXIA (HCC): Status: RESOLVED | Noted: 2023-01-01 | Resolved: 2023-01-11

## 2023-01-11 PROBLEM — J18.9 MULTIFOCAL PNEUMONIA: Status: RESOLVED | Noted: 2018-12-03 | Resolved: 2023-01-11

## 2023-01-11 LAB
ANION GAP SERPL CALCULATED.3IONS-SCNC: 7 MMOL/L (ref 4–13)
BUN SERPL-MCNC: 24 MG/DL (ref 5–25)
CALCIUM SERPL-MCNC: 8.5 MG/DL (ref 8.3–10.1)
CHLORIDE SERPL-SCNC: 105 MMOL/L (ref 96–108)
CO2 SERPL-SCNC: 31 MMOL/L (ref 21–32)
CREAT SERPL-MCNC: 1.15 MG/DL (ref 0.6–1.3)
ERYTHROCYTE [DISTWIDTH] IN BLOOD BY AUTOMATED COUNT: 14.5 % (ref 11.6–15.1)
GFR SERPL CREATININE-BSD FRML MDRD: 57 ML/MIN/1.73SQ M
GLUCOSE SERPL-MCNC: 147 MG/DL (ref 65–140)
GLUCOSE SERPL-MCNC: 149 MG/DL (ref 65–140)
GLUCOSE SERPL-MCNC: 155 MG/DL (ref 65–140)
HCT VFR BLD AUTO: 36.7 % (ref 34.8–46.1)
HGB BLD-MCNC: 11.7 G/DL (ref 11.5–15.4)
MCH RBC QN AUTO: 32.9 PG (ref 26.8–34.3)
MCHC RBC AUTO-ENTMCNC: 31.9 G/DL (ref 31.4–37.4)
MCV RBC AUTO: 103 FL (ref 82–98)
PLATELET # BLD AUTO: 261 THOUSANDS/UL (ref 149–390)
PMV BLD AUTO: 9.4 FL (ref 8.9–12.7)
POTASSIUM SERPL-SCNC: 4.2 MMOL/L (ref 3.5–5.3)
RBC # BLD AUTO: 3.56 MILLION/UL (ref 3.81–5.12)
SODIUM SERPL-SCNC: 143 MMOL/L (ref 135–147)
WBC # BLD AUTO: 4.22 THOUSAND/UL (ref 4.31–10.16)

## 2023-01-11 RX ORDER — ERGOCALCIFEROL 1.25 MG/1
50000 CAPSULE ORAL WEEKLY
Qty: 4 CAPSULE | Refills: 2 | Status: SHIPPED | OUTPATIENT
Start: 2023-01-11 | End: 2023-04-11

## 2023-01-11 RX ORDER — DIVALPROEX SODIUM 500 MG/1
1500 TABLET, EXTENDED RELEASE ORAL DAILY
Qty: 90 TABLET | Refills: 0 | Status: SHIPPED | OUTPATIENT
Start: 2023-01-11

## 2023-01-11 RX ORDER — ACETAMINOPHEN 325 MG/1
650 TABLET ORAL EVERY 6 HOURS PRN
Status: DISCONTINUED | OUTPATIENT
Start: 2023-01-11 | End: 2023-01-11 | Stop reason: HOSPADM

## 2023-01-11 RX ORDER — LEVOCETIRIZINE DIHYDROCHLORIDE 5 MG/1
5 TABLET, FILM COATED ORAL DAILY
Qty: 30 TABLET | Refills: 0 | Status: SHIPPED | OUTPATIENT
Start: 2023-01-11

## 2023-01-11 RX ORDER — POLYETHYLENE GLYCOL 3350 17 G/17G
17 POWDER, FOR SOLUTION ORAL DAILY
Qty: 119 G | Refills: 0 | Status: SHIPPED | OUTPATIENT
Start: 2023-01-12 | End: 2023-01-11 | Stop reason: SDUPTHER

## 2023-01-11 RX ORDER — BENZONATATE 100 MG/1
100 CAPSULE ORAL 3 TIMES DAILY PRN
Qty: 20 CAPSULE | Refills: 0 | Status: SHIPPED | OUTPATIENT
Start: 2023-01-11 | End: 2023-01-11 | Stop reason: SDUPTHER

## 2023-01-11 RX ORDER — BENZONATATE 100 MG/1
100 CAPSULE ORAL 3 TIMES DAILY PRN
Status: DISCONTINUED | OUTPATIENT
Start: 2023-01-11 | End: 2023-01-11 | Stop reason: HOSPADM

## 2023-01-11 RX ORDER — POLYETHYLENE GLYCOL 3350 17 G/17G
17 POWDER, FOR SOLUTION ORAL DAILY
Qty: 119 G | Refills: 0 | Status: SHIPPED | OUTPATIENT
Start: 2023-01-12 | End: 2023-01-19

## 2023-01-11 RX ORDER — ARIPIPRAZOLE 10 MG/1
10 TABLET ORAL DAILY
Qty: 90 TABLET | Refills: 0 | Status: SHIPPED | OUTPATIENT
Start: 2023-01-11

## 2023-01-11 RX ORDER — POLYETHYLENE GLYCOL 3350 17 G/17G
17 POWDER, FOR SOLUTION ORAL DAILY
Status: DISCONTINUED | OUTPATIENT
Start: 2023-01-11 | End: 2023-01-11 | Stop reason: HOSPADM

## 2023-01-11 RX ORDER — CLINDAMYCIN PHOSPHATE 10 MG/G
GEL TOPICAL 2 TIMES DAILY
Qty: 30 G | Refills: 0 | Status: SHIPPED | OUTPATIENT
Start: 2023-01-11

## 2023-01-11 RX ORDER — FLUTICASONE PROPIONATE 50 MCG
1 SPRAY, SUSPENSION (ML) NASAL DAILY
Qty: 16 G | Refills: 0 | Status: SHIPPED | OUTPATIENT
Start: 2023-01-11

## 2023-01-11 RX ORDER — LEVOTHYROXINE SODIUM 0.07 MG/1
75 TABLET ORAL
Qty: 90 TABLET | Refills: 0 | Status: SHIPPED | OUTPATIENT
Start: 2023-01-11 | End: 2023-04-11

## 2023-01-11 RX ORDER — BENZONATATE 100 MG/1
100 CAPSULE ORAL 3 TIMES DAILY PRN
Qty: 20 CAPSULE | Refills: 0 | Status: SHIPPED | OUTPATIENT
Start: 2023-01-11

## 2023-01-11 RX ORDER — CLOTRIMAZOLE 1 G/ML
1 SOLUTION TOPICAL 2 TIMES DAILY
Qty: 30 ML | Refills: 0 | Status: SHIPPED | OUTPATIENT
Start: 2023-01-11 | End: 2023-01-25

## 2023-01-11 RX ORDER — DIAPER,BRIEF,INFANT-TODD,DISP
EACH MISCELLANEOUS 4 TIMES DAILY PRN
Qty: 30 G | Refills: 0 | Status: SHIPPED | OUTPATIENT
Start: 2023-01-11

## 2023-01-11 RX ORDER — ALBUTEROL SULFATE 90 UG/1
2 AEROSOL, METERED RESPIRATORY (INHALATION) EVERY 4 HOURS PRN
Qty: 18 G | Refills: 0 | Status: SHIPPED | OUTPATIENT
Start: 2023-01-11

## 2023-01-11 RX ORDER — ECHINACEA PURPUREA EXTRACT 125 MG
1 TABLET ORAL
Qty: 15 ML | Refills: 0 | Status: SHIPPED | OUTPATIENT
Start: 2023-01-11

## 2023-01-11 RX ORDER — GINSENG 100 MG
1 CAPSULE ORAL 3 TIMES DAILY PRN
Qty: 15 G | Refills: 0 | Status: SHIPPED | OUTPATIENT
Start: 2023-01-11

## 2023-01-11 RX ORDER — ECHINACEA PURPUREA EXTRACT 125 MG
1 TABLET ORAL
Qty: 15 ML | Refills: 0 | Status: SHIPPED | OUTPATIENT
Start: 2023-01-11 | End: 2023-01-11 | Stop reason: SDUPTHER

## 2023-01-11 RX ORDER — ATORVASTATIN CALCIUM 10 MG/1
10 TABLET, FILM COATED ORAL DAILY
Qty: 90 TABLET | Refills: 0 | Status: SHIPPED | OUTPATIENT
Start: 2023-01-11

## 2023-01-11 RX ORDER — PHENOL 1.4 %
600 AEROSOL, SPRAY (ML) MUCOUS MEMBRANE DAILY
Qty: 180 TABLET | Refills: 0 | Status: SHIPPED | OUTPATIENT
Start: 2023-01-11

## 2023-01-11 RX ORDER — ACETAMINOPHEN 325 MG/1
325 TABLET ORAL EVERY 6 HOURS PRN
Qty: 100 TABLET | Refills: 0 | Status: SHIPPED | OUTPATIENT
Start: 2023-01-11 | End: 2023-04-11

## 2023-01-11 RX ORDER — MULTIVIT,IRON,MINERALS/LUTEIN
1 TABLET ORAL DAILY
Qty: 90 TABLET | Refills: 0 | Status: SHIPPED | OUTPATIENT
Start: 2023-01-11

## 2023-01-11 RX ORDER — VALACYCLOVIR HYDROCHLORIDE 1 G/1
1000 TABLET, FILM COATED ORAL 3 TIMES DAILY
Qty: 21 TABLET | Refills: 0 | Status: SHIPPED | OUTPATIENT
Start: 2023-01-11 | End: 2023-01-18

## 2023-01-11 RX ADMIN — LORATADINE 5 MG: 10 TABLET ORAL at 08:07

## 2023-01-11 RX ADMIN — FLUTICASONE PROPIONATE 1 SPRAY: 50 SPRAY, METERED NASAL at 08:09

## 2023-01-11 RX ADMIN — LEVALBUTEROL HYDROCHLORIDE 1.25 MG: 1.25 SOLUTION, CONCENTRATE RESPIRATORY (INHALATION) at 13:28

## 2023-01-11 RX ADMIN — HEPARIN SODIUM 5000 UNITS: 5000 INJECTION INTRAVENOUS; SUBCUTANEOUS at 05:34

## 2023-01-11 RX ADMIN — POLYETHYLENE GLYCOL 3350 17 G: 17 POWDER, FOR SOLUTION ORAL at 08:07

## 2023-01-11 RX ADMIN — INSULIN LISPRO 2 UNITS: 100 INJECTION, SOLUTION INTRAVENOUS; SUBCUTANEOUS at 08:10

## 2023-01-11 RX ADMIN — CALCIUM 1 TABLET: 500 TABLET ORAL at 07:52

## 2023-01-11 RX ADMIN — ARIPIPRAZOLE 10 MG: 5 TABLET ORAL at 08:07

## 2023-01-11 RX ADMIN — IPRATROPIUM BROMIDE 0.5 MG: 0.5 SOLUTION RESPIRATORY (INHALATION) at 07:12

## 2023-01-11 RX ADMIN — MUPIROCIN 1 APPLICATION: 20 OINTMENT TOPICAL at 08:09

## 2023-01-11 RX ADMIN — IPRATROPIUM BROMIDE 0.5 MG: 0.5 SOLUTION RESPIRATORY (INHALATION) at 02:22

## 2023-01-11 RX ADMIN — IPRATROPIUM BROMIDE 0.5 MG: 0.5 SOLUTION RESPIRATORY (INHALATION) at 13:28

## 2023-01-11 RX ADMIN — LEVALBUTEROL HYDROCHLORIDE 1.25 MG: 1.25 SOLUTION, CONCENTRATE RESPIRATORY (INHALATION) at 02:22

## 2023-01-11 RX ADMIN — DIVALPROEX SODIUM 1500 MG: 500 TABLET, FILM COATED, EXTENDED RELEASE ORAL at 08:07

## 2023-01-11 RX ADMIN — INSULIN LISPRO 2 UNITS: 100 INJECTION, SOLUTION INTRAVENOUS; SUBCUTANEOUS at 11:24

## 2023-01-11 RX ADMIN — LEVALBUTEROL HYDROCHLORIDE 1.25 MG: 1.25 SOLUTION, CONCENTRATE RESPIRATORY (INHALATION) at 07:12

## 2023-01-11 RX ADMIN — INSULIN LISPRO 1 UNITS: 100 INJECTION, SOLUTION INTRAVENOUS; SUBCUTANEOUS at 11:24

## 2023-01-11 RX ADMIN — LEVOTHYROXINE SODIUM 75 MCG: 75 TABLET ORAL at 05:34

## 2023-01-11 NOTE — PLAN OF CARE
Problem: RESPIRATORY - ADULT  Goal: Achieves optimal ventilation and oxygenation  Description: INTERVENTIONS:  - Assess for changes in respiratory status  - Assess for changes in mentation and behavior  - Position to facilitate oxygenation and minimize respiratory effort  - Oxygen administered by appropriate delivery if ordered  - Initiate smoking cessation education as indicated  - Encourage broncho-pulmonary hygiene including cough, deep breathe, Incentive Spirometry  - Assess the need for suctioning and aspirate as needed  - Assess and instruct to report SOB or any respiratory difficulty  - Respiratory Therapy support as indicated  Outcome: Progressing     Problem: MOBILITY - ADULT  Goal: Maintain or return to baseline ADL function  Description: INTERVENTIONS:  -  Assess patient's ability to carry out ADLs; assess patient's baseline for ADL function and identify physical deficits which impact ability to perform ADLs (bathing, care of mouth/teeth, toileting, grooming, dressing, etc )  - Assess/evaluate cause of self-care deficits   - Assess range of motion  - Assess patient's mobility; develop plan if impaired  - Assess patient's need for assistive devices and provide as appropriate  - Encourage maximum independence but intervene and supervise when necessary  - Involve family in performance of ADLs  - Assess for home care needs following discharge   - Consider OT consult to assist with ADL evaluation and planning for discharge  - Provide patient education as appropriate  Outcome: Progressing     Problem: GENITOURINARY - ADULT  Goal: Maintains or returns to baseline urinary function  Description: INTERVENTIONS:  - Assess urinary function  - Encourage oral fluids to ensure adequate hydration if ordered  - Administer IV fluids as ordered to ensure adequate hydration  - Administer ordered medications as needed  - Offer frequent toileting  - Follow urinary retention protocol if ordered  Outcome: Progressing
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
## 2023-01-11 NOTE — NURSING NOTE
Patient was discharged from [address] [place] [address] with all belongings including pediatric ventilator(CPAP) mask  AVS was reviewed with nursing home nurse Leena including dosage and schedules of medications  Leena verbalized understanding

## 2023-01-11 NOTE — CASE MANAGEMENT
Case Management Progress Note    Patient name Caleb Daily  [facility] MRN [id number]  : 1975 Date 2023       LOS (days): 10  Geometric Mean LOS (GMLOS) (days): 5 00  Days to GMLOS:-4 8        OBJECTIVE:    Current admission status: Inpatient  Preferred Pharmacy:   [facility]  [address]  [address] [address]  Phone: [phone] Fax: [phone] - One [facility], [address] [facility] ([address] [facility] 22)  [id number] 22)  [place] [address]  Phone: [phone] Fax: [phone]    [facility] [address] [facility] [address] [facility] [address]  Phone: [phone] Fax: [phone]    Primary Care Provider: Malina Ayon DO    Primary Insurance: MEDICARE  Secondary Insurance: [address]    PROGRESS NOTE:    SW following to assist with DCP  Discharge planned for today  [facility]  DME, home oxygen concentrator and pediatric size CPAP mask, was ordered from [facility] yesterday  SW spoke with Marcelino Garza, , to discuss discharge plan for today  Per Ms Campbell the oxygen concentrator has been delivered to the group home but pediatric size CPAP mask has not been  Ms Wendie Campbell will be coming to hospital this afternoon to review discharge instructions and obtain prescriptions for pt's return  Message left for Syeda Haider, respiratory liaison for [facility], to confirm delivery of mask for today  Waiting for call back  SW will follow to assist with planning as needed

## 2023-01-11 NOTE — PROGRESS NOTES
Progress Note - Pulmonary   Chela Petersen 56 y o  female MRN: [id number]  Unit/Bed#: [address] [place] [address] Encounter: [id number]    Assessment: and Plan:    1   Multifocal pneumonia: Multifocal pneumonia treated with ceftriaxone   Pneumococcal and Legionella antigens negative   Blood cultures negative      2   Obstructive sleep apnea: She has been on CPAP therapy 8 to 14 cm of water   Currently on CPAP 12 cm   Overnight oximetry was done on room air  She desaturated to 88% or below only for 4 minutes which does not qualify her for oxygen supplementation    She does not need any oxygen supplementation with CPAP at this time  She was scheduled for repeat sleep study which need to be rescheduled after discharge      3   Mild intermittent asthma: Currently on levalbuterol and ipratropium nebulizer   Chest clear to auscultation  Continue with bronchodilator therapy as needed     4   Acute hypoxic respiratory failure:  She was 2 L of nasal cannula oxygen before  Currently this has resolved  5   Severe obesity: Patient also has Down syndrome  She would benefit from weight reduction      Spoke to the patient's mother at length  Answered all questions  The patient would go back to her group home today  Thank you for allowing me to participate in the care of the patient  The patient is to follow-up with Dr Elvi Barney after discharge  Chief Complaint:   Shortness of breath    Subjective:   Shortness of breath is improved and she does not need any oxygen supplementation at this time  Denies any significant cough or phlegm  Objective:     Vitals: Blood pressure 93/50, pulse 65, temperature 97 5 °F (36 4 °C), temperature source Oral, resp  rate 17, height 4' 8" (1 422 m), weight 84 8 kg (187 lb), SpO2 96 %, not currently breastfeeding  ,Body mass index is 41 92 kg/m²        Intake/Output Summary (Last 24 hours) at 1/11/2023 1418  Last data filed at 1/11/2023 0402  Gross per 24 hour   Intake --   Output 525 ml   Net -525 ml       Invasive Devices     Peripheral Intravenous Line  Duration           Peripheral IV 01/09/23 Distal;Right;Ventral (anterior) Forearm 2 days                Physical Exam: On clinical examination, hemodynamically stable and afebrile  Comfortable on room air at rest   HEENT: No conjunctival pallor or cyanosis  Obese  Has Down syndrome  Neck: No jugular venous distention  Heart S1-S2 heard  Chest air entry present bilaterally  No rhonchi or crackles  Abdomen soft and nontender bowel sounds audible neuro awake alert oriented  Extremities no clubbing no edema  Labs: I have personally reviewed pertinent lab results  White cell count 4 22 hemoglobin 11 7 creatinine normal   Imaging and other studies: I have personally reviewed pertinent reports

## 2023-01-12 ENCOUNTER — TRANSITIONAL CARE MANAGEMENT (OUTPATIENT)
Dept: FAMILY MEDICINE CLINIC | Facility: CLINIC | Age: 48
End: 2023-01-12

## 2023-01-20 LAB
DME PARACHUTE DELIVERY DATE REQUESTED: NORMAL
DME PARACHUTE ITEM DESCRIPTION: NORMAL
DME PARACHUTE ORDER STATUS: NORMAL
DME PARACHUTE SUPPLIER NAME: NORMAL
DME PARACHUTE SUPPLIER PHONE: NORMAL

## 2023-01-23 ENCOUNTER — TELEPHONE (OUTPATIENT)
Dept: FAMILY MEDICINE CLINIC | Facility: CLINIC | Age: 48
End: 2023-01-23

## 2023-01-23 NOTE — TELEPHONE ENCOUNTER
Pt had an appt scheduled 1/23  Pt's mother was informed that Erica Burton (pt's secondary insurance)  Was not accepted in office at this time       Mother will contact [facility] and [facility] to see what choice she would like to make for care

## 2023-03-22 DIAGNOSIS — E03.9 ACQUIRED HYPOTHYROIDISM: ICD-10-CM

## 2023-03-22 DIAGNOSIS — F91.9 DISRUPTIVE BEHAVIOR: ICD-10-CM

## 2023-03-22 DIAGNOSIS — Z76.0 MEDICATION REFILL: ICD-10-CM

## 2023-03-28 ENCOUNTER — OFFICE VISIT (OUTPATIENT)
Dept: PULMONOLOGY | Facility: MEDICAL CENTER | Age: 48
End: 2023-03-28

## 2023-03-28 VITALS
HEART RATE: 54 BPM | TEMPERATURE: 96.6 F | WEIGHT: 171 LBS | DIASTOLIC BLOOD PRESSURE: 84 MMHG | HEIGHT: 56 IN | SYSTOLIC BLOOD PRESSURE: 122 MMHG | OXYGEN SATURATION: 93 % | BODY MASS INDEX: 38.46 KG/M2 | RESPIRATION RATE: 12 BRPM

## 2023-03-28 DIAGNOSIS — E66.9 OBESITY (BMI 35.0-39.9 WITHOUT COMORBIDITY): ICD-10-CM

## 2023-03-28 DIAGNOSIS — G47.33 OBSTRUCTIVE SLEEP APNEA SYNDROME: Primary | Chronic | ICD-10-CM

## 2023-03-28 DIAGNOSIS — Q90.9 DOWN SYNDROME, UNSPECIFIED: Chronic | ICD-10-CM

## 2023-03-28 DIAGNOSIS — J18.9 PNEUMONIA OF BOTH LUNGS DUE TO INFECTIOUS ORGANISM, UNSPECIFIED PART OF LUNG: ICD-10-CM

## 2023-03-28 DIAGNOSIS — J45.20 MILD INTERMITTENT ASTHMA WITHOUT COMPLICATION: Chronic | ICD-10-CM

## 2023-03-28 PROBLEM — E66.01 MORBID OBESITY WITH BMI OF 40.0-44.9, ADULT (HCC): Status: ACTIVE | Noted: 2023-03-28

## 2023-03-28 RX ORDER — ERYTHROMYCIN 5 MG/G
OINTMENT OPHTHALMIC
COMMUNITY
Start: 2022-12-20

## 2023-03-28 NOTE — ASSESSMENT & PLAN NOTE
Compliance data reviewed  John Patten is wearing her CPAP nightly set from 8-14 cmH2O with 2 L of oxygen  Her AHI is 3 5 and her 95th percentile leak is 40 L/min  Her median pressure is 8 6 with a maximum pressure of 11 3  She did have a new mask given to her while she was in the hospital due to irritation of her eyes related to mask leak  This has significantly improved, though she does have residual leak  I did discuss this with her mother and she will continue with the current mask and if further leaking continues or worsens, may need a repeat mask fit  Oxygen was added to her CPAP at night  Will recheck overnight pulse ox on CPAP with 2 L of oxygen to ensure adequate saturations  Order was placed today

## 2023-03-28 NOTE — ASSESSMENT & PLAN NOTE
She has albuterol to use if needed  She has not required this  She does have a nebulizer to use when she is ill  She has not used this since she left the hospital   She has no symptoms of asthma at present

## 2023-03-28 NOTE — PROGRESS NOTES
Pulmonary Follow-Up Note   Laura Petersen 56 y o  female MRN: [id number]  3/28/2023      Assessment/Plan:    Problem List Items Addressed This Visit        Respiratory    Obstructive sleep apnea syndrome - Primary (Chronic)     Compliance data reviewed  Erika Garcia is wearing her CPAP nightly set from 8-14 cmH2O with 2 L of oxygen  Her AHI is 3 5 and her 95th percentile leak is 40 L/min  Her median pressure is 8 6 with a maximum pressure of 11 3  She did have a new mask given to her while she was in the hospital due to irritation of her eyes related to mask leak  This has significantly improved, though she does have residual leak  I did discuss this with her mother and she will continue with the current mask and if further leaking continues or worsens, may need a repeat mask fit  Oxygen was added to her CPAP at night  Will recheck overnight pulse ox on CPAP with 2 L of oxygen to ensure adequate saturations  Order was placed today  Relevant Orders    Pulse Oximeter    Mild intermittent asthma without complication (Chronic)     She has albuterol to use if needed  She has not required this  She does have a nebulizer to use when she is ill  She has not used this since she left the hospital   She has no symptoms of asthma at present  Pneumonia of both lungs due to infectious organism     She was hospitalized for this in January and completed a course of azithromycin and ceftriaxone  She is doing well and has no residual symptoms  A chest x-ray done on January 10 did show near complete resolution of pneumonia with hazy opacity remaining in the left lower lobe  Exam is unrevealing today and she has not had any fevers, chills, or purulent sputum production  Discussed the role of repeat chest imaging with her mother  We will hold off on any repeat chest x-ray now and she will follow-up for her physical exam with her PCP in the next few weeks    Can consider chest x-ray if any new symptoms "occur  Other    Down syndrome, unspecified (Chronic)     She lives at a group home  Obesity (BMI 35 0-39 9 without comorbidity)     Lifestyle modifications and weight loss if able  Continue activity as tolerated  Education provided at this visit:   Need for Vaccination: Up to date with flu vaccine and her mother will check on pneumonia vaccine dates   Smoking Cessation: Never smoker   Inhaler Use: Reiterated proper use and technique    Return in about 3 months (around 6/28/2023), or if symptoms worsen or fail to improve  All of Chela's and her mother's questions were answered prior to leaving the office today  She will follow-up with Dr Sourav Graham in three months or sooner should the need arise  She is aware to call our office with any further questions or concerns  History of Present Illness   Reason for Visit: Hospital follow-up  Chief Complaint: \"I feel good  \"  HPI: Mary Jane Tobin is a 56 y o  female who presents to the office today for follow-up after hospitalization for pneumonia  Dondra Osgood is doing very well  She is accompanied by her mother  Together they report that she returned to the group home the day after leaving the hospital   She has been wearing her CPAP at night with 2 L of oxygen and has not required any oxygen during the day  She has not desaturated below 90% on room air  She is not having any shortness of breath  She does have an occasional cough, but this is baseline for her  No fevers or chills  No pain  Aside from the above, she has no other complaints  She is no longer using nasal spray, but does take a daily allergy pill  Review of Systems   All other systems reviewed and are negative  A full 12-point review of systems was completed and is negative except for those outlined in the HPI      Historical Information   Past Medical History:   Diagnosis Date   • Asthma    • Complex partial epilepsy ([facility] [address] )    • COVID 02/2021   • Diabetes mellitus " [facility])    • Disease of thyroid gland    • Down syndrome    • Heart murmur    • Hydradenitis    • Hyperlipidemia    • Long Q-T syndrome    • Pneumonia     Last assessed 5/28/2014    • Psychiatric disorder     schizo   • Sleep apnea      Past Surgical History:   Procedure Laterality Date   • CARDIAC SURGERY      patent duct   • PATENT DUCTUS ARTERIOUS LIGATION       Family History   Adopted: Yes   Problem Relation Age of Onset   • No Known Problems Mother      Social History   Social History     Substance and Sexual Activity   Alcohol Use Never     Social History     Substance and Sexual Activity   Drug Use Never     Social History     Tobacco Use   Smoking Status Never   Smokeless Tobacco Never     E-Cigarette/Vaping   • E-Cigarette Use Never User      E-Cigarette/Vaping Substances       Meds/Allergies     Current Outpatient Medications:   •  acetaminophen (TYLENOL) 325 mg tablet, Take 1 tablet (325 mg total) by mouth every 6 (six) hours as needed for mild pain, headaches or fever, Disp: 100 tablet, Rfl: 0  •  albuterol (PROVENTIL HFA,VENTOLIN HFA) 90 mcg/act inhaler, Inhale 2 puffs every 4 (four) hours as needed for wheezing, Disp: 18 g, Rfl: 0  •  ARIPiprazole (ABILIFY) 10 mg tablet, Take 1 tablet (10 mg total) by mouth daily Daily at 8 pm, Disp: 90 tablet, Rfl: 0  •  atorvastatin (LIPITOR) 10 mg tablet, Take 1 tablet (10 mg total) by mouth daily At 8pm, Disp: 90 tablet, Rfl: 0  •  bacitracin topical ointment 500 units/g topical ointment, Apply 1 large application topically 3 (three) times a day as needed for wound care, Disp: 15 g, Rfl: 0  •  calcium carbonate (OS-ESTEPHANIA) 600 MG tablet, Take 1 tablet (600 mg total) by mouth daily Daily with breakfast, Disp: 180 tablet, Rfl: 0  •  clindamycin (CLINDAGEL) 1 % gel, Apply topically 2 (two) times a day Apply to L axilla at 8 am / 8pm, Disp: 30 g, Rfl: 0  •  divalproex sodium (DEPAKOTE ER) 500 mg 24 hr tablet, Take 3 tablets (1,500 mg total) by mouth daily Daily 8 am, Disp: 90 tablet, Rfl: 0  •  ergocalciferol (VITAMIN D2) 50,000 units, Take 1 capsule (50,000 Units total) by mouth once a week Administer on sunday, Disp: 4 capsule, Rfl: 2  •  Eyelid Cleansers (OCUSOFT BABY EYELID & EYELASH EX), Inhale, Disp: , Rfl:   •  hydrocortisone 1 % cream, Apply topically 4 (four) times a day as needed (apply small amount to eyelid 3 times a day) May apply to dryness of both upper eyelids, Disp: 30 g, Rfl: 0  •  levocetirizine (XYZAL) 5 MG tablet, Take 1 tablet (5 mg total) by mouth daily At 8 PM, Disp: 30 tablet, Rfl: 0  •  levothyroxine 75 mcg tablet, Take 1 tablet (75 mcg total) by mouth daily in the early morning At 8 am, Disp: 90 tablet, Rfl: 0  •  Mouthwashes (LISTERINE ANTISEPTIC ADVANCED MT), by Per J Tube route, Disp: , Rfl:   •  Multiple Vitamins-Minerals (Centrum Silver Ultra Womens) TABS, Take 1 tablet by mouth daily Centrum Silver Ultra Womens Oral Tablet daily with breakfast, Disp: 90 tablet, Rfl: 0  •  NON FORMULARY, , Disp: , Rfl:   •  clotrimazole 1 % external solution, Apply 1 application topically 2 (two) times a day for 14 days Affected area for fungal skin infection 8 am / 8 pm, Disp: 30 mL, Rfl: 0  •  erythromycin (ILOTYCIN) ophthalmic ointment, , Disp: , Rfl:   •  polyethylene glycol (MIRALAX) 17 g packet, Take 17 g by mouth daily for 7 days Daily with breakfast Do not start before January 12, 2023 , Disp: 119 g, Rfl: 0  •  sodium chloride (OCEAN) 0 65 % nasal spray, 1 spray into each nostril every hour as needed for congestion, Disp: 15 mL, Rfl: 0  •  Spacer/Aero Chamber Mouthpiece (SPACER DEVICE) for metered dose inhaler, For use with metered dose inhaler, Disp: 1 Device, Rfl: 0  •  valACYclovir (VALTREX) 1,000 mg tablet, Take 1 tablet (1,000 mg total) by mouth 3 (three) times a day for 7 days Only take if you have active HSV flare up, Disp: 21 tablet, Rfl: 0  Allergies   Allergen Reactions   • Avandia [Rosiglitazone]      CHF   • Nsaids Other (See Comments)     unknown "      Vitals: Blood pressure 122/84, pulse (!) 54, temperature (!) 96 6 °F (35 9 °C), temperature source Temporal, resp  rate 12, height 4' 8\" (1 422 m), weight 77 6 kg (171 lb), SpO2 93 %, not currently breastfeeding  Body mass index is 38 34 kg/m²  Oxygen Therapy  SpO2: 93 %    Physical Exam:  Physical Exam  Vitals reviewed  Constitutional:       General: She is not in acute distress  Appearance: She is obese  She is not toxic-appearing or diaphoretic  HENT:      Head: Atraumatic  Eyes:      General: No scleral icterus  Neck:      Trachea: No tracheal deviation  Cardiovascular:      Rate and Rhythm: Normal rate and regular rhythm  Heart sounds: S1 normal and S2 normal  No murmur heard  No friction rub  No gallop  Pulmonary:      Effort: Pulmonary effort is normal  No tachypnea, accessory muscle usage or respiratory distress  Breath sounds: Normal breath sounds  No stridor  No decreased breath sounds, wheezing, rhonchi or rales  Chest:      Chest wall: No tenderness  Abdominal:      General: Bowel sounds are normal  There is no distension  Palpations: Abdomen is soft  Tenderness: There is no abdominal tenderness  Musculoskeletal:      Cervical back: Neck supple  Right lower leg: No edema  Left lower leg: No edema  Skin:     General: Skin is warm and dry  Findings: No rash  Neurological:      Mental Status: She is alert and oriented to person, place, and time  GCS: GCS eye subscore is 4  GCS verbal subscore is 5  GCS motor subscore is 6  Psychiatric:         Speech: Speech normal          Behavior: Behavior is cooperative  Imaging and other studies: I have personally reviewed pertinent reports  and I have personally reviewed pertinent films in PACS   Echocardiogram from January 6, 2023 showed EF 83% with diastolic function normal and normal RVSP     CT chest done January 1, 2023 showed multifocal bilateral opacities with air in the " "esophagus and small bilateral pleural effusions  Chest x-ray done January 10, 2023 showed significant improvement in opacities with only faint patchy opacity remaining in the left lung base  Pulmonary Results (PFTs, PSG): I have personally reviewed pertinent reports  Spirometry from February 10, 2020 consistent with moderately severe restriction  SESAR Pastor  [facility]        Portions of the record may have been created with voice recognition software  Occasional wrong word or \"sound a like\" substitutions may have occurred due to the inherent limitations of voice recognition software  Read the chart carefully and recognize, using context, where substitutions have occurred or contact the dictating provider    "

## 2023-03-28 NOTE — PROGRESS NOTES
YURI on CPAP with oxygen  bleed in order submitted to Jensen Osorio through [facility] [place]

## 2023-03-28 NOTE — ASSESSMENT & PLAN NOTE
She was hospitalized for this in January and completed a course of azithromycin and ceftriaxone  She is doing well and has no residual symptoms  A chest x-ray done on January 10 did show near complete resolution of pneumonia with hazy opacity remaining in the left lower lobe  Exam is unrevealing today and she has not had any fevers, chills, or purulent sputum production  Discussed the role of repeat chest imaging with her mother  We will hold off on any repeat chest x-ray now and she will follow-up for her physical exam with her PCP in the next few weeks  Can consider chest x-ray if any new symptoms occur

## 2023-03-29 LAB
DME PARACHUTE DELIVERY DATE EXPECTED: NORMAL
DME PARACHUTE DELIVERY DATE REQUESTED: NORMAL
DME PARACHUTE ITEM DESCRIPTION: NORMAL
DME PARACHUTE ORDER STATUS: NORMAL
DME PARACHUTE SUPPLIER NAME: NORMAL
DME PARACHUTE SUPPLIER PHONE: NORMAL

## 2023-03-30 RX ORDER — DIVALPROEX SODIUM 500 MG/1
TABLET, EXTENDED RELEASE ORAL
Qty: 90 TABLET | Refills: 10 | Status: SHIPPED | OUTPATIENT
Start: 2023-03-30

## 2023-03-30 RX ORDER — LEVOTHYROXINE SODIUM 0.07 MG/1
TABLET ORAL
Qty: 30 TABLET | Refills: 10 | Status: SHIPPED | OUTPATIENT
Start: 2023-03-30

## 2023-04-03 LAB
DME PARACHUTE DELIVERY DATE ACTUAL: NORMAL
DME PARACHUTE DELIVERY DATE EXPECTED: NORMAL
DME PARACHUTE DELIVERY DATE REQUESTED: NORMAL
DME PARACHUTE ITEM DESCRIPTION: NORMAL
DME PARACHUTE ORDER STATUS: NORMAL
DME PARACHUTE SUPPLIER NAME: NORMAL
DME PARACHUTE SUPPLIER PHONE: NORMAL

## 2023-04-06 ENCOUNTER — TELEPHONE (OUTPATIENT)
Age: 48
End: 2023-04-06

## 2023-04-06 NOTE — TELEPHONE ENCOUNTER
----- Message from [employer] sent at 4/6/2023 10:12 AM EDT -----  Please let Chela/her mom know that the overnight POX showed continued oxygen use with CPAP is necessary and there was no desaturation  Thank you

## 2023-04-06 NOTE — TELEPHONE ENCOUNTER
Patients mother is returning call  I advised her of the message from Katy Coe as the mother asked  She is understanding and appreciative  She does not need a call back unless there is any other information that would need to be provided that was not documented   Thank you

## 2023-04-24 ENCOUNTER — OFFICE VISIT (OUTPATIENT)
Dept: FAMILY MEDICINE CLINIC | Facility: CLINIC | Age: 48
End: 2023-04-24

## 2023-04-24 VITALS
HEIGHT: 56 IN | WEIGHT: 175.9 LBS | SYSTOLIC BLOOD PRESSURE: 106 MMHG | DIASTOLIC BLOOD PRESSURE: 70 MMHG | BODY MASS INDEX: 39.57 KG/M2

## 2023-04-24 DIAGNOSIS — Z12.12 SCREENING FOR COLORECTAL CANCER: ICD-10-CM

## 2023-04-24 DIAGNOSIS — Z12.31 ENCOUNTER FOR SCREENING MAMMOGRAM FOR BREAST CANCER: ICD-10-CM

## 2023-04-24 DIAGNOSIS — Z00.00 MEDICARE ANNUAL WELLNESS VISIT, SUBSEQUENT: Primary | ICD-10-CM

## 2023-04-24 DIAGNOSIS — Z12.11 SCREENING FOR COLORECTAL CANCER: ICD-10-CM

## 2023-04-24 DIAGNOSIS — Z11.4 SCREENING FOR HIV (HUMAN IMMUNODEFICIENCY VIRUS): ICD-10-CM

## 2023-04-24 DIAGNOSIS — E11.29 TYPE 2 DIABETES MELLITUS WITH MICROALBUMINURIA, WITHOUT LONG-TERM CURRENT USE OF INSULIN (HCC): ICD-10-CM

## 2023-04-24 DIAGNOSIS — E55.9 VITAMIN D DEFICIENCY: ICD-10-CM

## 2023-04-24 DIAGNOSIS — R80.9 TYPE 2 DIABETES MELLITUS WITH MICROALBUMINURIA, WITHOUT LONG-TERM CURRENT USE OF INSULIN (HCC): ICD-10-CM

## 2023-04-24 LAB — SL AMB POCT HEMOGLOBIN AIC: 5.6 (ref ?–6.5)
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
## 2023-05-02 ENCOUNTER — TELEPHONE (OUTPATIENT)
Dept: FAMILY MEDICINE CLINIC | Facility: CLINIC | Age: 48
End: 2023-05-02

## 2023-05-02 NOTE — TELEPHONE ENCOUNTER
ASCENCION Riggins on 4/24    Mother in office and stated that she left paperwork to be filled out      Please call: Isaias Hutton 6 manager when ready at [phone]

## 2023-05-04 NOTE — TELEPHONE ENCOUNTER
"Forms completed and placed in \"Blue Team Completed\" folder in clerical area  Has not yet been copied or scanned into patient's chart   "

## 2023-05-05 ENCOUNTER — TELEPHONE (OUTPATIENT)
Dept: FAMILY MEDICINE CLINIC | Facility: CLINIC | Age: 48
End: 2023-05-05

## 2023-05-05 NOTE — TELEPHONE ENCOUNTER
Letter written, signed, and placed in [facility] [place] Team Completed folder in clerical area for

## 2023-05-05 NOTE — TELEPHONE ENCOUNTER
"Called patient to inform form ready for   Made copies of completed form and placed in \"to be scanned\" bin  Original placed in envelope and placed in  bin for        "

## 2023-05-05 NOTE — TELEPHONE ENCOUNTER
"Martha from group home in the office to  completed forms  She stated she will need a letter d/c-ing the current order that they have that staes: High Calorie snack at Breakfast and 8pm      Diet discussed at [facility]:  \"Nutrition:   Current diet is Diabetic, Low Cholesterol, Low Saturated Fat and Low Carb  \"    Colin Mayer stated that the group home needs a letter for each order, everytime something is changed or added       Martha stated that a high calorie snack is not part of managing weight, which was discussed at [facility]  "

## 2023-05-27 PROBLEM — J18.9 PNEUMONIA OF BOTH LUNGS DUE TO INFECTIOUS ORGANISM: Status: RESOLVED | Noted: 2018-12-03 | Resolved: 2023-05-27

## 2023-06-06 ENCOUNTER — HOSPITAL ENCOUNTER (OUTPATIENT)
Dept: RADIOLOGY | Facility: HOSPITAL | Age: 48
Discharge: HOME | End: 2023-06-06
Payer: MEDICARE

## 2023-06-06 ENCOUNTER — TELEPHONE (OUTPATIENT)
Dept: FAMILY MEDICINE CLINIC | Facility: CLINIC | Age: 48
End: 2023-06-06

## 2023-06-06 VITALS — WEIGHT: 175 LBS | HEIGHT: 56 IN | BODY MASS INDEX: 39.37 KG/M2

## 2023-06-06 DIAGNOSIS — Z12.31 ENCOUNTER FOR SCREENING MAMMOGRAM FOR BREAST CANCER: ICD-10-CM

## 2023-06-06 PROCEDURE — 77067 SCR MAMMO BI INCL CAD: CPT

## 2023-06-06 PROCEDURE — 77063 BREAST TOMOSYNTHESIS BI: CPT

## 2023-06-06 NOTE — TELEPHONE ENCOUNTER
"Martha from [address] [place] [address] in the office requesting orders:    1  D/C letter for: \"Snack: 15GM carbohydrate with at least 5 grams of protein, such as yogurt, peanut butter and crackers\" this should be discontinued, patient is on a diet    2  OTC form filled out  Scanned into encounter  Placed in blue clinical folder    3  Please D/C- \"Standing order for coffee in the morning at 7am and 8am with breakfast and caffeinated beverage during the day until 2pm, if sleepy\" this should say \"Standing order for coffee in the morning at 7am with breakfast\" (only)    4  Needs new order for baking soda bath- \"Bath with baking soda (3 tablespoons) Monday and Thursday at 6pm for 15-20 minutes\"    5  Need updated order: \"Patient should use floss piks daily after brushing at 8am and 8am\"    6  Need updated order: \"Patient should use mouthwash after brushing (do not swallow) at 8am and 8pm\"    7  Need updated order: \"Specialty bed and mattress for ROBERTO and respiratory restriction\"    8  Need updated order: \"Please use huggies wipes simply clean to avoid contact dermatitis\"    9  Need updated order \"Please use dial anti-bacterial soap fragrance free to avaoid contact dermatitus\"    10 Need updated order: \"Apply night splints to feet daily for 30 minutes between the hours of 10pm and 10:30pm\"    11  Need updated order: \"Please use Tide original laundry detergent to avoid contact dermatitis\"      Attempting to update their documents, as she stated they were cited on these things  Please call when ready     "

## 2023-06-07 NOTE — TELEPHONE ENCOUNTER
Called Kiara from [address] [place] [address] to let her know letter were ready  Reached vm  It asked to enter remote access code then call ended  Placed letters in envelope at  for

## 2023-06-07 NOTE — TELEPHONE ENCOUNTER
"OTC form signed, copied, and placed in \"To Be Scanned\" folder in clinical area  OTC form and letters with above requests completed and placed in \"Blue Team Completed\" folder in [address] [place] [address] area  Thank you   "

## 2023-06-22 DIAGNOSIS — Z76.0 MEDICATION REFILL: ICD-10-CM

## 2023-06-28 ENCOUNTER — OFFICE VISIT (OUTPATIENT)
Dept: PULMONOLOGY | Facility: MEDICAL CENTER | Age: 48
End: 2023-06-28

## 2023-06-28 VITALS
SYSTOLIC BLOOD PRESSURE: 100 MMHG | HEART RATE: 53 BPM | OXYGEN SATURATION: 97 % | TEMPERATURE: 97.5 F | DIASTOLIC BLOOD PRESSURE: 70 MMHG | HEIGHT: 56 IN | WEIGHT: 175 LBS | BODY MASS INDEX: 39.37 KG/M2

## 2023-06-28 DIAGNOSIS — J45.20 MILD INTERMITTENT ASTHMA WITHOUT COMPLICATION: Chronic | ICD-10-CM

## 2023-06-28 DIAGNOSIS — G47.33 OBSTRUCTIVE SLEEP APNEA SYNDROME: Chronic | ICD-10-CM

## 2023-06-28 DIAGNOSIS — Z01.818 PREOPERATIVE EVALUATION TO RULE OUT SURGICAL CONTRAINDICATION: Primary | Chronic | ICD-10-CM

## 2023-06-28 NOTE — ASSESSMENT & PLAN NOTE
Patient currently is using and benefiting from an auto CPAP  She currently is on 8 to 14 cm of water pressure  Her data is reviewed from May 28, 2023 to June 26, 2023  Patient is 87% compliant with average usage of 10 hours and 21 minutes  This is on auto CPAP 8-14  Her pressure in the fifth percent tile is 10 1 and max is 11 8  Apneic hypopneic index is lowered to 4  However it does appear that she has a large air leak in her mask    Mast should probably be readjusted or she should be have a visit with durable medical equipment company  Patient continues to use and benefit

## 2023-06-28 NOTE — ASSESSMENT & PLAN NOTE
Patient is here today for her 6-month visit  Additionally she is going to have a colonoscopy under sedation  She was seen by her cardiologist June 2023  According to the ARISCAT scoring system patient is at low risk for any postoperative pulmonary complications  This scoring system is based on patient's age preoperative oxygenation the fact that she has had no respiratory infections in the last month is not anemic and this will not involve any type of incision and will also last less than 2 hours  It should over do well  However she does have Down syndrome and also has a diagnosis of obstructive sleep apnea that should be taken into account  She currently is using a auto CPAP 8 to 14 cm of water pressure with 2 L of supplemental oxygen  He asleep this should be utilized during her postoperative  Also patient also has a Mallampati 4 score and also an airway likely will be needed as per anesthesiologist   Additionally I have ordered a chest x-ray which she can have done at her earliest convenience  I think this would be appropriate since she has not had a chest x-ray since her pneumonia diagnosis    At that time her chest x-ray did show marked improvement

## 2023-06-28 NOTE — ASSESSMENT & PLAN NOTE
Patient is currently doing well and is not on any type of maintenance inhaler  On review of her medication from her group home it does appear that she is using albuterol sulfate once daily  I would continue with this especially in light of her forthcoming colonoscopy  Her mother accompanies her today    She is overall stable

## 2023-06-28 NOTE — PROGRESS NOTES
Assessment/Plan:     Problem List Items Addressed This Visit        Respiratory    Obstructive sleep apnea syndrome (Chronic)     Patient currently is using and benefiting from an auto CPAP  She currently is on 8 to 14 cm of water pressure  Her data is reviewed from May 28, 2023 to June 26, 2023  Patient is 87% compliant with average usage of 10 hours and 21 minutes  This is on auto CPAP 8-14  Her pressure in the fifth percent tile is 10 1 and max is 11 8  Apneic hypopneic index is lowered to 4  However it does appear that she has a large air leak in her mask  Mast should probably be readjusted or she should be have a visit with durable medical equipment company  Patient continues to use and benefit         Mild intermittent asthma without complication (Chronic)     Patient is currently doing well and is not on any type of maintenance inhaler  On review of her medication from her group home it does appear that she is using albuterol sulfate once daily  I would continue with this especially in light of her forthcoming colonoscopy  Her mother accompanies her today  She is overall stable            Other    Preoperative evaluation to rule out surgical contraindication - Primary (Chronic)     Patient is here today for her 6-month visit  Additionally she is going to have a colonoscopy under sedation  She was seen by her cardiologist June 2023  According to the ARISCAT scoring system patient is at low risk for any postoperative pulmonary complications  This scoring system is based on patient's age preoperative oxygenation the fact that she has had no respiratory infections in the last month is not anemic and this will not involve any type of incision and will also last less than 2 hours  It should over do well  However she does have Down syndrome and also has a diagnosis of obstructive sleep apnea that should be taken into account    She currently is using a auto CPAP 8 to 14 cm of water pressure with 2 L "of supplemental oxygen  He asleep this should be utilized during her postoperative  Also patient also has a Mallampati 4 score and also an airway likely will be needed as per anesthesiologist   Additionally I have ordered a chest x-ray which she can have done at her earliest convenience  I think this would be appropriate since she has not had a chest x-ray since her pneumonia diagnosis  At that time her chest x-ray did show marked improvement           Relevant Orders    XR chest pa & lateral         Return in about 1 year (around 6/28/2024)  All questions are answered to the patient's satisfaction and understanding  She verbalizes understanding  She is encouraged to call with any further questions or concerns  Portions of the record may have been created with voice recognition software  Occasional wrong word or \"sound a like\" substitutions may have occurred due to the inherent limitations of voice recognition software  Read the chart carefully and recognize, using context, where substitutions have occurred  Electronically Signed by SESAR Enriquez    ______________________________________________________________________    Chief Complaint: No chief complaint on file  Patient ID: ST RODRIGUEZ is a 56 y o  y o  female has a past medical history of Asthma, Complex partial epilepsy ([address] ), COVID (02/2021), Diabetes mellitus ([address] ), Disease of thyroid gland, Down syndrome, Heart murmur, Hydradenitis, Hyperlipidemia, Long Q-T syndrome, Pneumonia, Psychiatric disorder, and Sleep apnea  6/28/2023  Patient presents today for follow-up visit  HPI  ST RODRIGUEZ is 56year old female who had multifocal pneumonia in January 2023  She was treated with ceftriaxone and is here today for follow-up  She was also seen in the office in March 2023  That time she had resolution of pneumonia  Patient also has a history of obstructive sleep apnea    She currently is on 12 cm of water pressure  She is on an auto CPAP 8 to " 14 cm of water pressure  And also is using 2 L of supplemental oxygen at nighttime  Is scheduled to have a colonoscopy in the near future and is here today also for preoperative evaluation  Is noted that her last chest x-ray was done January 10, 2023  This was done for diagnosis of pneumonia lungs were significantly improved with only patchy opacity in the left lung base there was no pleural fluid or pneumothorax  Review of Systems   Constitutional: Negative  HENT: Negative  Eyes: Negative  Respiratory: Positive for cough  Cough is more like a tickle mostly dry in nature   Cardiovascular: Negative  Gastrointestinal: Negative  Endocrine: Negative  Genitourinary: Negative  Musculoskeletal: Negative  Skin: Negative  Allergic/Immunologic: Negative  Neurological: Negative  Hematological: Negative  Psychiatric/Behavioral: Negative  Smoking history: She reports that she has never smoked   She has never used smokeless tobacco     The following portions of the patient's history were reviewed and updated as appropriate: current medications, past family history, past medical history, past social history, past surgical history and problem list     Immunization History   Administered Date(s) Administered   • Hep B, adult 11/06/2003, 12/04/2003, 05/06/2004   • INFLUENZA 10/08/2019, 11/23/2021   • Td (adult), adsorbed 04/24/2003, 07/31/2013, 05/03/2014   • Tuberculin Skin Test-PPD Intradermal 07/31/2015, 08/02/2021     Current Outpatient Medications   Medication Sig Dispense Refill   • albuterol (PROVENTIL HFA,VENTOLIN HFA) 90 mcg/act inhaler Inhale 2 puffs every 4 (four) hours as needed for wheezing 18 g 0   • ARIPiprazole (ABILIFY) 10 mg tablet Take 1 tablet (10 mg total) by mouth daily Daily at 8 pm 90 tablet 0   • atorvastatin (LIPITOR) 10 mg tablet Take 1 tablet (10 mg total) by mouth daily At 8pm 90 tablet 0   • bacitracin topical ointment 500 units/g topical ointment Apply 1 large application topically 3 (three) times a day as needed for wound care 15 g 0   • calcium carbonate (OS-ESTEPHANIA) 600 MG tablet Take 1 tablet (600 mg total) by mouth daily Daily with breakfast 180 tablet 0   • clindamycin (CLINDAGEL) 1 % gel Apply topically 2 (two) times a day Apply to L axilla at 8 am / 8pm 30 g 0   • clotrimazole 1 % external solution Apply 1 application topically 2 (two) times a day for 14 days Affected area for fungal skin infection 8 am / 8 pm 30 mL 0   • divalproex sodium (DEPAKOTE ER) 500 mg 24 hr tablet TAKE 3 TABLETS (1500MG) BY MOUTH AT 8AM 90 tablet 10   • ergocalciferol (VITAMIN D2) 50,000 units Take 1 capsule (50,000 Units total) by mouth once a week Administer on sunday 4 capsule 2   • erythromycin (ILOTYCIN) ophthalmic ointment      • Eyelid Cleansers (OCUSOFT BABY EYELID & EYELASH EX) Inhale     • hydrocortisone 1 % cream Apply topically 4 (four) times a day as needed (apply small amount to eyelid 3 times a day) May apply to dryness of both upper eyelids 30 g 0   • levocetirizine (XYZAL) 5 MG tablet Take 1 tablet (5 mg total) by mouth daily At 8 PM 30 tablet 0   • levothyroxine 75 mcg tablet TAKE (1) TABLET BY MOUTH ONCE DAILY 30 tablet 10   • Mouthwashes (LISTERINE ANTISEPTIC ADVANCED MT) by Per J Tube route     • Multiple Vitamins-Minerals (Multivitamin Women 50+) TABS TAKE (1) TABLET BYMOUTH ONCE DAILY 30 tablet 10   • NON FORMULARY      • sodium chloride (OCEAN) 0 65 % nasal spray 1 spray into each nostril every hour as needed for congestion 15 mL 0   • Spacer/Aero Chamber Mouthpiece (SPACER DEVICE) for metered dose inhaler For use with metered dose inhaler 1 Device 0   • polyethylene glycol (MIRALAX) 17 g packet Take 17 g by mouth daily for 7 days Daily with breakfast Do not start before January 12, 2023  119 g 0   • valACYclovir (VALTREX) 1,000 mg tablet Take 1 tablet (1,000 mg total) by mouth 3 (three) times a day for 7 days Only take if you have active HSV flare up "(Patient not taking: Reported on 6/28/2023) 21 tablet 0     No current facility-administered medications for this visit  Allergies: Avandia [rosiglitazone] and Nsaids    Objective:  Vitals:    06/28/23 1004   BP: 100/70   BP Location: Right arm   Patient Position: Sitting   Cuff Size: Large   Pulse: (!) 53   Temp: 97 5 °F (36 4 °C)   SpO2: 97%   Weight: 79 4 kg (175 lb)   Height: 4' 8\" (1 422 m)   Oxygen Therapy  SpO2: 97 %    Wt Readings from Last 3 Encounters:   06/28/23 79 4 kg (175 lb)   06/06/23 79 4 kg (175 lb)   04/24/23 79 8 kg (175 lb 14 4 oz)     Body mass index is 39 23 kg/m²  Physical Exam  Constitutional:       Appearance: She is obese  HENT:      Head: Normocephalic and atraumatic  Nose: Nose normal       Mouth/Throat:      Mouth: Mucous membranes are moist       Pharynx: Oropharynx is clear  Comments: Mallampati 4  Eyes:      Pupils: Pupils are equal, round, and reactive to light  Cardiovascular:      Rate and Rhythm: Normal rate  Rhythm irregular  Pulses: Normal pulses  Heart sounds: Murmur heard  Pulmonary:      Effort: Pulmonary effort is normal    Musculoskeletal:         General: Normal range of motion  Cervical back: Normal range of motion  Skin:     General: Skin is warm  Capillary Refill: Capillary refill takes less than 2 seconds  Neurological:      General: No focal deficit present  Mental Status: She is alert and oriented to person, place, and time     Psychiatric:         Mood and Affect: Mood normal          Behavior: Behavior normal          Lab Review:   Office Visit on 04/24/2023   Component Date Value   • Hemoglobin A1C 04/24/2023 5 6    Orders Only on 03/28/2023   Component Date Value   • Supplier Name 03/28/2023 [employer] [facility]    • Supplier Phone Number 03/28/2023 [phone]    • Order Status 03/28/2023 Delivery Successful    • Delivery Request Date 03/28/2023 03/28/2023    • Date Delivered  03/28/2023 " 04/03/2023    • Supplier Name 03/28/2023 04/03/2023    • Item Description 03/28/2023 Overnight Oximetry Test    No results displayed because visit has over 200 results  Past Surgical History:   Procedure Laterality Date   • CARDIAC SURGERY      patent duct   • PATENT DUCTUS ARTERIOUS LIGATION          Family History   Adopted: Yes   Problem Relation Age of Onset   • No Known Problems Mother         Diagnostics:  I have personally reviewed pertinent reports  Office Spirometry Results:     ESS:    Mammo screening bilateral w 3d & cad    Result Date: 6/6/2023  Narrative: DIAGNOSIS: Encounter for screening mammogram for breast cancer TECHNIQUE: Digital screening mammography was performed  Computer Aided Detection (CAD) analyzed all applicable images  COMPARISONS: Prior breast imaging dated: 04/05/2022, 08/19/2016, and 08/19/2016 RELEVANT HISTORY: Family Breast Cancer History: No known family history of breast cancer  Family Medical History: No known relevant family medical history  Personal History: No known relevant hormone history  No known relevant surgical history  No known relevant medical history  The patient is scheduled in a reminder system for screening mammography  8-10% of cancers will be missed on mammography  Management of a palpable abnormality must be based on clinical grounds  Patients will be notified of their results via letter from our facility  Accredited by [employer] of Radiology and FDA  RISK ASSESSMENT: 5 Year Tyrer-Cuzick: 0 76 % 10 Year Tyrer-Cuzick: 1 69 % Lifetime Tyrer-Cuzick: 8 88 % TISSUE DENSITY: There are scattered areas of fibroglandular density  INDICATION: Tadeo Perales is a 56 y o  female presenting for screening mammography  FINDINGS: There are no suspicious masses, grouped microcalcifications or areas of architectural distortion  The skin and nipple areolar complex are unremarkable  Impression: No mammographic evidence of malignancy   ASSESSMENT/BI-RADS CATEGORY: Left: 1 - Negative Right: 1 - Negative Overall: 1 - Negative RECOMMENDATION:      - Routine screening mammogram in 1 year for both breasts   Workstation ID: [id number]

## 2023-07-20 DIAGNOSIS — J30.89 ALLERGIC RHINITIS DUE TO OTHER ALLERGIC TRIGGER, UNSPECIFIED SEASONALITY: ICD-10-CM

## 2023-07-20 DIAGNOSIS — Z76.0 MEDICATION REFILL: ICD-10-CM

## 2023-07-21 RX ORDER — LEVOCETIRIZINE DIHYDROCHLORIDE 5 MG/1
5 TABLET, FILM COATED ORAL DAILY
Qty: 30 TABLET | Refills: 0 | Status: SHIPPED | OUTPATIENT
Start: 2023-07-21

## 2023-07-31 ENCOUNTER — TELEPHONE (OUTPATIENT)
Dept: PULMONOLOGY | Facility: CLINIC | Age: 48
End: 2023-07-31

## 2023-07-31 DIAGNOSIS — Z76.0 MEDICATION REFILL: ICD-10-CM

## 2023-07-31 NOTE — TELEPHONE ENCOUNTER
Martha called in regards to the patient asking if we can print out an updated Overnight Pulse Ox script that she can come  in the office tomorrow. Please advise.

## 2023-07-31 NOTE — TELEPHONE ENCOUNTER
Spoke with Leah and she expressed they need an pulse oximeter order stating patient needs to be monitor 4x daily because the previous order they have on file has . This is needed for patient where she resides. Please advise.

## 2023-08-01 NOTE — TELEPHONE ENCOUNTER
I called [facility] but spoke w/ Luis Eduardo regarding order clarification. Orders were written. Luis Eduardo stated that she would p/u orders either Wed. or Thur.

## 2023-08-04 ENCOUNTER — TELEPHONE (OUTPATIENT)
Dept: FAMILY MEDICINE CLINIC | Facility: CLINIC | Age: 48
End: 2023-08-04

## 2023-08-04 RX ORDER — ARIPIPRAZOLE 10 MG/1
10 TABLET ORAL DAILY
Qty: 90 TABLET | Refills: 0 | Status: SHIPPED | OUTPATIENT
Start: 2023-08-04 | End: 2023-08-07 | Stop reason: SDUPTHER

## 2023-08-07 ENCOUNTER — TELEPHONE (OUTPATIENT)
Dept: FAMILY MEDICINE CLINIC | Facility: CLINIC | Age: 48
End: 2023-08-07

## 2023-08-07 DIAGNOSIS — E78.5 HYPERLIPIDEMIA, UNSPECIFIED HYPERLIPIDEMIA TYPE: ICD-10-CM

## 2023-08-07 DIAGNOSIS — J30.89 ALLERGIC RHINITIS DUE TO OTHER ALLERGIC TRIGGER, UNSPECIFIED SEASONALITY: ICD-10-CM

## 2023-08-07 DIAGNOSIS — Z76.0 MEDICATION REFILL: ICD-10-CM

## 2023-08-07 DIAGNOSIS — F20.9 SCHIZOPHRENIA, UNSPECIFIED TYPE (HCC): Primary | Chronic | ICD-10-CM

## 2023-08-07 RX ORDER — LEVOCETIRIZINE DIHYDROCHLORIDE 5 MG/1
5 TABLET, FILM COATED ORAL DAILY
Qty: 30 TABLET | Refills: 0 | Status: SHIPPED | OUTPATIENT
Start: 2023-08-07

## 2023-08-07 RX ORDER — ATORVASTATIN CALCIUM 10 MG/1
10 TABLET, FILM COATED ORAL DAILY
Qty: 90 TABLET | Refills: 0 | Status: SHIPPED | OUTPATIENT
Start: 2023-08-07

## 2023-08-07 RX ORDER — ARIPIPRAZOLE 10 MG/1
10 TABLET ORAL DAILY
Qty: 90 TABLET | Refills: 0 | Status: SHIPPED | OUTPATIENT
Start: 2023-08-07

## 2023-08-07 NOTE — TELEPHONE ENCOUNTER
Attempted contacting patient to schedule an appointment. Mom answered who is POA. States she is currently out of [place] and will call at a later day to schedule the  appointment.

## 2023-08-07 NOTE — TELEPHONE ENCOUNTER
Received paperwork from [employer] regarding clarify cancel rx. Not sure what this is about as it looks as though orginally this medication was cancelled 8/4 but then reordered today 8/7.   The paperwork is in your folder

## 2023-08-07 NOTE — TELEPHONE ENCOUNTER
----- Message from Yvonne Gutierrez MD sent at 8/4/2023  5:11 PM EDT -----  Regarding: Medical follow up visit due  Dear  staff: Please contact patient to arrange an office visit with  their PCP (or one of their colleagues on their team if they are not available) within the next 4 weeks for follow up of medical conditions. Also, if patient does not have MyChart listed as 'Active' yet, please assist them in setting this up to enhance efficient professional communication.  Thanks, Dr. Berenda Mcburney

## 2023-08-07 NOTE — TELEPHONE ENCOUNTER
Requested medications sent to preferred pharmacy.      Maggie Bhatti,   Family Medicine, PGY-1  08/07/23  4:31 PM

## 2023-08-07 NOTE — PROGRESS NOTES
Sending new prescription for Abilify 10 mg as per request from pharmacy. Changing time from 8 PM to 8 AM as per patient original timing.      Oxana Chen DO  Family Medicine, PGY-1  08/07/23  8:28 AM

## 2023-08-10 RX ORDER — ARIPIPRAZOLE 10 MG/1
10 TABLET ORAL EVERY MORNING
Qty: 30 TABLET | Refills: 0 | OUTPATIENT
Start: 2023-08-10

## 2023-10-12 NOTE — PLAN OF CARE
Problem: RESPIRATORY - ADULT  Goal: Achieves optimal ventilation and oxygenation  Description: INTERVENTIONS:  - Assess for changes in respiratory status  - Assess for changes in mentation and behavior  - Position to facilitate oxygenation and minimize respiratory effort  - Oxygen administered by appropriate delivery if ordered  - Initiate smoking cessation education as indicated  - Encourage broncho-pulmonary hygiene including cough, deep breathe, Incentive Spirometry  - Assess the need for suctioning and aspirate as needed  - Assess and instruct to report SOB or any respiratory difficulty  - Respiratory Therapy support as indicated  Outcome: Progressing     Problem: MOBILITY - ADULT  Goal: Maintain or return to baseline ADL function  Description: INTERVENTIONS:  -  Assess patient's ability to carry out ADLs; assess patient's baseline for ADL function and identify physical deficits which impact ability to perform ADLs (bathing, care of mouth/teeth, toileting, grooming, dressing, etc )  - Assess/evaluate cause of self-care deficits   - Assess range of motion  - Assess patient's mobility; develop plan if impaired  - Assess patient's need for assistive devices and provide as appropriate  - Encourage maximum independence but intervene and supervise when necessary  - Involve family in performance of ADLs  - Assess for home care needs following discharge   - Consider OT consult to assist with ADL evaluation and planning for discharge  - Provide patient education as appropriate  Outcome: Progressing Negative

## 2023-10-17 DIAGNOSIS — E78.5 HYPERLIPIDEMIA, UNSPECIFIED HYPERLIPIDEMIA TYPE: ICD-10-CM

## 2023-10-18 RX ORDER — ATORVASTATIN CALCIUM 10 MG/1
10 TABLET, FILM COATED ORAL DAILY
Qty: 90 TABLET | Refills: 0 | Status: SHIPPED | OUTPATIENT
Start: 2023-10-18

## 2023-11-02 DIAGNOSIS — J30.89 ALLERGIC RHINITIS DUE TO OTHER ALLERGIC TRIGGER, UNSPECIFIED SEASONALITY: ICD-10-CM

## 2023-11-02 RX ORDER — LEVOCETIRIZINE DIHYDROCHLORIDE 5 MG/1
5 TABLET, FILM COATED ORAL DAILY
Qty: 30 TABLET | Refills: 0 | Status: SHIPPED | OUTPATIENT
Start: 2023-11-02

## 2023-11-02 NOTE — TELEPHONE ENCOUNTER
VM on RX line:    Hi, I'm calling for Chela Petersen. Her date of birth is [birth year] for [facility] for XYZAL 5MG. Her phone number is. Our phone number here is [phone]. Thank you.

## 2023-11-06 DIAGNOSIS — E03.9 ACQUIRED HYPOTHYROIDISM: ICD-10-CM

## 2023-11-06 RX ORDER — LEVOTHYROXINE SODIUM 0.07 MG/1
75 TABLET ORAL DAILY
Qty: 90 TABLET | Refills: 1 | Status: SHIPPED | OUTPATIENT
Start: 2023-11-06

## 2023-11-06 NOTE — TELEPHONE ENCOUNTER
DELILAH left on Clinical line:    Hi, this is [facility]. In regards to Chela Gentle Medication, it's Constanza Luna calling in to get a refill. Our phone number here is [phone]. I'm sorry, [phone]. Thank you.

## 2023-11-07 RX ORDER — LEVOCETIRIZINE DIHYDROCHLORIDE 5 MG/1
5 TABLET, FILM COATED ORAL DAILY
Qty: 30 TABLET | Refills: 0 | Status: SHIPPED | OUTPATIENT
Start: 2023-11-07

## 2023-11-09 DIAGNOSIS — Z76.0 MEDICATION REFILL: ICD-10-CM

## 2023-11-09 DIAGNOSIS — F20.9 SCHIZOPHRENIA, UNSPECIFIED TYPE (HCC): Chronic | ICD-10-CM

## 2023-11-10 RX ORDER — ARIPIPRAZOLE 10 MG/1
10 TABLET ORAL DAILY
Qty: 90 TABLET | Refills: 0 | Status: SHIPPED | OUTPATIENT
Start: 2023-11-10

## 2023-11-27 DIAGNOSIS — L73.2 HIDRADENITIS: ICD-10-CM

## 2023-11-27 NOTE — TELEPHONE ENCOUNTER
DELILAH left on Clinical line:    Hi calling in regards to [id number] [address] Smith date of birth is [birth year]. In regards to a medication, I'll spell it CLIND AM JUDY. It was written on January 11th and I'm calling to see if we could have a prescription to have it. DC That's it. There's no more refill. My number here is [phone]. Thank you. My name is [place]. Thank you.

## 2023-11-28 RX ORDER — CLINDAMYCIN PHOSPHATE 10 MG/G
GEL TOPICAL 2 TIMES DAILY
Qty: 30 G | Refills: 1 | Status: SHIPPED | OUTPATIENT
Start: 2023-11-28

## 2023-12-04 ENCOUNTER — TELEPHONE (OUTPATIENT)
Dept: FAMILY MEDICINE CLINIC | Facility: CLINIC | Age: 48
End: 2023-12-04

## 2023-12-04 NOTE — TELEPHONE ENCOUNTER
Herminio Dowd called from [facility] requesting clindamycin 1% gel be discounted because there is no need for the medication as this time as well as the pharmacy is asking for a copay.   [facility] is asking for a letter to be written and to call when ready [phone]

## 2023-12-11 NOTE — TELEPHONE ENCOUNTER
Group home called requesting update on DC letter.      Message was never routed to PCP     Routing to Dr Martha Chacon

## 2023-12-13 NOTE — TELEPHONE ENCOUNTER
Group home called. Letter does not state the name of the medication being D/C. Stella Le note to include name of medication.

## 2023-12-26 DIAGNOSIS — J30.89 ALLERGIC RHINITIS DUE TO OTHER ALLERGIC TRIGGER, UNSPECIFIED SEASONALITY: ICD-10-CM

## 2023-12-26 RX ORDER — LEVOCETIRIZINE DIHYDROCHLORIDE 5 MG/1
5 TABLET, FILM COATED ORAL DAILY
Qty: 30 TABLET | Refills: 0 | Status: SHIPPED | OUTPATIENT
Start: 2023-12-26

## 2023-12-28 ENCOUNTER — HOSPITAL ENCOUNTER (EMERGENCY)
Facility: HOSPITAL | Age: 48
Discharge: HOME | End: 2023-12-28
Attending: EMERGENCY MEDICINE
Payer: MEDICARE

## 2023-12-28 ENCOUNTER — APPOINTMENT (EMERGENCY)
Dept: RADIOLOGY | Facility: HOSPITAL | Age: 48
End: 2023-12-28
Payer: MEDICARE

## 2023-12-28 VITALS
BODY MASS INDEX: 39.1 KG/M2 | SYSTOLIC BLOOD PRESSURE: 109 MMHG | TEMPERATURE: 96.8 F | OXYGEN SATURATION: 99 % | WEIGHT: 174.4 LBS | DIASTOLIC BLOOD PRESSURE: 57 MMHG | HEART RATE: 86 BPM | RESPIRATION RATE: 20 BRPM

## 2023-12-28 DIAGNOSIS — J11.1 INFLUENZA: Primary | ICD-10-CM

## 2023-12-28 LAB
ALBUMIN SERPL BCP-MCNC: 3.2 G/DL (ref 3.5–5)
ALP SERPL-CCNC: 58 U/L (ref 34–104)
ALT SERPL W P-5'-P-CCNC: 23 U/L (ref 7–52)
ANION GAP SERPL CALCULATED.3IONS-SCNC: 5 MMOL/L
AST SERPL W P-5'-P-CCNC: 45 U/L (ref 13–39)
BILIRUB SERPL-MCNC: 0.36 MG/DL (ref 0.2–1)
BUN SERPL-MCNC: 11 MG/DL (ref 5–25)
CALCIUM ALBUM COR SERPL-MCNC: 8.9 MG/DL (ref 8.3–10.1)
CALCIUM SERPL-MCNC: 8.3 MG/DL (ref 8.4–10.2)
CHLORIDE SERPL-SCNC: 98 MMOL/L (ref 96–108)
CO2 SERPL-SCNC: 31 MMOL/L (ref 21–32)
CREAT SERPL-MCNC: 0.96 MG/DL (ref 0.6–1.3)
FLUAV RNA RESP QL NAA+PROBE: POSITIVE
FLUBV RNA RESP QL NAA+PROBE: NEGATIVE
GFR SERPL CREATININE-BSD FRML MDRD: 70 ML/MIN/1.73SQ M
GLUCOSE SERPL-MCNC: 116 MG/DL (ref 65–140)
LACTATE SERPL-SCNC: 1 MMOL/L (ref 0.5–2)
POTASSIUM SERPL-SCNC: 4.7 MMOL/L (ref 3.5–5.3)
PROT SERPL-MCNC: 6.6 G/DL (ref 6.4–8.4)
RSV RNA RESP QL NAA+PROBE: NEGATIVE
S PYO DNA THROAT QL NAA+PROBE: NOT DETECTED
SARS-COV-2 RNA RESP QL NAA+PROBE: NEGATIVE
SODIUM SERPL-SCNC: 134 MMOL/L (ref 135–147)

## 2023-12-28 PROCEDURE — 99285 EMERGENCY DEPT VISIT HI MDM: CPT

## 2023-12-28 PROCEDURE — 99285 EMERGENCY DEPT VISIT HI MDM: CPT | Performed by: PHYSICIAN ASSISTANT

## 2023-12-28 PROCEDURE — 36415 COLL VENOUS BLD VENIPUNCTURE: CPT | Performed by: PHYSICIAN ASSISTANT

## 2023-12-28 PROCEDURE — 80053 COMPREHEN METABOLIC PANEL: CPT | Performed by: PHYSICIAN ASSISTANT

## 2023-12-28 PROCEDURE — 0241U HB NFCT DS VIR RESP RNA 4 TRGT: CPT | Performed by: PHYSICIAN ASSISTANT

## 2023-12-28 PROCEDURE — 83605 ASSAY OF LACTIC ACID: CPT | Performed by: PHYSICIAN ASSISTANT

## 2023-12-28 PROCEDURE — 71045 X-RAY EXAM CHEST 1 VIEW: CPT

## 2023-12-28 PROCEDURE — 87040 BLOOD CULTURE FOR BACTERIA: CPT | Performed by: PHYSICIAN ASSISTANT

## 2023-12-28 PROCEDURE — 96361 HYDRATE IV INFUSION ADD-ON: CPT

## 2023-12-28 PROCEDURE — 94640 AIRWAY INHALATION TREATMENT: CPT

## 2023-12-28 PROCEDURE — 96374 THER/PROPH/DIAG INJ IV PUSH: CPT

## 2023-12-28 PROCEDURE — 87651 STREP A DNA AMP PROBE: CPT | Performed by: PHYSICIAN ASSISTANT

## 2023-12-28 RX ORDER — METHYLPREDNISOLONE SODIUM SUCCINATE 125 MG/2ML
80 INJECTION, POWDER, LYOPHILIZED, FOR SOLUTION INTRAMUSCULAR; INTRAVENOUS ONCE
Status: COMPLETED | OUTPATIENT
Start: 2023-12-28 | End: 2023-12-28

## 2023-12-28 RX ORDER — ALBUTEROL SULFATE 2.5 MG/3ML
2.5 SOLUTION RESPIRATORY (INHALATION) EVERY 6 HOURS PRN
Qty: 75 ML | Refills: 0 | Status: SHIPPED | OUTPATIENT
Start: 2023-12-28

## 2023-12-28 RX ORDER — IPRATROPIUM BROMIDE AND ALBUTEROL SULFATE 2.5; .5 MG/3ML; MG/3ML
3 SOLUTION RESPIRATORY (INHALATION) ONCE
Status: COMPLETED | OUTPATIENT
Start: 2023-12-28 | End: 2023-12-28

## 2023-12-28 RX ORDER — PREDNISONE 20 MG/1
40 TABLET ORAL DAILY
Qty: 8 TABLET | Refills: 0 | Status: SHIPPED | OUTPATIENT
Start: 2023-12-28 | End: 2024-01-01

## 2023-12-28 RX ORDER — OSELTAMIVIR PHOSPHATE 75 MG/1
75 CAPSULE ORAL EVERY 12 HOURS
Qty: 10 CAPSULE | Refills: 0 | Status: SHIPPED | OUTPATIENT
Start: 2023-12-28 | End: 2024-01-02

## 2023-12-28 RX ADMIN — SODIUM CHLORIDE 500 ML: 0.9 INJECTION, SOLUTION INTRAVENOUS at 16:21

## 2023-12-28 RX ADMIN — METHYLPREDNISOLONE SODIUM SUCCINATE 80 MG: 125 INJECTION, POWDER, FOR SOLUTION INTRAMUSCULAR; INTRAVENOUS at 16:22

## 2023-12-28 RX ADMIN — IPRATROPIUM BROMIDE AND ALBUTEROL SULFATE 3 ML: .5; 3 SOLUTION RESPIRATORY (INHALATION) at 16:25

## 2023-12-28 NOTE — ED PROVIDER NOTES
History  Chief Complaint   Patient presents with    Cough     Brought by mother. Patient is intellectually impaired and normally lives at Group Home. Started with a cough yesterday. Patient has had a pneumonia in the past with admit to ICU. Uses CPAP with O2 at 2 LNC. Mother states sat is dropping to 86 % and places her on O2. Max temp 100.4. Sat in triage is 90 % . Mother gave nebulizer at 930 am .     Decreased Oxygen Level     48-year-old female, history of Down syndrome and COPD, presenting today with mother for evaluation of cough, congestion and sore throat as well as some generalized bodyaches since yesterday.   Patient presents with 88% oxygen on room air that responds well to 2 L nasal cannula to bring it up to 99%.  Mother relays that patient has a tendency to get sick this time of year and has been admitted to the ICU for poor respiratory status.  Patient typically lives at a group home however whenever patient is sick patient lives at home with the mother.  Low-grade fevers at home 100.5.  Sick contacts in the home however have all been negative for COVID and flu.  Patient has a normal appetite, drinking well going to the bathroom regularly without any episodes of vomiting or diarrhea.  Patient has not had any shortness of breath, utilizing nebulizers at home.  Denies chest or abdominal pain, shortness of breath.  Differential includes but is not limited to pneumonia, COPD exacerbation, viral illness etc.        Prior to Admission Medications   Prescriptions Last Dose Informant Patient Reported? Taking?   ARIPiprazole (ABILIFY) 10 mg tablet   No No   Sig: Take 1 tablet (10 mg total) by mouth daily Daily at 8 AM   Eyelid Cleansers (OCUSOFT BABY EYELID & EYELASH EX)  Care Giver, Outside Facility (Specify) Yes No   Sig: Inhale   Mouthwashes (LISTERINE ANTISEPTIC ADVANCED MT)  Care Giver, Outside Facility (Specify) Yes No   Sig: by Per J Tube route   Multiple Vitamins-Minerals (Multivitamin Women 50+) TABS    No No   Sig: Take 1 tablet by mouth in the morning   NON FORMULARY  Care Giver, Outside Facility (Specify) Yes No   Spacer/Aero Chamber Mouthpiece (SPACER DEVICE) for metered dose inhaler  Care Giver, Outside Facility (Specify) No No   Sig: For use with metered dose inhaler   albuterol (PROVENTIL HFA,VENTOLIN HFA) 90 mcg/act inhaler  Care Giver, Outside Facility (Specify) No No   Sig: Inhale 2 puffs every 4 (four) hours as needed for wheezing   atorvastatin (LIPITOR) 10 mg tablet   No No   Sig: Take 1 tablet (10 mg total) by mouth daily At 8pm   bacitracin topical ointment 500 units/g topical ointment  Care Giver, Outside Facility (Specify) No No   Sig: Apply 1 large application topically 3 (three) times a day as needed for wound care   calcium carbonate (OS-ESTEPHANIA) 600 MG tablet  Care Giver, Outside Facility (Specify) No No   Sig: Take 1 tablet (600 mg total) by mouth daily Daily with breakfast   clindamycin (CLINDAGEL) 1 % gel   No No   Sig: Apply topically 2 (two) times a day Apply to L axilla at 8 am / 8pm   clotrimazole 1 % external solution  Outside Facility (Specify) No No   Sig: Apply 1 application topically 2 (two) times a day for 14 days Affected area for fungal skin infection 8 am / 8 pm   divalproex sodium (DEPAKOTE ER) 500 mg 24 hr tablet  Outside Facility (Specify) No No   Sig: TAKE 3 TABLETS (1500MG) BY MOUTH AT 8AM   ergocalciferol (VITAMIN D2) 50,000 units  Care Giver, Outside Facility (Specify) No No   Sig: Take 1 capsule (50,000 Units total) by mouth once a week Administer on sunday   erythromycin (ILOTYCIN) ophthalmic ointment  Care Giver, Outside Facility (Specify) Yes No   hydrocortisone 1 % cream  Care Giver, Outside Facility (Specify) No No   Sig: Apply topically 4 (four) times a day as needed (apply small amount to eyelid 3 times a day) May apply to dryness of both upper eyelids   levocetirizine (XYZAL) 5 MG tablet   No No   Sig: Take 1 tablet (5 mg total) by mouth daily At 8 PM   levothyroxine  75 mcg tablet   No No   Sig: Take 1 tablet (75 mcg total) by mouth daily   polyethylene glycol (MIRALAX) 17 g packet   No No   Sig: Take 17 g by mouth daily for 7 days Daily with breakfast Do not start before 2023.   sodium chloride (OCEAN) 0.65 % nasal spray  Care Giver, Outside Facility (Specify) No No   Si spray into each nostril every hour as needed for congestion   valACYclovir (VALTREX) 1,000 mg tablet   No No   Sig: Take 1 tablet (1,000 mg total) by mouth 3 (three) times a day for 7 days Only take if you have active HSV flare up   Patient not taking: Reported on 2023      Facility-Administered Medications: None       Past Medical History:   Diagnosis Date    Asthma     Complex partial epilepsy (HCC)     COVID 2021    Diabetes mellitus (HCC)     Disease of thyroid gland     Down syndrome     Heart murmur     Hydradenitis     Hyperlipidemia     Long Q-T syndrome     Pneumonia     Last assessed 2014     Psychiatric disorder     schizo    Sleep apnea        Past Surgical History:   Procedure Laterality Date    CARDIAC SURGERY      patent duct    PATENT DUCTUS ARTERIOUS LIGATION         Family History   Adopted: Yes   Problem Relation Age of Onset    No Known Problems Mother      I have reviewed and agree with the history as documented.    E-Cigarette/Vaping    E-Cigarette Use Never User      E-Cigarette/Vaping Substances     Social History     Tobacco Use    Smoking status: Never    Smokeless tobacco: Never   Vaping Use    Vaping status: Never Used   Substance Use Topics    Alcohol use: Never    Drug use: Never       Review of Systems   Constitutional:  Positive for fatigue and fever. Negative for activity change, appetite change, chills, diaphoresis and unexpected weight change.   HENT:  Positive for congestion and sore throat. Negative for postnasal drip, rhinorrhea, sinus pressure, sinus pain and sneezing.    Eyes: Negative.    Respiratory:  Positive for cough. Negative for apnea,  choking, chest tightness, shortness of breath and wheezing.    Cardiovascular: Negative.    Gastrointestinal: Negative.  Negative for abdominal pain, diarrhea, nausea and vomiting.   Endocrine: Negative.    Genitourinary: Negative.    Musculoskeletal: Negative.    Skin: Negative.    Neurological: Negative.    Hematological: Negative.    Psychiatric/Behavioral: Negative.     All other systems reviewed and are negative.      Physical Exam  Physical Exam  Vitals and nursing note reviewed.   Constitutional:       General: She is not in acute distress.     Appearance: Normal appearance. She is well-developed and normal weight. She is not diaphoretic.   HENT:      Head: Normocephalic and atraumatic.      Right Ear: External ear normal.      Left Ear: External ear normal.      Nose: Nose normal.      Mouth/Throat:      Pharynx: Posterior oropharyngeal erythema present. No oropharyngeal exudate.      Comments: Mild to moderately erythematous oropharynx without tonsillar exudates, swelling or uvular deviation.  Eyes:      General: No scleral icterus.        Right eye: No discharge.         Left eye: No discharge.      Conjunctiva/sclera: Conjunctivae normal.      Pupils: Pupils are equal, round, and reactive to light.   Cardiovascular:      Rate and Rhythm: Normal rate and regular rhythm.      Pulses: Normal pulses.      Heart sounds: Normal heart sounds. No murmur heard.     No friction rub. No gallop.   Pulmonary:      Effort: Pulmonary effort is normal. No respiratory distress.      Breath sounds: Normal breath sounds. No stridor. No wheezing, rhonchi or rales.      Comments: Patient currently on 2 L of nasal cannula, there is no respiratory distress speaking full sentences without difficulty clear breath sounds in all lung fields  Chest:      Chest wall: No tenderness.   Abdominal:      General: Bowel sounds are normal. There is no distension.      Palpations: Abdomen is soft. There is no mass.      Tenderness: There is  no abdominal tenderness. There is no guarding or rebound.      Hernia: No hernia is present.   Musculoskeletal:      Cervical back: Normal range of motion and neck supple.      Right lower leg: No edema.      Left lower leg: No edema.   Lymphadenopathy:      Cervical: No cervical adenopathy.   Skin:     General: Skin is warm and dry.      Capillary Refill: Capillary refill takes less than 2 seconds.      Coloration: Skin is not pale.      Findings: No erythema or rash.   Neurological:      General: No focal deficit present.      Mental Status: She is alert and oriented to person, place, and time. Mental status is at baseline.   Psychiatric:         Thought Content: Thought content normal.         Judgment: Judgment normal.         Vital Signs  ED Triage Vitals [12/28/23 1450]   Temperature Pulse Respirations Blood Pressure SpO2   (!) 96.8 °F (36 °C) 68 (!) 28 114/70 (!) 88 %      Temp Source Heart Rate Source Patient Position - Orthostatic VS BP Location FiO2 (%)   Tympanic Monitor Sitting Left arm --      Pain Score       --           Vitals:    12/28/23 1450 12/28/23 1630   BP: 114/70 110/58   Pulse: 68 58   Patient Position - Orthostatic VS: Sitting Lying         Visual Acuity      ED Medications  Medications   sodium chloride 0.9 % bolus 500 mL (500 mL Intravenous New Bag 12/28/23 1621)   methylPREDNISolone sodium succinate (Solu-MEDROL) injection 80 mg (80 mg Intravenous Given 12/28/23 1622)   ipratropium-albuterol (DUO-NEB) 0.5-2.5 mg/3 mL inhalation solution 3 mL (3 mL Nebulization Given 12/28/23 1625)   ipratropium-albuterol (DUO-NEB) 0.5-2.5 mg/3 mL inhalation solution 3 mL (3 mL Nebulization Given 12/28/23 1625)       Diagnostic Studies  Results Reviewed       Procedure Component Value Units Date/Time    Comprehensive metabolic panel [[id number]]  (Abnormal) Collected: 12/28/23 1619    Lab Status: Final result Specimen: Blood from Arm, Left Updated: 12/28/23 1655     Sodium 134 mmol/L      Potassium 4.7  mmol/L      Chloride 98 mmol/L      CO2 31 mmol/L      ANION GAP 5 mmol/L      BUN 11 mg/dL      Creatinine 0.96 mg/dL      Glucose 116 mg/dL      Calcium 8.3 mg/dL      Corrected Calcium 8.9 mg/dL      AST 45 U/L      ALT 23 U/L      Alkaline Phosphatase 58 U/L      Total Protein 6.6 g/dL      Albumin 3.2 g/dL      Total Bilirubin 0.36 mg/dL      eGFR 70 ml/min/1.73sq m     Narrative:      National Kidney Disease Foundation guidelines for Chronic Kidney Disease (CKD):     Stage 1 with normal or high GFR (GFR > 90 mL/min/1.73 square meters)    Stage 2 Mild CKD (GFR = 60-89 mL/min/1.73 square meters)    Stage 3A Moderate CKD (GFR = 45-59 mL/min/1.73 square meters)    Stage 3B Moderate CKD (GFR = 30-44 mL/min/1.73 square meters)    Stage 4 Severe CKD (GFR = 15-29 mL/min/1.73 square meters)    Stage 5 End Stage CKD (GFR <15 mL/min/1.73 square meters)  Note: GFR calculation is accurate only with a steady state creatinine    Lactic acid, plasma (w/reflex if result > 2.0) [[id number]]  (Normal) Collected: 12/28/23 1619    Lab Status: Final result Specimen: Blood from Arm, Left Updated: 12/28/23 1655     LACTIC ACID 1.0 mmol/L     Narrative:      Result may be elevated if tourniquet was used during collection.    FLU/RSV/COVID - if FLU/RSV clinically relevant [[id number]]  (Abnormal) Collected: 12/28/23 1550    Lab Status: Final result Specimen: Nares from Nose Updated: 12/28/23 1641     SARS-CoV-2 Negative     INFLUENZA A PCR Positive     INFLUENZA B PCR Negative     RSV PCR Negative    Narrative:      FOR PEDIATRIC PATIENTS - copy/paste COVID Guidelines URL to browser: [email or web address]    SARS-CoV-2 assay is a Nucleic Acid Amplification assay intended for the  qualitative detection of nucleic acid from SARS-CoV-2 in nasopharyngeal  swabs. Results are for the presumptive identification of SARS-CoV-2 RNA.    Positive results are indicative of infection with  SARS-CoV-2, the virus  causing COVID-19, but do not rule out bacterial infection or co-infection  with other viruses. Laboratories within the [place] and its  territories are required to report all positive results to the appropriate  public health authorities. Negative results do not preclude SARS-CoV-2  infection and should not be used as the sole basis for treatment or other  patient management decisions. Negative results must be combined with  clinical observations, patient history, and epidemiological information.  This test has not been FDA cleared or approved.    This test has been authorized by FDA under an Emergency Use Authorization  (EUA). This test is only authorized for the duration of time the  declaration that circumstances exist justifying the authorization of the  emergency use of an in vitro diagnostic tests for detection of SARS-CoV-2  virus and/or diagnosis of COVID-19 infection under section 564(b)(1) of  the Act, 21 U.S.C. 360bbb-3(b)(1), unless the authorization is terminated  or revoked sooner. The test has been validated but independent review by FDA  and CLIA is pending.    Test performed using [employer] GeneXpert: This RT-PCR assay targets N2,  a region unique to SARS-CoV-2. A conserved region in the E-gene was chosen  for pan-Sarbecovirus detection which includes SARS-CoV-2.    According to CMS-2020-01-R, this platform meets the definition of high-throughput technology.    CBC and differential [[id number]] Updated: 12/28/23 1640    Lab Status: No result Specimen: Blood from Arm, Left     Blood culture #1 [[id number]] Collected: 12/28/23 1619    Lab Status: In process Specimen: Blood from Hand, Right Updated: 12/28/23 1631    Blood culture #2 [[id number]] Collected: 12/28/23 1619    Lab Status: In process Specimen: Blood from Hand, Left Updated: 12/28/23 1631    Strep A PCR [[id number]]  (Normal) Collected: 12/28/23 1550    Lab Status: Final result Specimen: Throat Updated: 12/28/23 1629      STREP A PCR Not Detected                   XR chest 1 view portable   ED Interpretation by Donna Subramanian PA-C (12/28 1601)   No acute intrapulmonary abnormality.                  Procedures  Procedures         ED Course  ED Course as of 12/28/23 1702   Thu Dec 28, 2023   1516 SpO2(!): 88 %  On 2 liters NC now at 99%   1604 Attempting to obtain labs                               SBIRT 22yo+      Flowsheet Row Most Recent Value   Initial Alcohol Screen: US AUDIT-C     1. How often do you have a drink containing alcohol? 0 Filed at: 12/28/2023 1453   Audit-C Score 0 Filed at: 12/28/2023 1453   NEREIDA: How many times in the past year have you...    Used an illegal drug or used a prescription medication for non-medical reasons? Never Filed at: 12/28/2023 1453                      Medical Decision Making  I had a thorough conversation with mother regarding labs and + flu. Mother would like to take patient home with her. Patient has h/o copd and on a regular basis will have a low normal O2 at 91-92% on RA. Patient today at 88% depending on positioning and responded very well to 2 liters- which patient will usually wear at night at baseline. Patient has oxygen at home. Mother was morseo concerned about pneumonia and defers admission at this point in time. CBC hemolyzed however mother defers repeat testing as patient was positive for flu and no signs of pneumonia noted on chest xray. Patient hungry requesting something to eat. Give risks factors and after discussion of risks vs benefits, mother agreeable to Tamiflu, prednisone, and regular nebulizer treatments.     Mother is prior RN. She ahs a pulse ox at home. Patient appears well no respiratory distress. Clear BS in lungifields and tolerated neb well in the ED.     Patient is informed to return to the emergency department for worsening of symptoms such as difficulty breathing, high persistent fevers etc and was given proper education regarding their diagnosis and  symptoms. Otherwise the patient is informed to follow up with their primary care doctor for re-evaluation. The mother verbalizes understanding and agrees with above assessment and plan. All questions were answered.        Amount and/or Complexity of Data Reviewed  Labs: ordered.  Radiology: ordered and independent interpretation performed.    Risk  Prescription drug management.             Disposition  Final diagnoses:   Influenza     Time reflects when diagnosis was documented in both MDM as applicable and the Disposition within this note       Time User Action Codes Description Comment    12/28/2023  5:01 PM Donna Subramanian [J11.1] Influenza           ED Disposition       ED Disposition   Discharge    Condition   Stable    Date/Time   u Dec 28, 2023 1701    Comment   Chela Petersen discharge to home/self care.                   Follow-up Information       Follow up With Specialties Details Why Contact Info Additional Information    [facility] Emergency Medicine Go to  If symptoms worsen such as high persistent fevers, difficulty breathing, dehydration etc, otherwise please follow up with your family doctor [address] [place] [id number]  [phone] [facility] Emergency Department, [address] [place] [address], [id number]            Patient's Medications   Discharge Prescriptions    ALBUTEROL (2.5 MG/3 ML) 0.083 % NEBULIZER SOLUTION    Take 3 mL (2.5 mg total) by nebulization every 6 (six) hours as needed for wheezing or shortness of breath       Start Date: 12/28/2023End Date: --       Order Dose: 2.5 mg       Quantity: 75 mL    Refills: 0    OSELTAMIVIR (TAMIFLU) 75 MG CAPSULE    Take 1 capsule (75 mg total) by mouth every 12 (twelve) hours for 5 days       Start Date: 12/28/2023End Date: 1/2/2024       Order Dose: 75 mg       Quantity: 10 capsule    Refills: 0    PREDNISONE 20 MG TABLET    Take 2 tablets (40 mg total) by  mouth daily for 4 days       Start Date: 12/28/2023End Date: 1/1/2024       Order Dose: 40 mg       Quantity: 8 tablet    Refills: 0       No discharge procedures on file.    PDMP Review       None            ED Provider  Electronically Signed by             Donna Subramanian PA-C  12/28/23 8476

## 2023-12-31 LAB
BACTERIA BLD CULT: NORMAL
BACTERIA BLD CULT: NORMAL

## 2024-01-02 LAB
BACTERIA BLD CULT: NORMAL
BACTERIA BLD CULT: NORMAL

## 2024-01-22 DIAGNOSIS — H01.133 ECZEMA OF RIGHT EYELID: ICD-10-CM

## 2024-01-22 DIAGNOSIS — Z91.09 ALLERGY TO ENVIRONMENTAL FACTORS: ICD-10-CM

## 2024-01-22 DIAGNOSIS — J45.20 MILD INTERMITTENT ASTHMA WITHOUT COMPLICATION: ICD-10-CM

## 2024-01-22 DIAGNOSIS — Z76.0 MEDICATION REFILL: ICD-10-CM

## 2024-01-22 NOTE — TELEPHONE ENCOUNTER
Group home called requesting some meds. Confirmed to send to [facility]     They asked about Valtrex. Informed showing D/C 6/28/23. They wanted copy of D/C script picked up.     Printed & placed at .

## 2024-01-24 DIAGNOSIS — J30.89 ALLERGIC RHINITIS DUE TO OTHER ALLERGIC TRIGGER, UNSPECIFIED SEASONALITY: ICD-10-CM

## 2024-01-24 DIAGNOSIS — E78.5 HYPERLIPIDEMIA, UNSPECIFIED HYPERLIPIDEMIA TYPE: ICD-10-CM

## 2024-01-24 RX ORDER — ALBUTEROL SULFATE 90 UG/1
2 AEROSOL, METERED RESPIRATORY (INHALATION) EVERY 4 HOURS PRN
Qty: 18 G | Refills: 2 | Status: SHIPPED | OUTPATIENT
Start: 2024-01-24

## 2024-01-24 RX ORDER — GINSENG 100 MG
1 CAPSULE ORAL 3 TIMES DAILY PRN
Qty: 15 G | Refills: 2 | Status: SHIPPED | OUTPATIENT
Start: 2024-01-24

## 2024-01-24 RX ORDER — BENZOCAINE/MENTHOL 6 MG-10 MG
LOZENGE MUCOUS MEMBRANE 4 TIMES DAILY PRN
Qty: 30 G | Refills: 2 | Status: SHIPPED | OUTPATIENT
Start: 2024-01-24

## 2024-01-24 RX ORDER — ECHINACEA PURPUREA EXTRACT 125 MG
1 TABLET ORAL
Qty: 15 ML | Refills: 2 | Status: SHIPPED | OUTPATIENT
Start: 2024-01-24

## 2024-01-25 RX ORDER — ATORVASTATIN CALCIUM 10 MG/1
10 TABLET, FILM COATED ORAL DAILY
Qty: 90 TABLET | Refills: 0 | Status: SHIPPED | OUTPATIENT
Start: 2024-01-25

## 2024-01-25 RX ORDER — LEVOCETIRIZINE DIHYDROCHLORIDE 5 MG/1
5 TABLET, FILM COATED ORAL DAILY
Qty: 30 TABLET | Refills: 0 | Status: SHIPPED | OUTPATIENT
Start: 2024-01-25

## 2024-01-25 NOTE — TELEPHONE ENCOUNTER
Pt needs to have repeat lipid panel done and almost due for AWV. Please call and schedule a visit. Advise to complete lipid panel so we can continue to refill her statin.     Dr. Nunez

## 2024-01-31 ENCOUNTER — TELEPHONE (OUTPATIENT)
Dept: FAMILY MEDICINE CLINIC | Facility: CLINIC | Age: 49
End: 2024-01-31

## 2024-01-31 NOTE — TELEPHONE ENCOUNTER
in office needing a letter on our letterhead advising that the tylenol and the Valacyclovir were discontinued. This letter does need a signature.    Please call when ready: [phone]

## 2024-02-05 ENCOUNTER — TELEPHONE (OUTPATIENT)
Dept: FAMILY MEDICINE CLINIC | Facility: CLINIC | Age: 49
End: 2024-02-05

## 2024-02-05 NOTE — TELEPHONE ENCOUNTER
Spoke to Patient's guardian grandmother  To  the letter and also  Schedule the medicare well 5/1    Front bin to

## 2024-02-08 DIAGNOSIS — Z76.0 MEDICATION REFILL: ICD-10-CM

## 2024-02-08 DIAGNOSIS — F20.9 SCHIZOPHRENIA, UNSPECIFIED TYPE (HCC): Chronic | ICD-10-CM

## 2024-02-08 RX ORDER — ARIPIPRAZOLE 10 MG/1
10 TABLET ORAL DAILY
Qty: 90 TABLET | Refills: 0 | Status: SHIPPED | OUTPATIENT
Start: 2024-02-08

## 2024-02-19 ENCOUNTER — OFFICE VISIT (OUTPATIENT)
Dept: URGENT CARE | Facility: CLINIC | Age: 49
End: 2024-02-19
Payer: MEDICARE

## 2024-02-19 VITALS
RESPIRATION RATE: 18 BRPM | TEMPERATURE: 98 F | WEIGHT: 173 LBS | HEIGHT: 56 IN | OXYGEN SATURATION: 99 % | HEART RATE: 53 BPM | BODY MASS INDEX: 38.92 KG/M2

## 2024-02-19 DIAGNOSIS — R21 RASH: Primary | ICD-10-CM

## 2024-02-19 PROCEDURE — 99213 OFFICE O/P EST LOW 20 MIN: CPT | Performed by: PHYSICIAN ASSISTANT

## 2024-02-19 PROCEDURE — 99203 OFFICE O/P NEW LOW 30 MIN: CPT | Performed by: PHYSICIAN ASSISTANT

## 2024-02-19 NOTE — PROGRESS NOTES
[facility]        NAME: Chela Petersen is a 48 y.o. female  : 1975    MRN: [id number]  DATE: 2024  TIME: 1:03 PM    Assessment and Plan   Rash [R21]  1. Rash  mupirocin (BACTROBAN) 2 % ointment        Will trial mupirocin tid. Discussed barriers for protection of skin. Does not appear fungal. If no improvement/any worsening or if development of any fevers or drainage should go to ED. Discussed strict return to care precautions as well as red flag symptoms which should prompt immediate ED referral. Pt verbalized understanding and is in agreement with plan.  Please follow up with your primary care provider within the next week. Please remember that your visit today was with an urgent care provider and should not replace follow up with your primary care provider for chronic medical issues or annual physicals.       Patient Instructions       Follow up with PCP in 3-5 days.  Proceed to  ER if symptoms worsen.    Chief Complaint     Chief Complaint   Patient presents with    Rash     Perineal rash x 1week         History of Present Illness       Pt is a 47 yo female with pmh Down syndrome, DM2, long QT, epilepsy, thyroid disease, and heart murmur presenting with perianal rash x 8 days. Presenting with mom but normally resides in a group home. Mom states pt started complaining of pain around her buttocks area 8 days ago and the group home noted it to be a little erythematous. Then since yesterday mom noticed what looks like an unroofed blister that is exquisitely tender. Pt has sensitive skin so mom asks that the group home uses a specific type of wipe, but right before symptoms began she was accidentally given the wrong wipes to use. Also has h/o frequent fungal infections but never of her vagina or of this area. Pt states it is itching and burning. Mom denies discharge/drainage, bleeding, fevers, or changes in behavior. Pt wears 100% cotton underwear. Normally takes baking soda baths  twice per week but this week mom asked them to increase this because of the rash. No otc meds tried.        Review of Systems   Review of Systems   Constitutional:  Negative for chills, diaphoresis and fever.   HENT:  Negative for congestion, rhinorrhea and sore throat.    Eyes:  Negative for discharge and itching.   Respiratory:  Negative for cough, chest tightness, shortness of breath and wheezing.    Cardiovascular:  Negative for chest pain.   Gastrointestinal:  Negative for diarrhea, nausea and vomiting.   Musculoskeletal:  Negative for myalgias.   Skin:  Positive for rash.   Neurological:  Negative for weakness and numbness.         Current Medications       Current Outpatient Medications:     ARIPiprazole (ABILIFY) 10 mg tablet, Take 1 tablet (10 mg total) by mouth daily Daily at 8 AM, Disp: 90 tablet, Rfl: 0    atorvastatin (LIPITOR) 10 mg tablet, Take 1 tablet (10 mg total) by mouth daily At 8pm, Disp: 90 tablet, Rfl: 0    calcium carbonate (OS-ESTEPHANIA) 600 MG tablet, Take 1 tablet (600 mg total) by mouth daily Daily with breakfast, Disp: 180 tablet, Rfl: 0    divalproex sodium (DEPAKOTE ER) 500 mg 24 hr tablet, TAKE 3 TABLETS (1500MG) BY MOUTH AT 8AM, Disp: 90 tablet, Rfl: 10    erythromycin (ILOTYCIN) ophthalmic ointment, , Disp: , Rfl:     hydrocortisone 1 % cream, Apply topically 4 (four) times a day as needed (apply small amount to eyelid 3 times a day) May apply to dryness of both upper eyelids, Disp: 30 g, Rfl: 2    levocetirizine (XYZAL) 5 MG tablet, Take 1 tablet (5 mg total) by mouth daily At 8 PM, Disp: 30 tablet, Rfl: 0    levothyroxine 75 mcg tablet, Take 1 tablet (75 mcg total) by mouth daily, Disp: 90 tablet, Rfl: 1    Mouthwashes (LISTERINE ANTISEPTIC ADVANCED MT), by Per J Tube route, Disp: , Rfl:     Multiple Vitamins-Minerals (Multivitamin Women 50+) TABS, Take 1 tablet by mouth in the morning, Disp: 30 tablet, Rfl: 10    mupirocin (BACTROBAN) 2 % ointment, Apply topically 3 (three) times a  day for 7 days, Disp: 30 g, Rfl: 0    NON FORMULARY, , Disp: , Rfl:     sodium chloride (OCEAN) 0.65 % nasal spray, 1 spray into each nostril every hour as needed for congestion, Disp: 15 mL, Rfl: 2    albuterol (2.5 mg/3 mL) 0.083 % nebulizer solution, Take 3 mL (2.5 mg total) by nebulization every 6 (six) hours as needed for wheezing or shortness of breath (Patient not taking: Reported on 2/19/2024), Disp: 75 mL, Rfl: 0    albuterol (PROVENTIL HFA,VENTOLIN HFA) 90 mcg/act inhaler, Inhale 2 puffs every 4 (four) hours as needed for wheezing (Patient not taking: Reported on 2/19/2024), Disp: 18 g, Rfl: 2    bacitracin topical ointment 500 units/g topical ointment, Apply 1 large application topically 3 (three) times a day as needed for wound care, Disp: 15 g, Rfl: 2    clindamycin (CLINDAGEL) 1 % gel, Apply topically 2 (two) times a day Apply to L axilla at 8 am / 8pm (Patient not taking: Reported on 2/19/2024), Disp: 30 g, Rfl: 1    clotrimazole 1 % external solution, Apply 1 application topically 2 (two) times a day for 14 days Affected area for fungal skin infection 8 am / 8 pm, Disp: 30 mL, Rfl: 0    ergocalciferol (VITAMIN D2) 50,000 units, Take 1 capsule (50,000 Units total) by mouth once a week Administer on sunday, Disp: 4 capsule, Rfl: 2    Eyelid Cleansers (OCUSOFT BABY EYELID & EYELASH EX), Inhale (Patient not taking: Reported on 2/19/2024), Disp: , Rfl:     polyethylene glycol (MIRALAX) 17 g packet, Take 17 g by mouth daily for 7 days Daily with breakfast Do not start before January 12, 2023., Disp: 119 g, Rfl: 0    Spacer/Aero Chamber Mouthpiece (SPACER DEVICE) for metered dose inhaler, For use with metered dose inhaler (Patient not taking: Reported on 2/19/2024), Disp: 1 Device, Rfl: 0    valACYclovir (VALTREX) 1,000 mg tablet, Take 1 tablet (1,000 mg total) by mouth 3 (three) times a day for 7 days Only take if you have active HSV flare up (Patient not taking: Reported on 6/28/2023), Disp: 21 tablet,  "Rfl: 0    Current Allergies     Allergies as of 02/19/2024 - Reviewed 02/19/2024   Allergen Reaction Noted    Avandia [rosiglitazone]  05/28/2014    Nsaids Other (See Comments) 01/07/2021            The following portions of the patient's history were reviewed and updated as appropriate: allergies, current medications, past family history, past medical history, past social history, past surgical history and problem list.     Past Medical History:   Diagnosis Date    Asthma     Complex partial epilepsy (HCC)     COVID 02/2021    Diabetes mellitus (HCC)     Disease of thyroid gland     Down syndrome     Heart murmur     Hydradenitis     Hyperlipidemia     Long Q-T syndrome     Pneumonia     Last assessed 5/28/2014     Psychiatric disorder     schizo    Sleep apnea        Past Surgical History:   Procedure Laterality Date    CARDIAC SURGERY      patent duct    PATENT DUCTUS ARTERIOUS LIGATION         Family History   Adopted: Yes   Problem Relation Age of Onset    No Known Problems Mother          Medications have been verified.        Objective   Pulse (!) 53   Temp 98 °F (36.7 °C)   Resp 18   Ht 4' 8\" (1.422 m)   Wt 78.5 kg (173 lb)   LMP  (LMP Unknown)   SpO2 99%   BMI 38.79 kg/m²        Physical Exam     Physical Exam  Vitals and nursing note reviewed.   Constitutional:       General: She is not in acute distress.     Appearance: Normal appearance. She is not ill-appearing.   HENT:      Head: Normocephalic and atraumatic.   Cardiovascular:      Rate and Rhythm: Normal rate.   Pulmonary:      Effort: Pulmonary effort is normal. No respiratory distress.   Genitourinary:      Skin:     General: Skin is warm and dry.      Capillary Refill: Capillary refill takes less than 2 seconds.   Neurological:      Mental Status: She is alert and oriented to person, place, and time.   Psychiatric:         Behavior: Behavior normal.                   "
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
## 2024-02-21 PROBLEM — A41.9 SEPSIS (HCC): Status: RESOLVED | Noted: 2020-02-05 | Resolved: 2024-02-21

## 2024-02-21 PROBLEM — R50.9 FEVER: Status: RESOLVED | Noted: 2020-02-05 | Resolved: 2024-02-21

## 2024-02-22 DIAGNOSIS — J30.89 ALLERGIC RHINITIS DUE TO OTHER ALLERGIC TRIGGER, UNSPECIFIED SEASONALITY: ICD-10-CM

## 2024-02-22 RX ORDER — LEVOCETIRIZINE DIHYDROCHLORIDE 5 MG/1
5 TABLET, FILM COATED ORAL DAILY
Qty: 30 TABLET | Refills: 0 | Status: SHIPPED | OUTPATIENT
Start: 2024-02-22

## 2024-02-22 NOTE — TELEPHONE ENCOUNTER
VM on appt line:    Hi, my name is Celine and I'm calling on in regards to Chela Vegas. So in regards to her medication, lol, I'm sorry, LEVOCETIRIZIN E5MG. My phone number here is [phone]. Thank you. Her date of birth is [birth year].

## 2024-03-14 DIAGNOSIS — F91.9 DISRUPTIVE BEHAVIOR: ICD-10-CM

## 2024-03-14 DIAGNOSIS — Z76.0 MEDICATION REFILL: ICD-10-CM

## 2024-03-15 DIAGNOSIS — E11.9 DIABETES MELLITUS (HCC): Primary | ICD-10-CM

## 2024-03-15 DIAGNOSIS — Z11.4 SCREENING FOR HIV (HUMAN IMMUNODEFICIENCY VIRUS): ICD-10-CM

## 2024-03-15 DIAGNOSIS — G40.209 NONINTRACTABLE EPILEPSY WITH COMPLEX PARTIAL SEIZURES (HCC): Chronic | ICD-10-CM

## 2024-03-15 DIAGNOSIS — E55.9 VITAMIN D DEFICIENCY: ICD-10-CM

## 2024-03-15 DIAGNOSIS — E03.9 ACQUIRED HYPOTHYROIDISM: ICD-10-CM

## 2024-03-15 RX ORDER — DIVALPROEX SODIUM 500 MG/1
1500 TABLET, EXTENDED RELEASE ORAL DAILY
Qty: 90 TABLET | Refills: 11 | Status: SHIPPED | OUTPATIENT
Start: 2024-03-15

## 2024-03-22 DIAGNOSIS — J30.89 ALLERGIC RHINITIS DUE TO OTHER ALLERGIC TRIGGER, UNSPECIFIED SEASONALITY: ICD-10-CM

## 2024-03-22 RX ORDER — LEVOCETIRIZINE DIHYDROCHLORIDE 5 MG/1
5 TABLET, FILM COATED ORAL DAILY
Qty: 30 TABLET | Refills: 0 | Status: SHIPPED | OUTPATIENT
Start: 2024-03-22

## 2024-03-22 NOTE — TELEPHONE ENCOUNTER
VM on appt line:    Good morning, my name is Celine and I'm calling in regards to Chela Petersen and she has a medication, the LEVOCETIRIZIN E5MG tab that needs to be refilled. If you could send a updated script to the pharmacy [facility] my number here is . Thank you her Chela's date of birth is [birth year].

## 2024-04-04 DIAGNOSIS — G47.33 OSA (OBSTRUCTIVE SLEEP APNEA): Primary | ICD-10-CM

## 2024-04-25 DIAGNOSIS — Z76.0 MEDICATION REFILL: ICD-10-CM

## 2024-04-25 DIAGNOSIS — E78.5 HYPERLIPIDEMIA, UNSPECIFIED HYPERLIPIDEMIA TYPE: ICD-10-CM

## 2024-04-25 DIAGNOSIS — J30.89 ALLERGIC RHINITIS DUE TO OTHER ALLERGIC TRIGGER, UNSPECIFIED SEASONALITY: ICD-10-CM

## 2024-04-25 DIAGNOSIS — F20.9 SCHIZOPHRENIA, UNSPECIFIED TYPE (HCC): Chronic | ICD-10-CM

## 2024-04-29 ENCOUNTER — TELEPHONE (OUTPATIENT)
Age: 49
End: 2024-04-29

## 2024-04-29 RX ORDER — ARIPIPRAZOLE 10 MG/1
10 TABLET ORAL DAILY
Qty: 90 TABLET | Refills: 0 | Status: SHIPPED | OUTPATIENT
Start: 2024-04-29

## 2024-04-29 RX ORDER — LEVOCETIRIZINE DIHYDROCHLORIDE 5 MG/1
5 TABLET, FILM COATED ORAL DAILY
Qty: 90 TABLET | Refills: 0 | Status: SHIPPED | OUTPATIENT
Start: 2024-04-29

## 2024-04-29 RX ORDER — ATORVASTATIN CALCIUM 10 MG/1
10 TABLET, FILM COATED ORAL DAILY
Qty: 90 TABLET | Refills: 0 | Status: SHIPPED | OUTPATIENT
Start: 2024-04-29

## 2024-04-29 NOTE — TELEPHONE ENCOUNTER
Message left on Clerical Line-      Hi, I'm calling in regards to Chela Petersen. Her YOB: 1975 and I'm calling about the medications we called about last week. Her medication for refill for her LEVOCETIRIZIN E5MG. Her Abilify 10MG and her Lipitor 10MG. Our number here is [phone]. Thank you.  You received a voice mail from [facility].

## 2024-05-01 ENCOUNTER — OFFICE VISIT (OUTPATIENT)
Age: 49
End: 2024-05-01

## 2024-05-01 ENCOUNTER — TELEPHONE (OUTPATIENT)
Age: 49
End: 2024-05-01

## 2024-05-01 VITALS
DIASTOLIC BLOOD PRESSURE: 64 MMHG | BODY MASS INDEX: 39.75 KG/M2 | OXYGEN SATURATION: 98 % | HEART RATE: 57 BPM | WEIGHT: 176.7 LBS | SYSTOLIC BLOOD PRESSURE: 112 MMHG | TEMPERATURE: 98.2 F | HEIGHT: 56 IN

## 2024-05-01 DIAGNOSIS — Z12.31 ENCOUNTER FOR SCREENING MAMMOGRAM FOR BREAST CANCER: ICD-10-CM

## 2024-05-01 DIAGNOSIS — Z00.00 MEDICARE ANNUAL WELLNESS VISIT, SUBSEQUENT: Primary | ICD-10-CM

## 2024-05-01 DIAGNOSIS — Z12.12 SCREENING FOR COLORECTAL CANCER: ICD-10-CM

## 2024-05-01 DIAGNOSIS — R80.9 TYPE 2 DIABETES MELLITUS WITH MICROALBUMINURIA, WITHOUT LONG-TERM CURRENT USE OF INSULIN (HCC): ICD-10-CM

## 2024-05-01 DIAGNOSIS — E11.29 TYPE 2 DIABETES MELLITUS WITH MICROALBUMINURIA, WITHOUT LONG-TERM CURRENT USE OF INSULIN (HCC): ICD-10-CM

## 2024-05-01 DIAGNOSIS — Z12.11 SCREENING FOR COLORECTAL CANCER: ICD-10-CM

## 2024-05-01 DIAGNOSIS — Z11.4 SCREENING FOR HIV (HUMAN IMMUNODEFICIENCY VIRUS): ICD-10-CM

## 2024-05-01 PROBLEM — J42 CHRONIC BRONCHITIS, UNSPECIFIED CHRONIC BRONCHITIS TYPE (HCC): Status: ACTIVE | Noted: 2024-05-01

## 2024-05-01 LAB — SL AMB POCT HEMOGLOBIN AIC: 5.5 (ref ?–6.5)

## 2024-05-01 PROCEDURE — G0439 PPPS, SUBSEQ VISIT: HCPCS | Performed by: FAMILY MEDICINE

## 2024-05-01 PROCEDURE — 83036 HEMOGLOBIN GLYCOSYLATED A1C: CPT | Performed by: FAMILY MEDICINE

## 2024-05-01 NOTE — TELEPHONE ENCOUNTER
Mom dropped off lab results and patient plan and healthcare goals from [facility]  Scanned into encounter for review
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
## 2024-05-01 NOTE — PATIENT INSTRUCTIONS
Medicare Preventive Visit Patient Instructions  Thank you for completing your Welcome to Medicare Visit or Medicare Annual Wellness Visit today. Your next wellness visit will be due in one year (5/2/2025).  The screening/preventive services that you may require over the next 5-10 years are detailed below. Some tests may not apply to you based off risk factors and/or age. Screening tests ordered at today's visit but not completed yet may show as past due. Also, please note that scanned in results may not display below.  Preventive Screenings:  Service Recommendations Previous Testing/Comments   Colorectal Cancer Screening  * Colonoscopy    * Fecal Occult Blood Test (FOBT)/Fecal Immunochemical Test (FIT)  * Fecal DNA/Cologuard Test  * Flexible Sigmoidoscopy Age: 45-75 years old   Colonoscopy: every 10 years (may be performed more frequently if at higher risk)  OR  FOBT/FIT: every 1 year  OR  Cologuard: every 3 years  OR  Sigmoidoscopy: every 5 years  Screening may be recommended earlier than age 45 if at higher risk for colorectal cancer. Also, an individualized decision between you and your healthcare provider will decide whether screening between the ages of 76-85 would be appropriate. Colonoscopy: Not on file  FOBT/FIT: Not on file  Cologuard: Not on file  Sigmoidoscopy: Not on file          Breast Cancer Screening Age: 40+ years old  Frequency: every 1-2 years  Not required if history of left and right mastectomy Mammogram: 06/06/2023    Screening Current   Cervical Cancer Screening Between the ages of 21-29, pap smear recommended once every 3 years.   Between the ages of 30-65, can perform pap smear with HPV co-testing every 5 years.   Recommendations may differ for women with a history of total hysterectomy, cervical cancer, or abnormal pap smears in past. Pap Smear: 12/16/2015        Hepatitis C Screening Once for adults born between 1945 and 1965  More frequently in patients at high risk for Hepatitis C Hep C  Antibody: 01/30/2021    Screening Current   Diabetes Screening 1-2 times per year if you're at risk for diabetes or have pre-diabetes Fasting glucose: 87 mg/dL (5/17/2022)  A1C: 5.6 (4/24/2023)  Screening Not Indicated  History Diabetes   Cholesterol Screening Once every 5 years if you don't have a lipid disorder. May order more often based on risk factors. Lipid panel: 05/17/2022    Screening Not Indicated  History Lipid Disorder     Other Preventive Screenings Covered by Medicare:  Abdominal Aortic Aneurysm (AAA) Screening: covered once if your at risk. You're considered to be at risk if you have a family history of AAA.  Lung Cancer Screening: covers low dose CT scan once per year if you meet all of the following conditions: (1) Age 55-77; (2) No signs or symptoms of lung cancer; (3) Current smoker or have quit smoking within the last 15 years; (4) You have a tobacco smoking history of at least 20 pack years (packs per day multiplied by number of years you smoked); (5) You get a written order from a healthcare provider.  Glaucoma Screening: covered annually if you're considered high risk: (1) You have diabetes OR (2) Family history of glaucoma OR (3)  aged 50 and older OR (4)  American aged 65 and older  Osteoporosis Screening: covered every 2 years if you meet one of the following conditions: (1) You're estrogen deficient and at risk for osteoporosis based off medical history and other findings; (2) Have a vertebral abnormality; (3) On glucocorticoid therapy for more than 3 months; (4) Have primary hyperparathyroidism; (5) On osteoporosis medications and need to assess response to drug therapy.   Last bone density test (DXA Scan): Not on file.  HIV Screening: covered annually if you're between the age of 15-65. Also covered annually if you are younger than 15 and older than 65 with risk factors for HIV infection. For pregnant patients, it is covered up to 3 times per  pregnancy.    Immunizations:  Immunization Recommendations   Influenza Vaccine Annual influenza vaccination during flu season is recommended for all persons aged >= 6 months who do not have contraindications   Pneumococcal Vaccine   * Pneumococcal conjugate vaccine = PCV13 (Prevnar 13), PCV15 (Vaxneuvance), PCV20 (Prevnar 20)  * Pneumococcal polysaccharide vaccine = PPSV23 (Pneumovax) Adults 19-65 yo with certain risk factors or if 65+ yo  If never received any pneumonia vaccine: recommend Prevnar 20 (PCV20)  Give PCV20 if previously received 1 dose of PCV13 or PPSV23   Hepatitis B Vaccine 3 dose series if at intermediate or high risk (ex: diabetes, end stage renal disease, liver disease)   Respiratory syncytial virus (RSV) Vaccine - COVERED BY MEDICARE PART D  * RSVPreF3 (Arexvy) CDC recommends that adults 60 years of age and older may receive a single dose of RSV vaccine using shared clinical decision-making (SCDM)   Tetanus (Td) Vaccine - COST NOT COVERED BY MEDICARE PART B Following completion of primary series, a booster dose should be given every 10 years to maintain immunity against tetanus. Td may also be given as tetanus wound prophylaxis.   Tdap Vaccine - COST NOT COVERED BY MEDICARE PART B Recommended at least once for all adults. For pregnant patients, recommended with each pregnancy.   Shingles Vaccine (Shingrix) - COST NOT COVERED BY MEDICARE PART B  2 shot series recommended in those 19 years and older who have or will have weakened immune systems or those 50 years and older     Health Maintenance Due:      Topic Date Due   • HIV Screening  Never done   • Cervical Cancer Screening  12/16/2018   • Colorectal Cancer Screening  Never done   • Breast Cancer Screening: Mammogram  06/06/2024   • Hepatitis C Screening  Completed     Immunizations Due:      Topic Date Due   • Pneumococcal Vaccine: Pediatrics (0 to 5 Years) and At-Risk Patients (6 to 64 Years) (1 of 2 - PCV) Never done   • COVID-19 Vaccine (1  - 2023-24 season) Never done     Advance Directives   What are advance directives?  Advance directives are legal documents that state your wishes and plans for medical care. These plans are made ahead of time in case you lose your ability to make decisions for yourself. Advance directives can apply to any medical decision, such as the treatments you want, and if you want to donate organs.   What are the types of advance directives?  There are many types of advance directives, and each state has rules about how to use them. You may choose a combination of any of the following:  Living will:  This is a written record of the treatment you want. You can also choose which treatments you do not want, which to limit, and which to stop at a certain time. This includes surgery, medicine, IV fluid, and tube feedings.   Durable power of  for healthcare (DPAHC):  This is a written record that states who you want to make healthcare choices for you when you are unable to make them for yourself. This person, called a proxy, is usually a family member or a friend. You may choose more than 1 proxy.  Do not resuscitate (DNR) order:  A DNR order is used in case your heart stops beating or you stop breathing. It is a request not to have certain forms of treatment, such as CPR. A DNR order may be included in other types of advance directives.  Medical directive:  This covers the care that you want if you are in a coma, near death, or unable to make decisions for yourself. You can list the treatments you want for each condition. Treatment may include pain medicine, surgery, blood transfusions, dialysis, IV or tube feedings, and a ventilator (breathing machine).  Values history:  This document has questions about your views, beliefs, and how you feel and think about life. This information can help others choose the care that you would choose.  Why are advance directives important?  An advance directive helps you control your care.  Although spoken wishes may be used, it is better to have your wishes written down. Spoken wishes can be misunderstood, or not followed. Treatments may be given even if you do not want them. An advance directive may make it easier for your family to make difficult choices about your care.   Urinary Incontinence   Urinary incontinence (UI)  is when you lose control of your bladder. UI develops because your bladder cannot store or empty urine properly. The 3 most common types of UI are stress incontinence, urge incontinence, or both.  Medicines:   May be given to help strengthen your bladder control. Report any side effects of medication to your healthcare provider.  Do pelvic muscle exercises often:  Your pelvic muscles help you stop urinating. Squeeze these muscles tight for 5 seconds, then relax for 5 seconds. Gradually work up to squeezing for 10 seconds. Do 3 sets of 15 repetitions a day, or as directed. This will help strengthen your pelvic muscles and improve bladder control.  Train your bladder:  Go to the bathroom at set times, such as every 2 hours, even if you do not feel the urge to go. You can also try to hold your urine when you feel the urge to go. For example, hold your urine for 5 minutes when you feel the urge to go. As that becomes easier, hold your urine for 10 minutes.   Self-care:   Keep a UI record.  Write down how often you leak urine and how much you leak. Make a note of what you were doing when you leaked urine.  Drink liquids as directed. You may need to limit the amount of liquid you drink to help control your urine leakage. Do not drink any liquid right before you go to bed. Limit or do not have drinks that contain caffeine or alcohol.   Prevent constipation.  Eat a variety of high-fiber foods. Good examples are high-fiber cereals, beans, vegetables, and whole-grain breads. Walking is the best way to trigger your intestines to have a bowel movement.  Exercise regularly and maintain a  healthy weight.  Weight loss and exercise will decrease pressure on your bladder and help you control your leakage.   Use a catheter as directed  to help empty your bladder. A catheter is a tiny, plastic tube that is put into your bladder to drain your urine.   Go to behavior therapy as directed.  Behavior therapy may be used to help you learn to control your urge to urinate.    Weight Management   Why it is important to manage your weight:  Being overweight increases your risk of health conditions such as heart disease, high blood pressure, type 2 diabetes, and certain types of cancer. It can also increase your risk for osteoarthritis, sleep apnea, and other respiratory problems. Aim for a slow, steady weight loss. Even a small amount of weight loss can lower your risk of health problems.  How to lose weight safely:  A safe and healthy way to lose weight is to eat fewer calories and get regular exercise. You can lose up about 1 pound a week by decreasing the number of calories you eat by 500 calories each day.   Healthy meal plan for weight management:  A healthy meal plan includes a variety of foods, contains fewer calories, and helps you stay healthy. A healthy meal plan includes the following:  Eat whole-grain foods more often.  A healthy meal plan should contain fiber. Fiber is the part of grains, fruits, and vegetables that is not broken down by your body. Whole-grain foods are healthy and provide extra fiber in your diet. Some examples of whole-grain foods are whole-wheat breads and pastas, oatmeal, brown rice, and bulgur.  Eat a variety of vegetables every day.  Include dark, leafy greens such as spinach, kale, tera greens, and mustard greens. Eat yellow and orange vegetables such as carrots, sweet potatoes, and winter squash.   Eat a variety of fruits every day.  Choose fresh or canned fruit (canned in its own juice or light syrup) instead of juice. Fruit juice has very little or no fiber.  Eat low-fat  dairy foods.  Drink fat-free (skim) milk or 1% milk. Eat fat-free yogurt and low-fat cottage cheese. Try low-fat cheeses such as mozzarella and other reduced-fat cheeses.  Choose meat and other protein foods that are low in fat.  Choose beans or other legumes such as split peas or lentils. Choose fish, skinless poultry (chicken or turkey), or lean cuts of red meat (beef or pork). Before you cook meat or poultry, cut off any visible fat.   Use less fat and oil.  Try baking foods instead of frying them. Add less fat, such as margarine, sour cream, regular salad dressing and mayonnaise to foods. Eat fewer high-fat foods. Some examples of high-fat foods include french fries, doughnuts, ice cream, and cakes.  Eat fewer sweets.  Limit foods and drinks that are high in sugar. This includes candy, cookies, regular soda, and sweetened drinks.  Exercise:  Exercise at least 30 minutes per day on most days of the week. Some examples of exercise include walking, biking, dancing, and swimming. You can also fit in more physical activity by taking the stairs instead of the elevator or parking farther away from stores. Ask your healthcare provider about the best exercise plan for you.      © Copyright [employer] 2018 Information is for End User's use only and may not be sold, redistributed or otherwise used for commercial purposes. All illustrations and images included in CareNotes® are the copyrighted property of A.D.A.M., Inc. or [employer]

## 2024-05-06 ENCOUNTER — TELEPHONE (OUTPATIENT)
Age: 49
End: 2024-05-06

## 2024-05-06 NOTE — TELEPHONE ENCOUNTER
Celine from [facility] called to check on form status. She adv that it is very urgent because the state needs to verify pt was seen in office. Appointment was on 5/1/24 and usually that portion is filled out during visit.   I located forms in Dr. Rigigns folder, and adv that I would route to colleagues to fill out since she is out of office until Friday  5/10/24.   Celine adv that its the MEDICAL RESPONSE section of form 1st page front and back is what is time sensitive, and would like to be called when it is available. Cell- [phone] or Reqsey-[phone].  Dr. Ryan please Review form, Thank You

## 2024-05-06 NOTE — TELEPHONE ENCOUNTER
Message left on Clinical Line-     Hi, my name is Celine and I'm calling from [facility] In regards to Chela Nicola calling to verify if her paperwork are ready. My number here is [phone]. Thank you. How many [employer] Hello?  You received a voice mail from [facility].         Returned call informing [facility] that forms are not ready as of yet.

## 2024-05-07 ENCOUNTER — TELEPHONE (OUTPATIENT)
Age: 49
End: 2024-05-07

## 2024-05-07 NOTE — TELEPHONE ENCOUNTER
Department of Human Services Forms were received for patient- placed in PCP folder to be completed.

## 2024-05-10 NOTE — TELEPHONE ENCOUNTER
Form has been completed and placed in Completed BLUE team folder in clerical area.   Please call Carlos or Celine Bagley (from group home) for .

## 2024-05-21 ENCOUNTER — ANNUAL EXAM (OUTPATIENT)
Age: 49
End: 2024-05-21

## 2024-05-21 VITALS
TEMPERATURE: 97.7 F | HEART RATE: 58 BPM | HEIGHT: 56 IN | SYSTOLIC BLOOD PRESSURE: 106 MMHG | BODY MASS INDEX: 39.82 KG/M2 | DIASTOLIC BLOOD PRESSURE: 69 MMHG | WEIGHT: 177 LBS | OXYGEN SATURATION: 100 %

## 2024-05-21 DIAGNOSIS — Z01.419 ENCOUNTER FOR ANNUAL ROUTINE GYNECOLOGICAL EXAMINATION: Primary | ICD-10-CM

## 2024-05-21 DIAGNOSIS — J30.89 ALLERGIC RHINITIS DUE TO OTHER ALLERGIC TRIGGER, UNSPECIFIED SEASONALITY: ICD-10-CM

## 2024-05-21 DIAGNOSIS — E03.9 ACQUIRED HYPOTHYROIDISM: ICD-10-CM

## 2024-05-21 DIAGNOSIS — B36.9 FUNGAL INFECTION OF SKIN: ICD-10-CM

## 2024-05-21 PROCEDURE — G0145 SCR C/V CYTO,THINLAYER,RESCR: HCPCS

## 2024-05-21 PROCEDURE — G0101 CA SCREEN;PELVIC/BREAST EXAM: HCPCS | Performed by: OBSTETRICS & GYNECOLOGY

## 2024-05-21 RX ORDER — NYSTATIN 100000 [USP'U]/G
POWDER TOPICAL 4 TIMES DAILY
Qty: 15 G | Refills: 1 | Status: SHIPPED | OUTPATIENT
Start: 2024-05-21

## 2024-05-21 RX ORDER — LEVOCETIRIZINE DIHYDROCHLORIDE 5 MG/1
5 TABLET, FILM COATED ORAL DAILY
Qty: 90 TABLET | Refills: 0 | OUTPATIENT
Start: 2024-05-21

## 2024-05-21 RX ORDER — LEVOTHYROXINE SODIUM 0.07 MG/1
75 TABLET ORAL DAILY
Qty: 90 TABLET | Refills: 1 | Status: SHIPPED | OUTPATIENT
Start: 2024-05-21

## 2024-05-21 NOTE — PROGRESS NOTES
Subjective      Chela Petersen is a 48 y.o. female who presents for annual well woman exam.     GYN:  denies vaginal discharge, labial erythema or lesions, dyspareunia, intermenstrual bleeding, spotting, or discharge.  Menarche: 15. Menopause: 2014 (patient's endocrinologist checked FSH.  Last level in 17 was 64.8, consistent with postmenopausal/amenorrhea state)  Menses were regular.  Contraception: never.  Postmenopausal bleeding? denied  Patient has never been vaginally sexually active  Prior gynecologic surgeries: never    OB:  G0 female.    :  Denies dysuria, urinary frequency or urgency, hematuria, flank pain, incontinence.    Breast:  No breast mass, skin changes, dimpling, reddening, nipple retraction, breast discharge.  Patient does not have a family history of breast, endometrial, or ovarian ca that she knows of but patient was adopted.    Lifestyle:  Diet/Nutrition:  on a 1500 calorie diet .   Exercise: walking and moderate cardiovascular exercise.   Vitamin D and calcium supplements?   Calcium 600mg BID because only absorb 600mg at a time    Screening/Preventative:  Cervical cancer: last pap smear in 12/2015. Results were normal.  Breast cancer: last mammogram in 6/2023. Results were normal.  Colon cancer: never done, Cologuard ordered  Osteoporosis: never screened.  Patient has now been postmenopausal/amenorrheic for 10 years.  Consider DEXA scan within the next 2 to 3 years to screen for osteoporosis in the setting of early menopause.  STD screening: never performed.  Gardasil Vaccine Series: never given    Review of Systems  Review of Systems   Constitutional: Negative.    HENT: Negative.     Eyes: Negative.    Respiratory:  Negative for cough.    Cardiovascular:  Negative for chest pain.   Gastrointestinal:  Negative for constipation, diarrhea, rectal pain and vomiting.   Endocrine: Negative.    Genitourinary: Negative.  Negative for difficulty urinating, vaginal bleeding and vaginal discharge.  "  Musculoskeletal: Negative.    Skin:  Positive for rash (fungal rash under abdominal fold.).   Allergic/Immunologic: Negative.    Neurological: Negative.    Hematological: Negative.    Psychiatric/Behavioral: Negative.           Objective      /69 (BP Location: Left arm, Patient Position: Sitting, Cuff Size: Standard)   Pulse 58   Temp 97.7 °F (36.5 °C) (Tympanic)   Ht 4' 7.5\" (1.41 m)   Wt 80.3 kg (177 lb)   LMP  (LMP Unknown)   SpO2 100%   BMI 40.40 kg/m²     Physical Exam  Vitals reviewed. Exam conducted with a chaperone present.   Constitutional:       Appearance: Normal appearance.   Cardiovascular:      Rate and Rhythm: Normal rate and regular rhythm.      Pulses: Normal pulses.      Heart sounds: Normal heart sounds.   Pulmonary:      Effort: Pulmonary effort is normal.      Breath sounds: Normal breath sounds.   Abdominal:      General: Abdomen is flat. Bowel sounds are normal.      Palpations: Abdomen is soft.      Comments: Fungal rash in LLQ of abdomen under pannus, no open lesions   Genitourinary:     Exam position: Lithotomy position.      Cervix: Friability present. No discharge or erythema.      Adnexa: Right adnexa normal and left adnexa normal.      Comments: No lesions or erythema in the vaginal canal.  Vaginal canal noted to be narrow consistent with post menopausal state in a patient who was never sexually active.    Cervix visualized and pap smear performed.  Musculoskeletal:         General: No swelling or tenderness. Normal range of motion.   Neurological:      General: No focal deficit present.      Mental Status: She is alert.          Assessment & Plan:  There are no diagnoses linked to this encounter.    Pap done 12/16/2015. Repeat at todays visit.  Colon cancer screening never done.  Due now.   Mammogram done 6/6/2024. Reviewed with pt including breast density. Repeat this year.  Discussed self breast awareness.   Discussed preventive care such as calcium and vitamin D " supplementation, importance of weight bearing at least 3x a week to improve muscle mass.     Lexie Estrella MD  [facility] PGY2

## 2024-05-23 ENCOUNTER — TELEPHONE (OUTPATIENT)
Dept: PULMONOLOGY | Facility: CLINIC | Age: 49
End: 2024-05-23

## 2024-05-30 ENCOUNTER — TELEPHONE (OUTPATIENT)
Age: 49
End: 2024-05-30

## 2024-05-30 LAB
LAB AP GYN PRIMARY INTERPRETATION: NORMAL
Lab: NORMAL

## 2024-05-30 NOTE — TELEPHONE ENCOUNTER
Celine from the Group Home asked for the following orders to be updated:    1. Standing order for 1 cup of coffee    2. Vitamin Water @ 12pm.    Please write as separate scripts and send to:  [facility]    Please contact Celine at the Group home for any questions:  [phone]

## 2024-05-30 NOTE — TELEPHONE ENCOUNTER
I could not send the prescriptions to the pharmacy, but I wrote 2 separate letters authorizing use of above. This has been adequate in the past. Gave signed letters directly to Deepali. Please call Celine from [facility] for .  [phone]

## 2024-05-30 NOTE — TELEPHONE ENCOUNTER
"Called patient to inform form ready for .     Made copies of completed form and placed in \"to be scanned\" bin. Original placed in envelope and placed in  bin for .       "

## 2024-06-07 ENCOUNTER — TELEPHONE (OUTPATIENT)
Age: 49
End: 2024-06-07

## 2024-06-07 DIAGNOSIS — B36.9 FUNGAL INFECTION OF SKIN: ICD-10-CM

## 2024-06-07 NOTE — TELEPHONE ENCOUNTER
is wanting a script for the nystatin powder to be changed to as needed in case the rash comes back.    Dr. Riggins, please advise.

## 2024-06-10 RX ORDER — NYSTATIN 100000 [USP'U]/G
POWDER TOPICAL 3 TIMES DAILY PRN
Qty: 15 G | Refills: 1 | Status: SHIPPED | OUTPATIENT
Start: 2024-06-10

## 2024-06-12 NOTE — TELEPHONE ENCOUNTER
in office advising that she is needing a letter stating that the old script for the nystatin powder to be discontinued and what the new script is.

## 2024-06-14 LAB — COLOGUARD RESULT REPORTABLE: NEGATIVE

## 2024-06-14 NOTE — TELEPHONE ENCOUNTER
"Spoke with patient's son, who is calling to speak with alyssa mcnaireen about the possibility of being placed in SNF, as his mom declined rehab after her most recent hospital admission. Son states, "alyssa knows all about this. She talked to us about this in the hospital."  Nurse informed son she doesn't think SNF is an option outside of the hospital, but maybe PT/OT is an option.   Son is requesting a phone call from alyssa today, as he feels "my mom is deteriorating before my eyes."    Message routed to alyssa haque  " Letter has been written, in patient's chart. Thank you.

## 2024-07-19 LAB — HBA1C MFR BLD HPLC: 6.1 %

## 2024-07-22 DIAGNOSIS — F20.9 SCHIZOPHRENIA, UNSPECIFIED TYPE (HCC): Chronic | ICD-10-CM

## 2024-07-22 DIAGNOSIS — Z76.0 MEDICATION REFILL: ICD-10-CM

## 2024-07-22 DIAGNOSIS — J30.89 ALLERGIC RHINITIS DUE TO OTHER ALLERGIC TRIGGER, UNSPECIFIED SEASONALITY: ICD-10-CM

## 2024-07-22 DIAGNOSIS — E78.5 HYPERLIPIDEMIA, UNSPECIFIED HYPERLIPIDEMIA TYPE: ICD-10-CM

## 2024-07-22 RX ORDER — ATORVASTATIN CALCIUM 10 MG/1
10 TABLET, FILM COATED ORAL DAILY
Qty: 90 TABLET | Refills: 3 | Status: SHIPPED | OUTPATIENT
Start: 2024-07-22

## 2024-07-22 RX ORDER — ARIPIPRAZOLE 10 MG/1
10 TABLET ORAL DAILY
Qty: 90 TABLET | Refills: 3 | Status: SHIPPED | OUTPATIENT
Start: 2024-07-22

## 2024-07-22 RX ORDER — LEVOCETIRIZINE DIHYDROCHLORIDE 5 MG/1
5 TABLET, FILM COATED ORAL DAILY
Qty: 90 TABLET | Refills: 0 | Status: SHIPPED | OUTPATIENT
Start: 2024-07-22

## 2024-07-31 ENCOUNTER — OFFICE VISIT (OUTPATIENT)
Dept: PULMONOLOGY | Facility: MEDICAL CENTER | Age: 49
End: 2024-07-31
Payer: MEDICARE

## 2024-07-31 VITALS
WEIGHT: 174 LBS | RESPIRATION RATE: 12 BRPM | SYSTOLIC BLOOD PRESSURE: 100 MMHG | HEIGHT: 56 IN | OXYGEN SATURATION: 99 % | DIASTOLIC BLOOD PRESSURE: 68 MMHG | HEART RATE: 59 BPM | TEMPERATURE: 98.7 F | BODY MASS INDEX: 39.14 KG/M2

## 2024-07-31 DIAGNOSIS — J45.20 MILD INTERMITTENT ASTHMA WITHOUT COMPLICATION: Chronic | ICD-10-CM

## 2024-07-31 DIAGNOSIS — G47.33 OSA (OBSTRUCTIVE SLEEP APNEA): Primary | Chronic | ICD-10-CM

## 2024-07-31 DIAGNOSIS — E66.9 OBESITY (BMI 35.0-39.9 WITHOUT COMORBIDITY): ICD-10-CM

## 2024-07-31 DIAGNOSIS — J30.2 SEASONAL ALLERGIC RHINITIS, UNSPECIFIED TRIGGER: Chronic | ICD-10-CM

## 2024-07-31 PROCEDURE — 99214 OFFICE O/P EST MOD 30 MIN: CPT | Performed by: NURSE PRACTITIONER

## 2024-07-31 RX ORDER — SELENIUM SULFIDE 10 MG/ML
SHAMPOO TOPICAL
COMMUNITY
Start: 2024-05-30

## 2024-07-31 NOTE — ASSESSMENT & PLAN NOTE
Patient continues to use and benefit from auto CPAP.  Her compliance data is reviewed from June 30, 2024 to July 29, 2024 patient is 100% compliance with average usage of 9 hours and 13 minutes she is set on auto CPAP 8 to 14 cm of water pressure her 95th percentile pressure is a 11 and AHI is reduced to 4.6 this is and spite of a air leak which is noted to occur on particular days.  Is using an AirFit F20 small.  He also uses supplemental oxygen 2 L on her CPAP.  A nocturnal pulse oximetry was done on May 20, 2024.  It was noted that oxygen below 88% was for 5 minutes and 57 seconds she continues to use and benefit we will change her oxygen supplementation to 3 L at at bedtime.  Also write an order accordingly for her group home and also to [employer]  Machine is broken beyond repair was issued to her in 2018.

## 2024-07-31 NOTE — ASSESSMENT & PLAN NOTE
Is lost weight.  Her current BMI is 39.01.  She is accompanied by her mother today.  She is now on a 1200-calorie diet.  And based on her A1c that has gone to 6.  Had been 5.3 previously.

## 2024-07-31 NOTE — PROGRESS NOTES
Assessment/Plan:     Problem List Items Addressed This Visit          Respiratory    Mild intermittent asthma without complication (Chronic)     Patient is basically asymptomatic.  She only uses albuterol at this point.         Allergic rhinitis (Chronic)     To her mother who accompanies her, she is doing well overall she no longer uses any medication for allergic rhinitis.  She had been using Zyrtec in the past.  Now using Xyxal.         ROBERTO (obstructive sleep apnea) - Primary (Chronic)     Patient continues to use and benefit from auto CPAP.  Her compliance data is reviewed from June 30, 2024 to July 29, 2024 patient is 100% compliance with average usage of 9 hours and 13 minutes she is set on auto CPAP 8 to 14 cm of water pressure her 95th percentile pressure is a 11 and AHI is reduced to 4.6 this is and spite of a air leak which is noted to occur on particular days.  Is using an AirFit F20 small.  He also uses supplemental oxygen 2 L on her CPAP.  A nocturnal pulse oximetry was done on May 20, 2024.  It was noted that oxygen below 88% was for 5 minutes and 57 seconds she continues to use and benefit we will change her oxygen supplementation to 3 L at at bedtime.  Also write an order accordingly for her group home and also to [employer] company  Machine is broken beyond repair was issued to her in 2018.         Relevant Orders    Oxygen    CPAP Auto New DME       Other    Obesity (BMI 35.0-39.9 without comorbidity) (Chronic)     Is lost weight.  Her current BMI is 39.01.  She is accompanied by her mother today.  She is now on a 1200-calorie diet.  And based on her A1c that has gone to 6.  Had been 5.3 previously.              No follow-ups on file.  All questions are answered to the patient's satisfaction and understanding.  She verbalizes understanding.  She is encouraged to call with any further questions or concerns.    Portions of the record may have been created with voice recognition software.  Occasional wrong  "word or \"sound a like\" substitutions may have occurred due to the inherent limitations of voice recognition software.  Read the chart carefully and recognize, using context, where substitutions have occurred.    Electronically Signed by SESAR Garza    ______________________________________________________________________    Chief Complaint: No chief complaint on file.      Patient ID: Chela is a 48 y.o. y.o. female has a past medical history of Asthma, Complex partial epilepsy (HCC), COVID (02/2021), Diabetes mellitus (HCC), Disease of thyroid gland, Down syndrome, Heart murmur, Hydradenitis, Hyperlipidemia, Long Q-T syndrome, Pneumonia, Psychiatric disorder, and Sleep apnea.    7/31/2024  Patient presents today for follow-up visit.  HPI Chela is here today for follow-up.  Patient was last seen in June 2023.  She has a history of obstructive sleep apnea and also mild asthma.  She did have pneumonia in January 2023..  Her last x-ray was done December 28, 2023 for which lungs were clear.  Patient did have multifocal pneumonia in January 2023.  She has since had resolution of the same she has history of sleep apnea for which she is on CPAP 8 to 14 cm of water pressure with 2 L of supplemental oxygen at nighttime.    Review of Systems   Constitutional: Negative.    HENT: Negative.     Eyes: Negative.    Respiratory:  Positive for cough and shortness of breath.         Dry cough   Cardiovascular: Negative.    Gastrointestinal: Negative.    Endocrine: Negative.    Genitourinary: Negative.    Musculoskeletal: Negative.    Skin: Negative.    Allergic/Immunologic: Negative.    Neurological: Negative.    Hematological: Negative.    Psychiatric/Behavioral: Negative.         Smoking history: She reports that she has never smoked. She has never used smokeless tobacco.    The following portions of the patient's history were reviewed and updated as appropriate: allergies, current medications, past family history, past " medical history, past social history, past surgical history, and problem list.    Immunization History   Administered Date(s) Administered    Hep B, adult 11/06/2003, 12/04/2003, 05/06/2004    INFLUENZA 10/08/2019, 11/23/2021    Td (adult), adsorbed 04/24/2003, 07/31/2013, 05/03/2014    Tuberculin Skin Test-PPD Intradermal 07/31/2015, 08/02/2021     Current Outpatient Medications   Medication Sig Dispense Refill    albuterol (2.5 mg/3 mL) 0.083 % nebulizer solution Take 3 mL (2.5 mg total) by nebulization every 6 (six) hours as needed for wheezing or shortness of breath 75 mL 0    albuterol (PROVENTIL HFA,VENTOLIN HFA) 90 mcg/act inhaler Inhale 2 puffs every 4 (four) hours as needed for wheezing 18 g 2    Anti-Dandruff 1 %       ARIPiprazole (ABILIFY) 10 mg tablet Take 1 tablet (10 mg total) by mouth daily Daily at 8 AM 90 tablet 3    atorvastatin (LIPITOR) 10 mg tablet Take 1 tablet (10 mg total) by mouth daily At 8pm 90 tablet 3    divalproex sodium (DEPAKOTE ER) 500 mg 24 hr tablet Take 3 tablets (1,500 mg total) by mouth daily at 8 AM 90 tablet 11    Eyelid Cleansers (OCUSOFT BABY EYELID & EYELASH EX) Inhale      hydrocortisone 1 % cream Apply topically 4 (four) times a day as needed (apply small amount to eyelid 3 times a day) May apply to dryness of both upper eyelids 30 g 2    levocetirizine (XYZAL) 5 MG tablet Take 1 tablet (5 mg total) by mouth daily At 8 PM 90 tablet 0    Mouthwashes (LISTERINE ANTISEPTIC ADVANCED MT) by Per J Tube route      Multiple Vitamins-Minerals (Multivitamin Women 50+) TABS Take 1 tablet by mouth in the morning 90 tablet 3    nystatin (MYCOSTATIN) powder Apply topically 3 (three) times a day as needed (rash) 15 g 1    sodium chloride (OCEAN) 0.65 % nasal spray 1 spray into each nostril every hour as needed for congestion 15 mL 2    bacitracin topical ointment 500 units/g topical ointment Apply 1 large application topically 3 (three) times a day as needed for wound care (Patient not  "taking: Reported on 5/1/2024) 15 g 2    calcium carbonate (OS-ESTEPHANIA) 600 MG tablet Take 1 tablet (600 mg total) by mouth daily Daily with breakfast (Patient not taking: Reported on 5/1/2024) 180 tablet 0    clindamycin (CLINDAGEL) 1 % gel Apply topically 2 (two) times a day Apply to L axilla at 8 am / 8pm (Patient not taking: Reported on 2/19/2024) 30 g 1    clotrimazole 1 % external solution Apply 1 application topically 2 (two) times a day for 14 days Affected area for fungal skin infection 8 am / 8 pm 30 mL 0    ergocalciferol (VITAMIN D2) 50,000 units Take 1 capsule (50,000 Units total) by mouth once a week Administer on sunday 4 capsule 2    erythromycin (ILOTYCIN) ophthalmic ointment  (Patient not taking: Reported on 5/21/2024)      levothyroxine 75 mcg tablet Take 1 tablet (75 mcg total) by mouth daily 90 tablet 1    mupirocin (BACTROBAN) 2 % ointment Apply topically 3 (three) times a day for 7 days 30 g 0    NON FORMULARY  (Patient not taking: Reported on 5/21/2024)      polyethylene glycol (MIRALAX) 17 g packet Take 17 g by mouth daily for 7 days Daily with breakfast Do not start before January 12, 2023. 119 g 0    Spacer/Aero Chamber Mouthpiece (SPACER DEVICE) for metered dose inhaler For use with metered dose inhaler (Patient not taking: Reported on 2/19/2024) 1 Device 0    valACYclovir (VALTREX) 1,000 mg tablet Take 1 tablet (1,000 mg total) by mouth 3 (three) times a day for 7 days Only take if you have active HSV flare up (Patient not taking: Reported on 6/28/2023) 21 tablet 0     No current facility-administered medications for this visit.     Allergies: Avandia [rosiglitazone] and Nsaids    Objective:  Vitals:    07/31/24 1025   BP: 100/68   BP Location: Right arm   Patient Position: Sitting   Cuff Size: Standard   Pulse: 59   Resp: 12   Temp: 98.7 °F (37.1 °C)   TempSrc: Temporal   SpO2: 99%   Weight: 78.9 kg (174 lb)   Height: 4' 8\" (1.422 m)   Oxygen Therapy  SpO2: 99 %  .  Wt Readings from Last 3 " Encounters:   07/31/24 78.9 kg (174 lb)   05/21/24 80.3 kg (177 lb)   05/01/24 80.2 kg (176 lb 11.2 oz)     Body mass index is 39.01 kg/m².    Physical Exam  Constitutional:       Appearance: She is obese.   HENT:      Head: Normocephalic and atraumatic.      Nose: Nose normal.      Mouth/Throat:      Mouth: Mucous membranes are moist.      Pharynx: Oropharynx is clear.   Cardiovascular:      Rate and Rhythm: Normal rate and regular rhythm.      Pulses: Normal pulses.      Heart sounds: Murmur heard.   Pulmonary:      Effort: Pulmonary effort is normal.      Breath sounds: Normal breath sounds.   Abdominal:      General: Abdomen is flat.   Musculoskeletal:         General: Normal range of motion.   Skin:     General: Skin is warm and dry.      Capillary Refill: Capillary refill takes less than 2 seconds.   Neurological:      General: No focal deficit present.      Mental Status: She is alert and oriented to person, place, and time.   Psychiatric:         Mood and Affect: Mood normal.         Behavior: Behavior normal.         Lab Review:   Annual Exam on 05/21/2024   Component Date Value    Case Report 05/21/2024                      Value:Gynecologic Cytology Report                       Case: [id number]                                  Authorizing Provider:  Lexie Estrella MD Collected:           05/21/2024 0929              Ordering Location:     [facility]      Received:            05/21/2024 0930                                     [facility]                                                     First Screen:          Griselda Almonte                                                                Specimen:    LIQUID-BASED PAP, SCREENING, Cervix                                                        Primary Interpretation 05/21/2024 Negative for intraepithelial lesion or malignancy     Specimen Adequacy 05/21/2024 Satisfactory for evaluation. Absence of endocervical/transformation  zone component.     Note 05/21/2024                      Value:Screening performed at [facility], [address] [address].        Additional Information 05/21/2024                      Value:[employer]'s FDA approved ,  and ThinPrep Imaging Duo System are utilized with strict adherence to the 's instruction manual to prepare gynecologic and non-gynecologic cytology specimens for the production of ThinPrep slides as well as for gynecologic ThinPrep imaging. These processes have been validated by our laboratory and/or by the .  The Pap test is not a diagnostic procedure and should not be used as the sole means to detect cervical cancer. It is only a screening procedure to aid in the detection of cervical cancer and its precursors. Both false-negative and false-positive results have been experienced. Your patient's test result should be interpreted in this context together with the history and clinical findings.      Gross Description 05/21/2024                      Value:20 ml , colorless, cloudy received in a ThinPrep vial.     Office Visit on 05/01/2024   Component Date Value    Hemoglobin A1C 05/01/2024 5.5     Cologuard Result 06/09/2024 Negative    Orders Only on 04/04/2024   Component Date Value    Supplier Name 04/04/2024 [employer]     Supplier Phone Number 04/04/2024 [phone]     Order Status 04/04/2024 Delivery Successful     Delivery Request Date 04/04/2024 04/04/2024     Date Delivered  04/04/2024 05/20/2024     Supplier Name 04/04/2024 05/20/2024     Item Description 04/04/2024 Overnight Oximetry Test        Past Surgical History:   Procedure Laterality Date    CARDIAC SURGERY      patent duct    PATENT DUCTUS ARTERIOUS LIGATION          Family History   Adopted: Yes   Problem Relation Age of Onset    No Known Problems Mother         Diagnostics:  I have personally reviewed pertinent reports.    none pertinent  Office  Spirometry Results:     ESS:    No results found.

## 2024-07-31 NOTE — ASSESSMENT & PLAN NOTE
To her mother who accompanies her, she is doing well overall she no longer uses any medication for allergic rhinitis.  She had been using Zyrtec in the past.  Now using Xyxal.

## 2024-08-01 ENCOUNTER — TELEPHONE (OUTPATIENT)
Age: 49
End: 2024-08-01

## 2024-08-01 NOTE — TELEPHONE ENCOUNTER
Facility is needing a DC order for oxygen and the pulse ox please send 2 separate scripts they will  the order please call them when it is ready for

## 2024-08-05 NOTE — TELEPHONE ENCOUNTER
Celine calling in again regarding the D/C order. Would like a call to pick them up once they are placed. Call back# [phone]

## 2024-08-09 LAB

## 2024-08-15 LAB

## 2024-10-21 DIAGNOSIS — J30.89 ALLERGIC RHINITIS DUE TO OTHER ALLERGIC TRIGGER, UNSPECIFIED SEASONALITY: ICD-10-CM

## 2024-10-21 RX ORDER — LEVOCETIRIZINE DIHYDROCHLORIDE 5 MG/1
5 TABLET, FILM COATED ORAL DAILY
Qty: 90 TABLET | Refills: 0 | Status: SHIPPED | OUTPATIENT
Start: 2024-10-21

## 2024-10-28 DIAGNOSIS — J30.89 ALLERGIC RHINITIS DUE TO OTHER ALLERGIC TRIGGER, UNSPECIFIED SEASONALITY: ICD-10-CM

## 2024-10-29 ENCOUNTER — OFFICE VISIT (OUTPATIENT)
Dept: PULMONOLOGY | Facility: MEDICAL CENTER | Age: 49
End: 2024-10-29
Payer: MEDICARE

## 2024-10-29 VITALS
SYSTOLIC BLOOD PRESSURE: 122 MMHG | HEART RATE: 64 BPM | BODY MASS INDEX: 39.91 KG/M2 | TEMPERATURE: 98 F | DIASTOLIC BLOOD PRESSURE: 64 MMHG | RESPIRATION RATE: 12 BRPM | OXYGEN SATURATION: 93 % | HEIGHT: 56 IN | WEIGHT: 177.4 LBS

## 2024-10-29 DIAGNOSIS — G47.33 OSA (OBSTRUCTIVE SLEEP APNEA): Primary | Chronic | ICD-10-CM

## 2024-10-29 DIAGNOSIS — J45.20 MILD INTERMITTENT ASTHMA WITHOUT COMPLICATION: Chronic | ICD-10-CM

## 2024-10-29 DIAGNOSIS — J30.2 SEASONAL ALLERGIC RHINITIS, UNSPECIFIED TRIGGER: ICD-10-CM

## 2024-10-29 PROBLEM — J30.1 SEASONAL ALLERGIC RHINITIS DUE TO POLLEN: Status: ACTIVE | Noted: 2024-10-29

## 2024-10-29 PROCEDURE — 99214 OFFICE O/P EST MOD 30 MIN: CPT | Performed by: INTERNAL MEDICINE

## 2024-10-29 RX ORDER — FLUTICASONE PROPIONATE 50 MCG
SPRAY, SUSPENSION (ML) NASAL
Qty: 16 G | Refills: 5 | Status: SHIPPED | OUTPATIENT
Start: 2024-10-29

## 2024-10-29 NOTE — PATIENT INSTRUCTIONS
Can use fluticasone nasal spray 2 sprays in each nostril at bedtime as needed for nasal congestion    Continue CPAP at bedtime with 2 L of oxygen with AirFit F10 XS mask

## 2024-10-29 NOTE — PROGRESS NOTES
Assessment & Plan        Problem List Items Addressed This Visit          Respiratory    Mild intermittent asthma without complication (Chronic)     Lung sounds were clear.  Does not need any maintenance inhaler.  Does have albuterol inhaler she can use as needed         ROBERTO (obstructive sleep apnea) - Primary (Chronic)     ROBERTO with good compliance to CPAP therapy she has a AirSense 10 auto CPAP machine set at 8 to 14 cm of water.  [employer] is Reaction.  She does use 2 L of oxygen with the CPAP machine at night.  She used to be CPAP for average of 9 hours per night and used it every night for past 30 days.  This resulted in AHI of 2.4 which is good.  Average CPAP pressure was 12.6.  She will continue on same setting of 8-14 of CPAP with 2 L of oxygen.  She does have an Airfit F10 xs fullface mask which fits her well and she has been tolerating it very well.         Seasonal allergic rhinitis     Chela does have mild seasonal allergies.  Right now having some nasal congestion and does have Xyzal she can use as needed.  I did write prescription for fluticasone nasal spray she can use 2 sprays at bedtime as needed for nasal congestion.  Recently has been having nasal congestion.         Relevant Medications    fluticasone (FLONASE) 50 mcg/act nasal spray         Cc: Some nasal congestion      HPI    Chela presents with her mother Olivia today for follow-up visit for her obstructive sleep apnea and mild intermittent asthma.    She is on auto CPAP set at 8 to 14 cm water and does use oxygen 2 L/min with her CPAP.  [employer] is Reaction.  She has been using his CPAP every night for just over 9 hours per night.  Her overall AHI is 2.4 which is good.  Average CPAP pressure was 12.6.  [employer] is Reaction.  She did finally get a new fullface mask which fits her well.  She was provided AirFit F10 XS for her fullface mask which fits her well and she likes it.    Has been having some nasal congestion  recently due to allergies.  Has some stuffy nose.  Not having any difficulty breathing.  No wheezing.  She had recent blood work done and hemoglobin A1c was 6.1.        Past Medical History:   Diagnosis Date    Asthma     Complex partial epilepsy (HCC)     COVID 02/2021    Diabetes mellitus (HCC)     Disease of thyroid gland     Down syndrome     Heart murmur     Hydradenitis     Hyperlipidemia     Long Q-T syndrome     Pneumonia     Last assessed 5/28/2014     Psychiatric disorder     schizo    Sleep apnea        Past Surgical History:   Procedure Laterality Date    CARDIAC SURGERY      patent duct    PATENT DUCTUS ARTERIOUS LIGATION           Current Outpatient Medications:     albuterol (2.5 mg/3 mL) 0.083 % nebulizer solution, Take 3 mL (2.5 mg total) by nebulization every 6 (six) hours as needed for wheezing or shortness of breath, Disp: 75 mL, Rfl: 0    albuterol (PROVENTIL HFA,VENTOLIN HFA) 90 mcg/act inhaler, Inhale 2 puffs every 4 (four) hours as needed for wheezing, Disp: 18 g, Rfl: 2    Anti-Dandruff 1 %, , Disp: , Rfl:     ARIPiprazole (ABILIFY) 10 mg tablet, Take 1 tablet (10 mg total) by mouth daily Daily at 8 AM, Disp: 90 tablet, Rfl: 3    atorvastatin (LIPITOR) 10 mg tablet, Take 1 tablet (10 mg total) by mouth daily At 8pm, Disp: 90 tablet, Rfl: 3    divalproex sodium (DEPAKOTE ER) 500 mg 24 hr tablet, Take 3 tablets (1,500 mg total) by mouth daily at 8 AM, Disp: 90 tablet, Rfl: 11    Eyelid Cleansers (OCUSOFT BABY EYELID & EYELASH EX), Inhale, Disp: , Rfl:     fluticasone (FLONASE) 50 mcg/act nasal spray, Use 2 sprays in each nostril daily at bedtime as needed for nasal congestion., Disp: 16 g, Rfl: 5    hydrocortisone 1 % cream, Apply topically 4 (four) times a day as needed (apply small amount to eyelid 3 times a day) May apply to dryness of both upper eyelids, Disp: 30 g, Rfl: 2    levocetirizine (XYZAL) 5 MG tablet, Take 1 tablet (5 mg total) by mouth daily At 8 PM, Disp: 90 tablet, Rfl: 0     levothyroxine 75 mcg tablet, Take 1 tablet (75 mcg total) by mouth daily, Disp: 90 tablet, Rfl: 1    Mouthwashes (LISTERINE ANTISEPTIC ADVANCED MT), by Per J Tube route, Disp: , Rfl:     Multiple Vitamins-Minerals (Multivitamin Women 50+) TABS, Take 1 tablet by mouth in the morning, Disp: 90 tablet, Rfl: 3    sodium chloride (OCEAN) 0.65 % nasal spray, 1 spray into each nostril every hour as needed for congestion, Disp: 15 mL, Rfl: 2    bacitracin topical ointment 500 units/g topical ointment, Apply 1 large application topically 3 (three) times a day as needed for wound care (Patient not taking: Reported on 5/1/2024), Disp: 15 g, Rfl: 2    calcium carbonate (OS-ESTEPHANIA) 600 MG tablet, Take 1 tablet (600 mg total) by mouth daily Daily with breakfast (Patient not taking: Reported on 5/1/2024), Disp: 180 tablet, Rfl: 0    clindamycin (CLINDAGEL) 1 % gel, Apply topically 2 (two) times a day Apply to L axilla at 8 am / 8pm (Patient not taking: Reported on 2/19/2024), Disp: 30 g, Rfl: 1    clotrimazole 1 % external solution, Apply 1 application topically 2 (two) times a day for 14 days Affected area for fungal skin infection 8 am / 8 pm, Disp: 30 mL, Rfl: 0    ergocalciferol (VITAMIN D2) 50,000 units, Take 1 capsule (50,000 Units total) by mouth once a week Administer on sunday, Disp: 4 capsule, Rfl: 2    erythromycin (ILOTYCIN) ophthalmic ointment, , Disp: , Rfl:     mupirocin (BACTROBAN) 2 % ointment, Apply topically 3 (three) times a day for 7 days, Disp: 30 g, Rfl: 0    NON FORMULARY, , Disp: , Rfl:     nystatin (MYCOSTATIN) powder, Apply topically 3 (three) times a day as needed (rash) (Patient not taking: Reported on 10/29/2024), Disp: 15 g, Rfl: 1    polyethylene glycol (MIRALAX) 17 g packet, Take 17 g by mouth daily for 7 days Daily with breakfast Do not start before January 12, 2023., Disp: 119 g, Rfl: 0    Spacer/Aero Chamber Mouthpiece (SPACER DEVICE) for metered dose inhaler, For use with metered dose inhaler  "(Patient not taking: Reported on 2/19/2024), Disp: 1 Device, Rfl: 0    valACYclovir (VALTREX) 1,000 mg tablet, Take 1 tablet (1,000 mg total) by mouth 3 (three) times a day for 7 days Only take if you have active HSV flare up (Patient not taking: Reported on 6/28/2023), Disp: 21 tablet, Rfl: 0    Allergies   Allergen Reactions    Avandia [Rosiglitazone]      CHF    Nsaids Other (See Comments)     unknown       Social History     Tobacco Use    Smoking status: Never    Smokeless tobacco: Never   Substance Use Topics    Alcohol use: Never         Family History   Adopted: Yes   Problem Relation Age of Onset    No Known Problems Mother        Review of Systems   Constitutional:  Negative for activity change, appetite change, chills, fever and unexpected weight change.   HENT:  Positive for congestion. Negative for dental problem, postnasal drip, sinus pressure, sinus pain, sore throat and voice change.    Eyes:  Negative for redness and visual disturbance.   Respiratory:  Negative for shortness of breath.         See HPI   Cardiovascular:  Negative for chest pain, palpitations and leg swelling.   Gastrointestinal:  Negative for abdominal pain, diarrhea, nausea and vomiting.   Endocrine: Negative for polydipsia and polyphagia.   Genitourinary:  Negative for difficulty urinating.   Musculoskeletal:  Negative for arthralgias.   Skin:  Negative for rash and wound.   Allergic/Immunologic: Negative for environmental allergies and food allergies.   Neurological:  Negative for dizziness, light-headedness and headaches.   Hematological:  Negative for adenopathy.   Psychiatric/Behavioral:  Negative for agitation, behavioral problems and confusion.            Vitals:    10/29/24 0940   BP: 122/64   Pulse: 64   Resp: 12   Temp: 98 °F (36.7 °C)   SpO2: 93%     Height: 4' 8\" (142.2 cm)  IBW (Ideal Body Weight): 36.3 kg  Body mass index is 39.77 kg/m².  Weight (last 2 days)       Date/Time Weight    10/29/24 0940 80.5 (177.4)      "           Physical Exam  Vitals reviewed.   Constitutional:       General: She is not in acute distress.     Appearance: Normal appearance. She is well-developed.   HENT:      Head: Normocephalic.      Right Ear: External ear normal.      Left Ear: External ear normal.      Nose: Nose normal.      Mouth/Throat:      Mouth: Mucous membranes are moist.      Pharynx: Oropharynx is clear. No oropharyngeal exudate.   Eyes:      Conjunctiva/sclera: Conjunctivae normal.      Pupils: Pupils are equal, round, and reactive to light.   Cardiovascular:      Rate and Rhythm: Normal rate and regular rhythm.      Heart sounds: Normal heart sounds.      Comments: There is grade 2 systolic ejection murmur  Pulmonary:      Effort: Pulmonary effort is normal.      Comments: Lung sounds are clear.  No wheezes, crackles or rhonchi  Abdominal:      General: There is no distension.      Palpations: Abdomen is soft.      Tenderness: There is no abdominal tenderness.   Musculoskeletal:      Cervical back: Neck supple.      Comments: No edema, cyanosis or clubbing   Lymphadenopathy:      Cervical: No cervical adenopathy.   Skin:     General: Skin is warm and dry.   Neurological:      General: No focal deficit present.      Mental Status: She is alert and oriented to person, place, and time.   Psychiatric:         Mood and Affect: Mood normal.         Behavior: Behavior normal.         Thought Content: Thought content normal.

## 2024-11-01 NOTE — ASSESSMENT & PLAN NOTE
Chela does have mild seasonal allergies.  Right now having some nasal congestion and does have Xyzal she can use as needed.  I did write prescription for fluticasone nasal spray she can use 2 sprays at bedtime as needed for nasal congestion.  Recently has been having nasal congestion.

## 2024-11-01 NOTE — ASSESSMENT & PLAN NOTE
Lung sounds were clear.  Does not need any maintenance inhaler.  Does have albuterol inhaler she can use as needed

## 2024-11-01 NOTE — ASSESSMENT & PLAN NOTE
ROBERTO with good compliance to CPAP therapy she has a AirSense 10 auto CPAP machine set at 8 to 14 cm of water.  DME company is [employer].  She does use 2 L of oxygen with the CPAP machine at night.  She used to be CPAP for average of 9 hours per night and used it every night for past 30 days.  This resulted in AHI of 2.4 which is good.  Average CPAP pressure was 12.6.  She will continue on same setting of 8-14 of CPAP with 2 L of oxygen.  She does have an Airfit F10 xs fullface mask which fits her well and she has been tolerating it very well.

## 2024-11-17 ENCOUNTER — OFFICE VISIT (OUTPATIENT)
Dept: URGENT CARE | Facility: CLINIC | Age: 49
End: 2024-11-17
Payer: MEDICARE

## 2024-11-17 DIAGNOSIS — B37.2 INTERTRIGO OF GENITOCRURAL REGION DUE TO CANDIDA SPECIES: Primary | ICD-10-CM

## 2024-11-17 PROCEDURE — 99213 OFFICE O/P EST LOW 20 MIN: CPT | Performed by: PHYSICIAN ASSISTANT

## 2024-11-17 PROCEDURE — G2211 COMPLEX E/M VISIT ADD ON: HCPCS | Performed by: PHYSICIAN ASSISTANT

## 2024-11-17 RX ORDER — KETOCONAZOLE 20 MG/G
CREAM TOPICAL DAILY
Qty: 60 G | Refills: 0 | Status: SHIPPED | OUTPATIENT
Start: 2024-11-17

## 2024-11-17 RX ORDER — PSEUDOEPHEDRINE HCL 30 MG/1
TABLET, FILM COATED ORAL
COMMUNITY
Start: 2024-10-11

## 2024-11-17 RX ORDER — MAGNESIUM HYDROXIDE 1200 MG/15ML
SUSPENSION ORAL
COMMUNITY
Start: 2024-10-11

## 2024-11-17 NOTE — PROGRESS NOTES
[facility]        NAME: Chela Petersen is a 49 y.o. female  : 1975    MRN: [id number]  DATE: 2024  TIME: 4:14 PM    Assessment and Plan   Intertrigo of genitocrural region due to Candida species [B37.2]  1. Intertrigo of genitocrural region due to Candida species  ketoconazole (NIZORAL) 2 % cream            Patient Instructions   Intertrigo (candidiasis) likely secondary to urinary incontinence  -Daily cleansing of the area is advised with mild soap and warm water. Use a blow dryer to get the area completely dry  -Aeration of the area. Barrier cream like zinc oxide/Desitin is highly encouraged  -Dry powders to the area after bathing or when exercising/sweating.  -Ketoconazole cream once daily for two weeks for itching and relief of the inflammation and possible underlying candidal cause.  -Wear cotton bras and tshirts and underwear. Consider depends.   -Follow up immediately for worsening symptoms or sign of infection as discussed. Urology follow up advised.     Follow up with PCP in 3-5 days.  Proceed to  ER if symptoms worsen.    If tests have been performed at [facility], our office will contact you with results if changes need to be made to the care plan discussed with you at the visit.  You can [facility]    Chief Complaint     Chief Complaint   Patient presents with    Rash     Perineal rash x 2 days         History of Present Illness       The patient is a 49-year-old female who presents today with her DSP and Mother for a perineal rash x 2 days. Patient has a hx of fungal infection of the area in the past. There is no oozing or drainage. The patient has been experiencing incontinence and will be seeing a Urologist on 24. No OTC measures. She states that the area is very tender and is burning. No fever or chills.         Review of Systems   Review of Systems   Constitutional:  Negative for activity change, appetite change, chills,  diaphoresis, fatigue and fever.   HENT:  Negative for facial swelling, sore throat and trouble swallowing.    Respiratory:  Negative for chest tightness, shortness of breath, wheezing and stridor.    Cardiovascular:  Negative for chest pain and palpitations.   Skin:  Positive for rash. Negative for color change.   Allergic/Immunologic: Negative for environmental allergies, food allergies and immunocompromised state.   Neurological:  Negative for dizziness and light-headedness.   Hematological:  Negative for adenopathy. Does not bruise/bleed easily.         Current Medications       Current Outpatient Medications:     Anti-Dandruff 1 %, , Disp: , Rfl:     ARIPiprazole (ABILIFY) 10 mg tablet, Take 1 tablet (10 mg total) by mouth daily Daily at 8 AM, Disp: 90 tablet, Rfl: 3    atorvastatin (LIPITOR) 10 mg tablet, Take 1 tablet (10 mg total) by mouth daily At 8pm, Disp: 90 tablet, Rfl: 3    bacitracin topical ointment 500 units/g topical ointment, Apply 1 large application topically 3 (three) times a day as needed for wound care, Disp: 15 g, Rfl: 2    calcium carbonate (OS-ESTEPHANIA) 600 MG tablet, Take 1 tablet (600 mg total) by mouth daily Daily with breakfast, Disp: 180 tablet, Rfl: 0    clindamycin (CLINDAGEL) 1 % gel, Apply topically 2 (two) times a day Apply to L axilla at 8 am / 8pm, Disp: 30 g, Rfl: 1    divalproex sodium (DEPAKOTE ER) 500 mg 24 hr tablet, Take 3 tablets (1,500 mg total) by mouth daily at 8 AM, Disp: 90 tablet, Rfl: 11    erythromycin (ILOTYCIN) ophthalmic ointment, , Disp: , Rfl:     Eyelid Cleansers (OCUSOFT BABY EYELID & EYELASH EX), Inhale, Disp: , Rfl:     fluticasone (FLONASE) 50 mcg/act nasal spray, Use 2 sprays in each nostril daily at bedtime as needed for nasal congestion., Disp: 16 g, Rfl: 5    hydrocortisone 1 % cream, Apply topically 4 (four) times a day as needed (apply small amount to eyelid 3 times a day) May apply to dryness of both upper eyelids, Disp: 30 g, Rfl: 2    ketoconazole  (NIZORAL) 2 % cream, Apply topically daily, Disp: 60 g, Rfl: 0    levocetirizine (XYZAL) 5 MG tablet, Take 1 tablet (5 mg total) by mouth daily At 8 PM, Disp: 90 tablet, Rfl: 0    levothyroxine 75 mcg tablet, Take 1 tablet (75 mcg total) by mouth daily, Disp: 90 tablet, Rfl: 1    Milk of Magnesia 400 MG/5ML oral suspension, , Disp: , Rfl:     Mouthwashes (LISTERINE ANTISEPTIC ADVANCED MT), by Per J Tube route, Disp: , Rfl:     Multiple Vitamins-Minerals (Multivitamin Women 50+) TABS, Take 1 tablet by mouth in the morning, Disp: 90 tablet, Rfl: 3    NON FORMULARY, , Disp: , Rfl:     nystatin (MYCOSTATIN) powder, Apply topically 3 (three) times a day as needed (rash), Disp: 15 g, Rfl: 1    pseudoephedrine (SUDAFED) 30 mg tablet, , Disp: , Rfl:     sodium chloride (OCEAN) 0.65 % nasal spray, 1 spray into each nostril every hour as needed for congestion, Disp: 15 mL, Rfl: 2    Spacer/Aero Chamber Mouthpiece (SPACER DEVICE) for metered dose inhaler, For use with metered dose inhaler, Disp: 1 Device, Rfl: 0    albuterol (PROVENTIL HFA,VENTOLIN HFA) 90 mcg/act inhaler, Inhale 2 puffs every 4 (four) hours as needed for wheezing, Disp: 18 g, Rfl: 2    clotrimazole 1 % external solution, Apply 1 application topically 2 (two) times a day for 14 days Affected area for fungal skin infection 8 am / 8 pm, Disp: 30 mL, Rfl: 0    ergocalciferol (VITAMIN D2) 50,000 units, Take 1 capsule (50,000 Units total) by mouth once a week Administer on sunday, Disp: 4 capsule, Rfl: 2    mupirocin (BACTROBAN) 2 % ointment, Apply topically 3 (three) times a day for 7 days, Disp: 30 g, Rfl: 0    polyethylene glycol (MIRALAX) 17 g packet, Take 17 g by mouth daily for 7 days Daily with breakfast Do not start before January 12, 2023., Disp: 119 g, Rfl: 0    valACYclovir (VALTREX) 1,000 mg tablet, Take 1 tablet (1,000 mg total) by mouth 3 (three) times a day for 7 days Only take if you have active HSV flare up (Patient not taking: Reported on  6/28/2023), Disp: 21 tablet, Rfl: 0    Current Allergies     Allergies as of 11/17/2024 - Reviewed 11/17/2024   Allergen Reaction Noted    Avandia [rosiglitazone]  05/28/2014    Nsaids Other (See Comments) 01/07/2021            The following portions of the patient's history were reviewed and updated as appropriate: allergies, current medications, past family history, past medical history, past social history, past surgical history and problem list.     Past Medical History:   Diagnosis Date    Asthma     Complex partial epilepsy (HCC)     COVID 02/2021    Diabetes mellitus (HCC)     Disease of thyroid gland     Down syndrome     Heart murmur     Hydradenitis     Hyperlipidemia     Long Q-T syndrome     Pneumonia     Last assessed 5/28/2014     Psychiatric disorder     schizo    Sleep apnea        Past Surgical History:   Procedure Laterality Date    CARDIAC SURGERY      patent duct    PATENT DUCTUS ARTERIOUS LIGATION         Family History   Adopted: Yes   Problem Relation Age of Onset    No Known Problems Mother          Medications have been verified.        Objective   LMP  (LMP Unknown)   No LMP recorded (lmp unknown). Patient is postmenopausal.       Physical Exam     Physical Exam  Vitals and nursing note reviewed.   Constitutional:       General: She is not in acute distress.     Appearance: She is well-developed. She is not diaphoretic.   HENT:      Mouth/Throat:      Pharynx: Uvula midline.   Cardiovascular:      Rate and Rhythm: Normal rate and regular rhythm.      Pulses: Normal pulses.      Heart sounds: Normal heart sounds, S1 normal and S2 normal. No murmur heard.  Pulmonary:      Effort: Pulmonary effort is normal. No tachypnea, accessory muscle usage or respiratory distress.      Breath sounds: Normal breath sounds. No stridor. No decreased breath sounds, wheezing, rhonchi or rales.   Skin:     Capillary Refill: Capillary refill takes less than 2 seconds.      Findings: Rash present.      Comments:  Beefy red erythematous rash over the perineum, very tender to touch. No oozing or drainage. No pustular or vesicular lesions.

## 2024-11-17 NOTE — PATIENT INSTRUCTIONS
Intertrigo (candidiasis) likely secondary to urinary incontinence  -Daily cleansing of the area is advised with mild soap and warm water. Use a blow dryer to get the area completely dry  -Aeration of the area. Barrier cream like zinc oxide/Desitin is highly encouraged  -Dry powders to the area after bathing or when exercising/sweating.  -Ketoconazole cream once daily for two weeks for itching and relief of the inflammation and possible underlying candidal cause.  -Wear cotton bras and tshirts and underwear. Consider depends.   -Follow up immediately for worsening symptoms or sign of infection as discussed. Urology follow up advised.

## 2024-11-18 DIAGNOSIS — E03.9 ACQUIRED HYPOTHYROIDISM: ICD-10-CM

## 2024-11-19 RX ORDER — LEVOTHYROXINE SODIUM 75 UG/1
75 TABLET ORAL DAILY
Qty: 90 TABLET | Refills: 1 | Status: SHIPPED | OUTPATIENT
Start: 2024-11-19

## 2024-12-08 ENCOUNTER — HOSPITAL ENCOUNTER (OUTPATIENT)
Facility: HOSPITAL | Age: 49
Setting detail: OBSERVATION
Discharge: HOME | End: 2024-12-09
Attending: EMERGENCY MEDICINE | Admitting: INTERNAL MEDICINE
Payer: MEDICARE

## 2024-12-08 ENCOUNTER — APPOINTMENT (EMERGENCY)
Dept: RADIOLOGY | Facility: HOSPITAL | Age: 49
End: 2024-12-08
Payer: MEDICARE

## 2024-12-08 DIAGNOSIS — K20.90 ESOPHAGITIS: Primary | ICD-10-CM

## 2024-12-08 DIAGNOSIS — E03.9 HYPOTHYROIDISM: ICD-10-CM

## 2024-12-08 DIAGNOSIS — G47.30 SLEEP APNEA: ICD-10-CM

## 2024-12-08 DIAGNOSIS — R09.02 HYPOXIA: ICD-10-CM

## 2024-12-08 PROBLEM — G93.41 ACUTE METABOLIC ENCEPHALOPATHY: Status: ACTIVE | Noted: 2024-12-08

## 2024-12-08 PROBLEM — B37.31 GENITAL CANDIDIASIS IN FEMALE: Status: ACTIVE | Noted: 2024-12-08

## 2024-12-08 LAB
ALBUMIN SERPL BCG-MCNC: 3.5 G/DL (ref 3.5–5)
ALP SERPL-CCNC: 72 U/L (ref 34–104)
ALT SERPL W P-5'-P-CCNC: 19 U/L (ref 7–52)
ANION GAP SERPL CALCULATED.3IONS-SCNC: 4 MMOL/L (ref 4–13)
AST SERPL W P-5'-P-CCNC: 30 U/L (ref 13–39)
BACTERIA UR QL AUTO: NORMAL /HPF
BASOPHILS # BLD AUTO: 0.02 THOUSANDS/ÂΜL (ref 0–0.1)
BASOPHILS NFR BLD AUTO: 0 % (ref 0–1)
BILIRUB SERPL-MCNC: 0.28 MG/DL (ref 0.2–1)
BILIRUB UR QL STRIP: NEGATIVE
BUN SERPL-MCNC: 17 MG/DL (ref 5–25)
CALCIUM SERPL-MCNC: 8.5 MG/DL (ref 8.4–10.2)
CHLORIDE SERPL-SCNC: 104 MMOL/L (ref 96–108)
CLARITY UR: CLEAR
CO2 SERPL-SCNC: 32 MMOL/L (ref 21–32)
COLOR UR: COLORLESS
CREAT SERPL-MCNC: 1.11 MG/DL (ref 0.6–1.3)
EOSINOPHIL # BLD AUTO: 0.02 THOUSAND/ÂΜL (ref 0–0.61)
EOSINOPHIL NFR BLD AUTO: 0 % (ref 0–6)
ERYTHROCYTE [DISTWIDTH] IN BLOOD BY AUTOMATED COUNT: 13.1 % (ref 11.6–15.1)
FLUAV AG UPPER RESP QL IA.RAPID: NEGATIVE
FLUBV AG UPPER RESP QL IA.RAPID: NEGATIVE
GFR SERPL CREATININE-BSD FRML MDRD: 58 ML/MIN/1.73SQ M
GLUCOSE SERPL-MCNC: 143 MG/DL (ref 65–140)
GLUCOSE UR STRIP-MCNC: NEGATIVE MG/DL
HCT VFR BLD AUTO: 43.4 % (ref 34.8–46.1)
HGB BLD-MCNC: 14.2 G/DL (ref 11.5–15.4)
HGB UR QL STRIP.AUTO: NEGATIVE
IMM GRANULOCYTES # BLD AUTO: 0.03 THOUSAND/UL (ref 0–0.2)
IMM GRANULOCYTES NFR BLD AUTO: 1 % (ref 0–2)
KETONES UR STRIP-MCNC: NEGATIVE MG/DL
LEUKOCYTE ESTERASE UR QL STRIP: ABNORMAL
LIPASE SERPL-CCNC: 16 U/L (ref 11–82)
LYMPHOCYTES # BLD AUTO: 1.51 THOUSANDS/ÂΜL (ref 0.6–4.47)
LYMPHOCYTES NFR BLD AUTO: 29 % (ref 14–44)
MAGNESIUM SERPL-MCNC: 2.2 MG/DL (ref 1.9–2.7)
MCH RBC QN AUTO: 33 PG (ref 26.8–34.3)
MCHC RBC AUTO-ENTMCNC: 32.7 G/DL (ref 31.4–37.4)
MCV RBC AUTO: 101 FL (ref 82–98)
MONOCYTES # BLD AUTO: 0.6 THOUSAND/ÂΜL (ref 0.17–1.22)
MONOCYTES NFR BLD AUTO: 12 % (ref 4–12)
NEUTROPHILS # BLD AUTO: 2.95 THOUSANDS/ÂΜL (ref 1.85–7.62)
NEUTS SEG NFR BLD AUTO: 58 % (ref 43–75)
NITRITE UR QL STRIP: NEGATIVE
NON-SQ EPI CELLS URNS QL MICRO: NORMAL /HPF
NRBC BLD AUTO-RTO: 0 /100 WBCS
PH UR STRIP.AUTO: 6 [PH]
PLATELET # BLD AUTO: 217 THOUSANDS/UL (ref 149–390)
PMV BLD AUTO: 9.4 FL (ref 8.9–12.7)
POTASSIUM SERPL-SCNC: 4.2 MMOL/L (ref 3.5–5.3)
PROCALCITONIN SERPL-MCNC: <0.05 NG/ML
PROT SERPL-MCNC: 6.6 G/DL (ref 6.4–8.4)
PROT UR STRIP-MCNC: NEGATIVE MG/DL
RBC # BLD AUTO: 4.3 MILLION/UL (ref 3.81–5.12)
RBC #/AREA URNS AUTO: NORMAL /HPF
SARS-COV+SARS-COV-2 AG RESP QL IA.RAPID: NEGATIVE
SODIUM SERPL-SCNC: 140 MMOL/L (ref 135–147)
SP GR UR STRIP.AUTO: 1.01 (ref 1–1.03)
TSH SERPL DL<=0.05 MIU/L-ACNC: 4.65 UIU/ML (ref 0.45–4.5)
UROBILINOGEN UR STRIP-ACNC: <2 MG/DL
WBC # BLD AUTO: 5.13 THOUSAND/UL (ref 4.31–10.16)
WBC #/AREA URNS AUTO: NORMAL /HPF

## 2024-12-08 PROCEDURE — 87804 INFLUENZA ASSAY W/OPTIC: CPT | Performed by: EMERGENCY MEDICINE

## 2024-12-08 PROCEDURE — 94660 CPAP INITIATION&MGMT: CPT

## 2024-12-08 PROCEDURE — 83690 ASSAY OF LIPASE: CPT | Performed by: EMERGENCY MEDICINE

## 2024-12-08 PROCEDURE — 74177 CT ABD & PELVIS W/CONTRAST: CPT

## 2024-12-08 PROCEDURE — 99285 EMERGENCY DEPT VISIT HI MDM: CPT | Performed by: EMERGENCY MEDICINE

## 2024-12-08 PROCEDURE — 87811 SARS-COV-2 COVID19 W/OPTIC: CPT | Performed by: EMERGENCY MEDICINE

## 2024-12-08 PROCEDURE — 84439 ASSAY OF FREE THYROXINE: CPT | Performed by: EMERGENCY MEDICINE

## 2024-12-08 PROCEDURE — 96360 HYDRATION IV INFUSION INIT: CPT

## 2024-12-08 PROCEDURE — 71045 X-RAY EXAM CHEST 1 VIEW: CPT

## 2024-12-08 PROCEDURE — 83735 ASSAY OF MAGNESIUM: CPT | Performed by: EMERGENCY MEDICINE

## 2024-12-08 PROCEDURE — 36415 COLL VENOUS BLD VENIPUNCTURE: CPT | Performed by: EMERGENCY MEDICINE

## 2024-12-08 PROCEDURE — 84145 PROCALCITONIN (PCT): CPT | Performed by: INTERNAL MEDICINE

## 2024-12-08 PROCEDURE — 99223 1ST HOSP IP/OBS HIGH 75: CPT | Performed by: INTERNAL MEDICINE

## 2024-12-08 PROCEDURE — 99285 EMERGENCY DEPT VISIT HI MDM: CPT

## 2024-12-08 PROCEDURE — 80053 COMPREHEN METABOLIC PANEL: CPT | Performed by: EMERGENCY MEDICINE

## 2024-12-08 PROCEDURE — 85025 COMPLETE CBC W/AUTO DIFF WBC: CPT | Performed by: EMERGENCY MEDICINE

## 2024-12-08 PROCEDURE — 94760 N-INVAS EAR/PLS OXIMETRY 1: CPT

## 2024-12-08 PROCEDURE — 81001 URINALYSIS AUTO W/SCOPE: CPT | Performed by: EMERGENCY MEDICINE

## 2024-12-08 PROCEDURE — 84443 ASSAY THYROID STIM HORMONE: CPT | Performed by: EMERGENCY MEDICINE

## 2024-12-08 PROCEDURE — 0202U NFCT DS 22 TRGT SARS-COV-2: CPT | Performed by: INTERNAL MEDICINE

## 2024-12-08 RX ORDER — ACETAMINOPHEN 325 MG/1
650 TABLET ORAL EVERY 4 HOURS PRN
Status: DISCONTINUED | OUTPATIENT
Start: 2024-12-08 | End: 2024-12-09 | Stop reason: HOSPADM

## 2024-12-08 RX ORDER — ALBUTEROL SULFATE 90 UG/1
2 INHALANT RESPIRATORY (INHALATION) EVERY 4 HOURS PRN
Status: DISCONTINUED | OUTPATIENT
Start: 2024-12-08 | End: 2024-12-09 | Stop reason: HOSPADM

## 2024-12-08 RX ORDER — HEPARIN SODIUM 5000 [USP'U]/ML
5000 INJECTION, SOLUTION INTRAVENOUS; SUBCUTANEOUS EVERY 8 HOURS SCHEDULED
Status: DISCONTINUED | OUTPATIENT
Start: 2024-12-08 | End: 2024-12-09 | Stop reason: HOSPADM

## 2024-12-08 RX ORDER — ONDANSETRON 2 MG/ML
4 INJECTION INTRAMUSCULAR; INTRAVENOUS EVERY 4 HOURS PRN
Status: DISCONTINUED | OUTPATIENT
Start: 2024-12-08 | End: 2024-12-09 | Stop reason: HOSPADM

## 2024-12-08 RX ORDER — POLYETHYLENE GLYCOL 3350 17 G/17G
17 POWDER, FOR SOLUTION ORAL DAILY
Status: DISCONTINUED | OUTPATIENT
Start: 2024-12-09 | End: 2024-12-09 | Stop reason: HOSPADM

## 2024-12-08 RX ORDER — DIVALPROEX SODIUM 500 MG/1
1500 TABLET, FILM COATED, EXTENDED RELEASE ORAL DAILY
Status: DISCONTINUED | OUTPATIENT
Start: 2024-12-09 | End: 2024-12-09 | Stop reason: HOSPADM

## 2024-12-08 RX ORDER — CALCIUM CARBONATE 500 MG/1
500 TABLET, CHEWABLE ORAL
Status: DISCONTINUED | OUTPATIENT
Start: 2024-12-08 | End: 2024-12-09 | Stop reason: HOSPADM

## 2024-12-08 RX ORDER — ARIPIPRAZOLE 10 MG/1
10 TABLET ORAL DAILY
Status: DISCONTINUED | OUTPATIENT
Start: 2024-12-09 | End: 2024-12-09 | Stop reason: HOSPADM

## 2024-12-08 RX ORDER — PANTOPRAZOLE SODIUM 40 MG/1
40 TABLET, DELAYED RELEASE ORAL
Status: DISCONTINUED | OUTPATIENT
Start: 2024-12-09 | End: 2024-12-09

## 2024-12-08 RX ORDER — LORATADINE 10 MG/1
5 TABLET ORAL DAILY
Status: DISCONTINUED | OUTPATIENT
Start: 2024-12-08 | End: 2024-12-09 | Stop reason: HOSPADM

## 2024-12-08 RX ORDER — NYSTATIN 100000 [USP'U]/G
POWDER TOPICAL 3 TIMES DAILY
Status: DISCONTINUED | OUTPATIENT
Start: 2024-12-08 | End: 2024-12-09 | Stop reason: HOSPADM

## 2024-12-08 RX ORDER — LEVOTHYROXINE SODIUM 75 UG/1
75 TABLET ORAL DAILY
Status: DISCONTINUED | OUTPATIENT
Start: 2024-12-09 | End: 2024-12-09

## 2024-12-08 RX ORDER — ATORVASTATIN CALCIUM 10 MG/1
10 TABLET, FILM COATED ORAL DAILY
Status: DISCONTINUED | OUTPATIENT
Start: 2024-12-08 | End: 2024-12-09 | Stop reason: HOSPADM

## 2024-12-08 RX ADMIN — IOHEXOL 100 ML: 350 INJECTION, SOLUTION INTRAVENOUS at 14:13

## 2024-12-08 RX ADMIN — LORATADINE 5 MG: 10 TABLET ORAL at 19:03

## 2024-12-08 RX ADMIN — SODIUM CHLORIDE 500 ML: 0.9 INJECTION, SOLUTION INTRAVENOUS at 12:55

## 2024-12-08 RX ADMIN — HEPARIN SODIUM 5000 UNITS: 5000 INJECTION, SOLUTION INTRAVENOUS; SUBCUTANEOUS at 19:03

## 2024-12-08 RX ADMIN — NYSTATIN: 100000 POWDER TOPICAL at 21:45

## 2024-12-08 RX ADMIN — ACETAMINOPHEN 650 MG: 325 TABLET ORAL at 19:03

## 2024-12-08 RX ADMIN — ATORVASTATIN CALCIUM 10 MG: 10 TABLET, FILM COATED ORAL at 19:03

## 2024-12-08 RX ADMIN — ANTACID TABLETS 500 MG: 500 TABLET, CHEWABLE ORAL at 19:03

## 2024-12-08 NOTE — ASSESSMENT & PLAN NOTE
Dilatation of distal esophagus with thickening concerning for esophagitis.    Will start pantoprazole and will need outpatient GI follow-up

## 2024-12-08 NOTE — ED PROVIDER NOTES
Time reflects when diagnosis was documented in both MDM as applicable and the Disposition within this note       Time User Action Codes Description Comment    12/8/2024  3:53 PM Nathaniel Shields Add [K20.90] Esophagitis     12/8/2024  3:53 PM Nathaniel Shields Add [R09.02] Hypoxia     12/8/2024  3:53 PM Nathaniel Shields Add [G47.30] Sleep apnea           ED Disposition       ED Disposition   Admit    Condition   Stable    Date/Time   Sun Dec 8, 2024  3:53 PM    Comment   Case was discussed with [place] and the patient's admission status was agreed to be Admission Status: observation status to the service of Dr. Fu .               Assessment & Plan       Medical Decision Making  49-year-old Down syndrome female here with her mother who states she is not acting right    Amount and/or Complexity of Data Reviewed  Labs: ordered.  Radiology: ordered.    Risk  Prescription drug management.  Decision regarding hospitalization.             Medications   sodium chloride 0.9 % bolus 500 mL (500 mL Intravenous New Bag 12/8/24 1255)   iohexol (OMNIPAQUE) 350 MG/ML injection (MULTI-DOSE) 100 mL (100 mL Intravenous Given 12/8/24 1413)       ED Risk Strat Scores                          SBIRT 22yo+      Flowsheet Row Most Recent Value   Initial Alcohol Screen: US AUDIT-C     1. How often do you have a drink containing alcohol? 0 Filed at: 12/08/2024 1136   2. How many drinks containing alcohol do you have on a typical day you are drinking?  0 Filed at: 12/08/2024 1136   3a. Male UNDER 65: How often do you have five or more drinks on one occasion? 0 Filed at: 12/08/2024 1136   3b. FEMALE Any Age, or MALE 65+: How often do you have 4 or more drinks on one occassion? 0 Filed at: 12/08/2024 1136   Audit-C Score 0 Filed at: 12/08/2024 1136   NEREIDA: How many times in the past year have you...    Used an illegal drug or used a prescription medication for non-medical reasons? Never Filed at: 12/08/2024 1136                             History of Present Illness       Chief Complaint   Patient presents with    Weakness - Generalized     Pt's mother states patient has not been acting normal. Patient verbalized abdominal and pelvic pain.        Past Medical History:   Diagnosis Date    Asthma     Complex partial epilepsy (HCC)     COVID 02/2021    Diabetes mellitus (HCC)     Disease of thyroid gland     Down syndrome     Heart murmur     Hydradenitis     Hyperlipidemia     Long Q-T syndrome     Pneumonia     Last assessed 5/28/2014     Psychiatric disorder     schizo    Sleep apnea       Past Surgical History:   Procedure Laterality Date    CARDIAC SURGERY      patent duct    PATENT DUCTUS ARTERIOUS LIGATION        Family History   Adopted: Yes   Problem Relation Age of Onset    No Known Problems Mother       Social History     Tobacco Use    Smoking status: Never    Smokeless tobacco: Never   Vaping Use    Vaping status: Never Used   Substance Use Topics    Alcohol use: Never    Drug use: Never      E-Cigarette/Vaping    E-Cigarette Use Never User       E-Cigarette/Vaping Substances    Nicotine No     THC No     CBD No     Flavoring No     Other No     Unknown No       I have reviewed and agree with the history as documented.     49-year-old female with Down syndrome presents here with her mother who states that she is not acting like she normally does today she is usually awake alert looking forward to Sundays has his coffee day and they get the lunch out.  She states she has been very tired and sleeping a lot when she is awake she is complaining of pain but the mom cannot seem to find out where her pain or discomfort is at this time.  No obvious fevers no nausea vomiting or diarrhea patient did recently have a fungal infection in the folds of her skin down around her buttocks.  She recently had a checkup with Dr. Yang who states urology wise she was fine.        Review of Systems   Constitutional:  Negative for activity change, chills,  diaphoresis and fever.   HENT:  Negative for congestion, ear pain, nosebleeds, sore throat, trouble swallowing and voice change.    Eyes:  Negative for pain, discharge and redness.   Respiratory:  Negative for apnea, cough, choking, shortness of breath, wheezing and stridor.    Cardiovascular:  Negative for chest pain and palpitations.   Gastrointestinal:  Negative for abdominal distention, abdominal pain, constipation, diarrhea, nausea and vomiting.   Endocrine: Negative for polydipsia.   Genitourinary:  Negative for difficulty urinating, dysuria, flank pain, frequency, hematuria and urgency.   Musculoskeletal:  Negative for back pain, gait problem, joint swelling, myalgias, neck pain and neck stiffness.   Skin:  Negative for pallor and rash.   Neurological:  Positive for weakness. Negative for dizziness, tremors, syncope, speech difficulty, numbness and headaches.   Hematological:  Negative for adenopathy.   Psychiatric/Behavioral:  Negative for confusion, hallucinations, self-injury and suicidal ideas. The patient is not nervous/anxious.            Objective       ED Triage Vitals   Temperature Pulse Blood Pressure Respirations SpO2 Patient Position - Orthostatic VS   12/08/24 1133 12/08/24 1133 12/08/24 1133 12/08/24 1133 12/08/24 1133 12/08/24 1330   97.5 °F (36.4 °C) 90 132/86 22 92 % Lying      Temp Source Heart Rate Source BP Location FiO2 (%) Pain Score    12/08/24 1133 12/08/24 1133 12/08/24 1330 -- --    Temporal Monitor Left arm        Vitals      Date and Time Temp Pulse SpO2 Resp BP Pain Score FACES Pain Rating User   12/08/24 1500 -- 70 92 % 16 101/51 -- -- BY   12/08/24 1400 -- 62 97 % 16 101/59 -- -- KR   12/08/24 1330 -- 61 100 % 16 117/63 -- -- KR   12/08/24 1133 97.5 °F (36.4 °C) 90 92 % 22 132/86 -- -- AM            Physical Exam  Vitals and nursing note reviewed.   Constitutional:       General: She is not in acute distress.     Appearance: She is well-developed. She is not diaphoretic.       Comments: Patient pretty much nonverbal however does seem like something is bothering her but she is unable to state what.  When patient has not been examined she does doze off and fall asleep.  During those times her O2 sat drops to 86 to 88%   HENT:      Head: Normocephalic and atraumatic.      Right Ear: External ear normal.      Left Ear: External ear normal.      Nose: Nose normal.   Eyes:      Conjunctiva/sclera: Conjunctivae normal.      Pupils: Pupils are equal, round, and reactive to light.   Cardiovascular:      Rate and Rhythm: Normal rate and regular rhythm.      Heart sounds: Normal heart sounds.   Pulmonary:      Effort: Pulmonary effort is normal.      Breath sounds: Normal breath sounds.   Abdominal:      General: Bowel sounds are normal.      Palpations: Abdomen is soft.      Tenderness: There is abdominal tenderness.      Comments: Diffuse tenderness   Musculoskeletal:         General: Normal range of motion.      Cervical back: Normal range of motion and neck supple.   Skin:     General: Skin is warm and dry.   Neurological:      Mental Status: She is alert and oriented to person, place, and time.      Deep Tendon Reflexes: Reflexes are normal and symmetric.         Results Reviewed       Procedure Component Value Units Date/Time    Urine Microscopic [[id number]]  (Normal) Collected: 12/08/24 1313    Lab Status: Final result Specimen: Urine, Clean Catch Updated: 12/08/24 1346     RBC, UA None Seen /hpf      WBC, UA 2-4 /hpf      Epithelial Cells Occasional /hpf      Bacteria, UA Occasional /hpf     UA (URINE) with reflex to Scope [[id number]]  (Abnormal) Collected: 12/08/24 1313    Lab Status: Final result Specimen: Urine, Clean Catch Updated: 12/08/24 1338     Color, UA Colorless     Clarity, UA Clear     Specific Gravity, UA 1.010     pH, UA 6.0     Leukocytes, UA Small     Nitrite, UA Negative     Protein, UA Negative mg/dl      Glucose, UA Negative mg/dl      Ketones, UA Negative mg/dl       Urobilinogen, UA <2.0 mg/dl      Bilirubin, UA Negative     Occult Blood, UA Negative    TSH, 3rd generation with Free T4 reflex [[id number]]  (Abnormal) Collected: 12/08/24 1253    Lab Status: Final result Specimen: Blood from Arm, Right Updated: 12/08/24 1335     TSH 3RD GENERATON 4.647 uIU/mL     T4, free [[id number]] Collected: 12/08/24 1253    Lab Status: In process Specimen: Blood from Arm, Right Updated: 12/08/24 1334    FLU/COVID Rapid Antigen (30 min. TAT) - Preferred screening test in ED [[id number]]  (Normal) Collected: 12/08/24 1253    Lab Status: Final result Specimen: Nares from Nose Updated: 12/08/24 1322     SARS COV Rapid Antigen Negative     Influenza A Rapid Antigen Negative     Influenza B Rapid Antigen Negative    Narrative:      This test has been performed using the Quidel Suzanne 2 FLU+SARS Antigen test under the Emergency Use Authorization (EUA). This test has been validated by the  and verified by the performing laboratory. The Suzanne uses lateral flow immunofluorescent sandwich assay to detect SARS-COV, Influenza A and Influenza B Antigen.     The Quidel Suzanne 2 SARS Antigen test does not differentiate between SARS-CoV and SARS-CoV-2.     Negative results are presumptive and may be confirmed with a molecular assay, if necessary, for patient management. Negative results do not rule out SARS-CoV-2 or influenza infection and should not be used as the sole basis for treatment or patient management decisions. A negative test result may occur if the level of antigen in a sample is below the limit of detection of this test.     Positive results are indicative of the presence of viral antigens, but do not rule out bacterial infection or co-infection with other viruses.     All test results should be used as an adjunct to clinical observations and other information available to the provider.    FOR PEDIATRIC PATIENTS - copy/paste COVID Guidelines URL to browser:  [email or web address]    Comprehensive metabolic panel [[id number]]  (Abnormal) Collected: 12/08/24 1253    Lab Status: Final result Specimen: Blood from Arm, Right Updated: 12/08/24 1319     Sodium 140 mmol/L      Potassium 4.2 mmol/L      Chloride 104 mmol/L      CO2 32 mmol/L      ANION GAP 4 mmol/L      BUN 17 mg/dL      Creatinine 1.11 mg/dL      Glucose 143 mg/dL      Calcium 8.5 mg/dL      AST 30 U/L      ALT 19 U/L      Alkaline Phosphatase 72 U/L      Total Protein 6.6 g/dL      Albumin 3.5 g/dL      Total Bilirubin 0.28 mg/dL      eGFR 58 ml/min/1.73sq m     Narrative:      National Kidney Disease Foundation guidelines for Chronic Kidney Disease (CKD):     Stage 1 with normal or high GFR (GFR > 90 mL/min/1.73 square meters)    Stage 2 Mild CKD (GFR = 60-89 mL/min/1.73 square meters)    Stage 3A Moderate CKD (GFR = 45-59 mL/min/1.73 square meters)    Stage 3B Moderate CKD (GFR = 30-44 mL/min/1.73 square meters)    Stage 4 Severe CKD (GFR = 15-29 mL/min/1.73 square meters)    Stage 5 End Stage CKD (GFR <15 mL/min/1.73 square meters)  Note: GFR calculation is accurate only with a steady state creatinine    Lipase [[id number]]  (Normal) Collected: 12/08/24 1253    Lab Status: Final result Specimen: Blood from Arm, Right Updated: 12/08/24 1319     Lipase 16 u/L     Magnesium [[id number]]  (Normal) Collected: 12/08/24 1253    Lab Status: Final result Specimen: Blood from Arm, Right Updated: 12/08/24 1319     Magnesium 2.2 mg/dL     CBC and differential [[id number]]  (Abnormal) Collected: 12/08/24 1253    Lab Status: Final result Specimen: Blood from Arm, Right Updated: 12/08/24 1303     WBC 5.13 Thousand/uL      RBC 4.30 Million/uL      Hemoglobin 14.2 g/dL      Hematocrit 43.4 %       fL      MCH 33.0 pg      MCHC 32.7 g/dL      RDW 13.1 %      MPV 9.4 fL      Platelets 217 Thousands/uL      nRBC 0 /100 WBCs      Segmented % 58 %      Immature Grans % 1 %       Lymphocytes % 29 %      Monocytes % 12 %      Eosinophils Relative 0 %      Basophils Relative 0 %      Absolute Neutrophils 2.95 Thousands/µL      Absolute Immature Grans 0.03 Thousand/uL      Absolute Lymphocytes 1.51 Thousands/µL      Absolute Monocytes 0.60 Thousand/µL      Eosinophils Absolute 0.02 Thousand/µL      Basophils Absolute 0.02 Thousands/µL             CT abdomen pelvis with contrast   Final Interpretation by Funmi Reed MD (12/08 8536)         1. Dilatation of the distal esophagus with mild wall thickening. Outpatient evaluation should be considered for esophagitis.   2. No inflammatory changes or bowel obstruction.         Workstation performed: [id number]         XR chest 1 view portable    (Results Pending)       Procedures    ED Medication and Procedure Management   Prior to Admission Medications   Prescriptions Last Dose Informant Patient Reported? Taking?   ARIPiprazole (ABILIFY) 10 mg tablet   No No   Sig: Take 1 tablet (10 mg total) by mouth daily Daily at 8 AM   Anti-Dandruff 1 %   Yes No   Eyelid Cleansers (OCUSOFT BABY EYELID & EYELASH EX)  Care Giver, Outside Facility (Specify) Yes No   Sig: Inhale   Milk of Magnesia 400 MG/5ML oral suspension   Yes No   Mouthwashes (LISTERINE ANTISEPTIC ADVANCED MT)  Care Giver, Outside Facility (Specify) Yes No   Sig: by Per J Tube route   Multiple Vitamins-Minerals (Multivitamin Women 50+) TABS   No No   Sig: Take 1 tablet by mouth in the morning   NON FORMULARY  Care Giver, Outside Facility (Specify) Yes No   Spacer/Aero Chamber Mouthpiece (SPACER DEVICE) for metered dose inhaler  Care Giver, Outside Facility (Specify) No No   Sig: For use with metered dose inhaler   albuterol (PROVENTIL HFA,VENTOLIN HFA) 90 mcg/act inhaler   No No   Sig: Inhale 2 puffs every 4 (four) hours as needed for wheezing   atorvastatin (LIPITOR) 10 mg tablet   No No   Sig: Take 1 tablet (10 mg total) by mouth daily At 8pm   bacitracin topical ointment 500  units/g topical ointment   No No   Sig: Apply 1 large application topically 3 (three) times a day as needed for wound care   calcium carbonate (OS-ESTEPHANIA) 600 MG tablet  Care Giver, Outside Facility (Specify) No No   Sig: Take 1 tablet (600 mg total) by mouth daily Daily with breakfast   clindamycin (CLINDAGEL) 1 % gel   No No   Sig: Apply topically 2 (two) times a day Apply to L axilla at 8 am / 8pm   clotrimazole 1 % external solution  Outside Facility (Specify) No No   Sig: Apply 1 application topically 2 (two) times a day for 14 days Affected area for fungal skin infection 8 am / 8 pm   divalproex sodium (DEPAKOTE ER) 500 mg 24 hr tablet   No No   Sig: Take 3 tablets (1,500 mg total) by mouth daily at 8 AM   ergocalciferol (VITAMIN D2) 50,000 units  Care Giver, Outside Facility (Specify) No No   Sig: Take 1 capsule (50,000 Units total) by mouth once a week Administer on sunday   erythromycin (ILOTYCIN) ophthalmic ointment  Care Giver, Outside Facility (Specify) Yes No   fluticasone (FLONASE) 50 mcg/act nasal spray   No No   Sig: Use 2 sprays in each nostril daily at bedtime as needed for nasal congestion.   hydrocortisone 1 % cream   No No   Sig: Apply topically 4 (four) times a day as needed (apply small amount to eyelid 3 times a day) May apply to dryness of both upper eyelids   ketoconazole (NIZORAL) 2 % cream   No No   Sig: Apply topically daily   levocetirizine (XYZAL) 5 MG tablet   No No   Sig: Take 1 tablet (5 mg total) by mouth daily At 8 PM   levothyroxine 75 mcg tablet   No No   Sig: Take 1 tablet (75 mcg total) by mouth daily   mupirocin (BACTROBAN) 2 % ointment   No No   Sig: Apply topically 3 (three) times a day for 7 days   nystatin (MYCOSTATIN) powder   No No   Sig: Apply topically 3 (three) times a day as needed (rash)   polyethylene glycol (MIRALAX) 17 g packet   No No   Sig: Take 17 g by mouth daily for 7 days Daily with breakfast Do not start before January 12, 2023.   pseudoephedrine (SUDAFED)  30 mg tablet   Yes No   sodium chloride (OCEAN) 0.65 % nasal spray   No No   Si spray into each nostril every hour as needed for congestion   valACYclovir (VALTREX) 1,000 mg tablet   No No   Sig: Take 1 tablet (1,000 mg total) by mouth 3 (three) times a day for 7 days Only take if you have active HSV flare up   Patient not taking: Reported on 2023      Facility-Administered Medications: None     Patient's Medications   Discharge Prescriptions    No medications on file     No discharge procedures on file.  ED SEPSIS DOCUMENTATION   Time reflects when diagnosis was documented in both MDM as applicable and the Disposition within this note       Time User Action Codes Description Comment    2024  3:53 PM Nathaniel Shields [K20.90] Esophagitis     2024  3:53 PM Nathaniel Shields [R09.02] Hypoxia     2024  3:53 PM Nathaniel Shields [G47.30] Sleep apnea                  Nathaniel Shields DO  24 1558

## 2024-12-08 NOTE — ASSESSMENT & PLAN NOTE
History of Down syndrome ROBERTO asthma obesity diabetes mood disorder hypothyroidism and seizure disorder who presents for change in mental status and lethargy  Over the last 24 hours noted to be more lethargic  Falling asleep in [place] and having difficulty ambulating  Mother is concerned as constellation of symptoms previously were related to infection/pneumonia  Urinalysis and CXR unrevealing  Will check RP 2 panel and procalcitonin.  Have PT/OT evaluate for discharge needs as patient resides in a group home
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
## 2024-12-08 NOTE — ASSESSMENT & PLAN NOTE
History of diabetes now diet controlled not on medications    Results from last 7 days   Lab Units 12/08/24  1253   GLUCOSE RANDOM mg/dL 143*

## 2024-12-08 NOTE — PLAN OF CARE

## 2024-12-09 VITALS
OXYGEN SATURATION: 98 % | BODY MASS INDEX: 36.73 KG/M2 | WEIGHT: 175 LBS | HEIGHT: 58 IN | TEMPERATURE: 33.8 F | DIASTOLIC BLOOD PRESSURE: 71 MMHG | RESPIRATION RATE: 20 BRPM | SYSTOLIC BLOOD PRESSURE: 119 MMHG | HEART RATE: 67 BPM

## 2024-12-09 PROBLEM — B37.31 GENITAL CANDIDIASIS IN FEMALE: Status: RESOLVED | Noted: 2024-12-08 | Resolved: 2024-12-09

## 2024-12-09 LAB
ALBUMIN SERPL BCG-MCNC: 3.2 G/DL (ref 3.5–5)
ALP SERPL-CCNC: 69 U/L (ref 34–104)
ALT SERPL W P-5'-P-CCNC: 21 U/L (ref 7–52)
ANION GAP SERPL CALCULATED.3IONS-SCNC: 4 MMOL/L (ref 4–13)
AST SERPL W P-5'-P-CCNC: 29 U/L (ref 13–39)
B PARAP IS1001 DNA NPH QL NAA+NON-PROBE: NOT DETECTED
B PERT.PT PRMT NPH QL NAA+NON-PROBE: NOT DETECTED
BILIRUB SERPL-MCNC: 0.41 MG/DL (ref 0.2–1)
BUN SERPL-MCNC: 14 MG/DL (ref 5–25)
C PNEUM DNA NPH QL NAA+NON-PROBE: NOT DETECTED
CALCIUM ALBUM COR SERPL-MCNC: 9.2 MG/DL (ref 8.3–10.1)
CALCIUM SERPL-MCNC: 8.6 MG/DL (ref 8.4–10.2)
CHLORIDE SERPL-SCNC: 108 MMOL/L (ref 96–108)
CO2 SERPL-SCNC: 31 MMOL/L (ref 21–32)
CREAT SERPL-MCNC: 0.95 MG/DL (ref 0.6–1.3)
ERYTHROCYTE [DISTWIDTH] IN BLOOD BY AUTOMATED COUNT: 13.3 % (ref 11.6–15.1)
FLUAV RNA NPH QL NAA+NON-PROBE: NOT DETECTED
FLUBV RNA NPH QL NAA+NON-PROBE: NOT DETECTED
GFR SERPL CREATININE-BSD FRML MDRD: 71 ML/MIN/1.73SQ M
GLUCOSE P FAST SERPL-MCNC: 106 MG/DL (ref 65–99)
GLUCOSE SERPL-MCNC: 106 MG/DL (ref 65–140)
HADV DNA NPH QL NAA+NON-PROBE: NOT DETECTED
HCOV 229E RNA NPH QL NAA+NON-PROBE: NOT DETECTED
HCOV HKU1 RNA NPH QL NAA+NON-PROBE: NOT DETECTED
HCOV NL63 RNA NPH QL NAA+NON-PROBE: NOT DETECTED
HCOV OC43 RNA NPH QL NAA+NON-PROBE: NOT DETECTED
HCT VFR BLD AUTO: 42.6 % (ref 34.8–46.1)
HGB BLD-MCNC: 13.8 G/DL (ref 11.5–15.4)
HMPV RNA NPH QL NAA+NON-PROBE: NOT DETECTED
HPIV1 RNA NPH QL NAA+NON-PROBE: NOT DETECTED
HPIV2 RNA NPH QL NAA+NON-PROBE: NOT DETECTED
HPIV3 RNA NPH QL NAA+NON-PROBE: NOT DETECTED
HPIV4 RNA NPH QL NAA+NON-PROBE: NOT DETECTED
M PNEUMO DNA NPH QL NAA+NON-PROBE: NOT DETECTED
MCH RBC QN AUTO: 32.9 PG (ref 26.8–34.3)
MCHC RBC AUTO-ENTMCNC: 32.4 G/DL (ref 31.4–37.4)
MCV RBC AUTO: 101 FL (ref 82–98)
PLATELET # BLD AUTO: 197 THOUSANDS/UL (ref 149–390)
PMV BLD AUTO: 9.3 FL (ref 8.9–12.7)
POTASSIUM SERPL-SCNC: 4 MMOL/L (ref 3.5–5.3)
PROT SERPL-MCNC: 6.2 G/DL (ref 6.4–8.4)
RBC # BLD AUTO: 4.2 MILLION/UL (ref 3.81–5.12)
RSV RNA NPH QL NAA+NON-PROBE: NOT DETECTED
RV+EV RNA NPH QL NAA+NON-PROBE: NOT DETECTED
SARS-COV-2 RNA NPH QL NAA+NON-PROBE: NOT DETECTED
SODIUM SERPL-SCNC: 143 MMOL/L (ref 135–147)
T4 FREE SERPL-MCNC: 0.9 NG/DL (ref 0.61–1.12)
VALPROATE SERPL-MCNC: 39 UG/ML (ref 50–100)
WBC # BLD AUTO: 5.14 THOUSAND/UL (ref 4.31–10.16)

## 2024-12-09 PROCEDURE — 94760 N-INVAS EAR/PLS OXIMETRY 1: CPT

## 2024-12-09 PROCEDURE — 97163 PT EVAL HIGH COMPLEX 45 MIN: CPT

## 2024-12-09 PROCEDURE — 99239 HOSP IP/OBS DSCHRG MGMT >30: CPT | Performed by: INTERNAL MEDICINE

## 2024-12-09 PROCEDURE — 80053 COMPREHEN METABOLIC PANEL: CPT | Performed by: INTERNAL MEDICINE

## 2024-12-09 PROCEDURE — 87081 CULTURE SCREEN ONLY: CPT | Performed by: INTERNAL MEDICINE

## 2024-12-09 PROCEDURE — 97530 THERAPEUTIC ACTIVITIES: CPT

## 2024-12-09 PROCEDURE — 85027 COMPLETE CBC AUTOMATED: CPT | Performed by: INTERNAL MEDICINE

## 2024-12-09 PROCEDURE — 80164 ASSAY DIPROPYLACETIC ACD TOT: CPT | Performed by: INTERNAL MEDICINE

## 2024-12-09 RX ORDER — LEVOTHYROXINE SODIUM 88 UG/1
88 TABLET ORAL
Status: DISCONTINUED | OUTPATIENT
Start: 2024-12-10 | End: 2024-12-09 | Stop reason: HOSPADM

## 2024-12-09 RX ORDER — PANTOPRAZOLE SODIUM 40 MG/1
40 TABLET, DELAYED RELEASE ORAL
Qty: 60 TABLET | Refills: 0 | Status: SHIPPED | OUTPATIENT
Start: 2024-12-09

## 2024-12-09 RX ORDER — PANTOPRAZOLE SODIUM 40 MG/1
40 TABLET, DELAYED RELEASE ORAL
Status: DISCONTINUED | OUTPATIENT
Start: 2024-12-09 | End: 2024-12-09 | Stop reason: HOSPADM

## 2024-12-09 RX ORDER — LEVOTHYROXINE SODIUM 88 UG/1
88 TABLET ORAL
Qty: 30 TABLET | Refills: 0 | Status: SHIPPED | OUTPATIENT
Start: 2024-12-10 | End: 2025-01-09

## 2024-12-09 RX ADMIN — ARIPIPRAZOLE 10 MG: 10 TABLET ORAL at 08:20

## 2024-12-09 RX ADMIN — DIVALPROEX SODIUM 1500 MG: 500 TABLET, FILM COATED, EXTENDED RELEASE ORAL at 08:20

## 2024-12-09 RX ADMIN — HEPARIN SODIUM 5000 UNITS: 5000 INJECTION, SOLUTION INTRAVENOUS; SUBCUTANEOUS at 02:04

## 2024-12-09 RX ADMIN — LEVOTHYROXINE SODIUM 75 MCG: 75 TABLET ORAL at 08:20

## 2024-12-09 RX ADMIN — NYSTATIN: 100000 POWDER TOPICAL at 16:15

## 2024-12-09 RX ADMIN — ANTACID TABLETS 500 MG: 500 TABLET, CHEWABLE ORAL at 08:20

## 2024-12-09 RX ADMIN — NYSTATIN: 100000 POWDER TOPICAL at 08:26

## 2024-12-09 RX ADMIN — HEPARIN SODIUM 5000 UNITS: 5000 INJECTION, SOLUTION INTRAVENOUS; SUBCUTANEOUS at 11:51

## 2024-12-09 RX ADMIN — PANTOPRAZOLE SODIUM 40 MG: 40 TABLET, DELAYED RELEASE ORAL at 06:27

## 2024-12-09 RX ADMIN — ACETAMINOPHEN 650 MG: 325 TABLET ORAL at 01:52

## 2024-12-09 RX ADMIN — POLYETHYLENE GLYCOL 3350 17 G: 17 POWDER, FOR SOLUTION ORAL at 08:20

## 2024-12-09 RX ADMIN — PANTOPRAZOLE SODIUM 40 MG: 40 TABLET, DELAYED RELEASE ORAL at 15:25

## 2024-12-09 NOTE — OCCUPATIONAL THERAPY NOTE
OT EVALUATION       12/09/24 1525   Note Type   Note type Cancelled Session   Additional Comments OT attempted however pt being discharged and group home staff present.  Patient seen by PT this am and patient requires assist for ADLS at baseline.   Licensure   [place] License Number  Chely Biswas MS OTR/L [id number]

## 2024-12-09 NOTE — PHYSICAL THERAPY NOTE
"       PHYSICAL THERAPY EVALUATION/TREATMENT       12/09/24 1016   PT Last Visit   PT Visit Date 12/09/24   Note Type   Note type Evaluation   Pain Assessment   Pain Assessment Tool 0-10   Pain Score No Pain  (no pain at beginning of session ; reports increased pain at abdomina + R arm)   Pain Location/Orientation Orientation: Right;Location: Arm;Location: Abdomen   Pain Onset/Description Frequency: Intermittent;Descriptor: Sore   Patient's Stated Pain Goal No pain   Hospital Pain Intervention(s) Repositioned;Ambulation/increased activity   Restrictions/Precautions   Weight Bearing Precautions Per Order No   Other Precautions Bed Alarm;Chair Alarm;Fall Risk;Pain   Home Living   Type of Home Group Home  (Pt resides in group home ; per previous PT note pt spends the day with her mother)   Home Layout One level   Bathroom Toilet Standard   Prior Function   Level of [place] Independent with functional mobility;Needs assistance with ADLs;Needs assistance with IADLS  (Pt reports staff + mother assist her with ADLs but also able to do IND ; staff/family performs IADLs)   Lives With Facility staff   Receives Help From Family;Other (Comment)  (staff)   IADLs Family/Friend/Other provides transportation;Family/Friend/Other provides medication management;Family/Friend/Other provides meals   Falls in the last 6 months 0   General   Additional Pertinent History Pt is a 49 year-old female who was admitted to the hospital on 12/8/2024 due to increased fatigue, weakness.   Family/Caregiver Present No   Cognition   Arousal/Participation Cooperative   Orientation Level Oriented to person;Oriented to place;Oriented to time   Following Commands Follows one step commands with increased time or repetition   Subjective   Subjective \"I need to use the bathroom\"   RLE Assessment   RLE Assessment WFL   LLE Assessment   LLE Assessment WFL   Bed Mobility   Supine to Sit 5  Supervision   Additional items Increased time required;HOB " elevated;Bedrails   Sit to Supine 5  Supervision   Additional items Increased time required;HOB elevated;Bedrails   Transfers   Sit to Stand 5  Supervision   Additional items Increased time required   Stand to Sit 5  Supervision   Additional items Increased time required   Ambulation/Elevation   Gait pattern Wide TY;Foward flexed;Short stride;Step through pattern  (decreased gait speed)   Gait Assistance 5  Supervision   Additional items Verbal cues   Assistive Device None   Distance 10 feet + 40 feet   Stair Management Assistance Not tested  (pt reports she does not use the stairs at group home)   Balance   Static Sitting Fair +   Dynamic Sitting Fair   Static Standing Fair   Dynamic Standing Fair -   Ambulatory Fair -   Activity Tolerance   Activity Tolerance Patient tolerated treatment well;Patient limited by fatigue  (Mild SOB on exertion)   Nurse Made Aware yes RN [place]   Assessment   Prognosis Good   Problem List Decreased strength;Decreased endurance;Impaired balance;Decreased mobility;Pain   Assessment Patient seen for Physical Therapy evaluation. Patient admitted with Acute metabolic encephalopathy.  Comorbidities affecting patient's physical performance include: down syndrome, asthma, cardiac disease, diabetes, hypothyroid, hyperlipidemia, and seizures.  Personal factors affecting patient at time of initial evaluation include: lives in 2 story house, inability to navigate community distances, inability to perform ADLS, and inability to perform IADLS . Prior to admission, patient was independent with functional mobility without assistive device, requiring assist for ADLS, requiring assist for IADLS, and living in a group home.  Please find objective findings from Physical Therapy assessment regarding body systems outlined above with impairments and limitations including weakness, impaired balance, decreased endurance, gait deviations, decreased activity tolerance, decreased functional mobility tolerance,  fall risk, and SOB upon exertion.  The Barthel Index was used as a functional outcome tool presenting with a score of Barthel Index Score: 50 today indicating marked limitations of functional mobility and ADLS.  Patient's clinical presentation is currently unstable/unpredictable as seen in patient's presentation of vital sign response, increased fall risk, and decreased endurance. Pt would benefit from continued Physical Therapy treatment to address deficits as defined above and maximize level of functional mobility. As demonstrated by objective findings, the assigned level of complexity for this evaluation is high.The patient's [facility] Basic Mobility Inpatient Short Form Raw Score is 18. A Raw score of greater than 16 suggests the patient may benefit from discharge to home. Please also refer to the recommendation of the Physical Therapist for safe discharge planning.   Goals   Patient Goals to go home   STG Expiration Date 12/16/24   Short Term Goal #1 Pt will perform bed mobility IND ; Pt will ambulate x 150 feet with supervision ; Standing balance will improve to fair to decrease risk of falls ; AMPAC score will improve >20/24 to demonstrate improved functional independence   LTG Expiration Date 12/23/24   Long Term Goal #1 Pt will ambulate x 150 feet IND ; Standing balance will improve to fair+ to decrease risk of falls ; AMPAC score will improve >22/24 to demonstrate improved functional independence   Plan   Treatment/Interventions ADL retraining;Functional transfer training;LE strengthening/ROM;Endurance training;Therapeutic exercise;Gait training;Spoke to nursing   PT Frequency 3-5x/wk   Discharge Recommendation   Rehab Resource Intensity Level, PT III (Minimum Resource Intensity)   AM-PAC Basic Mobility Inpatient   Turning in Flat Bed Without Bedrails 3   Lying on Back to Sitting on Edge of Flat Bed Without Bedrails 3   Moving Bed to Chair 3   Standing Up From Chair Using Arms 3   Walk in Room 3   Climb 3-5  Stairs With Railing 3   Basic Mobility Inpatient Raw Score 18   Basic Mobility Standardized Score 41.05   [facility] Highest Level Of Mobility   -HLM Goal 6: Walk 10 steps or more   -HLM Achieved 7: Walk 25 feet or more   Barthel Index   Feeding 10   Bathing 0   Grooming Score 0   Dressing Score 5   Bladder Score 10   Bowels Score 10   Toilet Use Score 5   Transfers (Bed/Chair) Score 10   Mobility (Level Surface) Score 0   Stairs Score 0   Barthel Index Score 50   Additional Treatment Session   Start Time 1006   End Time 1016   Treatment Assessment S: Pt requesting assistance to the bathroom O/A: Pt able to perform toilet transfer with supervision on to standard toilet using grab bar. Able to perform pericare from the front with supervision while standing + requesting assist from the back with good standing balance. Pt able to take short walk following the bathroom but distance limited due to mild fatigue/SOB on exertion. O2 >89% throughout session. Repositioned in bed with all needs within reach, P: Pt will benefit from skilled PT to address deficits to maximize IND for safe d/c   Equipment Use O2   End of Consult   Patient Position at End of Consult Supine;Bed/Chair alarm activated;All needs within reach   Licensure   [place] License Number  Chely Brooksans [id number]

## 2024-12-09 NOTE — PLAN OF CARE
Problem: Potential for Falls  Goal: Patient will remain free of falls  Description: INTERVENTIONS:  - Educate patient/family on patient safety including physical limitations  - Instruct patient to call for assistance with activity   - Consult OT/PT to assist with strengthening/mobility   - Keep Call bell within reach  - Keep bed low and locked with side rails adjusted as appropriate  - Keep care items and personal belongings within reach  - Initiate and maintain comfort rounds  - Make Fall Risk Sign visible to staff  - Offer Toileting every 2 Hours, in advance of need  - Initiate/Maintain bed alarm    - Apply yellow socks and bracelet for high fall risk patients  - Consider moving patient to room near nurses station  Outcome: Progressing     Problem: PAIN - ADULT  Goal: Verbalizes/displays adequate comfort level or baseline comfort level  Description: Interventions:  - Encourage patient to monitor pain and request assistance  - Assess pain using appropriate pain scale  - Administer analgesics based on type and severity of pain and evaluate response  - Implement non-pharmacological measures as appropriate and evaluate response  - Consider cultural and social influences on pain and pain management  - Notify physician/advanced practitioner if interventions unsuccessful or patient reports new pain  Outcome: Progressing     Problem: INFECTION - ADULT  Goal: Absence or prevention of progression during hospitalization  Description: INTERVENTIONS:  - Assess and monitor for signs and symptoms of infection  - Monitor lab/diagnostic results  - Monitor all insertion sites, i.e. indwelling lines, tubes, and drains  - Monitor endotracheal if appropriate and nasal secretions for changes in amount and color  - [place] appropriate cooling/warming therapies per order  - Administer medications as ordered  - Instruct and encourage patient and family to use good hand hygiene technique  - Identify and instruct in appropriate  isolation precautions for identified infection/condition  Outcome: Progressing  Goal: Absence of fever/infection during neutropenic period  Description: INTERVENTIONS:  - Monitor WBC    Outcome: Progressing     Problem: SAFETY ADULT  Goal: Patient will remain free of falls  Description: INTERVENTIONS:  - Educate patient/family on patient safety including physical limitations  - Instruct patient to call for assistance with activity   - Consult OT/PT to assist with strengthening/mobility   - Keep Call bell within reach  - Keep bed low and locked with side rails adjusted as appropriate  - Keep care items and personal belongings within reach  - Initiate and maintain comfort rounds  - Make Fall Risk Sign visible to staff    - Apply yellow socks and bracelet for high fall risk patients  - Consider moving patient to room near nurses station  Outcome: Progressing  Goal: Maintain or return to baseline ADL function  Description: INTERVENTIONS:  -  Assess patient's ability to carry out ADLs; assess patient's baseline for ADL function and identify physical deficits which impact ability to perform ADLs (bathing, care of mouth/teeth, toileting, grooming, dressing, etc.)  - Assess/evaluate cause of self-care deficits   - Assess range of motion  - Assess patient's mobility; develop plan if impaired  - Assess patient's need for assistive devices and provide as appropriate  - Encourage maximum independence but intervene and supervise when necessary  - Involve family in performance of ADLs  - Assess for home care needs following discharge   - Consider OT consult to assist with ADL evaluation and planning for discharge  - Provide patient education as appropriate  Outcome: Progressing  Goal: Maintains/Returns to pre admission functional level  Description: INTERVENTIONS:  - Perform AM-PAC 6 Click Basic Mobility/ Daily Activity assessment daily.  - Set and communicate daily mobility goal to care team and patient/family/caregiver.   -  Collaborate with rehabilitation services on mobility goals if consulted  - Perform Range of Motion 3 times a day.  - Reposition patient every 2 hrs  - Ambulate patient 3 times a day  - Out of bed to chair 3 times a day   - Out of bed for meals 3 times a day  - Out of bed for toileting  - Record patient progress and toleration of activity level   Outcome: Progressing     Problem: DISCHARGE PLANNING  Goal: Discharge to home or other facility with appropriate resources  Description: INTERVENTIONS:  - Identify barriers to discharge w/patient and caregiver  - Arrange for needed discharge resources and transportation as appropriate  - Identify discharge learning needs (meds, wound care, etc.)  - Arrange for interpretive services to assist at discharge as needed  - Refer to Case Management Department for coordinating discharge planning if the patient needs post-hospital services based on physician/advanced practitioner order or complex needs related to functional status, cognitive ability, or social support system  Outcome: Progressing     Problem: Knowledge Deficit  Goal: Patient/family/caregiver demonstrates understanding of disease process, treatment plan, medications, and discharge instructions  Description: Complete learning assessment and assess knowledge base.  Interventions:  - Provide teaching at level of understanding  - Provide teaching via preferred learning methods  Outcome: Progressing     Problem: RESPIRATORY - ADULT  Goal: Achieves optimal ventilation and oxygenation  Description: INTERVENTIONS:  - Assess for changes in respiratory status  - Assess for changes in mentation and behavior  - Position to facilitate oxygenation and minimize respiratory effort  - Oxygen administered by appropriate delivery if ordered  - Initiate smoking cessation education as indicated  - Encourage broncho-pulmonary hygiene including cough, deep breathe, Incentive Spirometry  - Assess the need for suctioning and aspirate as  needed  - Assess and instruct to report SOB or any respiratory difficulty  - Respiratory Therapy support as indicated  Outcome: Progressing

## 2024-12-09 NOTE — PLAN OF CARE
Problem: Potential for Falls  Goal: Patient will remain free of falls  Description: INTERVENTIONS:  - Educate patient/family on patient safety including physical limitations  - Instruct patient to call for assistance with activity   - Consult OT/PT to assist with strengthening/mobility   - Keep Call bell within reach  - Keep bed low and locked with side rails adjusted as appropriate  - Keep care items and personal belongings within reach  - Initiate and maintain comfort rounds  - Make Fall Risk Sign visible to staff  - Offer Toileting every 2 Hours, in advance of need  - Initiate/Maintain bed alarm  - Apply yellow socks and bracelet for high fall risk patients  - Consider moving patient to room near nurses station  Outcome: Progressing     Problem: PAIN - ADULT  Goal: Verbalizes/displays adequate comfort level or baseline comfort level  Description: Interventions:  - Encourage patient to monitor pain and request assistance  - Assess pain using appropriate pain scale  - Administer analgesics based on type and severity of pain and evaluate response  - Implement non-pharmacological measures as appropriate and evaluate response  - Consider cultural and social influences on pain and pain management  - Notify physician/advanced practitioner if interventions unsuccessful or patient reports new pain  Outcome: Progressing     Problem: INFECTION - ADULT  Goal: Absence or prevention of progression during hospitalization  Description: INTERVENTIONS:  - Assess and monitor for signs and symptoms of infection  - Monitor lab/diagnostic results  - Monitor all insertion sites, i.e. indwelling lines, tubes, and drains  - Monitor endotracheal if appropriate and nasal secretions for changes in amount and color  - [place] appropriate cooling/warming therapies per order  - Administer medications as ordered  - Instruct and encourage patient and family to use good hand hygiene technique  - Identify and instruct in appropriate isolation  precautions for identified infection/condition  Outcome: Progressing  Goal: Absence of fever/infection during neutropenic period  Description: INTERVENTIONS:  - Monitor WBC    Outcome: Progressing     Problem: SAFETY ADULT  Goal: Patient will remain free of falls  Description: INTERVENTIONS:  - Educate patient/family on patient safety including physical limitations  - Instruct patient to call for assistance with activity   - Consult OT/PT to assist with strengthening/mobility   - Keep Call bell within reach  - Keep bed low and locked with side rails adjusted as appropriate  - Keep care items and personal belongings within reach  - Initiate and maintain comfort rounds  - Make Fall Risk Sign visible to staff  - Offer Toileting every 2 Hours, in advance of need  - Initiate/Maintain bed alarm  - Apply yellow socks and bracelet for high fall risk patients  - Consider moving patient to room near nurses station  Outcome: Progressing  Goal: Maintain or return to baseline ADL function  Description: INTERVENTIONS:  -  Assess patient's ability to carry out ADLs; assess patient's baseline for ADL function and identify physical deficits which impact ability to perform ADLs (bathing, care of mouth/teeth, toileting, grooming, dressing, etc.)  - Assess/evaluate cause of self-care deficits   - Assess range of motion  - Assess patient's mobility; develop plan if impaired  - Assess patient's need for assistive devices and provide as appropriate  - Encourage maximum independence but intervene and supervise when necessary  - Involve family in performance of ADLs  - Assess for home care needs following discharge   - Consider OT consult to assist with ADL evaluation and planning for discharge  - Provide patient education as appropriate  Outcome: Progressing  Goal: Maintains/Returns to pre admission functional level  Description: INTERVENTIONS:  - Perform AM-PAC 6 Click Basic Mobility/ Daily Activity assessment daily.  - Set and  communicate daily mobility goal to care team and patient/family/caregiver.   - Collaborate with rehabilitation services on mobility goals if consulted  - Perform Range of Motion 2 times a day.  - Reposition patient every 2 hours.  - Dangle patient 2 times a day  - Stand patient 2 times a day  - Ambulate patient 2 times a day  - Out of bed to chair 2 times a day   - Out of bed for meals 2 times a day  - Out of bed for toileting  - Record patient progress and toleration of activity level   Outcome: Progressing     Problem: DISCHARGE PLANNING  Goal: Discharge to home or other facility with appropriate resources  Description: INTERVENTIONS:  - Identify barriers to discharge w/patient and caregiver  - Arrange for needed discharge resources and transportation as appropriate  - Identify discharge learning needs (meds, wound care, etc.)  - Arrange for interpretive services to assist at discharge as needed  - Refer to Case Management Department for coordinating discharge planning if the patient needs post-hospital services based on physician/advanced practitioner order or complex needs related to functional status, cognitive ability, or social support system  Outcome: Progressing     Problem: Knowledge Deficit  Goal: Patient/family/caregiver demonstrates understanding of disease process, treatment plan, medications, and discharge instructions  Description: Complete learning assessment and assess knowledge base.  Interventions:  - Provide teaching at level of understanding  - Provide teaching via preferred learning methods  Outcome: Progressing     Problem: RESPIRATORY - ADULT  Goal: Achieves optimal ventilation and oxygenation  Description: INTERVENTIONS:  - Assess for changes in respiratory status  - Assess for changes in mentation and behavior  - Position to facilitate oxygenation and minimize respiratory effort  - Oxygen administered by appropriate delivery if ordered  - Initiate smoking cessation education as  indicated  - Encourage broncho-pulmonary hygiene including cough, deep breathe, Incentive Spirometry  - Assess the need for suctioning and aspirate as needed  - Assess and instruct to report SOB or any respiratory difficulty  - Respiratory Therapy support as indicated  Outcome: Progressing     Problem: Prexisting or High Potential for Compromised Skin Integrity  Goal: Skin integrity is maintained or improved  Description: INTERVENTIONS:  - Identify patients at risk for skin breakdown  - Assess and monitor skin integrity  - Assess and monitor nutrition and hydration status  - Monitor labs   - Assess for incontinence   - Turn and reposition patient  - Assist with mobility/ambulation  - Relieve pressure over bony prominences  - Avoid friction and shearing  - Provide appropriate hygiene as needed including keeping skin clean and dry  - Evaluate need for skin moisturizer/barrier cream  - Collaborate with interdisciplinary team   - Patient/family teaching  - Consider wound care consult   Outcome: Progressing

## 2024-12-09 NOTE — ASSESSMENT & PLAN NOTE
TSH elevated and free T4 low end of normal  Increase Synthroid from 75 mcg to 88 mcg  Repeat TFTs in 4 to 6 weeks

## 2024-12-09 NOTE — PLAN OF CARE
Problem: PHYSICAL THERAPY ADULT  Goal: Performs mobility at highest level of function for planned discharge setting.  See evaluation for individualized goals.  Description: Treatment/Interventions: ADL retraining, Functional transfer training, LE strengthening/ROM, Endurance training, Therapeutic exercise, Gait training, Spoke to nursing          See flowsheet documentation for full assessment, interventions and recommendations.  Note: Prognosis: Good  Problem List: Decreased strength, Decreased endurance, Impaired balance, Decreased mobility, Pain  Assessment: Patient seen for Physical Therapy evaluation. Patient admitted with Acute metabolic encephalopathy.  Comorbidities affecting patient's physical performance include: down syndrome, asthma, cardiac disease, diabetes, hypothyroid, hyperlipidemia, and seizures.  Personal factors affecting patient at time of initial evaluation include: lives in 2 story house, inability to navigate community distances, inability to perform ADLS, and inability to perform IADLS . Prior to admission, patient was independent with functional mobility without assistive device, requiring assist for ADLS, requiring assist for IADLS, and living in a group home.  Please find objective findings from Physical Therapy assessment regarding body systems outlined above with impairments and limitations including weakness, impaired balance, decreased endurance, gait deviations, decreased activity tolerance, decreased functional mobility tolerance, fall risk, and SOB upon exertion.  The Barthel Index was used as a functional outcome tool presenting with a score of Barthel Index Score: 50 today indicating marked limitations of functional mobility and ADLS.  Patient's clinical presentation is currently unstable/unpredictable as seen in patient's presentation of vital sign response, increased fall risk, and decreased endurance. Pt would benefit from continued Physical Therapy treatment to address  deficits as defined above and maximize level of functional mobility. As demonstrated by objective findings, the assigned level of complexity for this evaluation is high.The patient's AM-PAC Basic Mobility Inpatient Short Form Raw Score is 18. A Raw score of greater than 16 suggests the patient may benefit from discharge to home. Please also refer to the recommendation of the Physical Therapist for safe discharge planning.        Rehab Resource Intensity Level, PT: III (Minimum Resource Intensity)    See flowsheet documentation for full assessment.

## 2024-12-09 NOTE — ASSESSMENT & PLAN NOTE
Continue depakote.  No evidence of seizure activity  Depakote level drawn-not supratherapeutic  Continue current regimen and have reevaluated as outpatient

## 2024-12-09 NOTE — DISCHARGE SUMMARY
Discharge Summary - Hospitalist   Name: Chela Petersen 49 y.o. female I MRN: [id number]  Unit/Bed#: 4 [place] [address] I Date of Admission: 12/8/2024   Date of Service: 12/9/2024 I Hospital Day: 0         Admitting Provider:  Luis Fu DO  Discharge Provider:  Maxim Zepeda DO  Admission Date: 12/8/2024       Discharge Date: 12/09/24   LOS: 0  Primary Care Physician at Discharge: Trina Riggins [phone]    HOSPITAL COURSE:  Chela Petersen is a 49 y.o. female who presented altered mental status.  The patient has a past medical history of Down syndrome, seizure disorder on Depakote, obesity as well as diabetes mellitus not on medication.  Other medical problems include hypothyroidism, obstructive sleep apnea as well as generalized weakness.  The patient resides normally in a group home.  The patient was brought in after she was noted to have change in mental status while at [employer].  The patient typically is ambulatory.  However it took more than 2 people to ambulate her.    For concern for infection the patient was assigned observation status.  The patient had RP 2 panel drawn which was without any evidence of viral infection.  CT scan performed of the abdomen and pelvis showed no acute pathology.  The patient remained stable on room air and there was no evidence of any infection.    It is possible that her excessive daytime sleepiness may be secondary to apnea.    Patient was incidentally noted to have esophagitis and will need outpatient GI follow-up.    At the time of discharge the patient was tolerating oral diet they were without acute complaint and they were medically cleared for discharge.  All questions were answered the patient's satisfaction and they were in agreement with the discharge plan.    DISCHARGE DIAGNOSES  * Acute metabolic encephalopathy  Assessment & Plan  History of Down syndrome ROBERTO asthma obesity diabetes mood disorder hypothyroidism and seizure disorder who presents for change  in mental status and lethargy  Over the last 24 hours noted to be more lethargic  Falling asleep in [place] and having difficulty ambulating  Procalcitonin within normal limits  No evidence of acute infection  RP 2 panel without evidence of viral illness  Depakote level not elevated, actually slightly subtherapeutic but has been so in the past.  Will need continued outpatient monitoring and follow-up      Esophagitis  Assessment & Plan  Dilatation of distal esophagus with thickening concerning for esophagitis.    Will start pantoprazole and will need outpatient GI follow-up    Nonintractable epilepsy with complex partial seizures (HCC)  Assessment & Plan  Continue depakote.  No evidence of seizure activity  Depakote level drawn-not supratherapeutic  Continue current regimen and have reevaluated as outpatient    Schizophrenia ([facility])  Assessment & Plan  Current flat mood but may be secondary to illness  Continue aripiprazole    Hypothyroidism  Assessment & Plan  TSH elevated and free T4 low end of normal  Increase Synthroid from 75 mcg to 88 mcg  Repeat TFTs in 4 to 6 weeks    Diabetes mellitus ([facility])  Assessment & Plan  History of diabetes now diet controlled not on medications    Results from last 7 days   Lab Units 12/09/24  0608 12/08/24  1253   GLUCOSE RANDOM mg/dL 106 143*         Down syndrome, unspecified  Assessment & Plan  Resides in a group home    Mild intermittent asthma without complication  Assessment & Plan  No exacerbation albuterol as needed    Obstructive sleep apnea syndrome  Assessment & Plan  Chronically on CPAP with 3 L.  Will order during hospitalization    Genital candidiasis in female-resolved as of 12/9/2024  Assessment & Plan  Continue nystatin to groin folds      CONSULTING PROVIDERS   None    PROCEDURES PERFORMED  * No surgery found *    RADIOLOGY RESULTS  XR chest 1 view portable  Result Date: 12/8/2024  Impression: Mild vascular congestion. Workstation performed: [id number]     CT abdomen  "pelvis with contrast  Result Date: 12/8/2024  Impression: 1. Dilatation of the distal esophagus with mild wall thickening. Outpatient evaluation should be considered for esophagitis. 2. No inflammatory changes or bowel obstruction. Workstation performed: [id number]       LABS  Results from last 7 days   Lab Units 12/09/24  0608 12/08/24  1253   WBC Thousand/uL 5.14 5.13   HEMOGLOBIN g/dL 13.8 14.2   HEMATOCRIT % 42.6 43.4   MCV fL 101* 101*   PLATELETS Thousands/uL 197 217     Results from last 7 days   Lab Units 12/09/24  0608 12/08/24  1253   SODIUM mmol/L 143 140   POTASSIUM mmol/L 4.0 4.2   CHLORIDE mmol/L 108 104   CO2 mmol/L 31 32   BUN mg/dL 14 17   CREATININE mg/dL 0.95 1.11   CALCIUM mg/dL 8.6 8.5   ALBUMIN g/dL 3.2* 3.5   TOTAL BILIRUBIN mg/dL 0.41 0.28   ALK PHOS U/L 69 72   ALT U/L 21 19   AST U/L 29 30   EGFR ml/min/1.73sq m 71 58   GLUCOSE RANDOM mg/dL 106 143*                          Results from last 7 days   Lab Units 12/08/24  1253   TSH 3RD GENERATON uIU/mL 4.647*   FREE T4 ng/dL 0.90     Results from last 7 days   Lab Units 12/08/24  1253   PROCALCITONIN ng/ml <0.05           Cultures:   Results from last 7 days   Lab Units 12/08/24  1313   COLOR UA  Colorless   CLARITY UA  Clear   SPEC GRAV UA  1.010   PH UA  6.0   LEUKOCYTES UA  Small*   NITRITE UA  Negative   GLUCOSE UA mg/dl Negative   KETONES UA mg/dl Negative   BILIRUBIN UA  Negative   BLOOD UA  Negative      Results from last 7 days   Lab Units 12/08/24  1313   RBC UA /hpf None Seen   WBC UA /hpf 2-4   EPITHELIAL CELLS WET PREP /hpf Occasional   BACTERIA UA /hpf Occasional          Results from last 7 days   Lab Units 12/08/24  1639   SARS-COV-2  Not Detected   RESPIRATORY SYNCYTIAL VIRUS  Not Detected         PHYSICAL EXAM:  Vitals:   Blood Pressure: 119/71 (12/09/24 0819)  Pulse: 67 (12/09/24 0819)  Temperature: (!) 97.3 °F (36.3 °C) (12/09/24 0230)  Temp Source: Temporal (12/08/24 1955)  Respirations: 18 (12/09/24 0230)  Height: 4' 10\" " (147.3 cm) (12/08/24 1958)  Weight - Scale: 79.4 kg (175 lb) (12/08/24 1133)  SpO2: 98 % (12/09/24 0819)      General: well appearing, no acute distress  HEENT: atraumatic, PERRLA, moist mucosa, normal pharynx, normal tonsils and adenoids, normal tongue, no fluid in sinuses  Neck: Trachea midline, no carotid bruit, no masses  Respiratory: normal chest wall expansion, CTA B  Cardiovascular: RRR, no m/r/g, Normal S1 and S2  Abdomen: Soft, non-tender, non-distended, normal bowel sounds in all quadrants, no hepatosplenomegaly, no tympany  Rectal: deferred  Musculoskeletal: Moves all  Integumentary: warm, dry, and pink, with no visible rash, purpura, or petechia  Heme/Lymph: no lymphadenopathy, no bruises  Neurological: Cranial Nerves II-XII grossly intact  Psychiatric: Unclear mental baseline.  Patient is pleasant and reported by therapy to be at her psychiatric baseline      Discharge Disposition: Home/Self Care    AM-PAC Basic Mobility:  Basic Mobility Inpatient Raw Score: 18    JH-HLM Achieved: 7: Walk 25 feet or more  JH-HLM Goal: 6: Walk 10 steps or more    HLM Goal listed above. Continue with ongoing physical therapy and encourage appropriate mobility to improve upon HLM goals.      Test Results Pending at Discharge:   Pending Labs       Order Current Status    MRSA culture In process                Medications   Summary of Medication Adjustments made as a result of this hospitalization: See discharge summary and AVS for medication changes  Medication Dosing Tapers - Please refer to Discharge Medication List for details on any medication dosing tapers (if applicable to patient).  Discharge Medication List: See after visit summary for reconciled discharge medications.     Diet restrictions:         Diet Orders   (From admission, onward)                 Start     Ordered    12/09/24 0931  Room Service  Once        Question:  Type of Service  Answer:  Room Service-Appropriate    12/09/24 0931 12/08/24 6775  Diet  Dysphagia/Modified Consistency; Dysphagia 3-Dental Soft; Thin Liquid  Diet effective now        References:    Adult Nutrition Support Algorithm    RD Therapeutic Diet Order Protocol   Question Answer Comment   Diet Type Dysphagia/Modified Consistency    Dysphagia/Modified Consistency Dysphagia 3-Dental Soft    Liquid Modifier Thin Liquid    RD to adjust diet per protocol? Yes        12/08/24 1139                  Activity restrictions: No strenuous activity  Discharge Condition: fair    Outpatient Follow-Up and Discharge Instructions  See after visit summary section titled Discharge Instructions for information provided to patient and family.      Code Status: Level 1 - Full Code  Discharge Statement   I spent 86 minutes discharging the patient. This time was spent on the day of discharge. Greater than 50% of total time was spent with the patient and / or family counseling and / or coordination of care.    ** Please Note: This note was completed in part utilizing [employer] Dragon [employer] One Software.  Grammatical errors, random word insertions, spelling mistakes, and incomplete sentences may be an occasional consequence of this system secondary to software limitations, ambient noise, and hardware issues.  If you have any questions or concerns about the content, text, or information contained within the body of this dictation, please contact the provider for clarification.**

## 2024-12-09 NOTE — ASSESSMENT & PLAN NOTE
History of Down syndrome ROBERTO asthma obesity diabetes mood disorder hypothyroidism and seizure disorder who presents for change in mental status and lethargy  Over the last 24 hours noted to be more lethargic  Falling asleep in [place] and having difficulty ambulating  Procalcitonin within normal limits  No evidence of acute infection  RP 2 panel without evidence of viral illness  Depakote level not elevated, actually slightly subtherapeutic but has been so in the past.  Will need continued outpatient monitoring and follow-up

## 2024-12-09 NOTE — DISCHARGE INSTR - AVS FIRST PAGE
Dear Chela Petersen,     It was our pleasure to care for you here at [facility].  It is our hope that we were always able to exceed the expected standards for your care during your stay.  You were hospitalized due to altered mental status, concern for possible infection.  You were cared for on the fourth floor by Maxim Zepeda DO with the [facility] who covers for your primary care physician (PCP), Trina Riggins DO, while you were hospitalized.  If you have any questions or concerns related to this hospitalization, you may contact us at .  For follow up as well as any medication refills, we recommend that you follow up with your primary care physician.  A registered nurse will reach out to you by phone within a few days after your discharge to answer any additional questions that you may have after going home.  However, at this time we provide for you here, the most important instructions / recommendations at discharge:     Notable Medication Adjustments -   Synthroid adjusted from 75 mcg to 88 mcg  Stop 75 mcg , Start 88 mcg  Please take Protonix 40 mg twice a day  Testing Required after Discharge -   Outpatient GI evaluation for possible endoscopy, referral has been sent  Repeat thyroid function test in 4 to 6 weeks to ensure improvement  Important follow up information -   PCP follow-up in 1 week  GI follow-up in 2 to 3 weeks  Other Instructions -   Continue dysphagia diet  Please review this entire after visit summary as additional general instructions including medication list, appointments, activity, diet, any pertinent wound care, and other additional recommendations from your care team that may be provided for you.      Sincerely,     Maxim Zepeda DO and Nurse [place]

## 2024-12-09 NOTE — ASSESSMENT & PLAN NOTE
History of diabetes now diet controlled not on medications    Results from last 7 days   Lab Units 12/09/24  0608 12/08/24  1253   GLUCOSE RANDOM mg/dL 106 143*

## 2024-12-10 ENCOUNTER — TRANSITIONAL CARE MANAGEMENT (OUTPATIENT)
Age: 49
End: 2024-12-10

## 2024-12-10 LAB — MRSA NOSE QL CULT: NORMAL

## 2024-12-13 ENCOUNTER — OFFICE VISIT (OUTPATIENT)
Age: 49
End: 2024-12-13

## 2024-12-13 VITALS
OXYGEN SATURATION: 99 % | BODY MASS INDEX: 39.77 KG/M2 | SYSTOLIC BLOOD PRESSURE: 128 MMHG | TEMPERATURE: 98 F | DIASTOLIC BLOOD PRESSURE: 76 MMHG | HEART RATE: 58 BPM | RESPIRATION RATE: 18 BRPM | WEIGHT: 176.8 LBS | HEIGHT: 56 IN

## 2024-12-13 DIAGNOSIS — G40.209 NONINTRACTABLE EPILEPSY WITH COMPLEX PARTIAL SEIZURES (HCC): Chronic | ICD-10-CM

## 2024-12-13 DIAGNOSIS — G47.33 OSA (OBSTRUCTIVE SLEEP APNEA): Chronic | ICD-10-CM

## 2024-12-13 DIAGNOSIS — K20.90 ESOPHAGITIS: ICD-10-CM

## 2024-12-13 DIAGNOSIS — Z78.9 TRANSITION OF CARE: Primary | ICD-10-CM

## 2024-12-13 DIAGNOSIS — J30.0 VASOMOTOR RHINITIS: ICD-10-CM

## 2024-12-13 DIAGNOSIS — E55.9 VITAMIN D DEFICIENCY: ICD-10-CM

## 2024-12-13 DIAGNOSIS — F20.89 OTHER SCHIZOPHRENIA (HCC): Chronic | ICD-10-CM

## 2024-12-13 PROCEDURE — 99496 TRANSJ CARE MGMT HIGH F2F 7D: CPT | Performed by: FAMILY MEDICINE

## 2024-12-13 NOTE — ASSESSMENT & PLAN NOTE
Chronic, follows with endocrinology. Maintained on 50,000U weekly, last vitamin D level WNL. Endocrinology has ordered repeat

## 2024-12-13 NOTE — ASSESSMENT & PLAN NOTE
Chronic, suspect some element of allergic component in setting of eczema. Patient tolerating Xyzal & Flonase appropriately, not recently worsening. Mother notes patient had seen allergist in distant past.     Pending [place] allergy panel

## 2024-12-13 NOTE — PROGRESS NOTES
Transition of Care Visit  Name: Chela Petersen      : 1975      MRN: [id number]  Encounter Provider: Ria Gordon DO  Encounter Date: 2024   Encounter department: [facility]    1. Transition of care  2. Vitamin D deficiency  Assessment & Plan:  Chronic, follows with endocrinology. Maintained on 50,000U weekly, last vitamin D level WNL. Endocrinology has ordered repeat  3. Other schizophrenia (HCC)  Assessment & Plan:  Chronic, stable - mother denying significant mood changes lately. Plan to continue abilify  4. Nonintractable epilepsy with complex partial seizures (HCC)  Assessment & Plan:  Chronic, follows with neurology, no recent hx of seizure like activity. Plan to continue depakote 1500 mg QD  5. Esophagitis  Assessment & Plan:  Chronic, noted incidentally on recent hospitalization. Tolerating protonix, GI f/u already scheduled  6. Vasomotor rhinitis  Assessment & Plan:  Chronic, suspect some element of allergic component in setting of eczema. Patient tolerating Xyzal & Flonase appropriately, not recently worsening. Mother notes patient had seen allergist in distant past.     Pending [place] allergy panel  Orders:  -     [facility] Allergy Panel, Adult; Future  7. ROBERTO (obstructive sleep apnea)  Assessment & Plan:  Chronic, appears to be well controlled - follows with pulm regularly. Compliance with CPAP documented per mother and pulmonology - recently increased overnight O2 to 3L NC in setting of recent resolved AMS.       Orders Placed This Encounter   Procedures    [facility] Allergy Panel, Adult          History of Present Illness     Transitional Care Management Review:   Chela Petersen is a 49 y.o. female here for TCM follow up.     During the TCM phone call patient stated:  TCM Call       Date and time call was made  12/10/2024  1:46 PM    Hospital care reviewed  Records reviewed    Patient was hospitial[facility] at  [facility]    Date of Admission   12/08/24    Date of discharge  12/09/24    Diagnosis  Acute metabolic encephalopathy    Disposition  Home    Were the patients medications reviewed and updated  Yes    Current Symptoms  None          TCM Call       Post hospital issues  None    Should patient be enrolled in anticoag monitoring?  No    Scheduled for follow up?  Yes  Appt made 12/13/24 8:40 am    Did you obtain your prescribed medications  Yes    Do you need help managing your prescriptions or medications  No    Is transportation to your appointment needed  No    I have advised the patient to call PCP with any new or worsening symptoms  Bhavna Hernandez RN    Living Arrangements  Family members    Support System  Family    The type of support provided  Emotional    Do you have social support  Yes, as much as I need    Are you recieving any outpatient services  No    Are you recieving home care services  No    Are you using any community resources  No    Current waiver services  No    Have you fallen in the last 12 months  No    Interperter language line needed  No    Counseling  Family    Counseling topics  Importance of RX compliance    Comments  12/13/24 8:40 am TCM appt            HPI:  -noting that since returning from hospital, improved mentation and attitude  -noting URI and rhinorrhea and cough at baseline  -increased CPAP and overnight O2, increased to 3L overnight   -recent CPAP fitting  -continues to follow up with pulmonology  -patient lives in a group home at baseline   -significant seasonal allergies at baseline,   -does not sleep flat   -already had speech pathology f/u and aspiration precautions     Review of Systems   Constitutional:  Negative for fatigue and fever.   HENT:  Positive for sneezing (chronic). Negative for congestion, postnasal drip, rhinorrhea, sore throat and tinnitus.    Respiratory:  Positive for cough (chronic). Negative for shortness of breath and wheezing.    Cardiovascular:  Negative for chest pain and palpitations.  "  Gastrointestinal:  Negative for abdominal pain, constipation, diarrhea, nausea and vomiting.   Musculoskeletal:  Negative for arthralgias, back pain, joint swelling, myalgias and neck stiffness.   Skin:  Negative for rash and wound.        Dry skin in bilateral ears   Neurological:  Negative for weakness and numbness.     Objective   /76 (BP Location: Left arm, Patient Position: Sitting, Cuff Size: Standard)   Pulse 58   Temp 98 °F (36.7 °C) (Tympanic)   Resp 18   Ht 4' 8\" (1.422 m)   Wt 80.2 kg (176 lb 12.8 oz)   LMP  (LMP Unknown)   SpO2 99%   BMI 39.64 kg/m²       Physical Exam  Constitutional:       General: She is not in acute distress.     Appearance: She is obese. She is not ill-appearing.   HENT:      Head: Normocephalic and atraumatic.      Right Ear: Tympanic membrane normal.      Left Ear: Tympanic membrane normal.      Nose: Nose normal. No congestion or rhinorrhea.      Mouth/Throat:      Pharynx: Oropharynx is clear.   Eyes:      Conjunctiva/sclera: Conjunctivae normal.   Cardiovascular:      Rate and Rhythm: Normal rate and regular rhythm.      Heart sounds: Murmur heard.   Pulmonary:      Effort: No respiratory distress.      Breath sounds: Normal breath sounds. No wheezing.   Abdominal:      General: There is no distension.      Palpations: Abdomen is soft.      Tenderness: There is no abdominal tenderness.   Musculoskeletal:         General: Normal range of motion.   Skin:     General: Skin is warm and dry.   Neurological:      General: No focal deficit present.      Mental Status: She is alert.   Psychiatric:         Mood and Affect: Mood normal.       Medications have been reviewed by provider in current encounter      "

## 2024-12-13 NOTE — ASSESSMENT & PLAN NOTE
Chronic, follows with neurology, no recent hx of seizure like activity. Plan to continue depakote 1500 mg QD

## 2024-12-13 NOTE — ASSESSMENT & PLAN NOTE
Chronic, appears to be well controlled - follows with pulm regularly. Compliance with CPAP documented per mother and pulmonology - recently increased overnight O2 to 3L NC in setting of recent resolved AMS.

## 2024-12-13 NOTE — ASSESSMENT & PLAN NOTE
Chronic, noted incidentally on recent hospitalization. Tolerating protonix, GI f/u already scheduled

## 2025-01-03 ENCOUNTER — OFFICE VISIT (OUTPATIENT)
Age: 50
End: 2025-01-03

## 2025-01-03 VITALS
OXYGEN SATURATION: 94 % | BODY MASS INDEX: 39.59 KG/M2 | TEMPERATURE: 97.5 F | SYSTOLIC BLOOD PRESSURE: 119 MMHG | HEART RATE: 55 BPM | RESPIRATION RATE: 20 BRPM | DIASTOLIC BLOOD PRESSURE: 72 MMHG | WEIGHT: 176 LBS | HEIGHT: 56 IN

## 2025-01-03 DIAGNOSIS — J06.9 VIRAL UPPER RESPIRATORY TRACT INFECTION: Primary | ICD-10-CM

## 2025-01-03 LAB
S PYO AG THROAT QL: NEGATIVE
SARS-COV-2 AG UPPER RESP QL IA: NEGATIVE
SL AMB POCT RAPID FLU A: NORMAL
SL AMB POCT RAPID FLU B: NORMAL
VALID CONTROL: NORMAL

## 2025-01-03 PROCEDURE — 87804 INFLUENZA ASSAY W/OPTIC: CPT | Performed by: FAMILY MEDICINE

## 2025-01-03 PROCEDURE — 87811 SARS-COV-2 COVID19 W/OPTIC: CPT | Performed by: FAMILY MEDICINE

## 2025-01-03 PROCEDURE — 99213 OFFICE O/P EST LOW 20 MIN: CPT | Performed by: FAMILY MEDICINE

## 2025-01-03 PROCEDURE — 87880 STREP A ASSAY W/OPTIC: CPT | Performed by: FAMILY MEDICINE

## 2025-01-03 RX ORDER — GUAIFENESIN 200 MG/10ML
200 LIQUID ORAL 3 TIMES DAILY PRN
Qty: 120 ML | Refills: 0 | Status: SHIPPED | OUTPATIENT
Start: 2025-01-03 | End: 2025-01-03

## 2025-01-03 RX ORDER — GUAIFENESIN 200 MG/10ML
200 LIQUID ORAL 3 TIMES DAILY PRN
Qty: 120 ML | Refills: 0 | Status: SHIPPED | OUTPATIENT
Start: 2025-01-03

## 2025-01-03 NOTE — PROGRESS NOTES
Name: Chela Petersen      : 1975      MRN: [id number]  Encounter Provider: Natividad Fine MD  Encounter Date: 1/3/2025   Encounter department: [facility]  :  Assessment & Plan  Viral upper respiratory tract infection  POCT strep A, Flu and Covid all neg. Most likely viral URI. Pt is already using Xyzal, Flonase and ocean spray. Denies fever or chills. Cough most likely due to post nasal drip.     Continue current regimen and add Robitussin TID PRN for cough and congestion.  Pt's mother was advised to call back if symptoms worsens even with treatment and go ED if SOB starts even with nebulizer treatment.    Orders:    POCT rapid ANTIGEN strepA    guaiFENesin (ROBITUSSIN) 100 MG/5ML oral liquid; Take 10 mL (200 mg total) by mouth 3 (three) times a day as needed for cough or congestion    POCT rapid flu A and B    POCT Rapid Covid Ag          Depression Screening and Follow-up Plan:   Patient's depression screening was negative with an [place]  Depression Scale score of  .     History of Present Illness     URI   This is a new problem. Episode onset: one week ago. The problem has been gradually worsening. There has been no fever. Associated symptoms include congestion, coughing, headaches, rhinorrhea, sinus pain, sneezing and a sore throat. Pertinent negatives include no abdominal pain, chest pain, diarrhea, dysuria, ear pain, joint pain, joint swelling, nausea, neck pain, plugged ear sensation, rash, swollen glands, vomiting or wheezing. She has tried decongestant and antihistamine for the symptoms. The treatment provided mild relief.   Review of Systems   Constitutional:  Negative for chills and fever.   HENT:  Positive for congestion, rhinorrhea, sinus pain, sneezing and sore throat. Negative for ear pain.    Eyes:  Negative for pain and visual disturbance.   Respiratory:  Positive for cough. Negative for shortness of breath and wheezing.    Cardiovascular:   "Negative for chest pain and palpitations.   Gastrointestinal:  Negative for abdominal pain, constipation, diarrhea, nausea and vomiting.   Genitourinary:  Negative for dysuria and hematuria.   Musculoskeletal:  Negative for arthralgias, back pain, joint pain, neck pain and neck stiffness.   Skin:  Negative for color change and rash.   Neurological:  Positive for headaches. Negative for dizziness and weakness.   Psychiatric/Behavioral:  Negative for agitation and confusion. The patient is not nervous/anxious.    All other systems reviewed and are negative.      Objective   /72 (BP Location: Right arm, Patient Position: Sitting, Cuff Size: Standard)   Pulse 55   Temp 97.5 °F (36.4 °C) (Tympanic)   Resp 20   Ht 4' 8\" (1.422 m)   Wt 79.8 kg (176 lb)   LMP  (LMP Unknown)   SpO2 94%   BMI 39.46 kg/m²      Physical Exam  Vitals and nursing note reviewed.   Constitutional:       General: She is not in acute distress.     Appearance: Normal appearance. She is well-developed. She is obese. She is not ill-appearing.   HENT:      Head: Normocephalic and atraumatic.      Right Ear: Tympanic membrane, ear canal and external ear normal. There is no impacted cerumen.      Left Ear: Tympanic membrane, ear canal and external ear normal. There is no impacted cerumen.      Nose: Congestion and rhinorrhea present.      Mouth/Throat:      Mouth: Mucous membranes are moist.      Pharynx: Oropharynx is clear. No oropharyngeal exudate or posterior oropharyngeal erythema.      Comments: Post nasal drip  Eyes:      General:         Right eye: No discharge.         Left eye: No discharge.      Conjunctiva/sclera: Conjunctivae normal.   Cardiovascular:      Rate and Rhythm: Normal rate and regular rhythm.      Pulses: Normal pulses.      Heart sounds: Normal heart sounds. No murmur heard.     No friction rub. No gallop.   Pulmonary:      Effort: Pulmonary effort is normal. No respiratory distress.      Breath sounds: Normal breath " sounds. No stridor. No wheezing, rhonchi or rales.   Chest:      Chest wall: No tenderness.   Abdominal:      General: Abdomen is flat. Bowel sounds are normal. There is no distension.      Palpations: Abdomen is soft.      Tenderness: There is no abdominal tenderness. There is no guarding.   Musculoskeletal:         General: No swelling, tenderness, deformity or signs of injury. Normal range of motion.      Cervical back: Normal range of motion and neck supple. No rigidity or tenderness.      Right lower leg: No edema.      Left lower leg: No edema.   Lymphadenopathy:      Cervical: No cervical adenopathy.   Skin:     General: Skin is warm and dry.      Capillary Refill: Capillary refill takes less than 2 seconds.      Findings: No bruising, erythema, lesion or rash.   Neurological:      General: No focal deficit present.      Mental Status: She is alert and oriented to person, place, and time. Mental status is at baseline.      Motor: No weakness.      Gait: Gait normal.      Deep Tendon Reflexes: Reflexes normal.   Psychiatric:         Mood and Affect: Mood normal.         Behavior: Behavior normal.         Thought Content: Thought content normal.

## 2025-01-09 DIAGNOSIS — K20.90 ESOPHAGITIS: ICD-10-CM

## 2025-01-09 RX ORDER — PANTOPRAZOLE SODIUM 40 MG/1
40 TABLET, DELAYED RELEASE ORAL
Qty: 60 TABLET | Refills: 0 | Status: CANCELLED | OUTPATIENT
Start: 2025-01-09

## 2025-01-10 ENCOUNTER — OFFICE VISIT (OUTPATIENT)
Age: 50
End: 2025-01-10

## 2025-01-10 VITALS
RESPIRATION RATE: 19 BRPM | OXYGEN SATURATION: 98 % | TEMPERATURE: 98 F | WEIGHT: 179.2 LBS | HEIGHT: 56 IN | SYSTOLIC BLOOD PRESSURE: 136 MMHG | BODY MASS INDEX: 40.31 KG/M2 | HEART RATE: 71 BPM | DIASTOLIC BLOOD PRESSURE: 84 MMHG

## 2025-01-10 DIAGNOSIS — H10.33 ACUTE BACTERIAL CONJUNCTIVITIS OF BOTH EYES: Primary | ICD-10-CM

## 2025-01-10 DIAGNOSIS — K20.90 ESOPHAGITIS: ICD-10-CM

## 2025-01-10 PROCEDURE — 99213 OFFICE O/P EST LOW 20 MIN: CPT | Performed by: FAMILY MEDICINE

## 2025-01-10 RX ORDER — GENTAMICIN SULFATE 3 MG/ML
2 SOLUTION/ DROPS OPHTHALMIC 3 TIMES DAILY
Qty: 5 ML | Refills: 0 | Status: SHIPPED | OUTPATIENT
Start: 2025-01-10 | End: 2025-01-15

## 2025-01-10 RX ORDER — PANTOPRAZOLE SODIUM 40 MG/1
40 TABLET, DELAYED RELEASE ORAL
Qty: 60 TABLET | Refills: 0 | Status: SHIPPED | OUTPATIENT
Start: 2025-01-10 | End: 2025-01-15 | Stop reason: SDUPTHER

## 2025-01-10 RX ORDER — POLYETHYLENE GLYCOL 3350 17 G/17G
17 POWDER, FOR SOLUTION ORAL DAILY
Qty: 119 G | Refills: 0 | Status: SHIPPED | OUTPATIENT
Start: 2025-01-10 | End: 2025-01-10

## 2025-01-10 RX ORDER — PANTOPRAZOLE SODIUM 40 MG/1
40 TABLET, DELAYED RELEASE ORAL
Qty: 60 TABLET | Refills: 0 | Status: CANCELLED | OUTPATIENT
Start: 2025-01-10

## 2025-01-10 NOTE — PROGRESS NOTES
Name: Chela Petersen      : 1975      MRN: [id number]  Encounter Provider: Radha Sanchez MD  Encounter Date: 1/10/2025   Encounter department: [facility]  :  Assessment & Plan  Acute bacterial conjunctivitis of both eyes  Pt presents from the group home for bilateral eye redness, which started last night. Eye is itchy and and red. Denies any sick contact or fever.   There is yellow discharge from both eyes which started last night.   Put 2 drop in each eye TID for 5 days  Continue to keep the area clean and dry  Do not put soap in the eye   Orders:    gentamicin (GARAMYCIN) 0.3 % ophthalmic solution; Administer 2 drops to both eyes 3 (three) times a day for 5 days    Esophagitis    Orders:    pantoprazole (PROTONIX) 40 mg tablet; Take 1 tablet (40 mg total) by mouth 2 (two) times a day before meals           History of Present Illness     Conjunctivitis   The current episode started 2 days ago. The onset was sudden. The problem has been unchanged. The problem is moderate. Nothing relieves the symptoms. Nothing aggravates the symptoms. Associated symptoms include eye itching, congestion, ear pain, eye discharge and eye redness. Pertinent negatives include no fever, no decreased vision, no double vision, no constipation, no ear discharge, no headaches, no hearing loss, no rhinorrhea, no sore throat, no muscle aches, no neck stiffness and no cough. The eye pain is moderate.     Review of Systems   Constitutional:  Negative for fever.   HENT:  Positive for congestion and ear pain. Negative for ear discharge, hearing loss, rhinorrhea and sore throat.    Eyes:  Positive for discharge, redness and itching. Negative for double vision.   Respiratory:  Negative for cough.    Gastrointestinal:  Negative for constipation.   Neurological:  Negative for headaches.       Objective   /84 (BP Location: Right arm, Patient Position: Sitting, Cuff Size: Standard)   Pulse 71    "Temp 98 °F (36.7 °C) (Tympanic)   Resp 19   Ht 4' 8\" (1.422 m)   Wt 81.3 kg (179 lb 3.2 oz)   LMP  (LMP Unknown)   SpO2 98%   BMI 40.18 kg/m²      Physical Exam  Vitals and nursing note reviewed.   Constitutional:       General: She is not in acute distress.     Appearance: She is well-developed.   HENT:      Head: Normocephalic and atraumatic.   Eyes:      General:         Right eye: Discharge present.         Left eye: Discharge present.     Conjunctiva/sclera: Conjunctivae normal.   Cardiovascular:      Rate and Rhythm: Normal rate and regular rhythm.      Pulses: Normal pulses.      Heart sounds: Normal heart sounds. No murmur heard.  Pulmonary:      Effort: Pulmonary effort is normal. No respiratory distress.      Breath sounds: Normal breath sounds.   Abdominal:      Palpations: Abdomen is soft.      Tenderness: There is no abdominal tenderness.   Musculoskeletal:      Cervical back: Neck supple.   Skin:     General: Skin is warm and dry.      Capillary Refill: Capillary refill takes less than 2 seconds.   Neurological:      Mental Status: She is alert.   Psychiatric:         Mood and Affect: Mood normal.         "

## 2025-01-13 ENCOUNTER — CONSULT (OUTPATIENT)
Dept: GASTROENTEROLOGY | Facility: CLINIC | Age: 50
End: 2025-01-13
Payer: MEDICARE

## 2025-01-13 VITALS
BODY MASS INDEX: 40.26 KG/M2 | SYSTOLIC BLOOD PRESSURE: 121 MMHG | HEART RATE: 71 BPM | WEIGHT: 179 LBS | DIASTOLIC BLOOD PRESSURE: 68 MMHG | HEIGHT: 56 IN

## 2025-01-13 DIAGNOSIS — R13.12 OROPHARYNGEAL DYSPHAGIA: ICD-10-CM

## 2025-01-13 DIAGNOSIS — K20.90 ESOPHAGITIS: Primary | ICD-10-CM

## 2025-01-13 DIAGNOSIS — Z12.11 SCREENING FOR COLON CANCER: ICD-10-CM

## 2025-01-13 PROCEDURE — 99204 OFFICE O/P NEW MOD 45 MIN: CPT | Performed by: NURSE PRACTITIONER

## 2025-01-13 RX ORDER — POLYETHYLENE GLYCOL 3350 17 G/17G
POWDER, FOR SOLUTION ORAL
COMMUNITY
Start: 2025-01-10

## 2025-01-13 NOTE — ASSESSMENT & PLAN NOTE
Pt recently admitted for change in mental status, weakness, ambulatory dysfunction in December. Has a hx of oropharyngeal dysphagia, 12/8/24 CT notable for dilation of the esophagus with mild wall thickening.  Concern for GERD with esophagitis vs pill esophagitis, aspiration of reflux, r/o underlying lesion.  Continue Pantoprazole 40mg BID  EGD scheduled for further evaluation. Prep and procedure explained.  Orders:    Ambulatory referral to Gastroenterology    EGD; Future

## 2025-01-13 NOTE — PATIENT INSTRUCTIONS
Scheduled date of EGD(as of today):02/04/25  Physician performing EGD:Dr. Esposito  Location of EGD:[facility]  Instructions reviewed with patient by:ti  Clearances:  n/a

## 2025-01-13 NOTE — H&P (VIEW-ONLY)
Name: Chela Petersen      : 1975      MRN: [id number]  Encounter Provider: SESAR Jessica  Encounter Date: 2025   Encounter department: [facility] [address]  :  Assessment & Plan  Esophagitis  Pt recently admitted for change in mental status, weakness, ambulatory dysfunction in December. Has a hx of oropharyngeal dysphagia, 24 CT notable for dilation of the esophagus with mild wall thickening.  Concern for GERD with esophagitis vs pill esophagitis, aspiration of reflux, r/o underlying lesion.  Continue Pantoprazole 40mg BID  EGD scheduled for further evaluation. Prep and procedure explained.  Orders:    Ambulatory referral to Gastroenterology    EGD; Future    Oropharyngeal dysphagia  Hx of mild-moderate oral and mild pharyngeal dysphagia on last barium swallow exam with speech in  performed due to suspected aspiration PNA  Recommended soft level 3 diet with thin liquids, upright posture, slow rate of feeding, small bites/sips and alternating bites and sips and 1:1 assisstance for verbal reminders for reduced rate/vol of intake.  Meds crushed with puree   Aspiration precautions   Reflux Precautions       Screening for colon cancer  Cologuard negative in 2024, continue to f/u with PCP for screening with this every 3 years       Body mass index (BMI) 40.0-44.9, adult ([facility])  Continue to work on healthy weight loss           History of Present Illness   HPI  Chela Petersen is a 49 y.o. female with down syndrome,hypothyroidism, oropharyngeal dysphagia, prediabetes, HLD, schizophrenia, congenital heart disease, history of prolonged QT, asthma, and ROBERTO on 3L at night who presents with her mother for evaluation of esophagitis on CT. She is adopted, ambulatory, resides at a group home, goes out to eat and to [employer] with her mother. She was admitted to the hospital recently in December due to a change in mental status, weakness, ambulatory  "dysfunction and CT performed showed dilation of the distal esophagus with wall thickening concerning for esophagitis. She was placed on Pantoprazole BID and recommended to follow up with GI. Mom reports patient always seems to have a dry cough even if she's not sick. She has a lack of dentition and reports pt tends to keep food in her mouth for a little while prior to swallowing, typically is on a soft, moist, chopped diet, thin liquids. She takes large Depakote pills in the AM but denies any difficulty swallowing these with water. Appetite is good, weight is stable, she's trying to lose weight. She moves her bowels well, occasional constipation is resolved with use of PRN Miralax. Denies any blood in the stool. She has never had an EGD. She had a cologuard done in June 2024 which was negative.       Review of Systems   Constitutional:  Negative for appetite change and unexpected weight change.   HENT:  Positive for trouble swallowing.    Gastrointestinal:  Negative for abdominal distention, abdominal pain, anal bleeding, blood in stool, constipation, diarrhea, nausea, rectal pain and vomiting.   Musculoskeletal:  Positive for arthralgias.          Objective   /68 (BP Location: Right arm, Patient Position: Sitting, Cuff Size: Standard)   Pulse 71   Ht 4' 8\" (1.422 m)   Wt 81.2 kg (179 lb)   LMP  (LMP Unknown)   BMI 40.13 kg/m²      Physical Exam  Constitutional:       Appearance: Normal appearance. She is obese.   HENT:      Head: Normocephalic and atraumatic.   Cardiovascular:      Rate and Rhythm: Normal rate and regular rhythm.   Pulmonary:      Breath sounds: Decreased breath sounds present.   Abdominal:      General: Bowel sounds are normal.      Palpations: Abdomen is soft.      Tenderness: There is no abdominal tenderness.   Musculoskeletal:         General: Normal range of motion.   Skin:     General: Skin is warm and dry.   Neurological:      Mental Status: She is alert. Mental status is at " baseline.   Psychiatric:         Mood and Affect: Mood normal.         Behavior: Behavior normal.

## 2025-01-13 NOTE — ASSESSMENT & PLAN NOTE
Hx of mild-moderate oral and mild pharyngeal dysphagia on last barium swallow exam with speech in 2018 performed due to suspected aspiration PNA  Recommended soft level 3 diet with thin liquids, upright posture, slow rate of feeding, small bites/sips and alternating bites and sips and 1:1 assisstance for verbal reminders for reduced rate/vol of intake.  Meds crushed with puree   Aspiration precautions   Reflux Precautions

## 2025-01-13 NOTE — PROGRESS NOTES
Name: Chela Petersen      : 1975      MRN: [id number]  Encounter Provider: SESAR Jessica  Encounter Date: 2025   Encounter department: [facility] [address]  :  Assessment & Plan  Esophagitis  Pt recently admitted for change in mental status, weakness, ambulatory dysfunction in December. Has a hx of oropharyngeal dysphagia, 24 CT notable for dilation of the esophagus with mild wall thickening.  Concern for GERD with esophagitis vs pill esophagitis, aspiration of reflux, r/o underlying lesion.  Continue Pantoprazole 40mg BID  EGD scheduled for further evaluation. Prep and procedure explained.  Orders:    Ambulatory referral to Gastroenterology    EGD; Future    Oropharyngeal dysphagia  Hx of mild-moderate oral and mild pharyngeal dysphagia on last barium swallow exam with speech in  performed due to suspected aspiration PNA  Recommended soft level 3 diet with thin liquids, upright posture, slow rate of feeding, small bites/sips and alternating bites and sips and 1:1 assisstance for verbal reminders for reduced rate/vol of intake.  Meds crushed with puree   Aspiration precautions   Reflux Precautions       Screening for colon cancer  Cologuard negative in 2024, continue to f/u with PCP for screening with this every 3 years       Body mass index (BMI) 40.0-44.9, adult ([facility])  Continue to work on healthy weight loss           History of Present Illness   HPI  Chela Petersen is a 49 y.o. female with down syndrome,hypothyroidism, oropharyngeal dysphagia, prediabetes, HLD, schizophrenia, congenital heart disease, history of prolonged QT, asthma, and ROBERTO on 3L at night who presents with her mother for evaluation of esophagitis on CT. She is adopted, ambulatory, resides at a group home, goes out to eat and to [employer] with her mother. She was admitted to the hospital recently in December due to a change in mental status, weakness, ambulatory  "dysfunction and CT performed showed dilation of the distal esophagus with wall thickening concerning for esophagitis. She was placed on Pantoprazole BID and recommended to follow up with GI. Mom reports patient always seems to have a dry cough even if she's not sick. She has a lack of dentition and reports pt tends to keep food in her mouth for a little while prior to swallowing, typically is on a soft, moist, chopped diet, thin liquids. She takes large Depakote pills in the AM but denies any difficulty swallowing these with water. Appetite is good, weight is stable, she's trying to lose weight. She moves her bowels well, occasional constipation is resolved with use of PRN Miralax. Denies any blood in the stool. She has never had an EGD. She had a cologuard done in June 2024 which was negative.       Review of Systems   Constitutional:  Negative for appetite change and unexpected weight change.   HENT:  Positive for trouble swallowing.    Gastrointestinal:  Negative for abdominal distention, abdominal pain, anal bleeding, blood in stool, constipation, diarrhea, nausea, rectal pain and vomiting.   Musculoskeletal:  Positive for arthralgias.          Objective   /68 (BP Location: Right arm, Patient Position: Sitting, Cuff Size: Standard)   Pulse 71   Ht 4' 8\" (1.422 m)   Wt 81.2 kg (179 lb)   LMP  (LMP Unknown)   BMI 40.13 kg/m²      Physical Exam  Constitutional:       Appearance: Normal appearance. She is obese.   HENT:      Head: Normocephalic and atraumatic.   Cardiovascular:      Rate and Rhythm: Normal rate and regular rhythm.   Pulmonary:      Breath sounds: Decreased breath sounds present.   Abdominal:      General: Bowel sounds are normal.      Palpations: Abdomen is soft.      Tenderness: There is no abdominal tenderness.   Musculoskeletal:         General: Normal range of motion.   Skin:     General: Skin is warm and dry.   Neurological:      Mental Status: She is alert. Mental status is at " baseline.   Psychiatric:         Mood and Affect: Mood normal.         Behavior: Behavior normal.

## 2025-01-14 LAB
ALBUMIN/CREAT UR: 4 MG/G CREAT (ref 0–29)
CREAT UR-MCNC: 67.5 MG/DL
HIV 1+2 AB+HIV1 P24 AG SERPL QL IA: NON REACTIVE
MICROALBUMIN UR-MCNC: 3 UG/ML

## 2025-01-15 DIAGNOSIS — J30.89 ALLERGIC RHINITIS DUE TO OTHER ALLERGIC TRIGGER, UNSPECIFIED SEASONALITY: ICD-10-CM

## 2025-01-15 DIAGNOSIS — K20.90 ESOPHAGITIS: ICD-10-CM

## 2025-01-15 DIAGNOSIS — E03.9 HYPOTHYROIDISM: ICD-10-CM

## 2025-01-15 RX ORDER — LEVOTHYROXINE SODIUM 88 UG/1
88 TABLET ORAL
Qty: 30 TABLET | Refills: 0 | Status: SHIPPED | OUTPATIENT
Start: 2025-01-15 | End: 2025-02-14

## 2025-01-15 RX ORDER — PANTOPRAZOLE SODIUM 40 MG/1
40 TABLET, DELAYED RELEASE ORAL
Qty: 60 TABLET | Refills: 0 | Status: SHIPPED | OUTPATIENT
Start: 2025-01-15

## 2025-01-15 RX ORDER — LEVOCETIRIZINE DIHYDROCHLORIDE 5 MG/1
5 TABLET, FILM COATED ORAL DAILY
Qty: 90 TABLET | Refills: 0 | Status: SHIPPED | OUTPATIENT
Start: 2025-01-15

## 2025-01-16 ENCOUNTER — TELEPHONE (OUTPATIENT)
Age: 50
End: 2025-01-16

## 2025-01-16 DIAGNOSIS — J45.20 MILD INTERMITTENT ASTHMA WITHOUT COMPLICATION: ICD-10-CM

## 2025-01-16 DIAGNOSIS — Z91.09 ALLERGY TO ENVIRONMENTAL FACTORS: ICD-10-CM

## 2025-01-16 DIAGNOSIS — Z76.0 MEDICATION REFILL: ICD-10-CM

## 2025-01-16 NOTE — TELEPHONE ENCOUNTER
Group Home staff came in office asking about the hydrocortisone cream, if it is discontinued can you write a letter stating that it is discontinued.

## 2025-01-16 NOTE — TELEPHONE ENCOUNTER
Group home staff came in office asking for a letter stating that from the visit on 1/10/25 that it was not pink eye that she had because that was what the doctor stated.     I looked at office notes and it shows she was seen for acute bacterial conjunctivitis    Are you able to review?

## 2025-01-21 ENCOUNTER — ANESTHESIA EVENT (OUTPATIENT)
Dept: ANESTHESIOLOGY | Facility: HOSPITAL | Age: 50
End: 2025-01-21

## 2025-01-21 ENCOUNTER — ANESTHESIA (OUTPATIENT)
Dept: ANESTHESIOLOGY | Facility: HOSPITAL | Age: 50
End: 2025-01-21

## 2025-01-22 RX ORDER — ALBUTEROL SULFATE 90 UG/1
2 INHALANT RESPIRATORY (INHALATION) EVERY 4 HOURS PRN
Qty: 18 G | Refills: 2 | Status: SHIPPED | OUTPATIENT
Start: 2025-01-22

## 2025-01-22 RX ORDER — ECHINACEA PURPUREA EXTRACT 125 MG
1 TABLET ORAL
Qty: 15 ML | Refills: 2 | Status: SHIPPED | OUTPATIENT
Start: 2025-01-22

## 2025-01-22 RX ORDER — GINSENG 100 MG
1 CAPSULE ORAL 3 TIMES DAILY PRN
Qty: 15 G | Refills: 2 | Status: SHIPPED | OUTPATIENT
Start: 2025-01-22

## 2025-01-22 NOTE — TELEPHONE ENCOUNTER
Group Home staff came in office asking about the hydrocortisone cream, if it is discontinued can you write a letter stating that it is discontinued.     Please see above note. They either need an updated script or a letter stating that it is discontinued.  Please advise.

## 2025-01-23 DIAGNOSIS — J45.20 MILD INTERMITTENT ASTHMA WITHOUT COMPLICATION: ICD-10-CM

## 2025-01-23 DIAGNOSIS — Z76.0 MEDICATION REFILL: ICD-10-CM

## 2025-01-23 DIAGNOSIS — Z91.09 ALLERGY TO ENVIRONMENTAL FACTORS: ICD-10-CM

## 2025-01-23 RX ORDER — ALBUTEROL SULFATE 90 UG/1
2 INHALANT RESPIRATORY (INHALATION) EVERY 4 HOURS PRN
Qty: 18 G | Refills: 2 | OUTPATIENT
Start: 2025-01-23

## 2025-01-23 RX ORDER — GINSENG 100 MG
1 CAPSULE ORAL 3 TIMES DAILY PRN
Qty: 15 G | Refills: 2 | OUTPATIENT
Start: 2025-01-23

## 2025-01-23 RX ORDER — ECHINACEA PURPUREA EXTRACT 125 MG
1 TABLET ORAL
Qty: 15 ML | Refills: 2 | OUTPATIENT
Start: 2025-01-23

## 2025-01-23 NOTE — TELEPHONE ENCOUNTER
Duplicate request. All 3 of these medications were refilled yesterday 1/22/25. Receipt was confirmed by pharmacy.

## 2025-01-25 NOTE — TELEPHONE ENCOUNTER
"Letter completed, available in patient's chart under \"Letters\". Please send to group home. Thank you! "

## 2025-01-27 ENCOUNTER — TELEPHONE (OUTPATIENT)
Age: 50
End: 2025-01-27

## 2025-01-27 ENCOUNTER — TELEPHONE (OUTPATIENT)
Dept: GASTROENTEROLOGY | Facility: CLINIC | Age: 50
End: 2025-01-27

## 2025-01-27 ENCOUNTER — RESULTS FOLLOW-UP (OUTPATIENT)
Age: 50
End: 2025-01-27

## 2025-01-27 DIAGNOSIS — Z91.09 ALLERGY TO ENVIRONMENTAL FACTORS: ICD-10-CM

## 2025-01-27 DIAGNOSIS — J45.20 MILD INTERMITTENT ASTHMA WITHOUT COMPLICATION: ICD-10-CM

## 2025-01-27 NOTE — TELEPHONE ENCOUNTER
Spoke to mother confirming pt's egd scheduled on 2/4/25 at [facility] with Dr Esposito.  Informed [facility] would be calling the day prior with the arrival time.  She has instructions for pt and did not have any questions.  She will be pt's .

## 2025-02-03 ENCOUNTER — ANESTHESIA EVENT (OUTPATIENT)
Dept: ANESTHESIOLOGY | Facility: HOSPITAL | Age: 50
End: 2025-02-03

## 2025-02-03 ENCOUNTER — ANESTHESIA (OUTPATIENT)
Dept: ANESTHESIOLOGY | Facility: HOSPITAL | Age: 50
End: 2025-02-03

## 2025-02-03 RX ORDER — SODIUM CHLORIDE, SODIUM LACTATE, POTASSIUM CHLORIDE, CALCIUM CHLORIDE 600; 310; 30; 20 MG/100ML; MG/100ML; MG/100ML; MG/100ML
75 INJECTION, SOLUTION INTRAVENOUS CONTINUOUS
Status: CANCELLED | OUTPATIENT
Start: 2025-02-03

## 2025-02-04 ENCOUNTER — ANESTHESIA EVENT (OUTPATIENT)
Dept: GASTROENTEROLOGY | Facility: AMBULARY SURGERY CENTER | Age: 50
End: 2025-02-04
Payer: MEDICARE

## 2025-02-04 ENCOUNTER — HOSPITAL ENCOUNTER (OUTPATIENT)
Dept: GASTROENTEROLOGY | Facility: AMBULARY SURGERY CENTER | Age: 50
Setting detail: OUTPATIENT SURGERY
Discharge: HOME | End: 2025-02-04
Attending: INTERNAL MEDICINE
Payer: MEDICARE

## 2025-02-04 VITALS
DIASTOLIC BLOOD PRESSURE: 68 MMHG | SYSTOLIC BLOOD PRESSURE: 109 MMHG | HEART RATE: 68 BPM | TEMPERATURE: 96.5 F | OXYGEN SATURATION: 99 % | RESPIRATION RATE: 18 BRPM

## 2025-02-04 DIAGNOSIS — K20.90 ESOPHAGITIS: ICD-10-CM

## 2025-02-04 PROBLEM — R13.12 OROPHARYNGEAL DYSPHAGIA: Status: RESOLVED | Noted: 2018-12-19 | Resolved: 2025-02-04

## 2025-02-04 PROBLEM — Z99.89 CPAP (CONTINUOUS POSITIVE AIRWAY PRESSURE) DEPENDENCE: Status: ACTIVE | Noted: 2025-02-04

## 2025-02-04 PROBLEM — K21.9 GASTROESOPHAGEAL REFLUX DISEASE: Status: ACTIVE | Noted: 2025-02-04

## 2025-02-04 PROCEDURE — 88305 TISSUE EXAM BY PATHOLOGIST: CPT | Performed by: PATHOLOGY

## 2025-02-04 RX ORDER — LIDOCAINE HYDROCHLORIDE 10 MG/ML
INJECTION, SOLUTION EPIDURAL; INFILTRATION; INTRACAUDAL; PERINEURAL AS NEEDED
Status: DISCONTINUED | OUTPATIENT
Start: 2025-02-04 | End: 2025-02-04

## 2025-02-04 RX ORDER — SODIUM CHLORIDE, SODIUM LACTATE, POTASSIUM CHLORIDE, CALCIUM CHLORIDE 600; 310; 30; 20 MG/100ML; MG/100ML; MG/100ML; MG/100ML
75 INJECTION, SOLUTION INTRAVENOUS CONTINUOUS
Status: DISCONTINUED | OUTPATIENT
Start: 2025-02-04 | End: 2025-02-08 | Stop reason: HOSPADM

## 2025-02-04 RX ORDER — PROPOFOL 10 MG/ML
INJECTION, EMULSION INTRAVENOUS AS NEEDED
Status: DISCONTINUED | OUTPATIENT
Start: 2025-02-04 | End: 2025-02-04

## 2025-02-04 RX ORDER — SODIUM CHLORIDE, SODIUM LACTATE, POTASSIUM CHLORIDE, CALCIUM CHLORIDE 600; 310; 30; 20 MG/100ML; MG/100ML; MG/100ML; MG/100ML
INJECTION, SOLUTION INTRAVENOUS CONTINUOUS PRN
Status: DISCONTINUED | OUTPATIENT
Start: 2025-02-04 | End: 2025-02-04

## 2025-02-04 RX ADMIN — SODIUM CHLORIDE, SODIUM LACTATE, POTASSIUM CHLORIDE, AND CALCIUM CHLORIDE: .6; .31; .03; .02 INJECTION, SOLUTION INTRAVENOUS at 09:18

## 2025-02-04 RX ADMIN — PROPOFOL 30 MG: 10 INJECTION, EMULSION INTRAVENOUS at 09:31

## 2025-02-04 RX ADMIN — LIDOCAINE HYDROCHLORIDE 50 MG: 10 INJECTION, SOLUTION EPIDURAL; INFILTRATION; INTRACAUDAL; PERINEURAL at 09:28

## 2025-02-04 RX ADMIN — PROPOFOL 120 MG: 10 INJECTION, EMULSION INTRAVENOUS at 09:29

## 2025-02-04 NOTE — INTERVAL H&P NOTE
H&P reviewed. After examining the patient I find no changes in the patients condition since the H&P had been written.    Vitals:    02/04/25 0808   BP: 131/63   Pulse: 57   Resp: 16   Temp: (!) 96.5 °F (35.8 °C)   SpO2: 100%

## 2025-02-04 NOTE — ANESTHESIA PREPROCEDURE EVALUATION
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      Anesthesia Plan  ASA Score- 3     Anesthesia Type- IV sedation with anesthesia with ASA Monitors.         Additional Monitors:     Airway Plan:     Comment: Consent provided by mother, Olivia Petersen.       Plan Factors-Exercise tolerance (METS): >4 METS.    Chart reviewed.        Patient is not a current smoker.              Induction- intravenous.    Postoperative Plan-     Perioperative Resuscitation Plan - Level 1 - Full Code.       Informed Consent- Anesthetic plan and risks discussed with mother.  I personally reviewed this patient with the CRNA. Discussed and agreed on the Anesthesia Plan with the CRNA..      NPO Status:  Vitals Value Taken Time   Date of last liquid 02/03/25 02/04/25 0807   Time of last liquid 2000 02/04/25 0807   Date of last solid 02/03/25 02/04/25 0807   Time of last solid 1730 02/04/25 0807

## 2025-02-04 NOTE — PERIOPERATIVE NURSING NOTE
Patient brought from procedure to phase two. Sbar given at bedside to GI rn  . Patient resting in bed, bed locked in lowest position with side rails raise, call light in reach and environment cleared. Plan of care ongoing.

## 2025-02-04 NOTE — ANESTHESIA POSTPROCEDURE EVALUATION
Post-Op Assessment Note    CV Status:  Stable  Pain Score: 0    Pain management: adequate       Mental Status:  Sleepy   Hydration Status:  Stable   PONV Controlled:  None   Airway Patency:  Patent     Post Op Vitals Reviewed: Yes    No anethesia notable event occurred.    Staff: Anesthesiologist           Last Filed PACU Vitals:  Vitals Value Taken Time   Temp     Pulse     BP     Resp     SpO2

## 2025-02-06 PROCEDURE — 88305 TISSUE EXAM BY PATHOLOGIST: CPT | Performed by: PATHOLOGY

## 2025-02-10 ENCOUNTER — APPOINTMENT (EMERGENCY)
Dept: RADIOLOGY | Facility: HOSPITAL | Age: 50
End: 2025-02-10
Payer: MEDICARE

## 2025-02-10 ENCOUNTER — HOSPITAL ENCOUNTER (EMERGENCY)
Facility: HOSPITAL | Age: 50
Discharge: HOME | End: 2025-02-10
Attending: EMERGENCY MEDICINE
Payer: MEDICARE

## 2025-02-10 VITALS
RESPIRATION RATE: 22 BRPM | SYSTOLIC BLOOD PRESSURE: 96 MMHG | HEART RATE: 66 BPM | TEMPERATURE: 98.2 F | DIASTOLIC BLOOD PRESSURE: 54 MMHG | OXYGEN SATURATION: 97 %

## 2025-02-10 DIAGNOSIS — J40 BRONCHITIS: Primary | ICD-10-CM

## 2025-02-10 LAB
FLUAV AG UPPER RESP QL IA.RAPID: NEGATIVE
FLUBV AG UPPER RESP QL IA.RAPID: NEGATIVE
SARS-COV+SARS-COV-2 AG RESP QL IA.RAPID: NEGATIVE

## 2025-02-10 PROCEDURE — 71045 X-RAY EXAM CHEST 1 VIEW: CPT

## 2025-02-10 PROCEDURE — 87804 INFLUENZA ASSAY W/OPTIC: CPT | Performed by: EMERGENCY MEDICINE

## 2025-02-10 PROCEDURE — 99284 EMERGENCY DEPT VISIT MOD MDM: CPT

## 2025-02-10 PROCEDURE — 87811 SARS-COV-2 COVID19 W/OPTIC: CPT | Performed by: EMERGENCY MEDICINE

## 2025-02-10 PROCEDURE — 99284 EMERGENCY DEPT VISIT MOD MDM: CPT | Performed by: EMERGENCY MEDICINE

## 2025-02-10 RX ORDER — DEXAMETHASONE 4 MG/1
10 TABLET ORAL ONCE
Status: COMPLETED | OUTPATIENT
Start: 2025-02-10 | End: 2025-02-10

## 2025-02-10 RX ORDER — ACETAMINOPHEN 325 MG/1
650 TABLET ORAL ONCE
Status: COMPLETED | OUTPATIENT
Start: 2025-02-10 | End: 2025-02-10

## 2025-02-10 RX ORDER — AZITHROMYCIN 250 MG/1
250 TABLET, FILM COATED ORAL EVERY 24 HOURS
Qty: 4 TABLET | Refills: 0 | Status: SHIPPED | OUTPATIENT
Start: 2025-02-10 | End: 2025-02-14

## 2025-02-10 RX ORDER — AZITHROMYCIN 250 MG/1
500 TABLET, FILM COATED ORAL ONCE
Status: COMPLETED | OUTPATIENT
Start: 2025-02-10 | End: 2025-02-10

## 2025-02-10 RX ADMIN — ACETAMINOPHEN 650 MG: 325 TABLET ORAL at 16:28

## 2025-02-10 RX ADMIN — AZITHROMYCIN DIHYDRATE 500 MG: 250 TABLET ORAL at 16:21

## 2025-02-10 RX ADMIN — DEXAMETHASONE 10 MG: 4 TABLET ORAL at 16:21

## 2025-02-12 ENCOUNTER — RESULTS FOLLOW-UP (OUTPATIENT)
Age: 50
End: 2025-02-12

## 2025-02-12 DIAGNOSIS — E03.9 HYPOTHYROIDISM: ICD-10-CM

## 2025-02-12 RX ORDER — LEVOTHYROXINE SODIUM 88 UG/1
88 TABLET ORAL
Qty: 30 TABLET | Refills: 0 | Status: SHIPPED | OUTPATIENT
Start: 2025-02-12 | End: 2025-03-14

## 2025-02-12 NOTE — RESULT ENCOUNTER NOTE
Called and spoke to patient's mother Olivia Nicola about tissue exam results. Advised to call GI office to discuss if needs follow up. Olivia expressed understanding and agreement to plan.

## 2025-02-13 ENCOUNTER — OFFICE VISIT (OUTPATIENT)
Age: 50
End: 2025-02-13

## 2025-02-13 VITALS
SYSTOLIC BLOOD PRESSURE: 112 MMHG | TEMPERATURE: 98.2 F | BODY MASS INDEX: 39.59 KG/M2 | DIASTOLIC BLOOD PRESSURE: 70 MMHG | HEIGHT: 56 IN | HEART RATE: 70 BPM | WEIGHT: 176 LBS | OXYGEN SATURATION: 98 %

## 2025-02-13 DIAGNOSIS — J42 CHRONIC BRONCHITIS, UNSPECIFIED CHRONIC BRONCHITIS TYPE (HCC): Primary | ICD-10-CM

## 2025-02-13 PROCEDURE — 99213 OFFICE O/P EST LOW 20 MIN: CPT | Performed by: FAMILY MEDICINE

## 2025-02-13 PROCEDURE — G2211 COMPLEX E/M VISIT ADD ON: HCPCS | Performed by: FAMILY MEDICINE

## 2025-02-13 NOTE — ASSESSMENT & PLAN NOTE
ED follow up. Doing much better  - Seen in ED on 2/10 (3 days ago) for fevers, low oxygen levels at group home  - CXR 2/10 negative for consolidations  - Flu/COVID negative on 2/10  - Given Decadron 10 mg and Azithromycin 500 mg     Plan:  - Continue Azithromycin ending 2/14  - Continue Albuterol inhaler PRN  - Continue Xyzal 5 mg daily  - Continue Nasal spray

## 2025-02-13 NOTE — PROGRESS NOTES
"Name: Chela Petersen      : 1975      MRN: [id number]  Encounter Provider: Kian Tapia MD  Encounter Date: 2025   Encounter department: [facility]  :  Assessment & Plan  Chronic bronchitis, unspecified chronic bronchitis type (HCC)  ED follow up. Doing much better  - Seen in ED on 2/10 (3 days ago) for fevers, low oxygen levels at group home  - CXR 2/10 negative for consolidations  - Flu/COVID negative on 2/10  - Given Decadron 10 mg and Azithromycin 500 mg     Plan:  - Continue Azithromycin ending   - Continue Albuterol inhaler PRN  - Continue Xyzal 5 mg daily  - Continue Nasal spray              History of Present Illness {?Quick Links Encounters * My Last Note * Last Note in Specialty * Snapshot * Since Last Visit * History :40205}  Patient presents for ED follow up. States she is doing much better. No new complaints today. Just got her nails done.       Review of Systems   Constitutional:  Negative for chills and fever.   HENT:  Negative for congestion.    Respiratory:  Negative for shortness of breath.    Cardiovascular:  Negative for chest pain.   Gastrointestinal:  Negative for abdominal pain, diarrhea, nausea and vomiting.   Genitourinary:  Negative for difficulty urinating.   Neurological:  Negative for dizziness.   All other systems reviewed and are negative.      Objective {?Quick Links Trend Vitals * Enter New Vitals * Results Review * Timeline (Adult) * Labs * Imaging * Cardiology * Procedures * Lung Cancer Screening * Surgical eConsent :66007}  /70 (BP Location: Left arm, Patient Position: Sitting, Cuff Size: Standard)   Pulse 70   Temp 98.2 °F (36.8 °C) (Tympanic)   Ht 4' 8\" (1.422 m)   Wt 79.8 kg (176 lb)   LMP  (LMP Unknown)   SpO2 98%   BMI 39.46 kg/m²      Physical Exam  Vitals reviewed.   Constitutional:       General: She is not in acute distress.     Appearance: She is obese. She is not ill-appearing.   Cardiovascular:      " Rate and Rhythm: Normal rate and regular rhythm.      Pulses: Normal pulses.      Heart sounds: Normal heart sounds.   Pulmonary:      Effort: No respiratory distress.      Breath sounds: Normal breath sounds. No wheezing.   Abdominal:      General: There is no distension.      Palpations: Abdomen is soft.      Tenderness: There is no abdominal tenderness.   Skin:     General: Skin is warm and dry.   Neurological:      Mental Status: She is alert.   Psychiatric:         Mood and Affect: Mood normal.

## 2025-02-13 NOTE — ED PROVIDER NOTES
Time reflects when diagnosis was documented in both MDM as applicable and the Disposition within this note       Time User Action Codes Description Comment    2/10/2025  4:17 PM Baldo Milian Add [J40] Bronchitis           ED Disposition       ED Disposition   Discharge    Condition   Stable    Date/Time   Mon Feb 10, 2025  4:17 PM    Comment   Chela Petersen discharge to home/self care.                   Assessment & Plan       Medical Decision Making  Pulse ox 97% on room air indicating adequate oxygenation.  CXR: NAD as read by me          Amount and/or Complexity of Data Reviewed  Labs: ordered.  Radiology: ordered and independent interpretation performed.    Risk  OTC drugs.  Prescription drug management.             Medications   dexamethasone (DECADRON) tablet 10 mg (10 mg Oral Given 2/10/25 1621)   azithromycin (ZITHROMAX) tablet 500 mg (500 mg Oral Given 2/10/25 1621)   acetaminophen (TYLENOL) tablet 650 mg (650 mg Oral Given 2/10/25 1628)       ED Risk Strat Scores                                              History of Present Illness       Chief Complaint   Patient presents with    Fever     Started with fever three days ago, also c/o headache    Decreased Oxygen Level     Lives in group home and they were getting low oxygen levels.     Eye Problem     Bilat eye redness and itchiness       Past Medical History:   Diagnosis Date    Asthma     Complex partial epilepsy (HCC)     COVID 02/2021    Diabetes mellitus (HCC)     Disease of thyroid gland     Down syndrome     Heart murmur     Hydradenitis     Hyperlipidemia     Long Q-T syndrome     Pneumonia     Last assessed 5/28/2014     Psychiatric disorder     schizo    Sleep apnea       Past Surgical History:   Procedure Laterality Date    CARDIAC SURGERY      patent duct    PATENT DUCTUS ARTERIOUS LIGATION        Family History   Adopted: Yes   Problem Relation Age of Onset    No Known Problems Mother       Social History     Tobacco Use    Smoking  status: Never    Smokeless tobacco: Never   Vaping Use    Vaping status: Never Used   Substance Use Topics    Alcohol use: Never    Drug use: Never      E-Cigarette/Vaping    E-Cigarette Use Never User       E-Cigarette/Vaping Substances    Nicotine No     THC No     CBD No     Flavoring No     Other No     Unknown No       I have reviewed and agree with the history as documented.     Patient brought in for evaluation of fever and cough for the last 3 days.  Has some bilateral eye redness and itchiness is ongoing issue for her and she has eyedrops for this.  Lives in a group home and they were sometimes getting low oxygen levels.      History provided by:  Patient   used: No    Fever  Associated symptoms: congestion, cough, fever and headaches    Eye Problem  Associated symptoms: headaches        Review of Systems   Constitutional:  Positive for fever.   HENT:  Positive for congestion.    Respiratory:  Positive for cough.    Neurological:  Positive for headaches.   All other systems reviewed and are negative.          Objective       ED Triage Vitals   Temperature Pulse Blood Pressure Respirations SpO2 Patient Position - Orthostatic VS   02/10/25 1351 02/10/25 1351 02/10/25 1351 02/10/25 1351 02/10/25 1354 02/10/25 1351   98.5 °F (36.9 °C) 68 111/56 (!) 28 100 % Sitting      Temp Source Heart Rate Source BP Location FiO2 (%) Pain Score    02/10/25 1351 02/10/25 1351 02/10/25 1351 -- 02/10/25 1628    Tympanic Monitor Right arm  6      Vitals      Date and Time Temp Pulse SpO2 Resp BP Pain Score FACES Pain Rating User   02/10/25 1628 -- -- -- -- -- 6 -- KR   02/10/25 1624 -- 66 97 % 22 96/54 -- -- KR   02/10/25 1602 98.2 °F (36.8 °C) 60 100 % -- 97/54 -- -- FO   02/10/25 1354 -- -- 100 % -- -- -- -- LS   02/10/25 1351 98.5 °F (36.9 °C) 68 -- 28 111/56 -- -- LS            Physical Exam  Vitals and nursing note reviewed.   Constitutional:       General: She is not in acute distress.  HENT:      Nose:  Congestion present.   Cardiovascular:      Rate and Rhythm: Normal rate and regular rhythm.   Pulmonary:      Effort: Pulmonary effort is normal. No respiratory distress.      Breath sounds: Rhonchi present.   Neurological:      General: No focal deficit present.      Mental Status: She is alert. Mental status is at baseline.         Results Reviewed       Procedure Component Value Units Date/Time    FLU/COVID Rapid Antigen (30 min. TAT) - Preferred screening test in ED [[id number]]  (Normal) Collected: 02/10/25 1444    Lab Status: Final result Specimen: Nares from Nose Updated: 02/10/25 1512     SARS COV Rapid Antigen Negative     Influenza A Rapid Antigen Negative     Influenza B Rapid Antigen Negative    Narrative:      This test has been performed using the [employer] Suzanne 2 FLU+SARS Antigen test under the Emergency Use Authorization (EUA). This test has been validated by the  and verified by the performing laboratory. The Suzanne uses lateral flow immunofluorescent sandwich assay to detect SARS-COV, Influenza A and Influenza B Antigen.     The [employer] Suzanne 2 SARS Antigen test does not differentiate between SARS-CoV and SARS-CoV-2.     Negative results are presumptive and may be confirmed with a molecular assay, if necessary, for patient management. Negative results do not rule out SARS-CoV-2 or influenza infection and should not be used as the sole basis for treatment or patient management decisions. A negative test result may occur if the level of antigen in a sample is below the limit of detection of this test.     Positive results are indicative of the presence of viral antigens, but do not rule out bacterial infection or co-infection with other viruses.     All test results should be used as an adjunct to clinical observations and other information available to the provider.    FOR PEDIATRIC PATIENTS - copy/paste COVID Guidelines URL to browser:  [email or web address]            XR chest 1 view portable   Final Interpretation by Jimmy Minor MD (02/10 1720)      Congestive changes centrally in the lungs versus viral syndrome. No segmental or lobar consolidations are seen            Workstation performed: [id number]             Procedures    ED Medication and Procedure Management   Prior to Admission Medications   Prescriptions Last Dose Informant Patient Reported? Taking?   ARIPiprazole (ABILIFY) 10 mg tablet  Care Giver No No   Sig: Take 1 tablet (10 mg total) by mouth daily Daily at 8 AM   Eyelid Cleansers (OCUSOFT BABY EYELID & EYELASH EX)  Care Giver, Outside Facility (Specify) Yes No   Sig: Inhale   Milk of Magnesia 400 MG/5ML oral suspension  Care Giver Yes No   Multiple Vitamins-Minerals (Multivitamin Women 50+) TABS  Care Giver No No   Sig: Take 1 tablet by mouth in the morning   Spacer/Aero Chamber Mouthpiece (SPACER DEVICE) for metered dose inhaler  Care Giver, Outside Facility (Specify) No No   Sig: For use with metered dose inhaler   albuterol (PROVENTIL HFA,VENTOLIN HFA) 90 mcg/act inhaler   No No   Sig: Inhale 2 puffs every 4 (four) hours as needed for wheezing   atorvastatin (LIPITOR) 10 mg tablet  Care Giver No No   Sig: Take 1 tablet (10 mg total) by mouth daily At 8pm   bacitracin topical ointment 500 units/g topical ointment   No No   Sig: Apply 1 large application topically 3 (three) times a day as needed for wound care   calcium carbonate (OS-ESTEPHANIA) 600 MG tablet  Care Giver, Outside Facility (Specify) No No   Sig: Take 1 tablet (600 mg total) by mouth daily Daily with breakfast   clindamycin (CLINDAGEL) 1 % gel  Care Giver No No   Sig: Apply topically 2 (two) times a day Apply to L axilla at 8 am / 8pm   divalproex sodium (DEPAKOTE ER) 500 mg 24 hr tablet  Care Giver No No   Sig: Take 3 tablets (1,500 mg total) by mouth daily at 8 AM   ergocalciferol (VITAMIN D2) 50,000 units   Care Giver, Outside Facility (Specify) No No   Sig: Take 1 capsule (50,000 Units total) by mouth once a week Administer on    fluticasone (FLONASE) 50 mcg/act nasal spray  Care Giver No No   Sig: Use 2 sprays in each nostril daily at bedtime as needed for nasal congestion.   guaiFENesin (ROBITUSSIN) 100 MG/5ML oral liquid  Care Giver No No   Sig: Take 10 mL (200 mg total) by mouth 3 (three) times a day as needed for cough or congestion   ketoconazole (NIZORAL) 2 % cream  Care Giver No No   Sig: Apply topically daily   levocetirizine (XYZAL) 5 MG tablet   No No   Sig: Take 1 tablet (5 mg total) by mouth daily At 8 PM   nystatin (MYCOSTATIN) powder  Care Giver No No   Sig: Apply topically 3 (three) times a day as needed (rash)   pantoprazole (PROTONIX) 40 mg tablet   No No   Sig: Take 1 tablet (40 mg total) by mouth 2 (two) times a day before meals   polyethylene glycol (MIRALAX) 17 g packet  Care Giver Yes No   Patient not taking: Reported on 2025   sodium chloride (OCEAN) 0.65 % nasal spray   No No   Si spray into each nostril every hour as needed for congestion      Facility-Administered Medications: None     Discharge Medication List as of 2/10/2025  4:18 PM        START taking these medications    Details   azithromycin (ZITHROMAX) 250 mg tablet Take 1 tablet (250 mg total) by mouth every 24 hours for 4 days, Starting Mon 2/10/2025, Until 2025, Normal           CONTINUE these medications which have NOT CHANGED    Details   albuterol (PROVENTIL HFA,VENTOLIN HFA) 90 mcg/act inhaler Inhale 2 puffs every 4 (four) hours as needed for wheezing, Starting 2025, Normal      ARIPiprazole (ABILIFY) 10 mg tablet Take 1 tablet (10 mg total) by mouth daily Daily at 8 AM, Starting 2024, Normal      atorvastatin (LIPITOR) 10 mg tablet Take 1 tablet (10 mg total) by mouth daily At 8pm, Starting 2024, Normal      bacitracin topical ointment 500 units/g topical ointment Apply 1  large application topically 3 (three) times a day as needed for wound care, Starting Wed 1/22/2025, Normal      calcium carbonate (OS-ESTEPHANIA) 600 MG tablet Take 1 tablet (600 mg total) by mouth daily Daily with breakfast, Starting Wed 1/11/2023, Normal      clindamycin (CLINDAGEL) 1 % gel Apply topically 2 (two) times a day Apply to L axilla at 8 am / 8pm, Starting Tue 11/28/2023, Normal      divalproex sodium (DEPAKOTE ER) 500 mg 24 hr tablet Take 3 tablets (1,500 mg total) by mouth daily at 8 AM, Starting Fri 3/15/2024, Normal      ergocalciferol (VITAMIN D2) 50,000 units Take 1 capsule (50,000 Units total) by mouth once a week Administer on sunday, Starting Wed 1/11/2023, Normal      Eyelid Cleansers (OCUSOFT BABY EYELID & EYELASH EX) Inhale, Historical Med      fluticasone (FLONASE) 50 mcg/act nasal spray Use 2 sprays in each nostril daily at bedtime as needed for nasal congestion., Normal      guaiFENesin (ROBITUSSIN) 100 MG/5ML oral liquid Take 10 mL (200 mg total) by mouth 3 (three) times a day as needed for cough or congestion, Starting Fri 1/3/2025, Normal      ketoconazole (NIZORAL) 2 % cream Apply topically daily, Starting Sun 11/17/2024, Normal      levocetirizine (XYZAL) 5 MG tablet Take 1 tablet (5 mg total) by mouth daily At 8 PM, Starting Wed 1/15/2025, Normal      Milk of Magnesia 400 MG/5ML oral suspension Historical Med      Multiple Vitamins-Minerals (Multivitamin Women 50+) TABS Take 1 tablet by mouth in the morning, Starting Mon 7/22/2024, Normal      nystatin (MYCOSTATIN) powder Apply topically 3 (three) times a day as needed (rash), Starting Mon 6/10/2024, Normal      pantoprazole (PROTONIX) 40 mg tablet Take 1 tablet (40 mg total) by mouth 2 (two) times a day before meals, Starting Wed 1/15/2025, Normal      polyethylene glycol (MIRALAX) 17 g packet Historical Med      sodium chloride (OCEAN) 0.65 % nasal spray 1 spray into each nostril every hour as needed for congestion, Starting Wed  1/22/2025, Normal      Spacer/Aero Chamber Mouthpiece (SPACER DEVICE) for metered dose inhaler For use with metered dose inhaler, Normal      levothyroxine 88 mcg tablet Take 1 tablet (88 mcg total) by mouth daily in the early morning, Starting Wed 1/15/2025, Until Fri 2/14/2025, Normal           No discharge procedures on file.  ED SEPSIS DOCUMENTATION   Time reflects when diagnosis was documented in both MDM as applicable and the Disposition within this note       Time User Action Codes Description Comment    2/10/2025  4:17 PM Baldo Milian [J40] Bronchitis                  Baldo Milian,   02/12/25 2123

## 2025-02-21 ENCOUNTER — RESULTS FOLLOW-UP (OUTPATIENT)
Age: 50
End: 2025-02-21

## 2025-02-21 NOTE — RESULT ENCOUNTER NOTE
Please call the patient regarding her abnormal result.  EGD biopsy shows mild gastritis.  Follow up in my office as needed

## 2025-03-05 ENCOUNTER — OFFICE VISIT (OUTPATIENT)
Age: 50
End: 2025-03-05
Payer: MEDICARE

## 2025-03-05 VITALS — HEIGHT: 56 IN | BODY MASS INDEX: 39.59 KG/M2 | WEIGHT: 176 LBS | RESPIRATION RATE: 17 BRPM

## 2025-03-05 DIAGNOSIS — B35.1 ONYCHOMYCOSIS: ICD-10-CM

## 2025-03-05 DIAGNOSIS — M79.671 PAIN IN BOTH FEET: Primary | ICD-10-CM

## 2025-03-05 DIAGNOSIS — B35.9 DERMATOPHYTOSIS: ICD-10-CM

## 2025-03-05 DIAGNOSIS — L03.032 PARONYCHIA OF TOENAIL OF LEFT FOOT: ICD-10-CM

## 2025-03-05 DIAGNOSIS — M79.672 PAIN IN BOTH FEET: Primary | ICD-10-CM

## 2025-03-05 PROCEDURE — 99202 OFFICE O/P NEW SF 15 MIN: CPT | Performed by: PODIATRIST

## 2025-03-05 NOTE — PROGRESS NOTES
Assessment/Plan: Pain upon ambulation.  Mycosis of nail.  Paronychia left hallux.  Dermatophytosis/xerosis of skin.    Plan.  Chart reviewed.  PCP notes reviewed.  Patient evaluated.  Staff advised on condition.  Written instruction given.  They will consider moisturizer daily.  No cutting instruction given.  Shoe style recommendations made.  Aftercare instruction given.  Watch for signs of infection.  Return for follow-up.         Diagnoses and all orders for this visit:    Pain in both feet    Onychomycosis    Paronychia of toenail of left foot    Dermatophytosis          Subjective: Patient presents for evaluation.  She has pain in her feet with ambulation and shoewear.  No history of trauma.    Allergies   Allergen Reactions    Avandia [Rosiglitazone]      CHF    Nsaids Other (See Comments)     unknown         Current Outpatient Medications:     albuterol (PROVENTIL HFA,VENTOLIN HFA) 90 mcg/act inhaler, Inhale 2 puffs every 4 (four) hours as needed for wheezing, Disp: 18 g, Rfl: 2    ARIPiprazole (ABILIFY) 10 mg tablet, Take 1 tablet (10 mg total) by mouth daily Daily at 8 AM, Disp: 90 tablet, Rfl: 3    atorvastatin (LIPITOR) 10 mg tablet, Take 1 tablet (10 mg total) by mouth daily At 8pm, Disp: 90 tablet, Rfl: 3    bacitracin topical ointment 500 units/g topical ointment, Apply 1 large application topically 3 (three) times a day as needed for wound care, Disp: 15 g, Rfl: 2    calcium carbonate (OS-ESTEPHANIA) 600 MG tablet, Take 1 tablet (600 mg total) by mouth daily Daily with breakfast, Disp: 180 tablet, Rfl: 0    clindamycin (CLINDAGEL) 1 % gel, Apply topically 2 (two) times a day Apply to L axilla at 8 am / 8pm, Disp: 30 g, Rfl: 1    divalproex sodium (DEPAKOTE ER) 500 mg 24 hr tablet, Take 3 tablets (1,500 mg total) by mouth daily at 8 AM, Disp: 90 tablet, Rfl: 11    ergocalciferol (VITAMIN D2) 50,000 units, Take 1 capsule (50,000 Units total) by mouth once a week Administer on sunday, Disp: 4 capsule, Rfl: 2     Eyelid Cleansers (OCUSOFT BABY EYELID & EYELASH EX), Inhale, Disp: , Rfl:     fluticasone (FLONASE) 50 mcg/act nasal spray, Use 2 sprays in each nostril daily at bedtime as needed for nasal congestion., Disp: 16 g, Rfl: 5    guaiFENesin (ROBITUSSIN) 100 MG/5ML oral liquid, Take 10 mL (200 mg total) by mouth 3 (three) times a day as needed for cough or congestion, Disp: 120 mL, Rfl: 0    ketoconazole (NIZORAL) 2 % cream, Apply topically daily, Disp: 60 g, Rfl: 0    levocetirizine (XYZAL) 5 MG tablet, Take 1 tablet (5 mg total) by mouth daily At 8 PM, Disp: 90 tablet, Rfl: 0    levothyroxine 88 mcg tablet, Take 1 tablet (88 mcg total) by mouth daily in the early morning, Disp: 30 tablet, Rfl: 0    Milk of Magnesia 400 MG/5ML oral suspension, , Disp: , Rfl:     Multiple Vitamins-Minerals (Multivitamin Women 50+) TABS, Take 1 tablet by mouth in the morning, Disp: 90 tablet, Rfl: 3    nystatin (MYCOSTATIN) powder, Apply topically 3 (three) times a day as needed (rash), Disp: 15 g, Rfl: 1    pantoprazole (PROTONIX) 40 mg tablet, Take 1 tablet (40 mg total) by mouth 2 (two) times a day before meals, Disp: 60 tablet, Rfl: 0    polyethylene glycol (MIRALAX) 17 g packet, , Disp: , Rfl:     sodium chloride (OCEAN) 0.65 % nasal spray, 1 spray into each nostril every hour as needed for congestion, Disp: 15 mL, Rfl: 2    Spacer/Aero Chamber Mouthpiece (SPACER DEVICE) for metered dose inhaler, For use with metered dose inhaler, Disp: 1 Device, Rfl: 0    Patient Active Problem List   Diagnosis    Obstructive sleep apnea syndrome    Mild intermittent asthma without complication    Down syndrome    History of prolonged Q-T interval on ECG    Hidradenitis    Prediabetes    Mild intellectual disability    Hypothyroidism    Hyperlipidemia    Hypersomnia    H/O congenital heart disease    Amenorrhea    Seasonal allergic rhinitis    Sleep disorder    PMS (premenstrual syndrome)    Prolonged QT interval    Schizophrenia (HCC)    Vasomotor  rhinitis    Vitamin D deficiency    RSV infection    Murmur, cardiac    Nonintractable epilepsy with complex partial seizures (HCC)    Allergy to environmental factors    Obesity (BMI 35.0-39.9 without comorbidity)    Preoperative evaluation to rule out surgical contraindication    ROBERTO (obstructive sleep apnea)    Chronic bronchitis, unspecified chronic bronchitis type (HCC)    Body mass index (BMI) 40.0-44.9, adult (HCC)    Seasonal allergic rhinitis due to pollen    Acute metabolic encephalopathy    Esophagitis    CPAP (continuous positive airway pressure) dependence    Gastroesophageal reflux disease          Patient ID: Chela Petersen is a 49 y.o. female.    HPI    The following portions of the patient's history were reviewed and updated as appropriate:     family history includes No Known Problems in her mother. She was adopted.      reports that she has never smoked. She has never used smokeless tobacco. She reports that she does not drink alcohol and does not use drugs.    Vitals:    03/05/25 1310   Resp: 17       Review of Systems      Objective:  Patient's shoes and socks removed.   Foot Exam    General  General Appearance: appears stated age and healthy   Orientation: alert and oriented to person, place, and time       Right Foot/Ankle     Inspection and Palpation  Tenderness: metatarsals   Swelling: dorsum   Arch: pes planus  Skin Exam: dry skin and tinea;     Neurovascular  Dorsalis pedis: 2+  Posterior tibial: 2+      Left Foot/Ankle      Inspection and Palpation  Tenderness: metatarsals   Swelling: dorsum   Arch: pes planus  Skin Exam: dry skin and tinea;     Neurovascular  Dorsalis pedis: 2+  Posterior tibial: 2+      Physical Exam  Vitals and nursing note reviewed.   Constitutional:       Appearance: Normal appearance.   Cardiovascular:      Rate and Rhythm: Normal rate and regular rhythm.      Pulses:           Dorsalis pedis pulses are 2+ on the right side and 2+ on the left side.         Posterior tibial pulses are 2+ on the right side and 2+ on the left side.   Feet:      Right foot:      Skin integrity: Dry skin present.      Left foot:      Skin integrity: Dry skin present.   Skin:     Capillary Refill: Capillary refill takes less than 2 seconds.      Comments: All nails are dystrophic.  They demonstrate distal mycosis.  Left hallux has paronychia fibular nail groove.  Negative pus.  Patient has xerosis of skin secondary to dermatophytosis.   Neurological:      Mental Status: She is alert.   Psychiatric:         Mood and Affect: Mood normal.         Thought Content: Thought content normal.         Judgment: Judgment normal.

## 2025-03-07 ENCOUNTER — TELEPHONE (OUTPATIENT)
Age: 50
End: 2025-03-07

## 2025-03-07 NOTE — TELEPHONE ENCOUNTER
[facility] contacted us, they will need an updated script sent over to their pharmacy for the Depakote.    They use:  [facility]     Please advise.

## 2025-03-11 ENCOUNTER — HOSPITAL ENCOUNTER (OUTPATIENT)
Dept: RADIOLOGY | Facility: HOSPITAL | Age: 50
Discharge: HOME | End: 2025-03-11
Payer: MEDICARE

## 2025-03-11 VITALS — HEIGHT: 56 IN | BODY MASS INDEX: 39.59 KG/M2 | WEIGHT: 176 LBS

## 2025-03-11 DIAGNOSIS — Z12.31 ENCOUNTER FOR SCREENING MAMMOGRAM FOR BREAST CANCER: ICD-10-CM

## 2025-03-11 PROCEDURE — 77067 SCR MAMMO BI INCL CAD: CPT

## 2025-03-11 PROCEDURE — 77063 BREAST TOMOSYNTHESIS BI: CPT

## 2025-03-14 DIAGNOSIS — F20.9 SCHIZOPHRENIA, UNSPECIFIED TYPE (HCC): Chronic | ICD-10-CM

## 2025-03-14 DIAGNOSIS — K20.90 ESOPHAGITIS: ICD-10-CM

## 2025-03-14 DIAGNOSIS — E03.9 HYPOTHYROIDISM: ICD-10-CM

## 2025-03-14 DIAGNOSIS — Z76.0 MEDICATION REFILL: ICD-10-CM

## 2025-03-14 DIAGNOSIS — F91.9 DISRUPTIVE BEHAVIOR: ICD-10-CM

## 2025-03-16 RX ORDER — ARIPIPRAZOLE 10 MG/1
10 TABLET ORAL DAILY
Qty: 90 TABLET | Refills: 0 | Status: SHIPPED | OUTPATIENT
Start: 2025-03-16

## 2025-03-16 RX ORDER — DIVALPROEX SODIUM 500 MG/1
1500 TABLET, FILM COATED, EXTENDED RELEASE ORAL DAILY
Qty: 270 TABLET | Refills: 0 | Status: SHIPPED | OUTPATIENT
Start: 2025-03-16

## 2025-03-16 RX ORDER — LEVOTHYROXINE SODIUM 88 UG/1
88 TABLET ORAL
Qty: 90 TABLET | Refills: 0 | Status: SHIPPED | OUTPATIENT
Start: 2025-03-16

## 2025-03-16 RX ORDER — PANTOPRAZOLE SODIUM 40 MG/1
40 TABLET, DELAYED RELEASE ORAL
Qty: 180 TABLET | Refills: 0 | Status: SHIPPED | OUTPATIENT
Start: 2025-03-16

## 2025-04-15 DIAGNOSIS — J30.89 ALLERGIC RHINITIS DUE TO OTHER ALLERGIC TRIGGER, UNSPECIFIED SEASONALITY: ICD-10-CM

## 2025-04-15 RX ORDER — LEVOCETIRIZINE DIHYDROCHLORIDE 5 MG/1
5 TABLET, FILM COATED ORAL DAILY
Qty: 90 TABLET | Refills: 0 | Status: SHIPPED | OUTPATIENT
Start: 2025-04-15

## 2025-04-30 ENCOUNTER — OFFICE VISIT (OUTPATIENT)
Dept: PULMONOLOGY | Facility: MEDICAL CENTER | Age: 50
End: 2025-04-30
Payer: MEDICARE

## 2025-04-30 VITALS
HEART RATE: 62 BPM | TEMPERATURE: 98.2 F | HEIGHT: 56 IN | WEIGHT: 179 LBS | RESPIRATION RATE: 12 BRPM | OXYGEN SATURATION: 96 % | BODY MASS INDEX: 40.26 KG/M2

## 2025-04-30 DIAGNOSIS — G47.33 OBSTRUCTIVE SLEEP APNEA SYNDROME: Primary | Chronic | ICD-10-CM

## 2025-04-30 DIAGNOSIS — J30.1 SEASONAL ALLERGIC RHINITIS DUE TO POLLEN: ICD-10-CM

## 2025-04-30 DIAGNOSIS — J45.20 MILD INTERMITTENT ASTHMA WITHOUT COMPLICATION: Chronic | ICD-10-CM

## 2025-04-30 PROCEDURE — 99214 OFFICE O/P EST MOD 30 MIN: CPT | Performed by: NURSE PRACTITIONER

## 2025-04-30 NOTE — ASSESSMENT & PLAN NOTE
Chela continues to do well she is no longer on a maintenance inhaler.  It was recently found that patient does was found via EGD.  He did present to the emergency department in February 2025 which was thought to be bronchitis.  However on 13 January she did have EGD.  That did show some mild abnormalities.  She is now on a tamp resolved 40 mg twice daily cough is much improved she does use her rescue inhaler on an as-needed basis which has been rarely if at all

## 2025-04-30 NOTE — ASSESSMENT & PLAN NOTE
Has a history of obstructive sleep apnea..  Current is reviewed from March 30, 2020 25-4 28 2025 patient is 100% compliant her average usage is 9 hours and 13 minutes this is on a CPAP 8 to 14 cm of water pressure she is also on 3 L supplemental oxygen.  Chela did have a nocturnal pulse oximetry done on spring 2024.  This was on her CPAP with 2 L of supplemental oxygen.  Oxygen below 89% was for 17 minutes and 41 seconds.  Compliance does reveal excellent compliance with AHI reduced to 2.5 events per hour.  Her median pressure is 12.6 max is 13.3 she continues to use and benefit.  Her mother who accompanies her today she does often reinforce with staff at the group home for which Chela resides to make sure that her mask stays on.  She continues to do well    Orders:    Oxygen Supplies

## 2025-04-30 NOTE — PROGRESS NOTES
Consultation - Pulmonary Medicine   Name: Chela Petersen      : 1975      MRN: [id number]  Encounter Provider: SESAR Garza  Encounter Date: 2025   Encounter department: [facility] PULMONARY [facility]    Requesting Provider:   No referring provider defined for this encounter.     Reason for Consult: :  Assessment & Plan  Obstructive sleep apnea syndrome  Has a history of obstructive sleep apnea..  Current is reviewed from  patient is 100% compliant her average usage is 9 hours and 13 minutes this is on a CPAP 8 to 14 cm of water pressure she is also on 3 L supplemental oxygen.  Chela did have a nocturnal pulse oximetry done on spring 2024.  This was on her CPAP with 2 L of supplemental oxygen.  Oxygen below 89% was for 17 minutes and 41 seconds.  Compliance does reveal excellent compliance with AHI reduced to 2.5 events per hour.  Her median pressure is 12.6 max is 13.3 she continues to use and benefit.  Her mother who accompanies her today she does often reinforce with staff at the group home for which Chela resides to make sure that her mask stays on.  She continues to do well    Orders:    Oxygen Supplies    Mild intermittent asthma without complication  Chela continues to do well she is no longer on a maintenance inhaler.  It was recently found that patient does was found via EGD.  He did present to the emergency department in 2025 which was thought to be bronchitis.  However on  she did have EGD.  That did show some mild abnormalities.  She is now on a tamp resolved 40 mg twice daily cough is much improved she does use her rescue inhaler on an as-needed basis which has been rarely if at all         Seasonal allergic rhinitis due to pollen  This visit Dr. Hank Harris did write a prescription for Flonase or fluticasone nasal spray to use as needed.  Again she does live in a group home and this is monitored by staff.   According to her mother she does not seem to be suffering for any allergies or discomfort at this point           Return in about 6 months (around 10/30/2025).  History of Present Illness   Chela Petersen is a 49 y.o. female who presents follow-up.  This 49-year-old female actually presented to the emergency department February 10, 2025.  She had started with a fever and decreased oxygen in the group home.  As a result of this she did have a EGD that was performed January 13, 2025 by Dr. Cate gupta there was mild erythema noted at the GE junction mucosa biopsy was performed, mild abnormal mucosa with erosion in the antrum, and the duodenal bulb, first part of duodenum and second part of duodenum appeared normal there was no esophageal mass obstruction or stricture seen.  EGD showed mild gastritis.  She is now on a tamp resolved 40 mg twice daily before meals.  It is noted that her mother accompanies her today.  Was last seen in the office by Dr. Hank Harris in October 2024.  She does have a history of mild intermittent asthma but has not needed any maintenance inhaler.  She also has a history of obstructive sleep apnea she does have an AirSense 10 auto CPAP set to 8 to 14 cm of water pressure her DME company is [employer] she uses 2 L of supplemental oxygen at nighttime.    Review of Systems   Constitutional: Negative.    HENT: Negative.     Eyes: Negative.    Respiratory:  Positive for cough.    Cardiovascular: Negative.    Gastrointestinal: Negative.    Endocrine: Negative.    Genitourinary: Negative.    Musculoskeletal: Negative.    Skin: Negative.    Allergic/Immunologic: Negative.    Neurological: Negative.    Hematological: Negative.    Psychiatric/Behavioral: Negative.         Aside from what is mentioned in the HPI, ROS is otherwise negative    Medical History Reviewed by provider this encounter:  Tobacco  Allergies  Meds  Problems  Med Hx  Surg Hx  Fam Hx     .    Historical Information  "  Past Medical History:   Diagnosis Date    Asthma     Complex partial epilepsy (HCC)     COVID 02/2021    Diabetes mellitus (HCC)     Disease of thyroid gland     Down syndrome     Heart murmur     Hydradenitis     Hyperlipidemia     Long Q-T syndrome     Pneumonia     Last assessed 5/28/2014     Psychiatric disorder     schizo    Sleep apnea      Past Surgical History:   Procedure Laterality Date    CARDIAC SURGERY      patent duct    PATENT DUCTUS ARTERIOUS LIGATION       Social History   Social History     Tobacco Use   Smoking Status Never   Smokeless Tobacco Never       Occupational/Environmental history:      Family History:   Family History   Adopted: Yes   Problem Relation Age of Onset    No Known Problems Mother        Objective   Pulse 62   Temp 98.2 °F (36.8 °C) (Temporal)   Resp 12   Ht 4' 8\" (1.422 m)   Wt 81.2 kg (179 lb)   LMP  (LMP Unknown)   SpO2 96%   BMI 40.13 kg/m²      Physical Exam  Constitutional:       Appearance: She is obese.   HENT:      Head: Normocephalic and atraumatic.      Nose: Nose normal.      Mouth/Throat:      Mouth: Mucous membranes are moist.      Pharynx: Oropharynx is clear.      Comments: Crowded oral airway  Eyes:      Pupils: Pupils are equal, round, and reactive to light.   Cardiovascular:      Rate and Rhythm: Normal rate and regular rhythm.      Pulses: Normal pulses.      Heart sounds: Murmur heard.   Pulmonary:      Effort: Pulmonary effort is normal.      Breath sounds: Normal breath sounds.   Musculoskeletal:      Cervical back: Normal range of motion.   Skin:     General: Skin is warm.      Capillary Refill: Capillary refill takes less than 2 seconds.   Neurological:      General: No focal deficit present.      Mental Status: She is alert and oriented to person, place, and time.   Psychiatric:         Mood and Affect: Mood normal.         Behavior: Behavior normal.           Diagnostic Data:  Labs: I personally reviewed the most recent laboratory data " "pertinent to today's visit.      Radiology results:        PFT/spirometry results:  No results found for: \"FEV1\", \"FVC\", \"ONT1YTZ\", \"TLC\", \"DLCO\"        Oximetry testing:      Other studies:      SESAR Garza      "

## 2025-04-30 NOTE — ASSESSMENT & PLAN NOTE
This visit Dr. Hank Harris did write a prescription for Flonase or fluticasone nasal spray to use as needed.  Again she does live in a group home and this is monitored by staff.  According to her mother she does not seem to be suffering for any allergies or discomfort at this point

## 2025-05-13 DIAGNOSIS — B36.9 FUNGAL INFECTION OF SKIN: ICD-10-CM

## 2025-05-13 DIAGNOSIS — E78.5 HYPERLIPIDEMIA, UNSPECIFIED HYPERLIPIDEMIA TYPE: ICD-10-CM

## 2025-05-13 DIAGNOSIS — Z76.0 MEDICATION REFILL: ICD-10-CM

## 2025-05-13 RX ORDER — ATORVASTATIN CALCIUM 10 MG/1
10 TABLET, FILM COATED ORAL DAILY
Qty: 90 TABLET | Refills: 3 | Status: SHIPPED | OUTPATIENT
Start: 2025-05-13

## 2025-05-13 RX ORDER — NYSTATIN 100000 [USP'U]/G
POWDER TOPICAL 3 TIMES DAILY PRN
Qty: 15 G | Refills: 1 | Status: SHIPPED | OUTPATIENT
Start: 2025-05-13

## 2025-05-14 ENCOUNTER — OFFICE VISIT (OUTPATIENT)
Age: 50
End: 2025-05-14

## 2025-05-14 VITALS
RESPIRATION RATE: 17 BRPM | HEART RATE: 59 BPM | SYSTOLIC BLOOD PRESSURE: 129 MMHG | HEIGHT: 56 IN | WEIGHT: 178 LBS | BODY MASS INDEX: 40.04 KG/M2 | OXYGEN SATURATION: 99 % | DIASTOLIC BLOOD PRESSURE: 81 MMHG

## 2025-05-14 DIAGNOSIS — Z23 ENCOUNTER FOR IMMUNIZATION: ICD-10-CM

## 2025-05-14 DIAGNOSIS — E03.9 HYPOTHYROIDISM: ICD-10-CM

## 2025-05-14 DIAGNOSIS — Z00.00 MEDICARE ANNUAL WELLNESS VISIT, SUBSEQUENT: Primary | ICD-10-CM

## 2025-05-14 PROCEDURE — 90715 TDAP VACCINE 7 YRS/> IM: CPT | Performed by: FAMILY MEDICINE

## 2025-05-14 PROCEDURE — G0439 PPPS, SUBSEQ VISIT: HCPCS | Performed by: FAMILY MEDICINE

## 2025-05-14 PROCEDURE — 90471 IMMUNIZATION ADMIN: CPT | Performed by: FAMILY MEDICINE

## 2025-05-14 RX ORDER — LEVOTHYROXINE SODIUM 75 UG/1
75 TABLET ORAL
Start: 2025-05-14

## 2025-05-14 NOTE — ASSESSMENT & PLAN NOTE
Medication dosage correction for chart.   Previously listed as levothyroxine 88 mcg daily, but patient is actually taking 75 mcg daily.   Hypothyroidism is managed by patient's endocrinologist at [facility]   Orders:  •  levothyroxine 75 mcg tablet; Take 1 tablet (75 mcg total) by mouth daily in the early morning

## 2025-05-14 NOTE — PATIENT INSTRUCTIONS
Medicare Preventive Visit Patient Instructions  Thank you for completing your Welcome to Medicare Visit or Medicare Annual Wellness Visit today. Your next wellness visit will be due in one year (5/15/2026).  The screening/preventive services that you may require over the next 5-10 years are detailed below. Some tests may not apply to you based off risk factors and/or age. Screening tests ordered at today's visit but not completed yet may show as past due. Also, please note that scanned in results may not display below.  Preventive Screenings:  Service Recommendations Previous Testing/Comments   Colorectal Cancer Screening  * Colonoscopy    * Fecal Occult Blood Test (FOBT)/Fecal Immunochemical Test (FIT)  * Fecal DNA/Cologuard Test  * Flexible Sigmoidoscopy Age: 45-75 years old   Colonoscopy: every 10 years (may be performed more frequently if at higher risk)  OR  FOBT/FIT: every 1 year  OR  Cologuard: every 3 years  OR  Sigmoidoscopy: every 5 years  Screening may be recommended earlier than age 45 if at higher risk for colorectal cancer. Also, an individualized decision between you and your healthcare provider will decide whether screening between the ages of 76-85 would be appropriate. Colonoscopy: Not on file  FOBT/FIT: Not on file  Cologuard: 06/09/2024  Sigmoidoscopy: Not on file    Screening Current     Breast Cancer Screening Age: 40+ years old  Frequency: every 1-2 years  Not required if history of left and right mastectomy Mammogram: 03/11/2025    Screening Current   Cervical Cancer Screening Between the ages of 21-29, pap smear recommended once every 3 years.   Between the ages of 30-65, can perform pap smear with HPV co-testing every 5 years.   Recommendations may differ for women with a history of total hysterectomy, cervical cancer, or abnormal pap smears in past. Pap Smear: 05/21/2024    Screening Current   Hepatitis C Screening Once for adults born between 1945 and 1965  More frequently in patients at  high risk for Hepatitis C Hep C Antibody: 01/30/2021    Screening Current   Diabetes Screening 1-2 times per year if you're at risk for diabetes or have pre-diabetes Fasting glucose: 106 mg/dL (12/9/2024)  A1C: 6.1 (7/19/2024)  Screening Current   Cholesterol Screening Once every 5 years if you don't have a lipid disorder. May order more often based on risk factors. Lipid panel: 05/17/2022    Screening Not Indicated  History Lipid Disorder     Other Preventive Screenings Covered by Medicare:  Abdominal Aortic Aneurysm (AAA) Screening: covered once if your at risk. You're considered to be at risk if you have a family history of AAA.  Lung Cancer Screening: covers low dose CT scan once per year if you meet all of the following conditions: (1) Age 55-77; (2) No signs or symptoms of lung cancer; (3) Current smoker or have quit smoking within the last 15 years; (4) You have a tobacco smoking history of at least 20 pack years (packs per day multiplied by number of years you smoked); (5) You get a written order from a healthcare provider.  Glaucoma Screening: covered annually if you're considered high risk: (1) You have diabetes OR (2) Family history of glaucoma OR (3)  aged 50 and older OR (4)  American aged 65 and older  Osteoporosis Screening: covered every 2 years if you meet one of the following conditions: (1) You're estrogen deficient and at risk for osteoporosis based off medical history and other findings; (2) Have a vertebral abnormality; (3) On glucocorticoid therapy for more than 3 months; (4) Have primary hyperparathyroidism; (5) On osteoporosis medications and need to assess response to drug therapy.   Last bone density test (DXA Scan): Not on file.  HIV Screening: covered annually if you're between the age of 15-65. Also covered annually if you are younger than 15 and older than 65 with risk factors for HIV infection. For pregnant patients, it is covered up to 3 times per  pregnancy.    Immunizations:  Immunization Recommendations   Influenza Vaccine Annual influenza vaccination during flu season is recommended for all persons aged >= 6 months who do not have contraindications   Pneumococcal Vaccine   * Pneumococcal conjugate vaccine = PCV13 (Prevnar 13), PCV15 (Vaxneuvance), PCV20 (Prevnar 20)  * Pneumococcal polysaccharide vaccine = PPSV23 (Pneumovax) Adults 19-63 yo with certain risk factors or if 65+ yo  If never received any pneumonia vaccine: recommend Prevnar 20 (PCV20)  Give PCV20 if previously received 1 dose of PCV13 or PPSV23   Hepatitis B Vaccine 3 dose series if at intermediate or high risk (ex: diabetes, end stage renal disease, liver disease)   Respiratory syncytial virus (RSV) Vaccine - COVERED BY MEDICARE PART D  * RSVPreF3 (Arexvy) CDC recommends that adults 60 years of age and older may receive a single dose of RSV vaccine using shared clinical decision-making (SCDM)   Tetanus (Td) Vaccine - COST NOT COVERED BY MEDICARE PART B Following completion of primary series, a booster dose should be given every 10 years to maintain immunity against tetanus. Td may also be given as tetanus wound prophylaxis.   Tdap Vaccine - COST NOT COVERED BY MEDICARE PART B Recommended at least once for all adults. For pregnant patients, recommended with each pregnancy.   Shingles Vaccine (Shingrix) - COST NOT COVERED BY MEDICARE PART B  2 shot series recommended in those 19 years and older who have or will have weakened immune systems or those 50 years and older     Health Maintenance Due:      Topic Date Due   • Breast Cancer Screening: Mammogram  03/11/2026   • Colorectal Cancer Screening  06/09/2027   • Cervical Cancer Screening  05/21/2029   • HIV Screening  Completed   • Hepatitis C Screening  Completed     Immunizations Due:      Topic Date Due   • Pneumococcal Vaccine: Pediatrics (0 to 5 Years) and At-Risk Patients (6 to 64 Years) (1 of 2 - PCV) Never done   • COVID-19 Vaccine (1 -  2024-25 season) Never done     Advance Directives   What are advance directives?  Advance directives are legal documents that state your wishes and plans for medical care. These plans are made ahead of time in case you lose your ability to make decisions for yourself. Advance directives can apply to any medical decision, such as the treatments you want, and if you want to donate organs.   What are the types of advance directives?  There are many types of advance directives, and each state has rules about how to use them. You may choose a combination of any of the following:  Living will:  This is a written record of the treatment you want. You can also choose which treatments you do not want, which to limit, and which to stop at a certain time. This includes surgery, medicine, IV fluid, and tube feedings.   Durable power of  for healthcare (DPAHC):  This is a written record that states who you want to make healthcare choices for you when you are unable to make them for yourself. This person, called a proxy, is usually a family member or a friend. You may choose more than 1 proxy.  Do not resuscitate (DNR) order:  A DNR order is used in case your heart stops beating or you stop breathing. It is a request not to have certain forms of treatment, such as CPR. A DNR order may be included in other types of advance directives.  Medical directive:  This covers the care that you want if you are in a coma, near death, or unable to make decisions for yourself. You can list the treatments you want for each condition. Treatment may include pain medicine, surgery, blood transfusions, dialysis, IV or tube feedings, and a ventilator (breathing machine).  Values history:  This document has questions about your views, beliefs, and how you feel and think about life. This information can help others choose the care that you would choose.  Why are advance directives important?  An advance directive helps you control your care.  Although spoken wishes may be used, it is better to have your wishes written down. Spoken wishes can be misunderstood, or not followed. Treatments may be given even if you do not want them. An advance directive may make it easier for your family to make difficult choices about your care.   Urinary Incontinence   Urinary incontinence (UI)  is when you lose control of your bladder. UI develops because your bladder cannot store or empty urine properly. The 3 most common types of UI are stress incontinence, urge incontinence, or both.  Medicines:   May be given to help strengthen your bladder control. Report any side effects of medication to your healthcare provider.  Do pelvic muscle exercises often:  Your pelvic muscles help you stop urinating. Squeeze these muscles tight for 5 seconds, then relax for 5 seconds. Gradually work up to squeezing for 10 seconds. Do 3 sets of 15 repetitions a day, or as directed. This will help strengthen your pelvic muscles and improve bladder control.  Train your bladder:  Go to the bathroom at set times, such as every 2 hours, even if you do not feel the urge to go. You can also try to hold your urine when you feel the urge to go. For example, hold your urine for 5 minutes when you feel the urge to go. As that becomes easier, hold your urine for 10 minutes.   Self-care:   Keep a UI record.  Write down how often you leak urine and how much you leak. Make a note of what you were doing when you leaked urine.  Drink liquids as directed. You may need to limit the amount of liquid you drink to help control your urine leakage. Do not drink any liquid right before you go to bed. Limit or do not have drinks that contain caffeine or alcohol.   Prevent constipation.  Eat a variety of high-fiber foods. Good examples are high-fiber cereals, beans, vegetables, and whole-grain breads. Walking is the best way to trigger your intestines to have a bowel movement.  Exercise regularly and maintain a  healthy weight.  Weight loss and exercise will decrease pressure on your bladder and help you control your leakage.   Use a catheter as directed  to help empty your bladder. A catheter is a tiny, plastic tube that is put into your bladder to drain your urine.   Go to behavior therapy as directed.  Behavior therapy may be used to help you learn to control your urge to urinate.    Weight Management   Why it is important to manage your weight:  Being overweight increases your risk of health conditions such as heart disease, high blood pressure, type 2 diabetes, and certain types of cancer. It can also increase your risk for osteoarthritis, sleep apnea, and other respiratory problems. Aim for a slow, steady weight loss. Even a small amount of weight loss can lower your risk of health problems.  How to lose weight safely:  A safe and healthy way to lose weight is to eat fewer calories and get regular exercise. You can lose up about 1 pound a week by decreasing the number of calories you eat by 500 calories each day.   Healthy meal plan for weight management:  A healthy meal plan includes a variety of foods, contains fewer calories, and helps you stay healthy. A healthy meal plan includes the following:  Eat whole-grain foods more often.  A healthy meal plan should contain fiber. Fiber is the part of grains, fruits, and vegetables that is not broken down by your body. Whole-grain foods are healthy and provide extra fiber in your diet. Some examples of whole-grain foods are whole-wheat breads and pastas, oatmeal, brown rice, and bulgur.  Eat a variety of vegetables every day.  Include dark, leafy greens such as spinach, kale, tera greens, and mustard greens. Eat yellow and orange vegetables such as carrots, sweet potatoes, and winter squash.   Eat a variety of fruits every day.  Choose fresh or canned fruit (canned in its own juice or light syrup) instead of juice. Fruit juice has very little or no fiber.  Eat low-fat  dairy foods.  Drink fat-free (skim) milk or 1% milk. Eat fat-free yogurt and low-fat cottage cheese. Try low-fat cheeses such as mozzarella and other reduced-fat cheeses.  Choose meat and other protein foods that are low in fat.  Choose beans or other legumes such as split peas or lentils. Choose fish, skinless poultry (chicken or turkey), or lean cuts of red meat (beef or pork). Before you cook meat or poultry, cut off any visible fat.   Use less fat and oil.  Try baking foods instead of frying them. Add less fat, such as margarine, sour cream, regular salad dressing and mayonnaise to foods. Eat fewer high-fat foods. Some examples of high-fat foods include french fries, doughnuts, ice cream, and cakes.  Eat fewer sweets.  Limit foods and drinks that are high in sugar. This includes candy, cookies, regular soda, and sweetened drinks.  Exercise:  Exercise at least 30 minutes per day on most days of the week. Some examples of exercise include walking, biking, dancing, and swimming. You can also fit in more physical activity by taking the stairs instead of the elevator or parking farther away from stores. Ask your healthcare provider about the best exercise plan for you.    © Copyright [employer] 2018 Information is for End User's use only and may not be sold, redistributed or otherwise used for commercial purposes. All illustrations and images included in CareNotes® are the copyrighted property of [employer] or [employer]

## 2025-05-14 NOTE — PROGRESS NOTES
Name: Chela Petersen      : 1975      MRN: [id number]  Encounter Provider: Trina Riggins DO  Encounter Date: 2025   Encounter department: [facility]  :  Assessment & Plan  Medicare annual wellness visit, subsequent  No acute concerns at this visit   Annual state physical form for group home completed for patient        Encounter for immunization  Patient declined pneumonia vaccine at this visit. Wants to discuss with pulmonologist and cardiologist first.   Immunization for Tdap booster administered.   Orders:  •  TDAP VACCINE GREATER THAN OR EQUAL TO 6YO IM    Hypothyroidism  Medication dosage correction for chart.   Previously listed as levothyroxine 88 mcg daily, but patient is actually taking 75 mcg daily.   Hypothyroidism is managed by patient's endocrinologist at [facility]   Orders:  •  levothyroxine 75 mcg tablet; Take 1 tablet (75 mcg total) by mouth daily in the early morning       Preventive health issues were discussed with patient, and age appropriate screening tests were ordered as noted in patient's After Visit Summary. Personalized health advice and appropriate referrals for health education or preventive services given if needed, as noted in patient's After Visit Summary.    History of Present Illness     Chela Petersen is a 49 y.o. female who presents with her mother for her Medicare Annual Wellness visit.   Chela has no acute concerns at this time.        Patient Care Team:  Trina Riggins DO as PCP - General (Family Medicine)  Marin Lo MD as PCP - PCP-[facility] ([facility])  Marin Lo MD as PCP - PCP-[facility] ([facility])  TERESA Weston MD Michael Nekoranik, DO Michael Nekoranik, DO as Consulting Physician (Pulmonology)    Review of Systems   Constitutional:  Negative for chills and fever.   HENT:  Negative for ear pain and sore throat.    Eyes:  Negative for pain and visual disturbance.   Respiratory:   Negative for cough and shortness of breath.    Cardiovascular:  Negative for chest pain and palpitations.   Gastrointestinal:  Negative for abdominal pain and vomiting.   Genitourinary:  Negative for dysuria and hematuria.   Musculoskeletal:  Negative for arthralgias and back pain.   Skin:  Negative for color change and rash.   Neurological:  Negative for dizziness and headaches.   All other systems reviewed and are negative.    Medical History Reviewed by provider this encounter:  Tobacco  Allergies  Meds  Problems  Med Hx  Surg Hx  Fam Hx       Annual Wellness Visit Questionnaire   Last Medicare Wellness visit information reviewed, patient interviewed and updates made to the record today.      Health Risk Assessment:   Patient rates overall health as very good. Patient feels that their physical health rating is slightly better. Patient is very satisfied with their life. Eyesight was rated as same. Hearing was rated as same. Patient feels that their emotional and mental health rating is slightly better. Patients states they are never, rarely angry. Patient states they are sometimes unusually tired/fatigued. Pain experienced in the last 7 days has been some. Patient's pain rating has been 1/10. Patient states that she has experienced no weight loss or gain in last 6 months.     Depression Screening:   PHQ-2 Score: 0      Fall Risk Screening:   In the past year, patient has experienced: no history of falling in past year      Urinary Incontinence Screening:   Patient has leaked urine accidently in the last six months. Has urinary continence supplies, wears diapers/pad occasionally as needed based on activities     Home Safety:  Patient does not have trouble with stairs inside or outside of their home. Patient has working smoke alarms and has working carbon monoxide detector. Home safety hazards include: none.     Nutrition:   Current diet is Regular and Diabetic.     Medications:   Patient is currently taking  over-the-counter supplements. OTC medications include: see medication list. Patient is not able to manage medications. Medications managed by group home and by mother when patient visits home.     Activities of Daily Living (ADLs)/Instrumental Activities of Daily Living (IADLs):   Walk and transfer into and out of bed and chair?: Yes  Dress and groom yourself?: Yes    Bathe or shower yourself?: Yes    Feed yourself? Yes  Do your laundry/housekeeping?: Yes  Manage your money, pay your bills and track your expenses?: No  Make your own meals?: No    Do your own shopping?: Yes    ADL comments: Meals managed by group home   Mother helps with financial aspects     Previous Hospitalizations:   Any hospitalizations or ED visits within the last 12 months?: No      Advance Care Planning:   Living will: No    Durable POA for healthcare: Yes    Advanced directive: No      Preventive Screenings      Cardiovascular Screening:    General: Screening Not Indicated and History Lipid Disorder      Diabetes Screening:     General: Screening Current      Colorectal Cancer Screening:     General: Screening Current      Breast Cancer Screening:     General: Screening Current      Cervical Cancer Screening:    General: Screening Current      Lung Cancer Screening:     General: Screening Not Indicated      Hepatitis C Screening:    General: Screening Current    Immunizations:  - Immunizations due: Prevnar 20 and Tdap  - Risks/benefits immunizations discussed    - Immunizations given per orders    - The patient declines recommended vaccines currently despite my recommendations      Screening, Brief Intervention, and Referral to Treatment (SBIRT)     Screening  Typical number of drinks in a day: 0  Typical number of drinks in a week: 0  Interpretation: Low risk drinking behavior.    Single Item Drug Screening:  How often have you used an illegal drug (including marijuana) or a prescription medication for non-medical reasons in the past year?  "never    Single Item Drug Screen Score: 0  Interpretation: Negative screen for possible drug use disorder    Other Counseling Topics:   Calcium and vitamin D intake. Patient declined pneumonia vaccine at this visit. Wants to discuss with pulmonologist and cardiologist first.   Immunization for Tdap booster administered.     Social Drivers of Health     Financial Resource Strain: Low Risk  (5/14/2025)    Overall Financial Resource Strain (CARDIA)    • Difficulty of Paying Living Expenses: Not very hard   Food Insecurity: No Food Insecurity (5/14/2025)    Nursing - Inadequate Food Risk Classification    • Worried About Running Out of Food in the Last Year: Never true    • Ran Out of Food in the Last Year: Never true    • Ran Out of Food in the Last Year: Never true   Transportation Needs: No Transportation Needs (5/14/2025)    PRAPARE - Transportation    • Lack of Transportation (Medical): No    • Lack of Transportation (Non-Medical): No   Housing Stability: Low Risk  (5/14/2025)    Housing Stability Vital Sign    • Unable to Pay for Housing in the Last Year: No    • Number of Times Moved in the Last Year: 0    • Homeless in the Last Year: No   Utilities: Not At Risk (5/14/2025)    [facility] Utilities    • Threatened with loss of utilities: No     No results found.    Objective   /81 (BP Location: Right arm, Patient Position: Sitting)   Pulse 59   Resp 17   Ht 4' 8\" (1.422 m)   Wt 80.7 kg (178 lb)   LMP  (LMP Unknown)   SpO2 99%   BMI 39.91 kg/m²     Physical Exam  Vitals reviewed.   Constitutional:       General: She is not in acute distress.     Appearance: She is obese.   HENT:      Head: Normocephalic and atraumatic.      Right Ear: External ear normal.      Left Ear: External ear normal.      Nose: Nose normal.      Mouth/Throat:      Mouth: Mucous membranes are moist.     Eyes:      General: No scleral icterus.     Extraocular Movements: Extraocular movements intact.      Conjunctiva/sclera: Conjunctivae " normal.      Pupils: Pupils are equal, round, and reactive to light.       Cardiovascular:      Rate and Rhythm: Normal rate and regular rhythm.      Heart sounds: Normal heart sounds. No murmur heard.  Pulmonary:      Effort: Pulmonary effort is normal. No respiratory distress.      Breath sounds: Normal breath sounds.   Abdominal:      Palpations: Abdomen is soft. There is no mass.      Tenderness: There is no abdominal tenderness.     Musculoskeletal:      Cervical back: Neck supple.      Right lower leg: No edema.      Left lower leg: No edema.   Lymphadenopathy:      Cervical: No cervical adenopathy.     Skin:     General: Skin is warm.     Neurological:      Mental Status: She is alert and oriented to person, place, and time. Mental status is at baseline.     Psychiatric:         Mood and Affect: Mood normal.         Behavior: Behavior normal.

## 2025-05-14 NOTE — LETTER
May 14, 2025     Patient: Chela Petersen   YOB: 1975   Date of Visit: 5/14/2025     Please see the attached Medical History for the above named patient, Chela Petersen.     Patient Active Problem List   Diagnosis   • Obstructive sleep apnea syndrome   • Mild intermittent asthma without complication   • Down syndrome   • History of prolonged Q-T interval on ECG   • Hidradenitis   • Prediabetes   • Mild intellectual disability   • Hypothyroidism   • Hyperlipidemia   • Hypersomnia   • H/O congenital heart disease   • Amenorrhea   • Seasonal allergic rhinitis   • Sleep disorder   • PMS (premenstrual syndrome)   • Prolonged QT interval   • Schizophrenia (HCC)   • Vasomotor rhinitis   • Vitamin D deficiency   • RSV infection   • Murmur, cardiac   • Nonintractable epilepsy with complex partial seizures (HCC)   • Allergy to environmental factors   • Obesity (BMI 35.0-39.9 without comorbidity)   • Preoperative evaluation to rule out surgical contraindication   • ROBERTO (obstructive sleep apnea)   • Chronic bronchitis, unspecified chronic bronchitis type (HCC)   • Body mass index (BMI) 40.0-44.9, adult ([facility])   • Seasonal allergic rhinitis due to pollen   • Acute metabolic encephalopathy   • Esophagitis   • CPAP (continuous positive airway pressure) dependence   • Gastroesophageal reflux disease                     Patient: Chela Petersen   YOB: 1975   Date of Visit: 5/14/2025     Past Medical History:   Diagnosis Date   • Asthma    • Complex partial epilepsy (HCC)    • COVID 02/2021   • Diabetes mellitus (HCC)    • Disease of thyroid gland    • Down syndrome    • Heart murmur    • Hydradenitis    • Hyperlipidemia    • Long Q-T syndrome    • Pneumonia     Last assessed 5/28/2014    • Psychiatric disorder     schizo   • Sleep apnea

## 2025-05-19 ENCOUNTER — TELEPHONE (OUTPATIENT)
Age: 50
End: 2025-05-19

## 2025-05-21 LAB
DME PARACHUTE DELIVERY DATE ACTUAL: NORMAL
DME PARACHUTE DELIVERY DATE REQUESTED: NORMAL
DME PARACHUTE ITEM DESCRIPTION: NORMAL
DME PARACHUTE ORDER STATUS: NORMAL
DME PARACHUTE SUPPLIER NAME: NORMAL
DME PARACHUTE SUPPLIER PHONE: NORMAL

## 2025-06-04 ENCOUNTER — PROCEDURE VISIT (OUTPATIENT)
Age: 50
End: 2025-06-04
Payer: MEDICARE

## 2025-06-04 VITALS — BODY MASS INDEX: 40.04 KG/M2 | RESPIRATION RATE: 17 BRPM | WEIGHT: 178 LBS | HEIGHT: 56 IN

## 2025-06-04 DIAGNOSIS — M79.672 PAIN IN BOTH FEET: Primary | ICD-10-CM

## 2025-06-04 DIAGNOSIS — L03.032 PARONYCHIA OF TOENAIL OF LEFT FOOT: ICD-10-CM

## 2025-06-04 DIAGNOSIS — M79.671 PAIN IN BOTH FEET: Primary | ICD-10-CM

## 2025-06-04 DIAGNOSIS — B35.9 DERMATOPHYTOSIS: ICD-10-CM

## 2025-06-04 PROCEDURE — 99212 OFFICE O/P EST SF 10 MIN: CPT | Performed by: PODIATRIST

## 2025-06-04 NOTE — PROGRESS NOTES
Assessment/Plan: Pain upon ambulation.  Mycosis of nail.  Paronychia left hallux.  Dermatophytosis/xerosis of skin.     Plan.  Chart reviewed.  PCP notes reviewed.  Patient evaluated.  Staff advised on condition.  Written instruction given.  They will consider moisturizer daily.  No cutting instruction given.  Shoe style recommendations made.  Aftercare instruction given.  Watch for signs of infection.  Return for follow-up.           Assessment  Diagnoses and all orders for this visit:     Pain in both feet     Onychomycosis     Paronychia of toenail of left foot     Dermatophytosis              Subjective: Patient presents for evaluation.  She has pain in her feet with ambulation and shoewear.  No history of trauma.           Allergies   Allergen Reactions    Avandia [Rosiglitazone]         CHF    Nsaids Other (See Comments)       unknown           Current Medications      Current Outpatient Medications:     albuterol (PROVENTIL HFA,VENTOLIN HFA) 90 mcg/act inhaler, Inhale 2 puffs every 4 (four) hours as needed for wheezing, Disp: 18 g, Rfl: 2    ARIPiprazole (ABILIFY) 10 mg tablet, Take 1 tablet (10 mg total) by mouth daily Daily at 8 AM, Disp: 90 tablet, Rfl: 3    atorvastatin (LIPITOR) 10 mg tablet, Take 1 tablet (10 mg total) by mouth daily At 8pm, Disp: 90 tablet, Rfl: 3    bacitracin topical ointment 500 units/g topical ointment, Apply 1 large application topically 3 (three) times a day as needed for wound care, Disp: 15 g, Rfl: 2    calcium carbonate (OS-ESTEPHANIA) 600 MG tablet, Take 1 tablet (600 mg total) by mouth daily Daily with breakfast, Disp: 180 tablet, Rfl: 0    clindamycin (CLINDAGEL) 1 % gel, Apply topically 2 (two) times a day Apply to L axilla at 8 am / 8pm, Disp: 30 g, Rfl: 1    divalproex sodium (DEPAKOTE ER) 500 mg 24 hr tablet, Take 3 tablets (1,500 mg total) by mouth daily at 8 AM, Disp: 90 tablet, Rfl: 11    ergocalciferol (VITAMIN D2) 50,000 units, Take 1 capsule (50,000 Units total) by mouth  once a week Administer on sunday, Disp: 4 capsule, Rfl: 2    Eyelid Cleansers (OCUSOFT BABY EYELID & EYELASH EX), Inhale, Disp: , Rfl:     fluticasone (FLONASE) 50 mcg/act nasal spray, Use 2 sprays in each nostril daily at bedtime as needed for nasal congestion., Disp: 16 g, Rfl: 5    guaiFENesin (ROBITUSSIN) 100 MG/5ML oral liquid, Take 10 mL (200 mg total) by mouth 3 (three) times a day as needed for cough or congestion, Disp: 120 mL, Rfl: 0    ketoconazole (NIZORAL) 2 % cream, Apply topically daily, Disp: 60 g, Rfl: 0    levocetirizine (XYZAL) 5 MG tablet, Take 1 tablet (5 mg total) by mouth daily At 8 PM, Disp: 90 tablet, Rfl: 0    levothyroxine 88 mcg tablet, Take 1 tablet (88 mcg total) by mouth daily in the early morning, Disp: 30 tablet, Rfl: 0    Milk of Magnesia 400 MG/5ML oral suspension, , Disp: , Rfl:     Multiple Vitamins-Minerals (Multivitamin Women 50+) TABS, Take 1 tablet by mouth in the morning, Disp: 90 tablet, Rfl: 3    nystatin (MYCOSTATIN) powder, Apply topically 3 (three) times a day as needed (rash), Disp: 15 g, Rfl: 1    pantoprazole (PROTONIX) 40 mg tablet, Take 1 tablet (40 mg total) by mouth 2 (two) times a day before meals, Disp: 60 tablet, Rfl: 0    polyethylene glycol (MIRALAX) 17 g packet, , Disp: , Rfl:     sodium chloride (OCEAN) 0.65 % nasal spray, 1 spray into each nostril every hour as needed for congestion, Disp: 15 mL, Rfl: 2    Spacer/Aero Chamber Mouthpiece (SPACER DEVICE) for metered dose inhaler, For use with metered dose inhaler, Disp: 1 Device, Rfl: 0        Problem List       Patient Active Problem List   Diagnosis    Obstructive sleep apnea syndrome    Mild intermittent asthma without complication    Down syndrome    History of prolonged Q-T interval on ECG    Hidradenitis    Prediabetes    Mild intellectual disability    Hypothyroidism    Hyperlipidemia    Hypersomnia    H/O congenital heart disease    Amenorrhea    Seasonal allergic rhinitis    Sleep disorder    PMS  (premenstrual syndrome)    Prolonged QT interval    Schizophrenia (HCC)    Vasomotor rhinitis    Vitamin D deficiency    RSV infection    Murmur, cardiac    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 female.     HPI     The following portions of the patient's history were reviewed and updated as appropriate:      family history includes No Known Problems in her mother. She was adopted.       reports that she has never smoked. She has never used smokeless tobacco. She reports that she does not drink alcohol and does not use drugs.           Objective:  Patient's shoes and socks removed.  Objective  Foot Exam     General  General Appearance: appears stated age and healthy   Orientation: alert and oriented to person, place, and time         Right Foot/Ankle      Inspection and Palpation  Tenderness: metatarsals   Swelling: dorsum   Arch: pes planus  Skin Exam: dry skin and tinea;      Neurovascular  Dorsalis pedis: 2+  Posterior tibial: 2+        Left Foot/Ankle       Inspection and Palpation  Tenderness: metatarsals   Swelling: dorsum   Arch: pes planus  Skin Exam: dry skin and tinea;      Neurovascular  Dorsalis pedis: 2+  Posterior tibial: 2+        Physical Exam  Vitals and nursing note reviewed.   Constitutional:       Appearance: Normal appearance.   Cardiovascular:      Rate and Rhythm: Normal rate and regular rhythm.      Pulses:           Dorsalis pedis  pulses are 2+ on the right side and 2+ on the left side.        Posterior tibial pulses are 2+ on the right side and 2+ on the left side.   Feet:      Right foot:      Skin integrity: Dry skin present.      Left foot:      Skin integrity: Dry skin present.   Skin:     Capillary Refill: Capillary refill takes less than 2 seconds.      Comments: All nails are dystrophic.  They demonstrate distal mycosis.  Left hallux has paronychia fibular nail groove.  Negative pus.  Patient has xerosis of skin secondary to dermatophytosis.   Neurological:      Mental Status: She is alert.   Psychiatric:         Mood and Affect: Mood normal.         Thought Content: Thought content normal.         Judgment: Judgment normal.

## 2025-06-09 ENCOUNTER — TELEPHONE (OUTPATIENT)
Age: 50
End: 2025-06-09

## 2025-06-09 NOTE — TELEPHONE ENCOUNTER
"[employer] - Over the Counter Medications for \"As Needed\" Use    Scanned copy into encounter    Placed in Dr. Riggins's folder in precepting room.    Call when complete: [phone]  "

## 2025-06-12 NOTE — TELEPHONE ENCOUNTER
Celine from [facility] called to check on the status of the OTC form. I advised her we did receive it and would call when it's complete.      Celine - [phone]

## 2025-06-13 DIAGNOSIS — Z76.0 MEDICATION REFILL: ICD-10-CM

## 2025-06-13 DIAGNOSIS — E03.9 HYPOTHYROIDISM: ICD-10-CM

## 2025-06-13 DIAGNOSIS — F91.9 DISRUPTIVE BEHAVIOR: ICD-10-CM

## 2025-06-13 DIAGNOSIS — K20.90 ESOPHAGITIS: ICD-10-CM

## 2025-06-13 NOTE — TELEPHONE ENCOUNTER
Celine stated that patient has been taking levothyroxine 88mcg. If that is not correct, she would need a D/C script for that medication. Please advise.

## 2025-06-16 DIAGNOSIS — F91.9 DISRUPTIVE BEHAVIOR: ICD-10-CM

## 2025-06-16 DIAGNOSIS — Z76.0 MEDICATION REFILL: ICD-10-CM

## 2025-06-16 DIAGNOSIS — K20.90 ESOPHAGITIS: ICD-10-CM

## 2025-06-17 DIAGNOSIS — E03.9 HYPOTHYROIDISM: ICD-10-CM

## 2025-06-17 DIAGNOSIS — Z76.0 MEDICATION REFILL: ICD-10-CM

## 2025-06-17 DIAGNOSIS — F91.9 DISRUPTIVE BEHAVIOR: ICD-10-CM

## 2025-06-17 RX ORDER — DIVALPROEX SODIUM 500 MG/1
1500 TABLET, FILM COATED, EXTENDED RELEASE ORAL DAILY
Qty: 270 TABLET | Refills: 0 | OUTPATIENT
Start: 2025-06-17

## 2025-06-17 RX ORDER — LEVOTHYROXINE SODIUM 75 UG/1
75 TABLET ORAL
OUTPATIENT
Start: 2025-06-17

## 2025-06-17 RX ORDER — LEVOTHYROXINE SODIUM 88 UG/1
88 TABLET ORAL
Qty: 90 TABLET | Refills: 1 | Status: SHIPPED | OUTPATIENT
Start: 2025-06-17

## 2025-06-17 RX ORDER — PANTOPRAZOLE SODIUM 40 MG/1
40 TABLET, DELAYED RELEASE ORAL
Qty: 180 TABLET | Refills: 1 | Status: SHIPPED | OUTPATIENT
Start: 2025-06-17

## 2025-06-17 RX ORDER — PANTOPRAZOLE SODIUM 40 MG/1
40 TABLET, DELAYED RELEASE ORAL
Qty: 180 TABLET | Refills: 0 | OUTPATIENT
Start: 2025-06-17

## 2025-06-17 RX ORDER — DIVALPROEX SODIUM 500 MG/1
1500 TABLET, FILM COATED, EXTENDED RELEASE ORAL DAILY
Qty: 270 TABLET | Refills: 1 | Status: SHIPPED | OUTPATIENT
Start: 2025-06-17

## 2025-06-20 ENCOUNTER — OFFICE VISIT (OUTPATIENT)
Dept: URGENT CARE | Facility: CLINIC | Age: 50
End: 2025-06-20
Payer: MEDICARE

## 2025-06-20 VITALS
OXYGEN SATURATION: 95 % | WEIGHT: 178 LBS | RESPIRATION RATE: 20 BRPM | TEMPERATURE: 97.9 F | HEIGHT: 56 IN | HEART RATE: 79 BPM | BODY MASS INDEX: 40.04 KG/M2

## 2025-06-20 DIAGNOSIS — H10.31 ACUTE CONJUNCTIVITIS OF RIGHT EYE, UNSPECIFIED ACUTE CONJUNCTIVITIS TYPE: Primary | ICD-10-CM

## 2025-06-20 PROCEDURE — 99213 OFFICE O/P EST LOW 20 MIN: CPT | Performed by: PREVENTIVE MEDICINE

## 2025-06-20 RX ORDER — GENTAMICIN SULFATE 3 MG/ML
1 SOLUTION/ DROPS OPHTHALMIC EVERY 4 HOURS
Qty: 5 ML | Refills: 0 | Status: SHIPPED | OUTPATIENT
Start: 2025-06-20

## 2025-06-20 NOTE — PROGRESS NOTES
"  [facility]        NAME: Chela Petersen is a 49 y.o. female  : 1975    MRN: [id number]  DATE: 2025  TIME: 3:47 PM    Assessment and Plan   Acute conjunctivitis of right eye, unspecified acute conjunctivitis type [H10.31]  1. Acute conjunctivitis of right eye, unspecified acute conjunctivitis type  gentamicin (GARAMYCIN) 0.3 % ophthalmic solution            Patient Instructions       Follow up with PCP in 3-5 days.  Proceed to  ER if symptoms worsen.    If tests have been performed at [facility], our office will contact you with results if changes need to be made to the care plan discussed with you at the visit.  [facility]    Chief Complaint     Chief Complaint   Patient presents with    Conjunctivitis     Rt  eye conjunctivitis         History of Present Illness       Discharge right eye    Conjunctivitis   Associated symptoms include eye discharge and eye redness.       Review of Systems   Review of Systems   Eyes:  Positive for discharge and redness.         Current Medications     Current Medications[1]    Current Allergies     Allergies as of 2025 - Reviewed 2025   Allergen Reaction Noted    Avandia [rosiglitazone]  2014    Nsaids Other (See Comments) 2021            The following portions of the patient's history were reviewed and updated as appropriate: allergies, current medications, past family history, past medical history, past social history, past surgical history and problem list.     Past Medical History[2]    Past Surgical History[3]    Family History[4]      Medications have been verified.        Objective   Pulse 79   Temp 97.9 °F (36.6 °C)   Resp 20   Ht 4' 8\" (1.422 m)   Wt 80.7 kg (178 lb)   LMP  (LMP Unknown)   SpO2 95%   BMI 39.91 kg/m²   No LMP recorded (lmp unknown). Patient is postmenopausal.       Physical Exam     Physical Exam    Eyes:      General:         Right eye: Discharge present. "                          [1]   Current Outpatient Medications:     albuterol (PROVENTIL HFA,VENTOLIN HFA) 90 mcg/act inhaler, Inhale 2 puffs every 4 (four) hours as needed for wheezing, Disp: 18 g, Rfl: 2    ARIPiprazole (ABILIFY) 10 mg tablet, Take 1 tablet (10 mg total) by mouth daily Daily at 8 AM, Disp: 90 tablet, Rfl: 0    atorvastatin (LIPITOR) 10 mg tablet, Take 1 tablet (10 mg total) by mouth daily At 8pm, Disp: 90 tablet, Rfl: 3    bacitracin topical ointment 500 units/g topical ointment, Apply 1 large application topically 3 (three) times a day as needed for wound care, Disp: 15 g, Rfl: 2    calcium carbonate (OS-ESTEPHANIA) 600 MG tablet, Take 1 tablet (600 mg total) by mouth daily Daily with breakfast, Disp: 180 tablet, Rfl: 0    clindamycin (CLINDAGEL) 1 % gel, Apply topically 2 (two) times a day Apply to L axilla at 8 am / 8pm, Disp: 30 g, Rfl: 1    divalproex sodium (DEPAKOTE ER) 500 mg 24 hr tablet, Take 3 tablets (1,500 mg total) by mouth daily at 8 AM, Disp: 270 tablet, Rfl: 1    Eyelid Cleansers (OCUSOFT BABY EYELID & EYELASH EX), Inhale, Disp: , Rfl:     fluticasone (FLONASE) 50 mcg/act nasal spray, Use 2 sprays in each nostril daily at bedtime as needed for nasal congestion., Disp: 16 g, Rfl: 5    gentamicin (GARAMYCIN) 0.3 % ophthalmic solution, Administer 1 drop to the right eye every 4 (four) hours, Disp: 5 mL, Rfl: 0    levocetirizine (XYZAL) 5 MG tablet, Take 1 tablet (5 mg total) by mouth daily At 8 PM, Disp: 90 tablet, Rfl: 0    levothyroxine 88 mcg tablet, Take 1 tablet (88 mcg total) by mouth daily in the early morning, Disp: 90 tablet, Rfl: 1    Milk of Magnesia 400 MG/5ML oral suspension, , Disp: , Rfl:     Multiple Vitamins-Minerals (Multivitamin Women 50+) TABS, Take 1 tablet by mouth in the morning, Disp: 90 tablet, Rfl: 3    nystatin (MYCOSTATIN) powder, Apply topically 3 (three) times a day as needed (rash), Disp: 15 g, Rfl: 1    pantoprazole (PROTONIX) 40 mg tablet, Take 1 tablet (40  mg total) by mouth 2 (two) times a day before meals, Disp: 180 tablet, Rfl: 1    sodium chloride (OCEAN) 0.65 % nasal spray, 1 spray into each nostril every hour as needed for congestion, Disp: 15 mL, Rfl: 2    Spacer/Aero Chamber Mouthpiece (SPACER DEVICE) for metered dose inhaler, For use with metered dose inhaler, Disp: 1 Device, Rfl: 0    ergocalciferol (VITAMIN D2) 50,000 units, Take 1 capsule (50,000 Units total) by mouth once a week Administer on sunday (Patient not taking: Reported on 6/20/2025), Disp: 4 capsule, Rfl: 2    guaiFENesin (ROBITUSSIN) 100 MG/5ML oral liquid, Take 10 mL (200 mg total) by mouth 3 (three) times a day as needed for cough or congestion (Patient not taking: Reported on 4/30/2025), Disp: 120 mL, Rfl: 0    ketoconazole (NIZORAL) 2 % cream, Apply topically daily (Patient not taking: Reported on 6/20/2025), Disp: 60 g, Rfl: 0    polyethylene glycol (MIRALAX) 17 g packet, , Disp: , Rfl:   [2]   Past Medical History:  Diagnosis Date    Asthma     Complex partial epilepsy (HCC)     COVID 02/2021    Diabetes mellitus (HCC)     Disease of thyroid gland     Down syndrome     Heart murmur     Hydradenitis     Hyperlipidemia     Long Q-T syndrome     Pneumonia     Last assessed 5/28/2014     Psychiatric disorder     schizo    Sleep apnea    [3]   Past Surgical History:  Procedure Laterality Date    CARDIAC SURGERY      patent duct    PATENT DUCTUS ARTERIOUS LIGATION     [4]   Family History  Adopted: Yes   Problem Relation Name Age of Onset    No Known Problems Mother

## 2025-07-16 DIAGNOSIS — Z76.0 MEDICATION REFILL: ICD-10-CM

## 2025-07-16 DIAGNOSIS — J30.89 ALLERGIC RHINITIS DUE TO OTHER ALLERGIC TRIGGER, UNSPECIFIED SEASONALITY: ICD-10-CM

## 2025-07-16 DIAGNOSIS — F20.9 SCHIZOPHRENIA, UNSPECIFIED TYPE (HCC): Chronic | ICD-10-CM

## 2025-07-17 RX ORDER — ARIPIPRAZOLE 10 MG/1
10 TABLET ORAL DAILY
Qty: 90 TABLET | Refills: 0 | Status: SHIPPED | OUTPATIENT
Start: 2025-07-17

## 2025-07-17 RX ORDER — LEVOCETIRIZINE DIHYDROCHLORIDE 5 MG/1
5 TABLET, FILM COATED ORAL DAILY
Qty: 90 TABLET | Refills: 0 | Status: SHIPPED | OUTPATIENT
Start: 2025-07-17